# Patient Record
Sex: FEMALE | Race: ASIAN | NOT HISPANIC OR LATINO | URBAN - METROPOLITAN AREA
[De-identification: names, ages, dates, MRNs, and addresses within clinical notes are randomized per-mention and may not be internally consistent; named-entity substitution may affect disease eponyms.]

---

## 2019-07-15 ENCOUNTER — INPATIENT (INPATIENT)
Facility: HOSPITAL | Age: 69
LOS: 3 days | Discharge: HOME | DRG: 644 | End: 2019-07-19
Payer: MEDICARE

## 2019-07-15 VITALS
SYSTOLIC BLOOD PRESSURE: 156 MMHG | DIASTOLIC BLOOD PRESSURE: 88 MMHG | TEMPERATURE: 98 F | HEART RATE: 75 BPM | RESPIRATION RATE: 18 BRPM | OXYGEN SATURATION: 99 %

## 2019-07-15 DIAGNOSIS — Z98.890 OTHER SPECIFIED POSTPROCEDURAL STATES: Chronic | ICD-10-CM

## 2019-07-15 LAB
ALBUMIN SERPL ELPH-MCNC: 4.1 G/DL — SIGNIFICANT CHANGE UP (ref 3.4–5)
ALBUMIN SERPL ELPH-MCNC: 4.4 G/DL — SIGNIFICANT CHANGE UP (ref 3.3–5)
ALP SERPL-CCNC: 45 U/L — SIGNIFICANT CHANGE UP (ref 40–120)
ALP SERPL-CCNC: 45 U/L — SIGNIFICANT CHANGE UP (ref 40–120)
ALT FLD-CCNC: 26 U/L — SIGNIFICANT CHANGE UP (ref 10–45)
ALT FLD-CCNC: 36 U/L — SIGNIFICANT CHANGE UP (ref 12–42)
ANION GAP SERPL CALC-SCNC: 12 MMOL/L — SIGNIFICANT CHANGE UP (ref 5–17)
ANION GAP SERPL CALC-SCNC: 14 MMOL/L — SIGNIFICANT CHANGE UP (ref 5–17)
ANION GAP SERPL CALC-SCNC: 9 MMOL/L — SIGNIFICANT CHANGE UP (ref 9–16)
APPEARANCE UR: CLEAR — SIGNIFICANT CHANGE UP
APTT BLD: 30.3 SEC — SIGNIFICANT CHANGE UP (ref 27.5–36.3)
AST SERPL-CCNC: 37 U/L — SIGNIFICANT CHANGE UP (ref 15–37)
AST SERPL-CCNC: 41 U/L — HIGH (ref 10–40)
BASOPHILS # BLD AUTO: 0.11 K/UL — SIGNIFICANT CHANGE UP (ref 0–0.2)
BASOPHILS NFR BLD AUTO: 0.2 % — SIGNIFICANT CHANGE UP (ref 0–2)
BASOPHILS NFR BLD AUTO: 1.7 % — SIGNIFICANT CHANGE UP (ref 0–2)
BILIRUB SERPL-MCNC: 0.7 MG/DL — SIGNIFICANT CHANGE UP (ref 0.2–1.2)
BILIRUB SERPL-MCNC: 0.8 MG/DL — SIGNIFICANT CHANGE UP (ref 0.2–1.2)
BILIRUB UR-MCNC: NEGATIVE — SIGNIFICANT CHANGE UP
BLD GP AB SCN SERPL QL: NEGATIVE — SIGNIFICANT CHANGE UP
BUN SERPL-MCNC: 15 MG/DL — SIGNIFICANT CHANGE UP (ref 7–23)
BUN SERPL-MCNC: 16 MG/DL — SIGNIFICANT CHANGE UP (ref 7–23)
BUN SERPL-MCNC: 20 MG/DL — SIGNIFICANT CHANGE UP (ref 7–23)
CALCIUM SERPL-MCNC: 9.3 MG/DL — SIGNIFICANT CHANGE UP (ref 8.5–10.5)
CALCIUM SERPL-MCNC: 9.9 MG/DL — SIGNIFICANT CHANGE UP (ref 8.4–10.5)
CALCIUM SERPL-MCNC: 9.9 MG/DL — SIGNIFICANT CHANGE UP (ref 8.4–10.5)
CHLORIDE SERPL-SCNC: 69 MMOL/L — LOW (ref 96–108)
CHLORIDE SERPL-SCNC: 73 MMOL/L — LOW (ref 96–108)
CHLORIDE SERPL-SCNC: 74 MMOL/L — LOW (ref 96–108)
CO2 SERPL-SCNC: 27 MMOL/L — SIGNIFICANT CHANGE UP (ref 22–31)
CO2 SERPL-SCNC: 27 MMOL/L — SIGNIFICANT CHANGE UP (ref 22–31)
CO2 SERPL-SCNC: 28 MMOL/L — SIGNIFICANT CHANGE UP (ref 22–31)
COLOR SPEC: YELLOW — SIGNIFICANT CHANGE UP
CREAT ?TM UR-MCNC: 16 MG/DL — SIGNIFICANT CHANGE UP
CREAT SERPL-MCNC: 0.74 MG/DL — SIGNIFICANT CHANGE UP (ref 0.5–1.3)
CREAT SERPL-MCNC: 0.76 MG/DL — SIGNIFICANT CHANGE UP (ref 0.5–1.3)
CREAT SERPL-MCNC: 0.97 MG/DL — SIGNIFICANT CHANGE UP (ref 0.5–1.3)
DIFF PNL FLD: ABNORMAL
EOSINOPHIL # BLD AUTO: 0.11 K/UL — SIGNIFICANT CHANGE UP (ref 0–0.5)
EOSINOPHIL NFR BLD AUTO: 1.6 % — SIGNIFICANT CHANGE UP (ref 0–6)
EOSINOPHIL NFR BLD AUTO: 1.7 % — SIGNIFICANT CHANGE UP (ref 0–6)
ETHANOL SERPL-MCNC: <3 MG/DL — SIGNIFICANT CHANGE UP
GLUCOSE SERPL-MCNC: 103 MG/DL — HIGH (ref 70–99)
GLUCOSE SERPL-MCNC: 108 MG/DL — HIGH (ref 70–99)
GLUCOSE SERPL-MCNC: 114 MG/DL — HIGH (ref 70–99)
GLUCOSE UR QL: NEGATIVE — SIGNIFICANT CHANGE UP
HCT VFR BLD CALC: 29.8 % — LOW (ref 34.5–45)
HCT VFR BLD CALC: 32.8 % — LOW (ref 34.5–45)
HCT VFR BLD CALC: 33.9 % — LOW (ref 34.5–45)
HGB BLD-MCNC: 11.2 G/DL — LOW (ref 11.5–15.5)
HGB BLD-MCNC: 12.2 G/DL — SIGNIFICANT CHANGE UP (ref 11.5–15.5)
HGB BLD-MCNC: 12.5 G/DL — SIGNIFICANT CHANGE UP (ref 11.5–15.5)
IMM GRANULOCYTES NFR BLD AUTO: 1.9 % — HIGH (ref 0–1.5)
INR BLD: 1.02 — SIGNIFICANT CHANGE UP (ref 0.88–1.16)
KETONES UR-MCNC: NEGATIVE — SIGNIFICANT CHANGE UP
LEUKOCYTE ESTERASE UR-ACNC: NEGATIVE — SIGNIFICANT CHANGE UP
LYMPHOCYTES # BLD AUTO: 1.45 K/UL — SIGNIFICANT CHANGE UP (ref 1–3.3)
LYMPHOCYTES # BLD AUTO: 16.8 % — SIGNIFICANT CHANGE UP (ref 13–44)
LYMPHOCYTES # BLD AUTO: 21.7 % — SIGNIFICANT CHANGE UP (ref 13–44)
MAGNESIUM SERPL-MCNC: 1.3 MG/DL — LOW (ref 1.6–2.6)
MAGNESIUM SERPL-MCNC: 1.8 MG/DL — SIGNIFICANT CHANGE UP (ref 1.6–2.6)
MAGNESIUM SERPL-MCNC: 2 MG/DL — SIGNIFICANT CHANGE UP (ref 1.6–2.6)
MCHC RBC-ENTMCNC: 31.5 PG — SIGNIFICANT CHANGE UP (ref 27–34)
MCHC RBC-ENTMCNC: 31.6 PG — SIGNIFICANT CHANGE UP (ref 27–34)
MCHC RBC-ENTMCNC: 31.9 PG — SIGNIFICANT CHANGE UP (ref 27–34)
MCHC RBC-ENTMCNC: 36.9 GM/DL — HIGH (ref 32–36)
MCHC RBC-ENTMCNC: 37.2 GM/DL — HIGH (ref 32–36)
MCHC RBC-ENTMCNC: 37.6 G/DL — HIGH (ref 32–36)
MCV RBC AUTO: 83.7 FL — SIGNIFICANT CHANGE UP (ref 80–100)
MCV RBC AUTO: 85.6 FL — SIGNIFICANT CHANGE UP (ref 80–100)
MCV RBC AUTO: 85.6 FL — SIGNIFICANT CHANGE UP (ref 80–100)
MONOCYTES # BLD AUTO: 0.64 K/UL — SIGNIFICANT CHANGE UP (ref 0–0.9)
MONOCYTES NFR BLD AUTO: 11.1 % — SIGNIFICANT CHANGE UP (ref 2–14)
MONOCYTES NFR BLD AUTO: 9.6 % — SIGNIFICANT CHANGE UP (ref 2–14)
NEUTROPHILS # BLD AUTO: 4.38 K/UL — SIGNIFICANT CHANGE UP (ref 1.8–7.4)
NEUTROPHILS NFR BLD AUTO: 61.8 % — SIGNIFICANT CHANGE UP (ref 43–77)
NEUTROPHILS NFR BLD AUTO: 68.4 % — SIGNIFICANT CHANGE UP (ref 43–77)
NITRITE UR-MCNC: NEGATIVE — SIGNIFICANT CHANGE UP
OSMOLALITY SERPL: 229 MOSMOL/KG — LOW (ref 280–301)
OSMOLALITY UR: 252 MOSMOL/KG — SIGNIFICANT CHANGE UP (ref 100–650)
PH UR: 7.5 — SIGNIFICANT CHANGE UP (ref 5–8)
PHOSPHATE SERPL-MCNC: 3.8 MG/DL — SIGNIFICANT CHANGE UP (ref 2.5–4.5)
PHOSPHATE SERPL-MCNC: 4.4 MG/DL — SIGNIFICANT CHANGE UP (ref 2.5–4.5)
PLATELET # BLD AUTO: 89 K/UL — LOW (ref 150–400)
PLATELET # BLD AUTO: 94 K/UL — LOW (ref 150–400)
PLATELET # BLD AUTO: 95 K/UL — LOW (ref 150–400)
POTASSIUM SERPL-MCNC: 2.5 MMOL/L — CRITICAL LOW (ref 3.5–5.3)
POTASSIUM SERPL-MCNC: 2.6 MMOL/L — CRITICAL LOW (ref 3.5–5.3)
POTASSIUM SERPL-MCNC: 2.9 MMOL/L — CRITICAL LOW (ref 3.5–5.3)
POTASSIUM SERPL-SCNC: 2.5 MMOL/L — CRITICAL LOW (ref 3.5–5.3)
POTASSIUM SERPL-SCNC: 2.6 MMOL/L — CRITICAL LOW (ref 3.5–5.3)
POTASSIUM SERPL-SCNC: 2.9 MMOL/L — CRITICAL LOW (ref 3.5–5.3)
PROT SERPL-MCNC: 7.3 G/DL — SIGNIFICANT CHANGE UP (ref 6.4–8.2)
PROT SERPL-MCNC: 7.8 G/DL — SIGNIFICANT CHANGE UP (ref 6–8.3)
PROT UR-MCNC: NEGATIVE MG/DL — SIGNIFICANT CHANGE UP
PROTHROM AB SERPL-ACNC: 11.2 SEC — SIGNIFICANT CHANGE UP (ref 10–12.9)
RBC # BLD: 3.56 M/UL — LOW (ref 3.8–5.2)
RBC # BLD: 3.83 M/UL — SIGNIFICANT CHANGE UP (ref 3.8–5.2)
RBC # BLD: 3.96 M/UL — SIGNIFICANT CHANGE UP (ref 3.8–5.2)
RBC # FLD: 11.9 % — SIGNIFICANT CHANGE UP (ref 10.3–14.5)
RBC # FLD: 11.9 % — SIGNIFICANT CHANGE UP (ref 10.3–14.5)
RBC # FLD: 12.1 % — SIGNIFICANT CHANGE UP (ref 10.3–14.5)
RH IG SCN BLD-IMP: POSITIVE — SIGNIFICANT CHANGE UP
SODIUM SERPL-SCNC: 110 MMOL/L — CRITICAL LOW (ref 135–145)
SODIUM SERPL-SCNC: 111 MMOL/L — CRITICAL LOW (ref 132–145)
SODIUM SERPL-SCNC: 112 MMOL/L — CRITICAL LOW (ref 135–145)
SODIUM UR-SCNC: 63 MMOL/L — SIGNIFICANT CHANGE UP
SP GR SPEC: 1.01 — SIGNIFICANT CHANGE UP (ref 1–1.03)
TSH SERPL-MCNC: 1.72 UIU/ML — SIGNIFICANT CHANGE UP (ref 0.35–4.94)
UROBILINOGEN FLD QL: 0.2 E.U./DL — SIGNIFICANT CHANGE UP
WBC # BLD: 6.08 K/UL — SIGNIFICANT CHANGE UP (ref 3.8–10.5)
WBC # BLD: 6.2 K/UL — SIGNIFICANT CHANGE UP (ref 3.8–10.5)
WBC # BLD: 6.7 K/UL — SIGNIFICANT CHANGE UP (ref 3.8–10.5)
WBC # FLD AUTO: 6.08 K/UL — SIGNIFICANT CHANGE UP (ref 3.8–10.5)
WBC # FLD AUTO: 6.2 K/UL — SIGNIFICANT CHANGE UP (ref 3.8–10.5)
WBC # FLD AUTO: 6.7 K/UL — SIGNIFICANT CHANGE UP (ref 3.8–10.5)

## 2019-07-15 PROCEDURE — 93010 ELECTROCARDIOGRAM REPORT: CPT

## 2019-07-15 PROCEDURE — 99291 CRITICAL CARE FIRST HOUR: CPT

## 2019-07-15 PROCEDURE — 70450 CT HEAD/BRAIN W/O DYE: CPT | Mod: 26

## 2019-07-15 RX ORDER — POTASSIUM CHLORIDE 20 MEQ
10 PACKET (EA) ORAL
Refills: 0 | Status: COMPLETED | OUTPATIENT
Start: 2019-07-15 | End: 2019-07-15

## 2019-07-15 RX ORDER — CHLORHEXIDINE GLUCONATE 213 G/1000ML
1 SOLUTION TOPICAL
Refills: 0 | Status: DISCONTINUED | OUTPATIENT
Start: 2019-07-15 | End: 2019-07-17

## 2019-07-15 RX ORDER — SODIUM CHLORIDE 5 G/100ML
30 INJECTION, SOLUTION INTRAVENOUS
Refills: 0 | Status: DISCONTINUED | OUTPATIENT
Start: 2019-07-15 | End: 2019-07-15

## 2019-07-15 RX ORDER — HEPARIN SODIUM 5000 [USP'U]/ML
5000 INJECTION INTRAVENOUS; SUBCUTANEOUS EVERY 8 HOURS
Refills: 0 | Status: DISCONTINUED | OUTPATIENT
Start: 2019-07-15 | End: 2019-07-19

## 2019-07-15 RX ORDER — NIFEDIPINE 30 MG
30 TABLET, EXTENDED RELEASE 24 HR ORAL DAILY
Refills: 0 | Status: DISCONTINUED | OUTPATIENT
Start: 2019-07-15 | End: 2019-07-16

## 2019-07-15 RX ORDER — SODIUM CHLORIDE 5 G/100ML
500 INJECTION, SOLUTION INTRAVENOUS
Refills: 0 | Status: DISCONTINUED | OUTPATIENT
Start: 2019-07-15 | End: 2019-07-15

## 2019-07-15 RX ORDER — POTASSIUM CHLORIDE 20 MEQ
40 PACKET (EA) ORAL ONCE
Refills: 0 | Status: DISCONTINUED | OUTPATIENT
Start: 2019-07-15 | End: 2019-07-15

## 2019-07-15 RX ORDER — POTASSIUM CHLORIDE 20 MEQ
40 PACKET (EA) ORAL ONCE
Refills: 0 | Status: COMPLETED | OUTPATIENT
Start: 2019-07-15 | End: 2019-07-15

## 2019-07-15 RX ORDER — MAGNESIUM SULFATE 500 MG/ML
2 VIAL (ML) INJECTION ONCE
Refills: 0 | Status: COMPLETED | OUTPATIENT
Start: 2019-07-15 | End: 2019-07-15

## 2019-07-15 RX ADMIN — Medication 100 MILLIEQUIVALENT(S): at 15:43

## 2019-07-15 RX ADMIN — HEPARIN SODIUM 5000 UNIT(S): 5000 INJECTION INTRAVENOUS; SUBCUTANEOUS at 21:30

## 2019-07-15 RX ADMIN — Medication 50 GRAM(S): at 15:03

## 2019-07-15 RX ADMIN — SODIUM CHLORIDE 30 MILLILITER(S): 5 INJECTION, SOLUTION INTRAVENOUS at 15:43

## 2019-07-15 RX ADMIN — Medication 100 MILLIEQUIVALENT(S): at 19:25

## 2019-07-15 RX ADMIN — Medication 100 MILLIEQUIVALENT(S): at 18:15

## 2019-07-15 RX ADMIN — Medication 100 MILLIEQUIVALENT(S): at 22:49

## 2019-07-15 RX ADMIN — SODIUM CHLORIDE 30 MILLILITER(S): 5 INJECTION, SOLUTION INTRAVENOUS at 18:42

## 2019-07-15 RX ADMIN — Medication 40 MILLIEQUIVALENT(S): at 21:31

## 2019-07-15 RX ADMIN — Medication 100 MILLIEQUIVALENT(S): at 21:30

## 2019-07-15 RX ADMIN — Medication 100 MILLIEQUIVALENT(S): at 20:30

## 2019-07-15 NOTE — H&P ADULT - HISTORY OF PRESENT ILLNESS
Ms. Thompson is a 69 year old  female with a past medical history significant for depression, HTN, and thrombocytopenia who presented to [facility] ED after losing balance and falling while walking. Patient denies hitting her head. She denies past episodes of dizziness or weakness. Patient states that two days ago she had 3 episodes of non-bilious and non-bloody vomiting after an episode of nausea. Patient states that she re-hydrated with 4 cups of water and felt better. The patient normall drinks 8 cups of seltzer per day and makes sure she stays "very hydrated". Last night the patient complained of trouble sleeping that did not get better with melatonin and was only able to sleep 3 hours after taking one of her prescribed lorazepam. Today she had jury duty and was walking across the walk way when she lost balance and fell. Patient admits to recent nausea, vomiting, and loss of balance. Patient denies headache, fever, changes in vision, dizziness, acid reflux, chest pain, palpitations, shortness of breath, changes in bowel movement or changes in urination. Patient denies changes in recent medication, travel, changes in weight or drinking habits.    ED Vitals: T 98.2 P 75 /88 RR 18 O2 99% on RA  ED Course: In the ED, Hgb 11.2, Sodium 111, Potassium 2.6, Chloride 74, Magnesium 1.3. CT Head: No acute intracranial hemorrhage or transcortical infarct. She received 30 ml of 3%NaCl and 2g of MgSulfate IV. She was admitted to [facility] MICU for workup and treatment of hyponatremia. Ms. Thompson is a 69 year old  female with a past medical history significant for depression, HTN, and thrombocytopenia who presented to [facility] ED after losing balance and falling while walking. Patient denies hitting her head. She denies past episodes of dizziness or weakness. Patient states that two days ago she had 3 episodes of non-bilious and non-bloody vomiting after an episode of nausea. Patient states that she re-hydrated with 4 cups of water and felt better. The patient normall drinks 8 cups of seltzer per day and makes sure she stays "very hydrated". She says that when she gets dehydrated and tired, she makes sure to drink water. to  Last night the patient complained of trouble sleeping that did not get better with melatonin and was only able to sleep 3 hours after taking one of her prescribed lorazepam. Today she had jury duty and was walking across the walk way when she lost balance and fell. Patient admits to recent nausea, vomiting, and loss of balance. Patient denies headache, fever, changes in vision, dizziness, acid reflux, chest pain, palpitations, shortness of breath, changes in bowel movement or changes in urination. Patient denies changes in recent medication, travel, changes in weight or drinking habits.    ED Vitals: T 98.2 P 75 /88 RR 18 O2 99% on RA  ED Course: In the ED, Hgb 11.2, Sodium 111, Potassium 2.6, Chloride 74, Magnesium 1.3. CT Head: No acute intracranial hemorrhage or transcortical infarct. She received 30 ml of 3%NaCl at 30 ml/hr and 2g of MgSulfate IV. She was admitted to [facility] MICU for workup and treatment of hyponatremia. Ms. Thompson is a 69 year old  female with a past medical history significant for depression, HTN, and thrombocytopenia who presented to [facility] ED after losing balance and falling while walking. Patient denies hitting her head or LOC. She denies past episodes of dizziness or weakness. Patient states that two days ago she had 3 episodes of non-bilious and non-bloody vomiting after an episode of nausea. Patient states that she re-hydrated with 4 cups of water and felt better. The patient normall drinks 8 cups of seltzer per day and makes sure she stays "very hydrated". She says that when she gets dehydrated and tired, she makes sure to drink water. to  Last night the patient complained of trouble sleeping that did not get better with melatonin and was only able to sleep 3 hours after taking one of her prescribed lorazepam. Today she had jury duty and was walking across the walk way when she lost balance and fell. Patient admits to recent nausea, vomiting, and loss of balance. Patient denies headache, fever, changes in vision, dizziness, acid reflux, chest pain, palpitations, shortness of breath, changes in bowel movement or changes in urination. Patient denies changes in recent medication, travel, changes in weight or drinking habits.    ED Vitals: T 98.2 P 75 /88 RR 18 O2 99% on RA  ED Course: In the ED, Hgb 11.2, Sodium 111, Potassium 2.6, Chloride 74, Magnesium 1.3. CT Head: No acute intracranial hemorrhage or transcortical infarct. She received 30 ml of 3%NaCl at 30 ml/hr and 2g of MgSulfate IV. She was admitted to [facility] MICU for workup and treatment of hyponatremia. Ms. Thompson is a 69 year old  female with a past medical history significant for depression, HTN, thoracic aortic aneurysm and thrombocytopenia who presented to [facility] ED after losing balance and falling while walking. Patient denies hitting her head or LOC. She denies past episodes of dizziness or weakness. Patient states that two days ago she had 3 episodes of non-bilious and non-bloody vomiting after an episode of nausea. Patient states that she re-hydrated with 4 cups of water and felt better. The patient normall drinks 8 cups of seltzer per day and makes sure she stays "very hydrated". She says that when she gets dehydrated and tired, she makes sure to drink water. to  Last night the patient complained of trouble sleeping that did not get better with melatonin and was only able to sleep 3 hours after taking one of her prescribed lorazepam. Today she had jury duty and was walking across the walk way when she lost balance and fell. Patient admits to recent nausea, vomiting, and loss of balance. Patient denies headache, fever, changes in vision, dizziness, acid reflux, chest pain, palpitations, shortness of breath, changes in bowel movement or changes in urination. Patient denies changes in recent medication, travel, changes in weight or drinking habits.    ED Vitals: T 98.2 P 75 /88 RR 18 O2 99% on RA  ED Course: In the ED, Hgb 11.2, Sodium 111, Potassium 2.6, Chloride 74, Magnesium 1.3. CT Head: No acute intracranial hemorrhage or transcortical infarct. She received 30 ml of 3%NaCl at 30 ml/hr and 2g of MgSulfate IV per Dr. Elias at [facility] and she was admitted to [facility] MICU for workup and treatment of hyponatremia.

## 2019-07-15 NOTE — H&P ADULT - NSICDXPASTMEDICALHX_GEN_ALL_CORE_FT
PAST MEDICAL HISTORY:  Colon polyp     Depression     HTN (hypertension)     Thoracic aortic aneurysm     Thrombocytopenia

## 2019-07-15 NOTE — ED PROVIDER NOTE - OBJECTIVE STATEMENT
70yo female pmh atypical depresison, remote hx alcohol abuse, "platelet issue," HTN BIBEMS for weakness. Pt was walking up stairs on way to jury duty, felt as if she is too weak to walk and sat down on the stairs. She denies falling but states she falls often, unsure why. Denies fevers, chills, n/v/d, abdominal pain, chest pain, LOC, dyspnea, cough, rash. Took lorazepam, melatonin and MAOI last night to sleep. States "I may have taken 1 lorazepam." Denies alcohol use. 70yo female pmh atypical depression, remote hx alcohol abuse, "platelet issue," HTN BIBEMS for weakness. Pt was walking up stairs on way to jury duty, felt as if she is too weak to walk and sat down on the stairs. She denies falling but states she falls often, unsure why. Denies fevers, chills, n/v/d, abdominal pain, chest pain, LOC, dyspnea, cough, rash. Took lorazepam, melatonin and MAOI last night to sleep. States "I may have taken 1 lorazepam." Denies alcohol use.

## 2019-07-15 NOTE — ED ADULT TRIAGE NOTE - CHIEF COMPLAINT QUOTE
here for generalized weakness - states she took her ativan, melatonin and her MAOIs at 2am- denies drinking alcohol, or recreational drugs- FS in the field is 112- presents with slurred speech

## 2019-07-15 NOTE — ED PROVIDER NOTE - CLINICAL SUMMARY MEDICAL DECISION MAKING FREE TEXT BOX
69F with weakness, slurred speech, unknown last known normal, hx platelet disorder, alcoholism. Poor historian, odd affect, history changing regarding falls, HPI. Considering pt high risk for intracranial bleeding, will CT head. Labs with alcohol level. EKG unremarkable. UA.

## 2019-07-15 NOTE — H&P ADULT - NSHPPHYSICALEXAM_GEN_ALL_CORE
.  VITAL SIGNS:  T(C): 36.3 (07-15-19 @ 18:08), Max: 36.8 (07-15-19 @ 13:15)  T(F): 97.4 (07-15-19 @ 18:08), Max: 98.2 (07-15-19 @ 13:15)  HR: 66 (07-15-19 @ 18:00) (66 - 75)  BP: 152/88 (07-15-19 @ 16:19) (152/88 - 171/100)  BP(mean): --  RR: 14 (07-15-19 @ 18:00) (14 - 18)  SpO2: 99% (07-15-19 @ 16:19) (98% - 99%)  Wt(kg): --    PHYSICAL EXAM:    Constitutional: No acute distress, resting in bed comfortably, pleasantly conversant  Head: NC/AT  Eyes: PERRL, EOMI, anicteric sclera, poor accomodation on the left eye  ENT: no nasal discharge; uvula midline, no oropharyngeal erythema or exudates; MMM  Neck: supple; no JVD or thyromegaly  Respiratory: CTA B/L; no W/R/R, no retractions  Cardiac: +S1/S2; RRR; no M/R/G  Gastrointestinal: soft, NT/ND; no rebound or guarding; +BSx4  Back: spine midline, no bony tenderness or step-offs  Extremities: WWP, no clubbing or cyanosis; no peripheral edema  Musculoskeletal: NROM x4; no joint swelling, tenderness or erythema  Vascular: 2+ radial, femoral, DP/PT pulses B/L  Dermatologic: skin warm, dry and intact; multiple scattered ecchymosis on bilateral legs,   Lymphatic: no submandibular or cervical LAD  Neurologic: AAOx3; CNII-XII grossly intact; no focal deficits  Psychiatric: affect and characteristics of appearance, verbalizations, behaviors are appropriate .  VITAL SIGNS:  T(C): 36.3 (07-15-19 @ 18:08), Max: 36.8 (07-15-19 @ 13:15)  T(F): 97.4 (07-15-19 @ 18:08), Max: 98.2 (07-15-19 @ 13:15)  HR: 66 (07-15-19 @ 18:00) (66 - 75)  BP: 152/88 (07-15-19 @ 16:19) (152/88 - 171/100)  BP(mean): --  RR: 14 (07-15-19 @ 18:00) (14 - 18)  SpO2: 99% (07-15-19 @ 16:19) (98% - 99%)  Wt(kg): --    PHYSICAL EXAM:    Constitutional: No acute distress, resting in bed comfortably, pleasantly conversant  Head: NC/AT  Eyes: PERRL, EOMI, anicteric sclera, poor accomodation on the left eye  ENT: no nasal discharge; uvula midline, no oropharyngeal erythema or exudates; MMM  Neck: supple; no JVD or thyromegaly  Respiratory: CTA B/L; no W/R/R, no retractions  Cardiac: +S1/S2; RRR; no M/R/G  Gastrointestinal: soft, NT/ND; no rebound or guarding; +BSx4  Back: spine midline, no bony tenderness or step-offs  Extremities: WWP, no clubbing or cyanosis; no peripheral edema  Musculoskeletal: NROM x4; no joint swelling, tenderness or erythema  Vascular: 2+ radial, femoral, DP/PT pulses B/L  Dermatologic: skin warm, dry and intact; multiple scattered ecchymosis on bilateral legs, and ecchymosis on right elbow  Lymphatic: no submandibular or cervical LAD  Neurologic: AAOx3; CNII-XII grossly intact; no focal deficits

## 2019-07-15 NOTE — H&P ADULT - NSHPLABSRESULTS_GEN_ALL_CORE
.  LABS:                         11.2   6.2   )-----------( 89       ( 15 Jul 2019 13:39 )             29.8     07-15    110<LL>  |  69<L>  |  16  ----------------------------<  114<H>  2.5<LL>   |  27  |  0.74    Ca    9.9      15 Jul 2019 17:30  Phos  3.8     07-15  Mg     2.0     07-15    TPro  7.8  /  Alb  4.4  /  TBili  0.8  /  DBili  x   /  AST  41<H>  /  ALT  26  /  AlkPhos  45  07-15    PT/INR - ( 15 Jul 2019 13:39 )   PT: 11.2 sec;   INR: 1.02          PTT - ( 15 Jul 2019 13:39 )  PTT:30.3 sec  Urinalysis Basic - ( 15 Jul 2019 14:49 )    Color: Yellow / Appearance: Clear / S.010 / pH: x  Gluc: x / Ketone: NEGATIVE  / Bili: NEGATIVE / Urobili: 0.2 E.U./dL   Blood: x / Protein: NEGATIVE mg/dL / Nitrite: NEGATIVE   Leuk Esterase: NEGATIVE / RBC: < 5 /HPF / WBC < 5 /HPF   Sq Epi: x / Non Sq Epi: 0-5 /HPF / Bacteria: Present /HPF    RADIOLOGY, EKG & ADDITIONAL TESTS: Reviewed.

## 2019-07-15 NOTE — ED ADULT NURSE NOTE - NSIMPLEMENTINTERV_GEN_ALL_ED
Implemented All Fall with Harm Risk Interventions:  [place] to call system. Call bell, personal items and telephone within reach. Instruct patient to call for assistance. Room bathroom lighting operational. Non-slip footwear when patient is off stretcher. Physically safe environment: no spills, clutter or unnecessary equipment. Stretcher in lowest position, wheels locked, appropriate side rails in place. Provide visual cue, wrist band, yellow gown, etc. Monitor gait and stability. Monitor for mental status changes and reorient to person, place, and time. Review medications for side effects contributing to fall risk. Reinforce activity limits and safety measures with patient and family. Provide visual clues: red socks.

## 2019-07-15 NOTE — H&P ADULT - ASSESSMENT
Neuro    Renal  #Electrolyte imbalances  #severe hyponatremia, symptomatic  - 3% at 30, recheck at 9    #hypokalemia  - replete as necessary    #hypomagnesemia   Mg 1.3-> 2.0, repleted with 20  - replete as necessary    Cardiovascular  #HTN  - Hold home Spironolactone    Respiratory  # none    Heme Onc  # Hx of thrombocytopenia, unknown   -     Psychiatric  # Hx of Depression  - Hold home MAOi  - Lorazepam for anxiety    DVT PPX  GI PPX    F: 3% NaCl at 30%  E:   N: 69 year old  female with a past medical history significant for depression, HTN, and thrombocytopenia who presented to [facility] ED after losing balance and falling while walking. In the ED, Hgb 11.2, Sodium 111, Potassium 2.6, Chloride 74, Magnesium 1.3.  She received 30 ml of 3%NaCl at 30 ml/hr and 2g of MgSulfate IV. She was admitted to [facility] MICU for workup and treatment of hyponatremia      Neuro  # Fall likely 2/2 severe hyponatremia  CT Head: No acute intracranial hemorrhage or transcortical infarct.  - Currently, AAOx3  - See Renal problems    Renal  #Electrolyte imbalances  #severe hyponatremia, symptomatic  - 3% at 30, recheck at 9  - Correct Na 6-8 mEq within 24 hours, do not exceed 8 mEq  - f/u urine lytes  - f/u urine tox    #hypokalemia  - replete as necessary  - 10 mEq KCl IV    #hypomagnesemia   Mg 1.3-> 2.0, repleted with 20  - replete as necessary    Cardiovascular  #HTN  - Hold home Spironolactone  - Monitor vitals    Respiratory  # none    Heme Onc  # Hx of thrombocytopenia, unknown cause, saw a hematologist in the past    Psychiatric  # Hx of Depression  - Hold home MAOi  - Lorazepam for anxiety    DVT PPX  GI PPX    F: 3% NaCl at 30%  E:   N: 69 year old  female with a past medical history significant for depression, HTN, and thrombocytopenia who presented to [facility] ED after losing balance and falling while walking. In the ED, Hgb 11.2, Sodium 111, Potassium 2.6, Chloride 74, Magnesium 1.3.  She received 30 ml of 3%NaCl at 30 ml/hr and 2g of MgSulfate IV. She was admitted to [facility] MICU for workup and treatment of hyponatremia      Neuro  # Fall likely 2/2 severe hyponatremia  CT Head: No acute intracranial hemorrhage or transcortical infarct.  - Currently, AAOx3  - See Renal problems    Renal  #Electrolyte imbalances  #severe hyponatremia, symptomatic likely 2/2 SIADH  Urine Na 63, Urine Cr 16, Urine osm 252, serum osm 229  - Continue 3% NaCl at 30, q3 hr BMP  - Correct Na 6-8 mEq within 24 hours, do not exceed 8 mEq  - f/u urine tox    #hypokalemia  - replete as necessary  - 10 mEq KCl IV    #hypomagnesemia   Mg 1.3-> 2.0, repleted with 20  - replete as necessary    Cardiovascular  #HTN  - Hold home Spironolactone  - Monitor vitals    Respiratory  # none    Heme Onc  # Hx of thrombocytopenia, unknown cause, saw a hematologist in the past  - AM CBC    Psychiatric  # Hx of Depression  - Hold home MAOi  - Lorazepam for anxiety    DVT PPX:  GI PPX:    F: 3% NaCl at 30%  E: replete as needed  N: NPO    DISPO: MICU 69 year old  female with a past medical history significant for depression, HTN, and thrombocytopenia who presented to [facility] ED after losing balance and falling while walking. In the ED, Hgb 11.2, Sodium 111, Potassium 2.6, Chloride 74, Magnesium 1.3.  She received 30 ml of 3%NaCl at 30 ml/hr and 2g of MgSulfate IV. She was admitted to [facility] MICU for workup and treatment of hyponatremia likely 2/2 SIADH      Neuro  # Fall likely 2/2 severe hyponatremia  CT Head: No acute intracranial hemorrhage or transcortical infarct.  - Currently, AAOx3  - See Renal problems    Renal  #Electrolyte imbalances  #severe hyponatremia, symptomatic likely 2/2 SIADH  Urine Na 63, Urine Cr 16, Urine osm 252, serum osm 229  - Continue 3% NaCl at 30, q3 hr BMP  - Correct Na 6-8 mEq within 24 hours, do not exceed 8 mEq  - f/u urine tox    #hypokalemia  - replete as necessary  - 10 mEq KCl IV    #hypomagnesemia   Mg 1.3-> 2.0, repleted with 20  - replete as necessary    Cardiovascular  #HTN  - Hold home Spironolactone  - Monitor vitals    Respiratory  # none    Heme Onc  # Hx of thrombocytopenia, unknown cause, saw a hematologist in the past  - AM CBC    Psychiatric  # Hx of Depression  - Hold home MAOi  - Lorazepam for anxiety    DVT PPX:  GI PPX:    F: 3% NaCl at 30%  E: replete as needed  N: NPO    DISPO: MICU 69 year old  female with a past medical history significant for depression, HTN, and thrombocytopenia who presented to [facility] ED after losing balance and falling while walking. In the ED, Hgb 11.2, Sodium 111, Potassium 2.6, Chloride 74, Magnesium 1.3.  She received 30 ml of 3%NaCl at 30 ml/hr and 2g of MgSulfate IV. She was admitted to [facility] MICU for workup and treatment of hyponatremia likely 2/2 SIADH      Neuro  # Fall likely 2/2 severe hyponatremia likely 2/2 SIADH  CT Head: No acute intracranial hemorrhage or transcortical infarct. Age related parenchymal volume loss. Patchy periventricular white matter   hypodense focus in left frontal lobe consistent with chronic microangiopathic ischemia.  - Currently, AAOx3  - See Renal problems    Renal  #Electrolyte imbalances  #Severe hyponatremia, symptomatic likely 2/2 SIADH  Urine Na 63, Urine Cr 16, Urine osm 252, serum osm 229  - Continue 3% NaCl at 30, q3 hr BMP  - Correct Na 6-8 mEq within 24 hours, do not exceed 8 mEq  - f/u urine tox    #hypokalemia  - replete as necessary  - 10 mEq KCl IV    #hypomagnesemia   Mg 1.3-> 2.0, repleted with 20  - replete as necessary    Cardiovascular  #HTN  - Hold home Spironolactone  - Monitor vitals    Respiratory  # none    Heme Onc  # Hx of thrombocytopenia, unknown cause, saw a hematologist in the past  - AM CBC, peripheral smear    Psychiatric  # Hx of Depression  - Hold home MAOi  - follow up with psychiatry regarding home meds  - hold lorazepam      DVT PPX: none  GI PPX: none    F: 3% NaCl at 30% ml/hr  E: replete as needed  N: NPO    DISPO: MICU

## 2019-07-15 NOTE — ED PROVIDER NOTE - CARE PLAN
Principal Discharge DX:	Hyponatremia  Secondary Diagnosis:	Hypokalemia  Secondary Diagnosis:	Hypomagnesemia

## 2019-07-15 NOTE — ED PROVIDER NOTE - ATTENDING CONTRIBUTION TO CARE
69F with weakness, slurred speech, unknown last known normal, hx platelet disorder, alcoholism. Poor historian, odd affect.  Found to have hyponatremic with low mag/potassium/chloride.  Slurred speech, slightly lethargic but AAO x 3.  Mag/Potassium replacement ordered.  D/w Dr. Elias who recommends hypertonic saline.  CT brain with chronic microvascular ischemic disease.  Admit to the ICU at [facility].  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

## 2019-07-15 NOTE — ED PROVIDER NOTE - PROGRESS NOTE DETAILS
Na 111, hypokalemic, hypomagnesemic. Will replete mag followed by K. Urine labs are send out here, will defer to ICU as results will occur sooner. Discussed admission, pt agreed. Discussed with MICU attending at [facility]. Na 111, hypokalemic, hypomagnesemic. Will replete mag followed by K. Urine labs are send out here, will defer to ICU as results will occur sooner. Discussed admission, pt agreed. Discussed with MICU attending at [facility], Dr. Sharif, who requested 30cc hypertonic saline as pt is altered from baseline despite being AAOx3.

## 2019-07-15 NOTE — ED ADULT NURSE NOTE - OBJECTIVE STATEMENT
pt is a 68 y/o female presents to the ED for generalized weakness, lack of sleep (only had 3 hours of sleep), and slurred speech with unsteady gait. as per pt, fell this morning  and has ecchymosis to right elbow and lower extremities. denies head injury. as per pt, takes antidepressants, but did not take more than usual.

## 2019-07-15 NOTE — ED PROVIDER NOTE - PHYSICAL EXAMINATION
Neuro: Awake, alert and fully oriented x 4. Slow speech.   Cranial nerves: Visual fields are full. Extraocular movements full. Pupils equal, round, react to light. No nystagmus noted. Fifth nerve function is normal. There is no facial asymmetry noted. CN XII intact.   Motor exam: good tone and bulk throughout. No pronator drift. Muscle strength is 5/5 throughout. Sensory examination is intact.

## 2019-07-16 DIAGNOSIS — D69.6 THROMBOCYTOPENIA, UNSPECIFIED: ICD-10-CM

## 2019-07-16 DIAGNOSIS — F33.42 MAJOR DEPRESSIVE DISORDER, RECURRENT, IN FULL REMISSION: ICD-10-CM

## 2019-07-16 DIAGNOSIS — Z91.89 OTHER SPECIFIED PERSONAL RISK FACTORS, NOT ELSEWHERE CLASSIFIED: ICD-10-CM
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
## 2019-07-16 DIAGNOSIS — E22.2 SYNDROME OF INAPPROPRIATE SECRETION OF ANTIDIURETIC HORMONE: ICD-10-CM

## 2019-07-16 DIAGNOSIS — Z29.9 ENCOUNTER FOR PROPHYLACTIC MEASURES, UNSPECIFIED: ICD-10-CM

## 2019-07-16 DIAGNOSIS — E83.42 HYPOMAGNESEMIA: ICD-10-CM

## 2019-07-16 DIAGNOSIS — E87.1 HYPO-OSMOLALITY AND HYPONATREMIA: ICD-10-CM

## 2019-07-16 DIAGNOSIS — I10 ESSENTIAL (PRIMARY) HYPERTENSION: ICD-10-CM

## 2019-07-16 DIAGNOSIS — E87.6 HYPOKALEMIA: ICD-10-CM

## 2019-07-16 LAB
ANION GAP SERPL CALC-SCNC: 10 MMOL/L — SIGNIFICANT CHANGE UP (ref 5–17)
ANION GAP SERPL CALC-SCNC: 12 MMOL/L — SIGNIFICANT CHANGE UP (ref 5–17)
ANION GAP SERPL CALC-SCNC: 12 MMOL/L — SIGNIFICANT CHANGE UP (ref 5–17)
ANION GAP SERPL CALC-SCNC: 14 MMOL/L — SIGNIFICANT CHANGE UP (ref 5–17)
ANION GAP SERPL CALC-SCNC: 9 MMOL/L — SIGNIFICANT CHANGE UP (ref 5–17)
BUN SERPL-MCNC: 12 MG/DL — SIGNIFICANT CHANGE UP (ref 7–23)
BUN SERPL-MCNC: 13 MG/DL — SIGNIFICANT CHANGE UP (ref 7–23)
BUN SERPL-MCNC: 13 MG/DL — SIGNIFICANT CHANGE UP (ref 7–23)
BUN SERPL-MCNC: 14 MG/DL — SIGNIFICANT CHANGE UP (ref 7–23)
BUN SERPL-MCNC: 19 MG/DL — SIGNIFICANT CHANGE UP (ref 7–23)
CALCIUM SERPL-MCNC: 9.1 MG/DL — SIGNIFICANT CHANGE UP (ref 8.4–10.5)
CALCIUM SERPL-MCNC: 9.4 MG/DL — SIGNIFICANT CHANGE UP (ref 8.4–10.5)
CALCIUM SERPL-MCNC: 9.5 MG/DL — SIGNIFICANT CHANGE UP (ref 8.4–10.5)
CALCIUM SERPL-MCNC: 9.5 MG/DL — SIGNIFICANT CHANGE UP (ref 8.4–10.5)
CALCIUM SERPL-MCNC: 9.8 MG/DL — SIGNIFICANT CHANGE UP (ref 8.4–10.5)
CHLORIDE SERPL-SCNC: 73 MMOL/L — LOW (ref 96–108)
CHLORIDE SERPL-SCNC: 79 MMOL/L — LOW (ref 96–108)
CHLORIDE SERPL-SCNC: 79 MMOL/L — LOW (ref 96–108)
CHLORIDE SERPL-SCNC: 80 MMOL/L — LOW (ref 96–108)
CHLORIDE SERPL-SCNC: 83 MMOL/L — LOW (ref 96–108)
CHLORIDE UR-SCNC: 35 MMOL/L — SIGNIFICANT CHANGE UP
CO2 SERPL-SCNC: 25 MMOL/L — SIGNIFICANT CHANGE UP (ref 22–31)
CO2 SERPL-SCNC: 26 MMOL/L — SIGNIFICANT CHANGE UP (ref 22–31)
CO2 SERPL-SCNC: 26 MMOL/L — SIGNIFICANT CHANGE UP (ref 22–31)
CO2 SERPL-SCNC: 27 MMOL/L — SIGNIFICANT CHANGE UP (ref 22–31)
CO2 SERPL-SCNC: 29 MMOL/L — SIGNIFICANT CHANGE UP (ref 22–31)
CREAT SERPL-MCNC: 0.71 MG/DL — SIGNIFICANT CHANGE UP (ref 0.5–1.3)
CREAT SERPL-MCNC: 0.73 MG/DL — SIGNIFICANT CHANGE UP (ref 0.5–1.3)
CREAT SERPL-MCNC: 0.74 MG/DL — SIGNIFICANT CHANGE UP (ref 0.5–1.3)
CREAT SERPL-MCNC: 0.76 MG/DL — SIGNIFICANT CHANGE UP (ref 0.5–1.3)
CREAT SERPL-MCNC: 0.8 MG/DL — SIGNIFICANT CHANGE UP (ref 0.5–1.3)
GLUCOSE BLDC GLUCOMTR-MCNC: 102 MG/DL — HIGH (ref 70–99)
GLUCOSE BLDC GLUCOMTR-MCNC: 81 MG/DL — SIGNIFICANT CHANGE UP (ref 70–99)
GLUCOSE SERPL-MCNC: 151 MG/DL — HIGH (ref 70–99)
GLUCOSE SERPL-MCNC: 81 MG/DL — SIGNIFICANT CHANGE UP (ref 70–99)
GLUCOSE SERPL-MCNC: 83 MG/DL — SIGNIFICANT CHANGE UP (ref 70–99)
GLUCOSE SERPL-MCNC: 84 MG/DL — SIGNIFICANT CHANGE UP (ref 70–99)
GLUCOSE SERPL-MCNC: 98 MG/DL — SIGNIFICANT CHANGE UP (ref 70–99)
MAGNESIUM SERPL-MCNC: 1.5 MG/DL — LOW (ref 1.6–2.6)
OSMOLALITY UR: 259 MOSMOL/KG — SIGNIFICANT CHANGE UP (ref 100–650)
OSMOLALITY UR: 361 MOSMOL/KG — SIGNIFICANT CHANGE UP (ref 100–650)
POTASSIUM SERPL-MCNC: 3.3 MMOL/L — LOW (ref 3.5–5.3)
POTASSIUM SERPL-MCNC: 3.3 MMOL/L — LOW (ref 3.5–5.3)
POTASSIUM SERPL-MCNC: 3.5 MMOL/L — SIGNIFICANT CHANGE UP (ref 3.5–5.3)
POTASSIUM SERPL-MCNC: 3.7 MMOL/L — SIGNIFICANT CHANGE UP (ref 3.5–5.3)
POTASSIUM SERPL-MCNC: 3.9 MMOL/L — SIGNIFICANT CHANGE UP (ref 3.5–5.3)
POTASSIUM SERPL-SCNC: 3.3 MMOL/L — LOW (ref 3.5–5.3)
POTASSIUM SERPL-SCNC: 3.3 MMOL/L — LOW (ref 3.5–5.3)
POTASSIUM SERPL-SCNC: 3.5 MMOL/L — SIGNIFICANT CHANGE UP (ref 3.5–5.3)
POTASSIUM SERPL-SCNC: 3.7 MMOL/L — SIGNIFICANT CHANGE UP (ref 3.5–5.3)
POTASSIUM SERPL-SCNC: 3.9 MMOL/L — SIGNIFICANT CHANGE UP (ref 3.5–5.3)
POTASSIUM UR-SCNC: 51 MMOL/L — SIGNIFICANT CHANGE UP
SODIUM SERPL-SCNC: 112 MMOL/L — CRITICAL LOW (ref 135–145)
SODIUM SERPL-SCNC: 116 MMOL/L — CRITICAL LOW (ref 135–145)
SODIUM SERPL-SCNC: 117 MMOL/L — CRITICAL LOW (ref 135–145)
SODIUM SERPL-SCNC: 117 MMOL/L — CRITICAL LOW (ref 135–145)
SODIUM SERPL-SCNC: 122 MMOL/L — LOW (ref 135–145)
SODIUM UR-SCNC: 22 MMOL/L — SIGNIFICANT CHANGE UP
SODIUM UR-SCNC: 32 MMOL/L — SIGNIFICANT CHANGE UP

## 2019-07-16 PROCEDURE — 71045 X-RAY EXAM CHEST 1 VIEW: CPT | Mod: 26

## 2019-07-16 PROCEDURE — 99223 1ST HOSP IP/OBS HIGH 75: CPT

## 2019-07-16 PROCEDURE — 99291 CRITICAL CARE FIRST HOUR: CPT

## 2019-07-16 RX ORDER — SODIUM CHLORIDE 5 G/100ML
1000 INJECTION, SOLUTION INTRAVENOUS
Refills: 0 | Status: DISCONTINUED | OUTPATIENT
Start: 2019-07-16 | End: 2019-07-16

## 2019-07-16 RX ORDER — SODIUM CHLORIDE 9 MG/ML
1000 INJECTION, SOLUTION INTRAVENOUS
Refills: 0 | Status: DISCONTINUED | OUTPATIENT
Start: 2019-07-16 | End: 2019-07-17

## 2019-07-16 RX ORDER — POTASSIUM CHLORIDE 20 MEQ
40 PACKET (EA) ORAL ONCE
Refills: 0 | Status: COMPLETED | OUTPATIENT
Start: 2019-07-16 | End: 2019-07-16

## 2019-07-16 RX ORDER — NIFEDIPINE 30 MG
1 TABLET, EXTENDED RELEASE 24 HR ORAL
Qty: 0 | Refills: 0 | DISCHARGE

## 2019-07-16 RX ORDER — DEXTROSE 50 % IN WATER 50 %
25 SYRINGE (ML) INTRAVENOUS ONCE
Refills: 0 | Status: DISCONTINUED | OUTPATIENT
Start: 2019-07-16 | End: 2019-07-19

## 2019-07-16 RX ORDER — TRANYLCYPROMINE SULFATE 10 MG/1
60 TABLET, FILM COATED ORAL EVERY 24 HOURS
Refills: 0 | Status: DISCONTINUED | OUTPATIENT
Start: 2019-07-17 | End: 2019-07-19

## 2019-07-16 RX ORDER — INSULIN LISPRO 100/ML
VIAL (ML) SUBCUTANEOUS
Refills: 0 | Status: DISCONTINUED | OUTPATIENT
Start: 2019-07-16 | End: 2019-07-16

## 2019-07-16 RX ORDER — SODIUM CHLORIDE 5 G/100ML
500 INJECTION, SOLUTION INTRAVENOUS
Refills: 0 | Status: DISCONTINUED | OUTPATIENT
Start: 2019-07-16 | End: 2019-07-16

## 2019-07-16 RX ORDER — MAGNESIUM SULFATE 500 MG/ML
2 VIAL (ML) INJECTION ONCE
Refills: 0 | Status: COMPLETED | OUTPATIENT
Start: 2019-07-16 | End: 2019-07-16

## 2019-07-16 RX ORDER — POTASSIUM CHLORIDE 20 MEQ
40 PACKET (EA) ORAL ONCE
Refills: 0 | Status: DISCONTINUED | OUTPATIENT
Start: 2019-07-16 | End: 2019-07-16

## 2019-07-16 RX ORDER — DEXTROSE 50 % IN WATER 50 %
15 SYRINGE (ML) INTRAVENOUS ONCE
Refills: 0 | Status: DISCONTINUED | OUTPATIENT
Start: 2019-07-16 | End: 2019-07-19

## 2019-07-16 RX ORDER — NIFEDIPINE 30 MG
30 TABLET, EXTENDED RELEASE 24 HR ORAL EVERY 24 HOURS
Refills: 0 | Status: DISCONTINUED | OUTPATIENT
Start: 2019-07-16 | End: 2019-07-19

## 2019-07-16 RX ORDER — GLUCAGON INJECTION, SOLUTION 0.5 MG/.1ML
1 INJECTION, SOLUTION SUBCUTANEOUS ONCE
Refills: 0 | Status: DISCONTINUED | OUTPATIENT
Start: 2019-07-16 | End: 2019-07-19

## 2019-07-16 RX ORDER — DEXTROSE 50 % IN WATER 50 %
12.5 SYRINGE (ML) INTRAVENOUS ONCE
Refills: 0 | Status: DISCONTINUED | OUTPATIENT
Start: 2019-07-16 | End: 2019-07-19

## 2019-07-16 RX ADMIN — HEPARIN SODIUM 5000 UNIT(S): 5000 INJECTION INTRAVENOUS; SUBCUTANEOUS at 21:48

## 2019-07-16 RX ADMIN — Medication 40 MILLIEQUIVALENT(S): at 13:59

## 2019-07-16 RX ADMIN — HEPARIN SODIUM 5000 UNIT(S): 5000 INJECTION INTRAVENOUS; SUBCUTANEOUS at 06:25

## 2019-07-16 RX ADMIN — Medication 40 MILLIEQUIVALENT(S): at 01:32

## 2019-07-16 RX ADMIN — Medication 1 MILLIGRAM(S): at 21:47

## 2019-07-16 RX ADMIN — Medication 1 MILLIGRAM(S): at 17:39

## 2019-07-16 RX ADMIN — Medication 30 MILLIGRAM(S): at 00:08

## 2019-07-16 RX ADMIN — SODIUM CHLORIDE 20 MILLILITER(S): 5 INJECTION, SOLUTION INTRAVENOUS at 15:37

## 2019-07-16 RX ADMIN — HEPARIN SODIUM 5000 UNIT(S): 5000 INJECTION INTRAVENOUS; SUBCUTANEOUS at 13:59

## 2019-07-16 RX ADMIN — Medication 30 MILLIGRAM(S): at 23:12

## 2019-07-16 RX ADMIN — SODIUM CHLORIDE 50 MILLILITER(S): 5 INJECTION, SOLUTION INTRAVENOUS at 01:59

## 2019-07-16 RX ADMIN — Medication 50 GRAM(S): at 13:59

## 2019-07-16 NOTE — CONSULT NOTE ADULT - ASSESSMENT
69F PMH  depression, HTN, thoracic aortic aneurysm and thrombocytopenia who presented to [facility] ED after fall due to generalized weakness, found to have Sodium 111, Potassium 2.6, Chloride 74, Magnesium 1.3 admitted to MICU for symptomatic hyponatremia. Now s/p approprate correction with 3% NaCl to Na 117; however with concern that Pt is still symptomatic after weakness walking this AM. Renal consulted for further management     #Severe Chronic Euvolemic Hypotonic Hypernatremia  -Chronicity unclear, but Pt has been having symptoms since 7/12 with last known Na 130 in Oct 2018 at PCP   -At this time etio likely multifactorial suspect component of SIADH (due to MAOI, less likely CA with recent LD-CT chest WNL, UTD with CA screening, TSH WNL, denies pain) in the setting of thiazide use given low Cl, K, Mg  Plan   -Repeat stat BMP this afternoon  -Would not advise additional 3% NaCl at this time to avoid overcorrection   -Agree with plan for correct Na 6-8 mEq within 24 hours, do not exceed 8 mEq , c/w q3hr BMP      #Essential HTN   -On [place]-HCTZ for past two years, BP above goal this admission   -C/w Nifedipine 30mg 69F PMH  depression, HTN, thoracic aortic aneurysm and thrombocytopenia who presented to [facility] ED after fall due to generalized weakness, found to have Sodium 111, Potassium 2.6, Chloride 74, Magnesium 1.3 admitted to MICU for symptomatic hyponatremia. Now s/p approprate correction with 3% NaCl to Na 117; however with concern that Pt is still symptomatic after weakness walking this AM. Renal consulted for further management     #Severe Chronic Euvolemic Hypotonic Hypernatremia  -Chronicity unclear, but Pt has been having symptoms since 7/12 with last known Na 130 in Oct 2018 at PCP   -At this time etio likely multifactorial suspect component of SIADH (due to MAOI, less likely CA with recent LD-CT chest WNL, UTD with CA screening, TSH WNL, denies pain) in the setting of thiazide use given low Cl, K, Mg  Plan   -Repeat stat BMP this afternoon  -Would not advise additional 3% NaCl at this time to avoid overcorrection   -Agree with plan for correct Na 6-8 mEq within 24 hours, do not exceed 8 mEq , c/w q3hr BMP, strict i/o      #Essential HTN   -On [place]-HCTZ for past two years, BP above goal this admission   -C/w Nifedipine 30mg     #Hypokalemia, hypophosphatemia   -Replete to goal PRN

## 2019-07-16 NOTE — BEHAVIORAL HEALTH ASSESSMENT NOTE - NSBHCONSULTMEDNEW_PSY_A_CORE
Problem: Patient Care Overview (Adult)  Goal: Plan of Care Review  Outcome: Ongoing (interventions implemented as appropriate)    06/13/17 1037   Coping/Psychosocial Response Interventions   Plan Of Care Reviewed With patient   Patient Care Overview   Progress improving   Outcome Evaluation   Outcome Summary/Follow up Plan patient is tolerating antibiotic therapy;         Problem: Fall Risk (Adult)  Goal: Absence of Falls  Outcome: Ongoing (interventions implemented as appropriate)    Problem: Infection, Risk/Actual (Adult)  Goal: Infection Prevention/Resolution  Outcome: Ongoing (interventions implemented as appropriate)    Problem: Skin Integrity Impairment, Risk/Actual (Adult)  Goal: Skin Integrity/Wound Healing  Outcome: Ongoing (interventions implemented as appropriate)       no

## 2019-07-16 NOTE — PROGRESS NOTE ADULT - PROBLEM SELECTOR PLAN 1
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   - Currently AAOx3  - Current Na at goal of 117, pt currently 122  - hypertonic NaCl held   - Goal 24 hr Na 123  - BMP q3  - UO increased to 100-370/hr from 8am-1pm suggesting ADH effect waning  - low Ur Na   - f/u renal reccs

## 2019-07-16 NOTE — PROGRESS NOTE ADULT - ASSESSMENT
69 year old  female with a past medical history significant for depression, HTN, and thrombocytopenia who presented to [facility] ED after losing balance and falling while walking. She was admitted to [facility] MICU for workup and treatment of hyponatremia likely 2/2 SIADH; hyponatremia currently stabilized at 122 from 110 on admission. Patient transferred to [facility] for further management.

## 2019-07-16 NOTE — BEHAVIORAL HEALTH ASSESSMENT NOTE - NSBHADMITCOUNSEL_PSY_A_CORE
instructions for management, treatment and follow up/risk factor reduction/client/family/caregiver education/risks and benefits of treatment options/diagnostic results/impressions and/or recommended studies/importance of adherence to chosen treatment/prognosis

## 2019-07-16 NOTE — PROGRESS NOTE ADULT - PROBLEM SELECTOR PLAN 9
Fluids:  Electrolytes;  Nutrition: regular diet   DVT: none  Dispo: [facility]  Code: Full 1) PCP Contacted on Admission: (Y/N) --> Name & Phone #:  2) Date of Contact with PCP:  3) PCP Contacted at Discharge: (Y/N)  4) Summary of Handoff Given to PCP:   5) Post-Discharge Appointment Date and Location:

## 2019-07-16 NOTE — PROGRESS NOTE ADULT - PROBLEM SELECTOR PLAN 2
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   - Current Na at goal of 117, pt currently 122

## 2019-07-16 NOTE — PROGRESS NOTE ADULT - PROBLEM SELECTOR PLAN 6
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   - Currently AAOx3  - Current Na at goal of 117, pt currently 122  - hypertonic NaCl held   - Goal 24 hr Na 123  - BMP q3  - UO increased to 100-370/hr from 8am-1pm suggesting ADH effect waning  - low Ur Na   - f/u renal reccs History of depression   - follow up with psychiatry regarding home meds  - hold lorazepam

## 2019-07-16 NOTE — CONSULT NOTE ADULT - ATTENDING COMMENTS
Symptomatic presumed chronic hypoosmolar euvolemic hyponatremia suspect 2/2 thiazide diuretic rather than denovo SIADH given only modest elevation in Uosm and relatively low Ur Na, as well as concurrent hypokalemia, hypomagnesemia and hypochloremia. UO increased to 100-370/hr from 8am-1pm suggesting ADH effect waning, 24hrs after Na 111 on admission Na results at 117 which is exactly at goal, concern may overcorrect if UO remains elevated and hypertonic given, recommend stat repeat BMP, hold hypertonic. Goal for next 24hrs is 123 by early afternoon tomorrow.

## 2019-07-16 NOTE — BEHAVIORAL HEALTH ASSESSMENT NOTE - PATIENT'S CHIEF COMPLAINT
"Going off my parnate is a big deal. I've been on it for 10 years. Nothing else has worked. I've even needed ECT in the past."

## 2019-07-16 NOTE — BEHAVIORAL HEALTH ASSESSMENT NOTE - NSBHCONSULTRECOMMENDOTHER_PSY_A_CORE FT
1)Pt has been on parnate for 10 years and has severe recurrent depression with suicide attempts and hospitalizations and has failed multiple trials of antidepressants and has required ECT. Her outpatient psychiatrist and I recommend continuation of parnate which patient is very much in agreement with. I understand no other source of SIADH found. Recommend decreasing dose of parnate from 90 mg to 60 mg and continued treatment to manage electrolyte abnormalities that occur/continue.     2)If discontinuation of parnate is absolutely necessary would taper off rather than abruptly discontinue. Literature search suggested remeron is antidepressant with lowest risk of SIADH. Pt seems never to have tried remeron. There has to be a two week washout period after discontinuation of parnate before remeron or any other antidepressant could be started.     3)I will continue to follow.

## 2019-07-16 NOTE — PROVIDER CONTACT NOTE (CRITICAL VALUE NOTIFICATION) - DATE AND TIME:
Ongoing SW/CM Assessment/Plan of Care Note     See SW/CM flowsheets for goals and other objective data.    Patient/Family discharge goal (s):  Goal #1: Psychosocial needs assessed  Goal #2: Establish present or future health care decison-maker       PT Recommendation:  Recommendation for Discharge: PT: Post acute therapy    OT Recommendation:  Recommendations for Discharge: OT: Sub-acute nursing home    SLP Recommendation:       Disposition:  Planned Discharge Destination: Rehabilitation/Skilled Care    Progress note:   Chart reviewed, pt discussed in OFT rounds held earlier today and reassessment completed.  Met briefly with pt this pm and also spoke to his RN.  VAC currently off due to leaking last night.  Uncertain when VAC will be placed again.  Pt remains on IV antibiotics and continues to receive HBO treatments.  Today was treatment number 19 out of an anticipated 22 treatments.  Uncertain if wound care will want to continue with the HBO treatments after the 22nd treatment.  Did meet briefly with pt this pm.  Introduced self and social work role.  Brought up possible LTAC placement but do need to know when HBO treatments end.  Pt has had a 3 night stay in the ICU unit.  Therefore, pt would qualify for referral to [facility].  However, pt lives in [facility] [place] might be more convenient and closer for family.  This discipline will follow up with pt in the next day or two to see if LTAC referral is appropriate at that time and if wound care is looking at discontinuing treatments after the 22nd treatment or if they will be doing more.  If appropriate for LTAC referral, will provide pt and his wife with the options available in the area.  Follow up planned.         16-Jul-2019 04:48

## 2019-07-16 NOTE — PROGRESS NOTE ADULT - ASSESSMENT
69 year old  female with a past medical history significant for depression, HTN, and thrombocytopenia who presented to [facility] ED after losing balance and falling while walking. In the ED, Hgb 11.2, Sodium 111, Potassium 2.6, Chloride 74, Magnesium 1.3.  She received 30 ml of 3%NaCl at 30 ml/hr and 2g of MgSulfate IV. She was admitted to [facility] MICU for workup and treatment of hyponatremia likely 2/2 SIADH      Neuro  # Fall likely 2/2 severe hyponatremia likely 2/2 SIADH  CT Head: No acute intracranial hemorrhage or transcortical infarct. Age related parenchymal volume loss. Patchy periventricular white matter   hypodense focus in left frontal lobe consistent with chronic microangiopathic ischemia.  - Currently, AAOx3  - See Renal problems    Renal  #Electrolyte imbalances  #Severe hyponatremia, symptomatic likely 2/2 SIADH  Urine Na 63, Urine Cr 16, Urine osm 252, serum osm 229  - Continue 3% NaCl at 30, q3 hr BMP  - Correct Na 6-8 mEq within 24 hours, do not exceed 8 mEq  - f/u urine tox    #hypokalemia  - replete as necessary  - 10 mEq KCl IV    #hypomagnesemia   Mg 1.3-> 2.0, repleted with 20  - replete as necessary    Cardiovascular  #HTN  - Hold home Spironolactone  - Monitor vitals    Respiratory  # none    Heme Onc  # Hx of thrombocytopenia, unknown cause, saw a hematologist in the past  - AM CBC, peripheral smear    Psychiatric  # Hx of Depression  - Hold home MAOi  - follow up with psychiatry regarding home meds  - hold lorazepam      DVT PPX: none  GI PPX: none    F: 3% NaCl at 30% ml/hr  E: replete as needed  N: NPO    DISPO: MICU 69 year old  female with a past medical history significant for depression, HTN, and thrombocytopenia who presented to [facility] ED after losing balance and falling while walking. In the ED, Hgb 11.2, Sodium 111, Potassium 2.6, Chloride 74, Magnesium 1.3.  She received 30 ml of 3%NaCl at 30 ml/hr and 2g of MgSulfate IV. Sodium corrected from 111->117 in 24 hours. She was admitted to [facility] MICU for workup and treatment of hyponatremia likely 2/2 SIADH. Her sodium has corrected from 111 to 117 in 23 hours. She is hemodynamically stable without change to neurological status, improving sodium, and can be stepped down to [facility] for further management.    Neuro  # Fall likely 2/2 severe symptomatic hyponatremia, euvolemic likely 2/2 SIADH  CT Head: No acute intracranial hemorrhage or transcortical infarct. Age related parenchymal volume loss. Patchy periventricular white matter   hypodense focus in left frontal lobe consistent with chronic microangiopathic ischemia.   - Currently, AAOx3  - See Renal problems    Renal  #Electrolyte imbalances  #Severe symptomatic hyponatremia, euvolemic likely 2/2 SIADH  Urine Na 63, Urine Cr 16, Urine osm 252, serum osm 229  Correct Na 6-8 mEq within 24 hours, do not exceed 8 mEq  - Na trended overnight from admission. 111-> 110-> 112-> 112-> 116-> 117-> 117  - Hold 2% NaCl at 30 cc/hr  - renal consulted, f/u recommendations  - q4 hr BMP, next at 4pm  - f/u repeat urine lytes on 7/16  - f/u urine tox    #hypokalemia  - replete as necessary    #hypomagnesemia   Mg 1.3-> 2.0, repleted with 20    Cardiovascular  #HTN, episode of -180's overnight  - Hold home Spironolactone  - Procardia XL 30mg qd  - Monitor vitals      #Urinary retention likely from MAOi use  - Received ocampo overnight on 7/14 for urinary rentention of 450, dc'ed on 7/16  -q6 bladder scans and ocampo/straight cath protocol    Respiratory  # none    Heme Onc  # Hx of thrombocytopenia, unknown cause, saw a hematologist in the past  - Heme/Onc consulted, likely can follow up outpatient    Psychiatric  # Hx of Depression  - Hold home MAOi (Parnate) in setting of SIADH  - Per her psychiatrist Dr. Najera, SIADH likely not from parnate, can consider restarting parnate and ativan 1mg for anxiety  - follow up with [facility] psychiatry regarding home meds in the hospital    DVT PPX: SQH 5000 U q8h  GI PPX: none    F: 3% NaCl at 30% ml/hr  E: replete as needed  N: Regular    DISPO: Stepdown to [facility]

## 2019-07-16 NOTE — CONSULT NOTE ADULT - SUBJECTIVE AND OBJECTIVE BOX
[facility] DIVISION OF KIDNEY DISEASES AND HYPERTENSION -- INITIAL CONSULT NOTE  --------------------------------------------------------------------------------  HPI: 69 year old  female with a past medical history significant for depression, HTN, thoracic aortic aneurysm and thrombocytopenia who presented to [facility] ED after losing balance and falling while walking. Pt states she was stumbled up the stairs and slid against the side, landing on her bottom without LOC/head trauma. EMS called to the scene. Attributed the episode due to generalized weakness and the hot weather, no neurological or syncopal reasons elicited. Of note, Pt         PAST HISTORY  --------------------------------------------------------------------------------  PAST MEDICAL & SURGICAL HISTORY:  Thoracic aortic aneurysm  Colon polyp  Thrombocytopenia  Depression  HTN (hypertension)  History of eye surgery    FAMILY HISTORY:  Family history of colon cancer in mother    PAST SOCIAL HISTORY:    ALLERGIES & MEDICATIONS  --------------------------------------------------------------------------------  Allergies    No Known Allergies    Intolerances      Standing Inpatient Medications  chlorhexidine 2% Cloths 1 Application(s) Topical <User Schedule>  dextrose 5%. 1000 milliLiter(s) IV Continuous <Continuous>  dextrose 50% Injectable 12.5 Gram(s) IV Push once  dextrose 50% Injectable 25 Gram(s) IV Push once  dextrose 50% Injectable 25 Gram(s) IV Push once  heparin  Injectable 5000 Unit(s) SubCutaneous every 8 hours  NIFEdipine XL 30 milliGRAM(s) Oral every 24 hours  sodium chloride 3%. 500 milliLiter(s) IV Continuous <Continuous>    PRN Inpatient Medications  dextrose 40% Gel 15 Gram(s) Oral once PRN  glucagon  Injectable 1 milliGRAM(s) IntraMuscular once PRN      REVIEW OF SYSTEMS  --------------------------------------------------------------------------------  Gen: No weight changes, fatigue, fevers/chills, weakness  Skin: No rashes  Head/Eyes/Ears/Mouth: No headache; Normal hearing; Normal vision w/o blurriness; No sinus pain/discomfort, sore throat  Respiratory: No dyspnea, cough, wheezing, hemoptysis  CV: No chest pain, PND, orthopnea  GI: No abdominal pain, diarrhea, constipation, nausea, vomiting, melena, hematochezia  : No increased frequency, dysuria, hematuria, nocturia  MSK: No joint pain/swelling; no back pain; no edema  Neuro: No dizziness/lightheadedness, weakness, seizures, numbness, tingling  Heme: No easy bruising or bleeding  Endo: No heat/cold intolerance  Psych: No significant nervousness, anxiety, stress, depression    All other systems were reviewed and are negative, except as noted.    VITALS/PHYSICAL EXAM  --------------------------------------------------------------------------------  T(C): 36.4 (07-16-19 @ 14:01), Max: 36.7 (07-16-19 @ 06:03)  HR: 68 (07-16-19 @ 13:00) (60 - 68)  BP: 111/67 (07-16-19 @ 13:00) (104/57 - 186/97)  RR: 48 (07-16-19 @ 13:00) (10 - 48)  SpO2: 99% (07-16-19 @ 13:00) (97% - 100%)  Wt(kg): --  Height (cm): 162.56 (07-15-19 @ 13:34)  Weight (kg): 56.4 (07-15-19 @ 13:34)  BMI (kg/m2): 21.3 (07-15-19 @ 13:34)  BSA (m2): 1.6 (07-15-19 @ 13:34)      07-15-19 @ 07:01  -  07-16-19 @ 07:00  --------------------------------------------------------  IN: 540 mL / OUT: 2780 mL / NET: -2240 mL    07-16-19 @ 07:01  -  07-16-19 @ 15:23  --------------------------------------------------------  IN: 0 mL / OUT: 945 mL / NET: -945 mL      Physical Exam:  	Gen: NAD, well-appearing  	HEENT: PERRL, supple neck, clear oropharynx  	Pulm: CTA B/L  	CV: RRR, S1S2; no rub  	Back: No spinal or CVA tenderness; no sacral edema  	Abd: +BS, soft, nontender/nondistended  	: No suprapubic tenderness  	UE: Warm, FROM, no clubbing, intact strength; no edema; no asterixis  	LE: Warm, FROM, no clubbing, intact strength; no edema  	Neuro: No focal deficits, intact gait  	Psych: Normal affect and mood  	Skin: Warm, without rashes  	Vascular access:    LABS/STUDIES  --------------------------------------------------------------------------------              12.2   6.70  >-----------<  95       [07-15-19 @ 20:58]              32.8     117  |  79  |  12  ----------------------------<  81      [07-16-19 @ 12:47]  3.3   |  26  |  0.76        Ca     9.5     [07-16-19 @ 12:47]      Mg     1.5     [07-16-19 @ 07:32]      Phos  3.8     [07-15-19 @ 17:30]    TPro  7.8  /  Alb  4.4  /  TBili  0.8  /  DBili  x   /  AST  41  /  ALT  26  /  AlkPhos  45  [07-15-19 @ 17:30]    PT/INR: PT 11.2 , INR 1.02       [07-15-19 @ 13:39]  PTT: 30.3       [07-15-19 @ 13:39]    Serum Osmolality 229      [07-15-19 @ 17:30]    Creatinine Trend:  SCr 0.76 [07-16 @ 12:47]  SCr 0.71 [07-16 @ 07:32]  SCr 0.74 [07-16 @ 04:09]  SCr 0.73 [07-16 @ 00:23]  SCr 0.76 [07-15 @ 20:58]    Urinalysis - [07-15-19 @ 14:49]      Color Yellow / Appearance Clear / SG 1.010 / pH 7.5      Gluc NEGATIVE / Ketone NEGATIVE  / Bili NEGATIVE / Urobili 0.2       Blood Trace / Protein NEGATIVE / Leuk Est NEGATIVE / Nitrite NEGATIVE      RBC < 5 / WBC < 5 / Hyaline  / Gran  / Sq Epi  / Non Sq Epi 0-5 / Bacteria Present    Urine Creatinine 16      [07-15-19 @ 18:22]  Urine Sodium 32      [07-16-19 @ 12:47]  Urine Osmolality 259      [07-16-19 @ 12:47]    TSH 1.716      [07-15-19 @ 20:58] [facility] DIVISION OF KIDNEY DISEASES AND HYPERTENSION -- INITIAL CONSULT NOTE  --------------------------------------------------------------------------------  HPI: 69 year old  female with a past medical history significant for depression, HTN, thoracic aortic aneurysm and thrombocytopenia who presented to [facility] ED after losing balance and falling while walking. Pt states she was stumbled up the stairs and slid against the side, landing on her bottom without LOC/head trauma. EMS called to the scene. Attributed the episode due to generalized weakness and the hot weather, no neurological or syncopal reasons elicited. Of note, Pt endorses feeling unusually weak after her massage three days before admission then slept most of the day, then NBNB vomiting the day after.     Renal specific hx: has been told of "urine tubes being enlarged" (?poss hydronephrosis??) on routine gynecological sonograms (for r/o CA) without symptoms or interventions. No CKD or electrolyte abnormality history. Takes Grayson-HZTC for HTN for past two years and was told to drink a cup of pedialyte daily to combat electrolyte abnormalities. Also drinks 8 cups of seltzer water daily. Does not eat much but tries to eat healthy diet, no weight changes.     Last labs oct 2018 (per convo with Dr. Coto's office): Na 130, K 3.5, Cl 94, CO2 28, BUN 22 Cre 1.0      PAST HISTORY  --------------------------------------------------------------------------------  PAST MEDICAL & SURGICAL HISTORY:  Thoracic aortic aneurysm  Colon polyp  Thrombocytopenia  Depression  HTN (hypertension)  History of eye surgery    FAMILY HISTORY:  Family history of colon cancer in mother    PAST SOCIAL HISTORY:    ALLERGIES & MEDICATIONS  --------------------------------------------------------------------------------  Allergies    No Known Allergies    Intolerances      Standing Inpatient Medications  chlorhexidine 2% Cloths 1 Application(s) Topical <User Schedule>  dextrose 5%. 1000 milliLiter(s) IV Continuous <Continuous>  dextrose 50% Injectable 12.5 Gram(s) IV Push once  dextrose 50% Injectable 25 Gram(s) IV Push once  dextrose 50% Injectable 25 Gram(s) IV Push once  heparin  Injectable 5000 Unit(s) SubCutaneous every 8 hours  NIFEdipine XL 30 milliGRAM(s) Oral every 24 hours  sodium chloride 3%. 500 milliLiter(s) IV Continuous <Continuous>    PRN Inpatient Medications  dextrose 40% Gel 15 Gram(s) Oral once PRN  glucagon  Injectable 1 milliGRAM(s) IntraMuscular once PRN      REVIEW OF SYSTEMS  --------------------------------------------------------------------------------  Gen: No weight changes, fatigue, fevers/chills, weakness  Skin: No rashes  Head/Eyes/Ears/Mouth: No headache; Normal hearing; Normal vision w/o blurriness; No sinus pain/discomfort, sore throat  Respiratory: No dyspnea, cough, wheezing, hemoptysis  CV: No chest pain, PND, orthopnea  GI: No abdominal pain, diarrhea, constipation, nausea, vomiting, melena, hematochezia  : No increased frequency, dysuria, hematuria, nocturia  MSK: No joint pain/swelling; no back pain; no edema  Neuro: No dizziness/lightheadedness, weakness, seizures, numbness, tingling  Heme: No easy bruising or bleeding  Endo: No heat/cold intolerance  Psych: No significant nervousness, anxiety, stress, depression    All other systems were reviewed and are negative, except as noted.    VITALS/PHYSICAL EXAM  --------------------------------------------------------------------------------  T(C): 36.4 (07-16-19 @ 14:01), Max: 36.7 (07-16-19 @ 06:03)  HR: 68 (07-16-19 @ 13:00) (60 - 68)  BP: 111/67 (07-16-19 @ 13:00) (104/57 - 186/97)  RR: 48 (07-16-19 @ 13:00) (10 - 48)  SpO2: 99% (07-16-19 @ 13:00) (97% - 100%)  Wt(kg): --  Height (cm): 162.56 (07-15-19 @ 13:34)  Weight (kg): 56.4 (07-15-19 @ 13:34)  BMI (kg/m2): 21.3 (07-15-19 @ 13:34)  BSA (m2): 1.6 (07-15-19 @ 13:34)      07-15-19 @ 07:01  -  07-16-19 @ 07:00  --------------------------------------------------------  IN: 540 mL / OUT: 2780 mL / NET: -2240 mL    07-16-19 @ 07:01  -  07-16-19 @ 15:23  --------------------------------------------------------  IN: 0 mL / OUT: 945 mL / NET: -945 mL      Physical Exam:  	Gen: NAD, well-appearing, tired appearing   	HEENT: PERRL, supple neck, clear oropharynx, dry   	Pulm: CTA B/L  	CV: RRR, S1S2; no rub  	Back: No spinal or CVA tenderness; no sacral edema  	Abd: +BS, soft, nontender/nondistended  	: No suprapubic tenderness  	UE: Warm, FROM, no clubbing, intact strength; no edema; no asterixis  	LE: Warm, FROM, no clubbing, intact strength; no edema  	Neuro: No focal deficits, intact gait; strength 5/5 in UE, reflexes 2+ UE/LE, strength 4/5 in LE   	Psych: Normal affect and mood  	Skin: Warm, without rashes  	Vascular access:    LABS/STUDIES  --------------------------------------------------------------------------------              12.2   6.70  >-----------<  95       [07-15-19 @ 20:58]              32.8     117  |  79  |  12  ----------------------------<  81      [07-16-19 @ 12:47]  3.3   |  26  |  0.76        Ca     9.5     [07-16-19 @ 12:47]      Mg     1.5     [07-16-19 @ 07:32]      Phos  3.8     [07-15-19 @ 17:30]    TPro  7.8  /  Alb  4.4  /  TBili  0.8  /  DBili  x   /  AST  41  /  ALT  26  /  AlkPhos  45  [07-15-19 @ 17:30]    PT/INR: PT 11.2 , INR 1.02       [07-15-19 @ 13:39]  PTT: 30.3       [07-15-19 @ 13:39]    Serum Osmolality 229      [07-15-19 @ 17:30]    Creatinine Trend:  SCr 0.76 [07-16 @ 12:47]  SCr 0.71 [07-16 @ 07:32]  SCr 0.74 [07-16 @ 04:09]  SCr 0.73 [07-16 @ 00:23]  SCr 0.76 [07-15 @ 20:58]    Urinalysis - [07-15-19 @ 14:49]      Color Yellow / Appearance Clear / SG 1.010 / pH 7.5      Gluc NEGATIVE / Ketone NEGATIVE  / Bili NEGATIVE / Urobili 0.2       Blood Trace / Protein NEGATIVE / Leuk Est NEGATIVE / Nitrite NEGATIVE      RBC < 5 / WBC < 5 / Hyaline  / Gran  / Sq Epi  / Non Sq Epi 0-5 / Bacteria Present    Urine Creatinine 16      [07-15-19 @ 18:22]  Urine Sodium 32      [07-16-19 @ 12:47]  Urine Osmolality 259      [07-16-19 @ 12:47]    TSH 1.716      [07-15-19 @ 20:58]

## 2019-07-16 NOTE — PROGRESS NOTE ADULT - PROBLEM SELECTOR PLAN 8
# Hx of thrombocytopenia, unknown cause, saw a hematologist in the past  - AM CBC, peripheral smear Fluids:  Electrolytes;  Nutrition: regular diet   DVT: none  Dispo: [facility]  Code: Full

## 2019-07-16 NOTE — BEHAVIORAL HEALTH ASSESSMENT NOTE - DETAILS
2 suicide attempts, 1 severe overdose and 1 hanging attempt- Led to hospitalizations had hypertensive crisis on MAOI after inadvertent parmesan cheese ingestion, has also had episodes of hypomania on a couple of medications fatigue

## 2019-07-16 NOTE — PROGRESS NOTE ADULT - PROBLEM SELECTOR PLAN 7
History of depression   - follow up with psychiatry regarding home meds  - hold lorazepam # Hx of thrombocytopenia, unknown cause, saw a hematologist in the past  - AM CBC, peripheral smear

## 2019-07-16 NOTE — PROGRESS NOTE ADULT - SUBJECTIVE AND OBJECTIVE BOX
**Incomplete Note**    TRANSFER NOTE: FROM MICU TO [facility]     OVERNIGHT EVENTS:    SUBJECTIVE / INTERVAL HPI: Patient seen and examined at bedside.     VITAL SIGNS:  Vital Signs Last 24 Hrs  T(C): 36.4 (2019 14:01), Max: 36.7 (2019 06:03)  T(F): 97.5 (2019 14:01), Max: 98 (2019 06:03)  HR: 80 (2019 19:52) (60 - 80)  BP: 148/78 (2019 19:52) (104/57 - 186/97)  BP(mean): 88 (2019 18:40) (79 - 130)  RR: 16 (2019 19:52) (10 - 38)  SpO2: 99% (2019 19:52) (97% - 100%)    PHYSICAL EXAM:    General: WDWN  HEENT: NC/AT; PERRL, anicteric sclera; MMM  Neck: supple  Cardiovascular: +S1/S2; RRR  Respiratory: CTA B/L; no W/R/R  Gastrointestinal: soft, NT/ND; +BSx4  Extremities: WWP; no edema, clubbing or cyanosis  Vascular: 2+ radial, DP/PT pulses B/L  Neurological: AAOx3; no focal deficits    MEDICATIONS:  MEDICATIONS  (STANDING):  chlorhexidine 2% Cloths 1 Application(s) Topical <User Schedule>  dextrose 5%. 1000 milliLiter(s) (80 mL/Hr) IV Continuous <Continuous>  dextrose 5%. 1000 milliLiter(s) (50 mL/Hr) IV Continuous <Continuous>  dextrose 50% Injectable 12.5 Gram(s) IV Push once  dextrose 50% Injectable 25 Gram(s) IV Push once  dextrose 50% Injectable 25 Gram(s) IV Push once  heparin  Injectable 5000 Unit(s) SubCutaneous every 8 hours  LORazepam     Tablet 1 milliGRAM(s) Oral once  NIFEdipine XL 30 milliGRAM(s) Oral every 24 hours    MEDICATIONS  (PRN):  dextrose 40% Gel 15 Gram(s) Oral once PRN Blood Glucose LESS THAN 70 milliGRAM(s)/deciliter  glucagon  Injectable 1 milliGRAM(s) IntraMuscular once PRN Glucose LESS THAN 70 milligrams/deciliter  LORazepam     Tablet 1 milliGRAM(s) Oral every 6 hours PRN Anxiety      ALLERGIES:  Allergies    No Known Allergies    Intolerances        LABS:                        12.2   6.70  )-----------( 95       ( 15 Jul 2019 20:58 )             32.8     07-16    122<L>  |  83<L>  |  19  ----------------------------<  151<H>  3.9   |  25  |  0.80    Ca    9.5      2019 17:58  Phos  3.8     07-15  Mg     1.5     -16    TPro  7.8  /  Alb  4.4  /  TBili  0.8  /  DBili  x   /  AST  41<H>  /  ALT  26  /  AlkPhos  45  07-15    PT/INR - ( 15 Jul 2019 13:39 )   PT: 11.2 sec;   INR: 1.02          PTT - ( 15 Jul 2019 13:39 )  PTT:30.3 sec  Urinalysis Basic - ( 15 Jul 2019 14:49 )    Color: Yellow / Appearance: Clear / S.010 / pH: x  Gluc: x / Ketone: NEGATIVE  / Bili: NEGATIVE / Urobili: 0.2 E.U./dL   Blood: x / Protein: NEGATIVE mg/dL / Nitrite: NEGATIVE   Leuk Esterase: NEGATIVE / RBC: < 5 /HPF / WBC < 5 /HPF   Sq Epi: x / Non Sq Epi: 0-5 /HPF / Bacteria: Present /HPF      CAPILLARY BLOOD GLUCOSE      POCT Blood Glucose.: 81 mg/dL (2019 05:33)      RADIOLOGY & ADDITIONAL TESTS: Reviewed.    ASSESSMENT:    PLAN: **Incomplete Note**    TRANSFER NOTE: FROM MICU TO [address] [place] [address] Course:     OVERNIGHT EVENTS:    SUBJECTIVE / INTERVAL HPI: Patient seen and examined at bedside.     VITAL SIGNS:  Vital Signs Last 24 Hrs  T(C): 36.4 (2019 14:01), Max: 36.7 (2019 06:03)  T(F): 97.5 (2019 14:01), Max: 98 (2019 06:03)  HR: 80 (2019 19:52) (60 - 80)  BP: 148/78 (2019 19:52) (104/57 - 186/97)  BP(mean): 88 (2019 18:40) (79 - 130)  RR: 16 (2019 19:52) (10 - 38)  SpO2: 99% (2019 19:52) (97% - 100%)    PHYSICAL EXAM:    General: WDWN  HEENT: NC/AT; PERRL, anicteric sclera; MMM  Neck: supple  Cardiovascular: +S1/S2; RRR  Respiratory: CTA B/L; no W/R/R  Gastrointestinal: soft, NT/ND; +BSx4  Extremities: WWP; no edema, clubbing or cyanosis  Vascular: 2+ radial, DP/PT pulses B/L  Neurological: AAOx3; no focal deficits    MEDICATIONS:  MEDICATIONS  (STANDING):  chlorhexidine 2% Cloths 1 Application(s) Topical <User Schedule>  dextrose 5%. 1000 milliLiter(s) (80 mL/Hr) IV Continuous <Continuous>  dextrose 5%. 1000 milliLiter(s) (50 mL/Hr) IV Continuous <Continuous>  dextrose 50% Injectable 12.5 Gram(s) IV Push once  dextrose 50% Injectable 25 Gram(s) IV Push once  dextrose 50% Injectable 25 Gram(s) IV Push once  heparin  Injectable 5000 Unit(s) SubCutaneous every 8 hours  LORazepam     Tablet 1 milliGRAM(s) Oral once  NIFEdipine XL 30 milliGRAM(s) Oral every 24 hours    MEDICATIONS  (PRN):  dextrose 40% Gel 15 Gram(s) Oral once PRN Blood Glucose LESS THAN 70 milliGRAM(s)/deciliter  glucagon  Injectable 1 milliGRAM(s) IntraMuscular once PRN Glucose LESS THAN 70 milligrams/deciliter  LORazepam     Tablet 1 milliGRAM(s) Oral every 6 hours PRN Anxiety      ALLERGIES:  Allergies    No Known Allergies    Intolerances        LABS:                        12.2   6.70  )-----------( 95       ( 15 Jul 2019 20:58 )             32.8     07-16    122<L>  |  83<L>  |  19  ----------------------------<  151<H>  3.9   |  25  |  0.80    Ca    9.5      2019 17:58  Phos  3.8     07-15  Mg     1.5     -16    TPro  7.8  /  Alb  4.4  /  TBili  0.8  /  DBili  x   /  AST  41<H>  /  ALT  26  /  AlkPhos  45  07-15    PT/INR - ( 15 Jul 2019 13:39 )   PT: 11.2 sec;   INR: 1.02          PTT - ( 15 Jul 2019 13:39 )  PTT:30.3 sec  Urinalysis Basic - ( 15 Jul 2019 14:49 )    Color: Yellow / Appearance: Clear / S.010 / pH: x  Gluc: x / Ketone: NEGATIVE  / Bili: NEGATIVE / Urobili: 0.2 E.U./dL   Blood: x / Protein: NEGATIVE mg/dL / Nitrite: NEGATIVE   Leuk Esterase: NEGATIVE / RBC: < 5 /HPF / WBC < 5 /HPF   Sq Epi: x / Non Sq Epi: 0-5 /HPF / Bacteria: Present /HPF      CAPILLARY BLOOD GLUCOSE      POCT Blood Glucose.: 81 mg/dL (2019 05:33)      RADIOLOGY & ADDITIONAL TESTS: Reviewed.    ASSESSMENT:    PLAN: **Incomplete Note**    TRANSFER NOTE: FROM MICU TO [address] [place] [address] Course:     68 y/o  female with a medical history of depression, HTN, thoracic aortic aneurysm and thrombocytopenia admitted for LOC secondary to hyponatremia. On admission, patient was hemodynamically stable; Na: 111, K: 2.6, Cl: 74, M.3. She was repleted with 30 ml of 3%NaCl at 30 ml/hr and 2g of MgSulfate IV. CT head had no evidence of acute intracranial hemorrhage or transcortical infarct. CXR unremarkable. EKG NSR. Patient was admitted to [facility] MICU for workup and treatment of hyponatremia. In MICU, Na corrected to 112 (7/15), while on 3%NaC1; NA remained stable and fluids were changed to 2%NaCl, Na continued to improve to 117. TSH WNL; urine Na, mildly improving urine osmality. Nephrology consulted due to hyponatremia and other electrolyte abnormalities (hypokalemia, hypomagnesemia and hypochloremia). Hyponatremia considered chronic hypoosmolar euvolemic hyponatremia and attributed to thiazide diuretic use. Renal plan and MICU team plan to correct Na 6-8 mEq within 24 hours. Of note, patient was hypertensive 160s-180s SBP, while in improved with nifedipine 30mg, which was required during previous hospitalization, as per pharmacy. Hypertension was attributed to MAO-I (Parnate). Psych consulted regarding inpatient medication management, Parnate to be restarted.     Patient stable, Na improving, no neurological deficits and stable neurological status. Patient stepped down from MICU to [facility].       SUBJECTIVE HPI: Patient seen and examined at bedside. No complaints.  Indicates she is agitated without her MAO-I, but is awaiting the ativan for relief. Denies weakness, headache, nausea, vomiting. chest pain. SOB. ROS otherwise negative.     VITAL SIGNS:  Vital Signs Last 24 Hrs  T(C): 36.4 (2019 14:01), Max: 36.7 (2019 06:03)  T(F): 97.5 (2019 14:01), Max: 98 (2019 06:03)  HR: 80 (2019 19:52) (60 - 80)  BP: 148/78 (2019 19:52) (104/57 - 186/97)  BP(mean): 88 (2019 18:40) (79 - 130)  RR: 16 (2019 19:52) (10 - 38)  SpO2: 99% (2019 19:52) (97% - 100%)    PHYSICAL EXAM:    Constitutional: well appearing female resting comfortably in bed; NAD; AAOx3  HEENT: NC/AT; PERRL, EOMI, clear conjunctiva; MMM; no cervical or submandibular LAD; neck supple  Cardiac: +S1/S2; RRR; no M/R/G  Respiratory: CTA B/L; no W/R/R, no retractions  Gastrointestinal: soft, NT/ND; +BSx4  Extremities: WWP, no clubbing or cyanosis; no peripheral edema  Neurologic: AAOx3; CNII-XII grossly intact; no focal deficits  - Motor:  strength is 5/5 throughout; normal muscle bulk and tone throughout  - Sensory:  intact to light touch, pin prick, vibration, and joint-position sense throughout    MEDICATIONS:  MEDICATIONS  (STANDING):  chlorhexidine 2% Cloths 1 Application(s) Topical <User Schedule>  dextrose 5%. 1000 milliLiter(s) (80 mL/Hr) IV Continuous <Continuous>  dextrose 5%. 1000 milliLiter(s) (50 mL/Hr) IV Continuous <Continuous>  dextrose 50% Injectable 12.5 Gram(s) IV Push once  dextrose 50% Injectable 25 Gram(s) IV Push once  dextrose 50% Injectable 25 Gram(s) IV Push once  heparin  Injectable 5000 Unit(s) SubCutaneous every 8 hours  LORazepam     Tablet 1 milliGRAM(s) Oral once  NIFEdipine XL 30 milliGRAM(s) Oral every 24 hours    MEDICATIONS  (PRN):  dextrose 40% Gel 15 Gram(s) Oral once PRN Blood Glucose LESS THAN 70 milliGRAM(s)/deciliter  glucagon  Injectable 1 milliGRAM(s) IntraMuscular once PRN Glucose LESS THAN 70 milligrams/deciliter  LORazepam     Tablet 1 milliGRAM(s) Oral every 6 hours PRN Anxiety      ALLERGIES:  Allergies    No Known Allergies    Intolerances        LABS:                        12.2   6.70  )-----------( 95       ( 15 Jul 2019 20:58 )             32.8     07-16    122<L>  |  83<L>  |  19  ----------------------------<  151<H>  3.9   |  25  |  0.80    Ca    9.5      2019 17:58  Phos  3.8     07-15  Mg     1.5     07-16    TPro  7.8  /  Alb  4.4  /  TBili  0.8  /  DBili  x   /  AST  41<H>  /  ALT  26  /  AlkPhos  45  07-15    PT/INR - ( 15 Jul 2019 13:39 )   PT: 11.2 sec;   INR: 1.02          PTT - ( 15 Jul 2019 13:39 )  PTT:30.3 sec  Urinalysis Basic - ( 15 Jul 2019 14:49 )    Color: Yellow / Appearance: Clear / S.010 / pH: x  Gluc: x / Ketone: NEGATIVE  / Bili: NEGATIVE / Urobili: 0.2 E.U./dL   Blood: x / Protein: NEGATIVE mg/dL / Nitrite: NEGATIVE   Leuk Esterase: NEGATIVE / RBC: < 5 /HPF / WBC < 5 /HPF   Sq Epi: x / Non Sq Epi: 0-5 /HPF / Bacteria: Present /HPF      CAPILLARY BLOOD GLUCOSE      POCT Blood Glucose.: 81 mg/dL (2019 05:33)      RADIOLOGY & ADDITIONAL TESTS: Reviewed.    ASSESSMENT:    PLAN: **Incomplete Note**    TRANSFER NOTE: FROM MICU TO [address] [place] [address] Course:     70 y/o  female with a medical history of depression, HTN, thoracic aortic aneurysm and thrombocytopenia admitted for LOC secondary to hyponatremia. On admission, patient was hemodynamically stable; Na: 111, K: 2.6, Cl: 74, M.3. She was repleted with 30 ml of 3%NaCl at 30 ml/hr and 2g of MgSulfate IV. CT head had no evidence of acute intracranial hemorrhage or transcortical infarct. CXR unremarkable. EKG NSR. Patient was admitted to [facility] MICU for workup and treatment of hyponatremia. In MICU, Na corrected to 112 (7/15), while on 3%NaC1; NA remained stable and fluids were changed to 2%NaCl, Na continued to improve to 117. TSH WNL; urine Na, mildly improving urine osmality. Nephrology consulted due to hyponatremia and other electrolyte abnormalities (hypokalemia, hypomagnesemia and hypochloremia). Hyponatremia considered chronic hypoosmolar euvolemic hyponatremia and attributed to thiazide diuretic use. Renal plan and MICU team plan to correct Na 6-8 mEq within 24 hours. Of note, patient was hypertensive 160s-180s SBP, while in improved with nifedipine 30mg, which was required during previous hospitalization, as per pharmacy. Hypertension was attributed to MAO-I (Parnate). Psych consulted regarding inpatient medication management, parnate (MAO-I) to be restarted.     Patient stable, Na improving, no neurological deficits and stable neurological status. Patient stepped down from MICU to [facility].     SUBJECTIVE HPI: Patient seen and examined at bedside. No complaints.  Indicates she is agitated without her MAO-I, but is awaiting the ativan for relief. Denies weakness, headache, nausea, vomiting. chest pain. SOB. ROS otherwise negative.     VITAL SIGNS:  Vital Signs Last 24 Hrs  T(C): 36.4 (2019 14:01), Max: 36.7 (2019 06:03)  T(F): 97.5 (2019 14:01), Max: 98 (2019 06:03)  HR: 80 (2019 19:52) (60 - 80)  BP: 148/78 (2019 19:52) (104/57 - 186/97)  BP(mean): 88 (2019 18:40) (79 - 130)  RR: 16 (2019 19:52) (10 - 38)  SpO2: 99% (2019 19:52) (97% - 100%)    PHYSICAL EXAM:    Constitutional: well appearing female resting comfortably in bed; NAD; AAOx3  HEENT: NC/AT; PERRL, EOMI, clear conjunctiva; MMM; no cervical or submandibular LAD; neck supple  Cardiac: +S1/S2; RRR; no M/R/G  Respiratory: CTA B/L; no W/R/R, no retractions  Gastrointestinal: soft, NT/ND; +BSx4  Extremities: WWP, no clubbing or cyanosis; no peripheral edema  Neurologic: AAOx3; CNII-XII grossly intact; no focal deficits  - Motor:  strength is 5/5 throughout; normal muscle bulk and tone throughout  - Sensory:  intact to light touch, pin prick, vibration, and joint-position sense throughout    MEDICATIONS:  MEDICATIONS  (STANDING):  chlorhexidine 2% Cloths 1 Application(s) Topical <User Schedule>  dextrose 5%. 1000 milliLiter(s) (80 mL/Hr) IV Continuous <Continuous>  dextrose 5%. 1000 milliLiter(s) (50 mL/Hr) IV Continuous <Continuous>  dextrose 50% Injectable 12.5 Gram(s) IV Push once  dextrose 50% Injectable 25 Gram(s) IV Push once  dextrose 50% Injectable 25 Gram(s) IV Push once  heparin  Injectable 5000 Unit(s) SubCutaneous every 8 hours  LORazepam     Tablet 1 milliGRAM(s) Oral once  NIFEdipine XL 30 milliGRAM(s) Oral every 24 hours    MEDICATIONS  (PRN):  dextrose 40% Gel 15 Gram(s) Oral once PRN Blood Glucose LESS THAN 70 milliGRAM(s)/deciliter  glucagon  Injectable 1 milliGRAM(s) IntraMuscular once PRN Glucose LESS THAN 70 milligrams/deciliter  LORazepam     Tablet 1 milliGRAM(s) Oral every 6 hours PRN Anxiety      ALLERGIES:  Allergies    No Known Allergies    Intolerances        LABS:                        12.2   6.70  )-----------( 95       ( 15 Jul 2019 20:58 )             32.8     07-16    122<L>  |  83<L>  |  19  ----------------------------<  151<H>  3.9   |  25  |  0.80    Ca    9.5      2019 17:58  Phos  3.8     07-15  Mg     1.5     07-16    TPro  7.8  /  Alb  4.4  /  TBili  0.8  /  DBili  x   /  AST  41<H>  /  ALT  26  /  AlkPhos  45  07-15    PT/INR - ( 15 Jul 2019 13:39 )   PT: 11.2 sec;   INR: 1.02          PTT - ( 15 Jul 2019 13:39 )  PTT:30.3 sec  Urinalysis Basic - ( 15 Jul 2019 14:49 )    Color: Yellow / Appearance: Clear / S.010 / pH: x  Gluc: x / Ketone: NEGATIVE  / Bili: NEGATIVE / Urobili: 0.2 E.U./dL   Blood: x / Protein: NEGATIVE mg/dL / Nitrite: NEGATIVE   Leuk Esterase: NEGATIVE / RBC: < 5 /HPF / WBC < 5 /HPF   Sq Epi: x / Non Sq Epi: 0-5 /HPF / Bacteria: Present /HPF      CAPILLARY BLOOD GLUCOSE      POCT Blood Glucose.: 81 mg/dL (2019 05:33)      RADIOLOGY & ADDITIONAL TESTS: Reviewed.    ASSESSMENT:    PLAN: TRANSFER NOTE: FROM MICU TO [address] [place] [address] Course:     68 y/o  female with a medical history of depression, HTN, thoracic aortic aneurysm and thrombocytopenia admitted for LOC secondary to hyponatremia. On admission, patient was hemodynamically stable; Na: 111, K: 2.6, Cl: 74, M.3. She was repleted with 30 ml of 3%NaCl at 30 ml/hr and 2g of MgSulfate IV. CT head had no evidence of acute intracranial hemorrhage or transcortical infarct. CXR unremarkable. EKG NSR. Patient was admitted to [facility] MICU for workup and treatment of hyponatremia. In MICU, Na corrected to 112 (7/15), while on 3%NaC1; NA remained stable and fluids were changed to 2%NaCl, Na continued to improve to 117. TSH WNL; urine Na, mildly improving urine osmality. Nephrology consulted due to hyponatremia and other electrolyte abnormalities (hypokalemia, hypomagnesemia and hypochloremia). Hyponatremia considered chronic hypoosmolar euvolemic hyponatremia and attributed to thiazide diuretic use. Renal plan and MICU team plan to correct Na 6-8 mEq within 24 hours. Of note, patient was hypertensive 160s-180s SBP, while in improved with nifedipine 30mg, which was required during previous hospitalization, as per pharmacy. Hypertension was attributed to MAO-I (Parnate). Psych consulted regarding inpatient medication management, parnate (MAO-I) to be restarted.     Patient stable, Na improving, no neurological deficits and stable neurological status. Patient stepped down from MICU to [facility].     SUBJECTIVE HPI: Patient seen and examined at bedside. No complaints.  Indicates she is agitated without her MAO-I, but is awaiting the ativan for relief. Denies weakness, headache, nausea, vomiting. chest pain. SOB. ROS otherwise negative.     VITAL SIGNS:  Vital Signs Last 24 Hrs  T(C): 36.4 (2019 14:01), Max: 36.7 (2019 06:03)  T(F): 97.5 (2019 14:01), Max: 98 (2019 06:03)  HR: 80 (2019 19:52) (60 - 80)  BP: 148/78 (2019 19:52) (104/57 - 186/97)  BP(mean): 88 (2019 18:40) (79 - 130)  RR: 16 (2019 19:52) (10 - 38)  SpO2: 99% (2019 19:52) (97% - 100%)    PHYSICAL EXAM:    Constitutional: well appearing female resting comfortably in bed; NAD; AAOx3  HEENT: NC/AT; PERRL, EOMI, clear conjunctiva; MMM; no cervical or submandibular LAD; neck supple  Cardiac: +S1/S2; RRR; no M/R/G  Respiratory: CTA B/L; no W/R/R, no retractions  Gastrointestinal: soft, NT/ND; +BSx4  Extremities: WWP, no clubbing or cyanosis; no peripheral edema  Neurologic: AAOx3; CNII-XII grossly intact; no focal deficits  - Motor:  strength is 5/5 throughout; normal muscle bulk and tone throughout  - Sensory:  intact to light touch, pin prick, vibration, and joint-position sense throughout    MEDICATIONS:  MEDICATIONS  (STANDING):  chlorhexidine 2% Cloths 1 Application(s) Topical <User Schedule>  dextrose 5%. 1000 milliLiter(s) (80 mL/Hr) IV Continuous <Continuous>  dextrose 5%. 1000 milliLiter(s) (50 mL/Hr) IV Continuous <Continuous>  dextrose 50% Injectable 12.5 Gram(s) IV Push once  dextrose 50% Injectable 25 Gram(s) IV Push once  dextrose 50% Injectable 25 Gram(s) IV Push once  heparin  Injectable 5000 Unit(s) SubCutaneous every 8 hours  LORazepam     Tablet 1 milliGRAM(s) Oral once  NIFEdipine XL 30 milliGRAM(s) Oral every 24 hours    MEDICATIONS  (PRN):  dextrose 40% Gel 15 Gram(s) Oral once PRN Blood Glucose LESS THAN 70 milliGRAM(s)/deciliter  glucagon  Injectable 1 milliGRAM(s) IntraMuscular once PRN Glucose LESS THAN 70 milligrams/deciliter  LORazepam     Tablet 1 milliGRAM(s) Oral every 6 hours PRN Anxiety      ALLERGIES:  Allergies    No Known Allergies    Intolerances        LABS:                        12.2   6.70  )-----------( 95       ( 15 Jul 2019 20:58 )             32.8     07-16    122<L>  |  83<L>  |  19  ----------------------------<  151<H>  3.9   |  25  |  0.80    Ca    9.5      2019 17:58  Phos  3.8     07-15  Mg     1.5     07-16    TPro  7.8  /  Alb  4.4  /  TBili  0.8  /  DBili  x   /  AST  41<H>  /  ALT  26  /  AlkPhos  45  07-15    PT/INR - ( 15 Jul 2019 13:39 )   PT: 11.2 sec;   INR: 1.02          PTT - ( 15 Jul 2019 13:39 )  PTT:30.3 sec  Urinalysis Basic - ( 15 Jul 2019 14:49 )    Color: Yellow / Appearance: Clear / S.010 / pH: x  Gluc: x / Ketone: NEGATIVE  / Bili: NEGATIVE / Urobili: 0.2 E.U./dL   Blood: x / Protein: NEGATIVE mg/dL / Nitrite: NEGATIVE   Leuk Esterase: NEGATIVE / RBC: < 5 /HPF / WBC < 5 /HPF   Sq Epi: x / Non Sq Epi: 0-5 /HPF / Bacteria: Present /HPF      CAPILLARY BLOOD GLUCOSE      POCT Blood Glucose.: 81 mg/dL (2019 05:33)      RADIOLOGY & ADDITIONAL TESTS: Reviewed.    ASSESSMENT:    PLAN:

## 2019-07-16 NOTE — BEHAVIORAL HEALTH ASSESSMENT NOTE - SUMMARY
69 year old  female with a past medical history significant for depression, HTN, thoracic aortic aneurysm and thrombocytopenia who presented to [facility] ED on 7/15 after losing balance and falling while walking. Patient denies hitting her head or LOC. She denies past episodes of dizziness or weakness. Patient states that two days ago she had 3 episodes of non-bilious and non-bloody vomiting after an episode of nausea. Patient states that she re-hydrated with 4 cups of water and felt better. The patient normal drinks 8 cups of seltzer per day and makes sure she stays "very hydrated". HD stable in the ED, found a Sodium 111, Potassium 2.6, Chloride 74, Magnesium 1.3. CT Head: No acute intracranial hemorrhage or transcortical infarct. She received 30 ml of 3%NaCl at 30 ml/hr and 2g of MgSulfate IV. She was admitted to [facility] MICU for workup and treatment of hyponatremia. She has been depressed since childhood and has failed multiple medications in the past including tofranil, all SSRIs, nardil (not as good as parnate), and ECT has been effective in the past. Pt has been asymptomatic of late. Has had two suicide attempts in the past, 1 severe overdose and 1 hanging attempt (used her bathrobe and shower curtain broke). Spoke at length with her outpatient psychiatrist, Dr. Chica Najera, who has followed her for many years. Pt has been on parnate for 10 years and is currently on 90 mg. She took 120 mg in the past. Her sodium has always been stable on parnate in the past. She denies current substance abuse but has abused alcohol (3/4 bottle of champagne nightly) and cocaine in the past.    1)Pt has been on parnate for 10 years and has severe recurrent depression with suicide attempts and hospitalizations and has failed multiple trials of antidepressants and has required ECT. Her outpatient psychiatrist and I recommend continuation of parnate which patient is very much in agreement with. I understand no other source of SIADH found. Recommend decreasing dose of parnate from 90 mg to 60 mg and continued treatment to manage electrolyte abnormalities that occur/continue.     2)If discontinuation of parnate is absolutely necessary would taper off rather than abruptly discontinue. Literature search suggested remeron is antidepressant with lowest risk of SIADH. Pt seems never to have tried remeron. There has to be a two week washout period after discontinuation of parnate before remeron or any other antidepressant could be started.     3)I will continue to follow.

## 2019-07-16 NOTE — BEHAVIORAL HEALTH ASSESSMENT NOTE - [id number]_PSY_[email or web address]_CORE
Residential stability/Good treatment reponse/ compliance/Engagement in treatment/Employment stability/Affective stability

## 2019-07-16 NOTE — PROGRESS NOTE ADULT - SUBJECTIVE AND OBJECTIVE BOX
*** NOTE IN PROGRESS ***    INTERVAL HPI/OVERNIGHT EVENTS:    SUBJECTIVE: Patient seen and examined at bedside.    OBJECTIVE:    VITAL SIGNS:  ICU Vital Signs Last 24 Hrs  T(C): 36.4 (2019 02:02), Max: 36.8 (15 Jul 2019 13:15)  T(F): 97.5 (2019 02:02), Max: 98.2 (15 Jul 2019 13:15)  HR: 66 (2019 05:00) (62 - 75)  BP: 152/86 (2019 05:00) (118/74 - 186/97)  BP(mean): 108 (2019 05:00) (88 - 130)  ABP: --  ABP(mean): --  RR: 18 (2019 05:00) (10 - 36)  SpO2: 97% (2019 05:00) (97% - 100%)        07-15 @ 07:01  -  07-16 @ 06:06  --------------------------------------------------------  IN: 540 mL / OUT: 2600 mL / NET: -2060 mL      CAPILLARY BLOOD GLUCOSE      POCT Blood Glucose.: 81 mg/dL (2019 05:33)      PHYSICAL EXAM:    General: NAD  HEENT: NC/AT; PERRL, clear conjunctiva  Neck: supple  Respiratory: CTA b/l  Cardiovascular: +S1/S2; RRR  Abdomen: soft, NT/ND; +BS x4  Extremities: WWP, 2+ peripheral pulses b/l; no LE edema  Skin: normal color and turgor; no rash  Neurological:     MEDICATIONS:  MEDICATIONS  (STANDING):  chlorhexidine 2% Cloths 1 Application(s) Topical <User Schedule>  dextrose 5%. 1000 milliLiter(s) (50 mL/Hr) IV Continuous <Continuous>  dextrose 50% Injectable 12.5 Gram(s) IV Push once  dextrose 50% Injectable 25 Gram(s) IV Push once  dextrose 50% Injectable 25 Gram(s) IV Push once  heparin  Injectable 5000 Unit(s) SubCutaneous every 8 hours  insulin lispro (HumaLOG) corrective regimen sliding scale   SubCutaneous three times a day before meals  NIFEdipine XL 30 milliGRAM(s) Oral daily    MEDICATIONS  (PRN):  dextrose 40% Gel 15 Gram(s) Oral once PRN Blood Glucose LESS THAN 70 milliGRAM(s)/deciliter  glucagon  Injectable 1 milliGRAM(s) IntraMuscular once PRN Glucose LESS THAN 70 milligrams/deciliter      ALLERGIES:  Allergies    No Known Allergies    Intolerances        LABS:                        12.2   6.70  )-----------( 95       ( 15 Jul 2019 20:58 )             32.8     07-16    116<LL>  |  80<L>  |  13  ----------------------------<  84  3.7   |  27  |  0.74    Ca    9.1      2019 04:09  Phos  3.8     07-15  Mg     1.8     07-15    TPro  7.8  /  Alb  4.4  /  TBili  0.8  /  DBili  x   /  AST  41<H>  /  ALT  26  /  AlkPhos  45  07-15    PT/INR - ( 15 Jul 2019 13:39 )   PT: 11.2 sec;   INR: 1.02          PTT - ( 15 Jul 2019 13:39 )  PTT:30.3 sec  Urinalysis Basic - ( 15 Jul 2019 14:49 )    Color: Yellow / Appearance: Clear / S.010 / pH: x  Gluc: x / Ketone: NEGATIVE  / Bili: NEGATIVE / Urobili: 0.2 E.U./dL   Blood: x / Protein: NEGATIVE mg/dL / Nitrite: NEGATIVE   Leuk Esterase: NEGATIVE / RBC: < 5 /HPF / WBC < 5 /HPF   Sq Epi: x / Non Sq Epi: 0-5 /HPF / Bacteria: Present /HPF        RADIOLOGY & ADDITIONAL TESTS: Reviewed. *** NOTE IN PROGRESS ***    INTERVAL HPI/OVERNIGHT EVENTS:    SUBJECTIVE: Patient seen and examined at bedside.    OBJECTIVE:    VITAL SIGNS:  ICU Vital Signs Last 24 Hrs  T(C): 36.6 (2019 09:19), Max: 36.8 (15 Jul 2019 13:15)  T(F): 97.8 (2019 09:19), Max: 98.2 (15 Jul 2019 13:15)  HR: 62 (2019 11:00) (60 - 75)  BP: 126/71 (2019 11:00) (104/57 - 186/97)  BP(mean): 90 (2019 11:00) (79 - 130)  ABP: --  ABP(mean): --  RR: 38 (2019 11:00) (10 - 38)  SpO2: 98% (2019 11:00) (97% - 100%)        07-15 @ 07:01  -  16 @ 07:00  --------------------------------------------------------  IN: 540 mL / OUT: 2780 mL / NET: -2240 mL     @ 07:01  -  16 @ 13:03  --------------------------------------------------------  IN: 0 mL / OUT: 945 mL / NET: -945 mL      CAPILLARY BLOOD GLUCOSE      POCT Blood Glucose.: 81 mg/dL (2019 05:33)      PHYSICAL EXAM:    General: NAD  HEENT: NC/AT; PERRL, clear conjunctiva  Neck: supple  Respiratory: CTA b/l  Cardiovascular: +S1/S2; RRR  Abdomen: soft, NT/ND; +BS x4  Extremities: WWP, 2+ peripheral pulses b/l; no LE edema  Skin: normal color and turgor; no rash  Neurological:     MEDICATIONS:  MEDICATIONS  (STANDING):  chlorhexidine 2% Cloths 1 Application(s) Topical <User Schedule>  dextrose 5%. 1000 milliLiter(s) (50 mL/Hr) IV Continuous <Continuous>  dextrose 50% Injectable 12.5 Gram(s) IV Push once  dextrose 50% Injectable 25 Gram(s) IV Push once  dextrose 50% Injectable 25 Gram(s) IV Push once  heparin  Injectable 5000 Unit(s) SubCutaneous every 8 hours  NIFEdipine XL 30 milliGRAM(s) Oral every 24 hours    MEDICATIONS  (PRN):  dextrose 40% Gel 15 Gram(s) Oral once PRN Blood Glucose LESS THAN 70 milliGRAM(s)/deciliter  glucagon  Injectable 1 milliGRAM(s) IntraMuscular once PRN Glucose LESS THAN 70 milligrams/deciliter      ALLERGIES:  Allergies    No Known Allergies    Intolerances        LABS:                        12.2   6.70  )-----------( 95       ( 15 Jul 2019 20:58 )             32.8     07-16    117<LL>  |  79<L>  |  13  ----------------------------<  83  3.5   |  26  |  0.71    Ca    9.4      2019 07:32  Phos  3.8     07-15  Mg     1.5     07-16    TPro  7.8  /  Alb  4.4  /  TBili  0.8  /  DBili  x   /  AST  41<H>  /  ALT  26  /  AlkPhos  45  07-15    PT/INR - ( 15 Jul 2019 13:39 )   PT: 11.2 sec;   INR: 1.02          PTT - ( 15 Jul 2019 13:39 )  PTT:30.3 sec  Urinalysis Basic - ( 15 Jul 2019 14:49 )    Color: Yellow / Appearance: Clear / S.010 / pH: x  Gluc: x / Ketone: NEGATIVE  / Bili: NEGATIVE / Urobili: 0.2 E.U./dL   Blood: x / Protein: NEGATIVE mg/dL / Nitrite: NEGATIVE   Leuk Esterase: NEGATIVE / RBC: < 5 /HPF / WBC < 5 /HPF   Sq Epi: x / Non Sq Epi: 0-5 /HPF / Bacteria: Present /HPF        RADIOLOGY & ADDITIONAL TESTS: Reviewed. INTERVAL HPI/OVERNIGHT EVENTS:     SUBJECTIVE: Patient seen and examined at bedside.    OBJECTIVE:    VITAL SIGNS:  ICU Vital Signs Last 24 Hrs  T(C): 36.6 (2019 09:19), Max: 36.8 (15 Jul 2019 13:15)  T(F): 97.8 (2019 09:19), Max: 98.2 (15 Jul 2019 13:15)  HR: 62 (2019 11:00) (60 - 75)  BP: 126/71 (2019 11:00) (104/57 - 186/97)  BP(mean): 90 (2019 11:00) (79 - 130)  ABP: --  ABP(mean): --  RR: 38 (2019 11:00) (10 - 38)  SpO2: 98% (2019 11:00) (97% - 100%)        15 @ 07:01  -  16 @ 07:00  --------------------------------------------------------  IN: 540 mL / OUT: 2780 mL / NET: -2240 mL     @ 07:01  16 @ 13:03  --------------------------------------------------------  IN: 0 mL / OUT: 945 mL / NET: -945 mL      CAPILLARY BLOOD GLUCOSE      POCT Blood Glucose.: 81 mg/dL (2019 05:33)      PHYSICAL EXAM:    General: NAD  HEENT: NC/AT; PERRL, clear conjunctiva  Neck: supple  Respiratory: CTA b/l  Cardiovascular: +S1/S2; RRR  Abdomen: soft, NT/ND; +BS x4  Extremities: WWP, 2+ peripheral pulses b/l; no LE edema  Skin: normal color and turgor; no rash  Neurological:     MEDICATIONS:  MEDICATIONS  (STANDING):  chlorhexidine 2% Cloths 1 Application(s) Topical <User Schedule>  dextrose 5%. 1000 milliLiter(s) (50 mL/Hr) IV Continuous <Continuous>  dextrose 50% Injectable 12.5 Gram(s) IV Push once  dextrose 50% Injectable 25 Gram(s) IV Push once  dextrose 50% Injectable 25 Gram(s) IV Push once  heparin  Injectable 5000 Unit(s) SubCutaneous every 8 hours  NIFEdipine XL 30 milliGRAM(s) Oral every 24 hours    MEDICATIONS  (PRN):  dextrose 40% Gel 15 Gram(s) Oral once PRN Blood Glucose LESS THAN 70 milliGRAM(s)/deciliter  glucagon  Injectable 1 milliGRAM(s) IntraMuscular once PRN Glucose LESS THAN 70 milligrams/deciliter      ALLERGIES:  Allergies    No Known Allergies    Intolerances        LABS:                        12.2   6.70  )-----------( 95       ( 15 Jul 2019 20:58 )             32.8     07-16    117<LL>  |  79<L>  |  13  ----------------------------<  83  3.5   |  26  |  0.71    Ca    9.4      2019 07:32  Phos  3.8     07-15  Mg     1.5     07-16    TPro  7.8  /  Alb  4.4  /  TBili  0.8  /  DBili  x   /  AST  41<H>  /  ALT  26  /  AlkPhos  45  07-15    PT/INR - ( 15 Jul 2019 13:39 )   PT: 11.2 sec;   INR: 1.02          PTT - ( 15 Jul 2019 13:39 )  PTT:30.3 sec  Urinalysis Basic - ( 15 Jul 2019 14:49 )    Color: Yellow / Appearance: Clear / S.010 / pH: x  Gluc: x / Ketone: NEGATIVE  / Bili: NEGATIVE / Urobili: 0.2 E.U./dL   Blood: x / Protein: NEGATIVE mg/dL / Nitrite: NEGATIVE   Leuk Esterase: NEGATIVE / RBC: < 5 /HPF / WBC < 5 /HPF   Sq Epi: x / Non Sq Epi: 0-5 /HPF / Bacteria: Present /HPF        RADIOLOGY & ADDITIONAL TESTS: Reviewed. INTERVAL HPI/OVERNIGHT EVENTS: 3% NS was changed to 2% around 2 am.    SUBJECTIVE: Patient seen and examined at bedside. No acute complaints overnight.    ROS:  General: Denies fever, nausea, vomiting  Cardiac: Denies chest pain or palpitations  Respiratory: No SOB or wheezing  GI: No abdominal pain, diarrhea or constipation\    OBJECTIVE:    VITAL SIGNS:  ICU Vital Signs Last 24 Hrs  T(C): 36.6 (2019 09:19), Max: 36.7 (15 Jul 2019 13:42)  T(F): 97.8 (2019 09:19), Max: 98.1 (15 Jul 2019 13:42)  HR: 62 (2019 11:00) (60 - 70)  BP: 126/71 (2019 11:00) (104/57 - 186/97)  BP(mean): 90 (2019 11:00) (79 - 130)  ABP: --  ABP(mean): --  RR: 38 (2019 11:00) (10 - 38)  SpO2: 98% (2019 11:00) (97% - 100%)        15 @ 07:  -  16 @ 07:00  --------------------------------------------------------  IN: 540 mL / OUT: 2780 mL / NET: -2240 mL     @ 07:16 @ 13:29  --------------------------------------------------------  IN: 0 mL / OUT: 945 mL / NET: -945 mL      CAPILLARY BLOOD GLUCOSE      POCT Blood Glucose.: 81 mg/dL (2019 05:33)      PHYSICAL EXAM:    General: No acute distress, pleasantly conversant  HEENT: NC/AT; PERRL, clear conjunctiva, moist mucus membranes  Neck: supple, no JVD  Respiratory: CTA b/l, no W/R/R  Cardiovascular: +S1/S2; RRR, no murmurs, rubs, or gallops  Abdomen: soft, NT/ND; +BS x4  Extremities: WWP, 2+ peripheral pulses b/l; no LE edema  Skin: normal color and turgor; multiple scattered ecchymosis on bilateral legs, and ecchymosis on right elbow  Neurologic: AAOx3; CNII-XII grossly intact; no focal deficits  Neurological:     MEDICATIONS:  MEDICATIONS  (STANDING):  chlorhexidine 2% Cloths 1 Application(s) Topical <User Schedule>  dextrose 5%. 1000 milliLiter(s) (50 mL/Hr) IV Continuous <Continuous>  dextrose 50% Injectable 12.5 Gram(s) IV Push once  dextrose 50% Injectable 25 Gram(s) IV Push once  dextrose 50% Injectable 25 Gram(s) IV Push once  heparin  Injectable 5000 Unit(s) SubCutaneous every 8 hours  NIFEdipine XL 30 milliGRAM(s) Oral every 24 hours    MEDICATIONS  (PRN):  dextrose 40% Gel 15 Gram(s) Oral once PRN Blood Glucose LESS THAN 70 milliGRAM(s)/deciliter  glucagon  Injectable 1 milliGRAM(s) IntraMuscular once PRN Glucose LESS THAN 70 milligrams/deciliter      ALLERGIES:  Allergies    No Known Allergies    Intolerances        LABS:                        12.2   6.70  )-----------( 95       ( 15 Jul 2019 20:58 )             32.8     07-16    117<LL>  |  79<L>  |  12  ----------------------------<  81  3.3<L>   |  26  |  0.76    Ca    9.5      2019 12:47  Phos  3.8     07-15  Mg     1.5     07-16    TPro  7.8  /  Alb  4.4  /  TBili  0.8  /  DBili  x   /  AST  41<H>  /  ALT  26  /  AlkPhos  45  07-15    PT/INR - ( 15 Jul 2019 13:39 )   PT: 11.2 sec;   INR: 1.02          PTT - ( 15 Jul 2019 13:39 )  PTT:30.3 sec  Urinalysis Basic - ( 15 Jul 2019 14:49 )    Color: Yellow / Appearance: Clear / S.010 / pH: x  Gluc: x / Ketone: NEGATIVE  / Bili: NEGATIVE / Urobili: 0.2 E.U./dL   Blood: x / Protein: NEGATIVE mg/dL / Nitrite: NEGATIVE   Leuk Esterase: NEGATIVE / RBC: < 5 /HPF / WBC < 5 /HPF   Sq Epi: x / Non Sq Epi: 0-5 /HPF / Bacteria: Present /HPF        RADIOLOGY & ADDITIONAL TESTS: Reviewed. Hospital Course:    69 year old  female with a past medical history significant for depression, HTN, thoracic aortic aneurysm and thrombocytopenia who presented to [facility] ED on 7/15 after losing balance and falling while walking. Patient denies hitting her head or LOC. She denies past episodes of dizziness or weakness. Patient states that two days ago she had 3 episodes of non-bilious and non-bloody vomiting after an episode of nausea. Patient states that she re-hydrated with 4 cups of water and felt better. The patient normal drinks 8 cups of seltzer per day and makes sure she stays "very hydrated". She says that when she gets dehydrated and tired, she makes sure to drink water. to  Last night the patient complained of trouble sleeping that did not get better with melatonin and was only able to sleep 3 hours after taking one of her prescribed lorazepam. Today she had jury duty and was walking across the walk way when she lost balance and fell. Patient admits to recent nausea, vomiting, and loss of balance. Patient denies headache, fever, changes in vision, dizziness, acid reflux, chest pain, palpitations, shortness of breath, changes in bowel movement or changes in urination. Patient denies changes in recent medication, travel, changes in weight or drinking habits.    ED Vitals: T 98.2 P 75 /88 RR 18 O2 99% on RA  ED Course: In the ED, Hgb 11.2, Sodium 111, Potassium 2.6, Chloride 74, Magnesium 1.3. CT Head: No acute intracranial hemorrhage or transcortical infarct. She received 30 ml of 3%NaCl at 30 ml/hr and 2g of MgSulfate IV. She was admitted to [facility] MICU for workup and treatment of hyponatremia.      7/15: Patient found to have retained urine, straight cathed with 1.2 L out   ON: Patient was -180s so she was given nifedipine 30mg (per pharmacy she was on in ). Contacted pharmacy (see notes above). Patient 3% was held at 8:30p w/ Na 112 to see what her Na would do. It remained unchanged so 2% was started 50cc at 1a. 4a Gb=481, so 2% saline was dc'ed because patient had increased 6 during the shift. K repleted throughout the evening. A&Ox3. Bladder scan showed 450mL so ocampo placed.      INTERVAL HPI/OVERNIGHT EVENTS: 3% NS was changed to 2% around 2 am.    SUBJECTIVE: Patient seen and examined at bedside. No acute complaints overnight.    ROS:  General: Denies fever, nausea, vomiting  Cardiac: Denies chest pain or palpitations  Respiratory: No SOB or wheezing  GI: No abdominal pain, diarrhea or constipation    OBJECTIVE:    VITAL SIGNS:  ICU Vital Signs Last 24 Hrs  T(C): 36.6 (2019 09:19), Max: 36.7 (15 Jul 2019 13:42)  T(F): 97.8 (2019 09:19), Max: 98.1 (15 Jul 2019 13:42)  HR: 62 (2019 11:00) (60 - 70)  BP: 126/71 (2019 11:00) (104/57 - 186/97)  BP(mean): 90 (2019 11:00) (79 - 130)  ABP: --  ABP(mean): --  RR: 38 (2019 11:00) (10 - 38)  SpO2: 98% (2019 11:00) (97% - 100%)        07-15 @ 07:  -   @ 07:00  --------------------------------------------------------  IN: 540 mL / OUT: 2780 mL / NET: -2240 mL     @ 07: @ 13:29  --------------------------------------------------------  IN: 0 mL / OUT: 945 mL / NET: -945 mL      CAPILLARY BLOOD GLUCOSE      POCT Blood Glucose.: 81 mg/dL (2019 05:33)      PHYSICAL EXAM:    General: No acute distress, pleasantly conversant  HEENT: NC/AT; PERRL, clear conjunctiva, moist mucus membranes  Neck: supple, no JVD  Respiratory: CTA b/l, no W/R/R  Cardiovascular: +S1/S2; RRR, no murmurs, rubs, or gallops  Abdomen: soft, NT/ND; +BS x4  Extremities: WWP, 2+ peripheral pulses b/l; no LE edema  Skin: normal color and turgor; multiple scattered ecchymosis on bilateral legs, and ecchymosis on right elbow  Neurologic: AAOx3; CNII-XII grossly intact; no focal deficits  Neurological:     MEDICATIONS:  MEDICATIONS  (STANDING):  chlorhexidine 2% Cloths 1 Application(s) Topical <User Schedule>  dextrose 5%. 1000 milliLiter(s) (50 mL/Hr) IV Continuous <Continuous>  dextrose 50% Injectable 12.5 Gram(s) IV Push once  dextrose 50% Injectable 25 Gram(s) IV Push once  dextrose 50% Injectable 25 Gram(s) IV Push once  heparin  Injectable 5000 Unit(s) SubCutaneous every 8 hours  NIFEdipine XL 30 milliGRAM(s) Oral every 24 hours    MEDICATIONS  (PRN):  dextrose 40% Gel 15 Gram(s) Oral once PRN Blood Glucose LESS THAN 70 milliGRAM(s)/deciliter  glucagon  Injectable 1 milliGRAM(s) IntraMuscular once PRN Glucose LESS THAN 70 milligrams/deciliter      ALLERGIES:  Allergies    No Known Allergies    Intolerances        LABS:                        12.2   6.70  )-----------( 95       ( 15 Jul 2019 20:58 )             32.8     07-16    117<LL>  |  79<L>  |  12  ----------------------------<  81  3.3<L>   |  26  |  0.76    Ca    9.5      2019 12:47  Phos  3.8     07-15  Mg     1.5     07-16    TPro  7.8  /  Alb  4.4  /  TBili  0.8  /  DBili  x   /  AST  41<H>  /  ALT  26  /  AlkPhos  45  07-15    PT/INR - ( 15 Jul 2019 13:39 )   PT: 11.2 sec;   INR: 1.02          PTT - ( 15 Jul 2019 13:39 )  PTT:30.3 sec  Urinalysis Basic - ( 15 Jul 2019 14:49 )    Color: Yellow / Appearance: Clear / S.010 / pH: x  Gluc: x / Ketone: NEGATIVE  / Bili: NEGATIVE / Urobili: 0.2 E.U./dL   Blood: x / Protein: NEGATIVE mg/dL / Nitrite: NEGATIVE   Leuk Esterase: NEGATIVE / RBC: < 5 /HPF / WBC < 5 /HPF   Sq Epi: x / Non Sq Epi: 0-5 /HPF / Bacteria: Present /HPF        RADIOLOGY & ADDITIONAL TESTS: Reviewed. Hospital Course:    69 year old  female with a past medical history significant for depression, HTN, thoracic aortic aneurysm and thrombocytopenia who presented to [facility] ED on 7/15 after losing balance and falling while walking. Patient denies hitting her head or LOC. She denies past episodes of dizziness or weakness. Patient states that two days ago she had 3 episodes of non-bilious and non-bloody vomiting after an episode of nausea. Patient states that she re-hydrated with 4 cups of water and felt better. The patient normal drinks 8 cups of seltzer per day and makes sure she stays "very hydrated". HD stable in the ED, found a Sodium 111, Potassium 2.6, Chloride 74, Magnesium 1.3. CT Head: No acute intracranial hemorrhage or transcortical infarct. She received 30 ml of 3%NaCl at 30 ml/hr and 2g of MgSulfate IV. She was admitted to [facility] MICU for workup and treatment of hyponatremia.     Patient was started on 3%NaCl and her sodium corrected to 112 on 7/15. Patient 3% was held at 8:30p w/ Na 112 to see what her Na would do. It remained unchanged so 2% was started 50cc at 1a, her sodium corrected 116->117 in the morning, 2% NaCl was dc'ed because patient had increased 6 during the shift. Overnight experienced had -180s, improved with nifedipine 30mg (per pharmacy she was on in ).  There was concern that her MAOi (Parnate) might have been responsible, her psychiatrist was contacted and she thought it was unlikely, [facility] psychiatry was also contacted regarding continuing her medications inpatient. She is hemodynamically stable without change to neurological status, improving sodium, and can be stepped down to [id number] for further management.      INTERVAL HPI/OVERNIGHT EVENTS:     SUBJECTIVE: Patient seen and examined at bedside. No acute complaints overnight.    ROS:  General: Denies fever, nausea, vomiting  Cardiac: Denies chest pain or palpitations  Respiratory: No SOB or wheezing  GI: No abdominal pain, diarrhea or constipation    OBJECTIVE:    VITAL SIGNS:  ICU Vital Signs Last 24 Hrs  T(C): 36.6 (2019 09:19), Max: 36.7 (15 Jul 2019 13:42)  T(F): 97.8 (2019 09:19), Max: 98.1 (15 Jul 2019 13:42)  HR: 62 (2019 11:00) (60 - 70)  BP: 126/71 (2019 11:00) (104/57 - 186/97)  BP(mean): 90 (2019 11:00) (79 - 130)  ABP: --  ABP(mean): --  RR: 38 (2019 11:00) (10 - 38)  SpO2: 98% (2019 11:00) (97% - 100%)        07-15 @ 07:  -   @ 07:00  --------------------------------------------------------  IN: 540 mL / OUT: 2780 mL / NET: -2240 mL     @ 07:01   @ 13:29  --------------------------------------------------------  IN: 0 mL / OUT: 945 mL / NET: -945 mL      CAPILLARY BLOOD GLUCOSE      POCT Blood Glucose.: 81 mg/dL (2019 05:33)      PHYSICAL EXAM:    General: No acute distress, pleasantly conversant  HEENT: NC/AT; PERRL, clear conjunctiva, moist mucus membranes  Neck: supple, no JVD  Respiratory: CTA b/l, no W/R/R  Cardiovascular: +S1/S2; RRR, no murmurs, rubs, or gallops  Abdomen: soft, NT/ND; +BS x4  Extremities: WWP, 2+ peripheral pulses b/l; no LE edema  Skin: normal color and turgor; multiple scattered ecchymosis on bilateral legs, and ecchymosis on right elbow  Neurologic: AAOx3; CNII-XII grossly intact; no focal deficits      MEDICATIONS:  MEDICATIONS  (STANDING):  chlorhexidine 2% Cloths 1 Application(s) Topical <User Schedule>  dextrose 5%. 1000 milliLiter(s) (50 mL/Hr) IV Continuous <Continuous>  dextrose 50% Injectable 12.5 Gram(s) IV Push once  dextrose 50% Injectable 25 Gram(s) IV Push once  dextrose 50% Injectable 25 Gram(s) IV Push once  heparin  Injectable 5000 Unit(s) SubCutaneous every 8 hours  NIFEdipine XL 30 milliGRAM(s) Oral every 24 hours    MEDICATIONS  (PRN):  dextrose 40% Gel 15 Gram(s) Oral once PRN Blood Glucose LESS THAN 70 milliGRAM(s)/deciliter  glucagon  Injectable 1 milliGRAM(s) IntraMuscular once PRN Glucose LESS THAN 70 milligrams/deciliter      ALLERGIES:  Allergies    No Known Allergies    Intolerances        LABS:                        12.2   6.70  )-----------( 95       ( 15 Jul 2019 20:58 )             32.8     07-16    117<LL>  |  79<L>  |  12  ----------------------------<  81  3.3<L>   |  26  |  0.76    Ca    9.5      2019 12:47  Phos  3.8     07-15  Mg     1.5     07-16    TPro  7.8  /  Alb  4.4  /  TBili  0.8  /  DBili  x   /  AST  41<H>  /  ALT  26  /  AlkPhos  45  07-15    PT/INR - ( 15 Jul 2019 13:39 )   PT: 11.2 sec;   INR: 1.02          PTT - ( 15 Jul 2019 13:39 )  PTT:30.3 sec  Urinalysis Basic - ( 15 Jul 2019 14:49 )    Color: Yellow / Appearance: Clear / S.010 / pH: x  Gluc: x / Ketone: NEGATIVE  / Bili: NEGATIVE / Urobili: 0.2 E.U./dL   Blood: x / Protein: NEGATIVE mg/dL / Nitrite: NEGATIVE   Leuk Esterase: NEGATIVE / RBC: < 5 /HPF / WBC < 5 /HPF   Sq Epi: x / Non Sq Epi: 0-5 /HPF / Bacteria: Present /HPF        RADIOLOGY & ADDITIONAL TESTS: Reviewed.

## 2019-07-16 NOTE — BEHAVIORAL HEALTH ASSESSMENT NOTE - HPI (INCLUDE ILLNESS QUALITY, SEVERITY, DURATION, TIMING, CONTEXT, MODIFYING FACTORS, ASSOCIATED SIGNS AND SYMPTOMS)
69 year old  female with a past medical history significant for depression, HTN, thoracic aortic aneurysm and thrombocytopenia who presented to [facility] ED on 7/15 after losing balance and falling while walking. Patient denies hitting her head or LOC. She denies past episodes of dizziness or weakness. Patient states that two days ago she had 3 episodes of non-bilious and non-bloody vomiting after an episode of nausea. Patient states that she re-hydrated with 4 cups of water and felt better. The patient normal drinks 8 cups of seltzer per day and makes sure she stays "very hydrated". HD stable in the ED, found a Sodium 111, Potassium 2.6, Chloride 74, Magnesium 1.3. CT Head: No acute intracranial hemorrhage or transcortical infarct. She received 30 ml of 3%NaCl at 30 ml/hr and 2g of MgSulfate IV. She was admitted to [facility] MICU for workup and treatment of hyponatremia. She has been depressed since childhood and has failed multiple medications in the past including tofranil, all SSRIs, nardil (not as good as parnate), and ECT has been effective in the past. Pt has been asymptomatic of late. Has had two suicide attempts in the past, 1 severe overdose and 1 hanging attempt (used her bathrobe and shower curtain broke). Spoke at length with her outpatient psychiatrist, Dr. Chica Najera, who has followed her for many years. Pt has been on parnate for 10 years and is currently on 90 mg. She took 120 mg in the past. Her sodium has always been stable on parnate in the past. She denies current substance abuse but has abused alcohol (3/4 bottle of champagne nightly) and cocaine in the past. Pt in individual and group therapy with Milagros Fernandez as well. Also of note in this whole situation is that Dr. Najera is soon to go on a monthlong vacation.

## 2019-07-16 NOTE — BEHAVIORAL HEALTH ASSESSMENT NOTE - OTHER PAST PSYCHIATRIC HISTORY (INCLUDE DETAILS REGARDING ONSET, COURSE OF ILLNESS, INPATIENT/OUTPATIENT TREATMENT)
Several past hospitalizations at [place], ECT has been helpful, multiple failed trials of antidepressants with only MAOIs, and especially parnate, being effective. 2 suicide attempts. Depressed since childhood.

## 2019-07-16 NOTE — BEHAVIORAL HEALTH ASSESSMENT NOTE - NSBHCONSULTFOLLOWAFTERCARE_PSY_A_CORE FT
She should follow up with outpatient psychiatrist, Dr. Chica Najera, and with outpatient therapist, Milagros Fernandez.

## 2019-07-17 LAB
ANION GAP SERPL CALC-SCNC: 10 MMOL/L — SIGNIFICANT CHANGE UP (ref 5–17)
ANION GAP SERPL CALC-SCNC: 10 MMOL/L — SIGNIFICANT CHANGE UP (ref 5–17)
ANION GAP SERPL CALC-SCNC: 11 MMOL/L — SIGNIFICANT CHANGE UP (ref 5–17)
ANION GAP SERPL CALC-SCNC: 11 MMOL/L — SIGNIFICANT CHANGE UP (ref 5–17)
ANION GAP SERPL CALC-SCNC: 12 MMOL/L — SIGNIFICANT CHANGE UP (ref 5–17)
ANION GAP SERPL CALC-SCNC: 12 MMOL/L — SIGNIFICANT CHANGE UP (ref 5–17)
BUN SERPL-MCNC: 16 MG/DL — SIGNIFICANT CHANGE UP (ref 7–23)
BUN SERPL-MCNC: 18 MG/DL — SIGNIFICANT CHANGE UP (ref 7–23)
BUN SERPL-MCNC: 18 MG/DL — SIGNIFICANT CHANGE UP (ref 7–23)
BUN SERPL-MCNC: 19 MG/DL — SIGNIFICANT CHANGE UP (ref 7–23)
BUN SERPL-MCNC: 20 MG/DL — SIGNIFICANT CHANGE UP (ref 7–23)
BUN SERPL-MCNC: 25 MG/DL — HIGH (ref 7–23)
CALCIUM SERPL-MCNC: 8.8 MG/DL — SIGNIFICANT CHANGE UP (ref 8.4–10.5)
CALCIUM SERPL-MCNC: 9.1 MG/DL — SIGNIFICANT CHANGE UP (ref 8.4–10.5)
CALCIUM SERPL-MCNC: 9.1 MG/DL — SIGNIFICANT CHANGE UP (ref 8.4–10.5)
CALCIUM SERPL-MCNC: 9.4 MG/DL — SIGNIFICANT CHANGE UP (ref 8.4–10.5)
CALCIUM SERPL-MCNC: 9.5 MG/DL — SIGNIFICANT CHANGE UP (ref 8.4–10.5)
CALCIUM SERPL-MCNC: 9.5 MG/DL — SIGNIFICANT CHANGE UP (ref 8.4–10.5)
CHLORIDE SERPL-SCNC: 83 MMOL/L — LOW (ref 96–108)
CHLORIDE SERPL-SCNC: 84 MMOL/L — LOW (ref 96–108)
CHLORIDE SERPL-SCNC: 85 MMOL/L — LOW (ref 96–108)
CHLORIDE SERPL-SCNC: 85 MMOL/L — LOW (ref 96–108)
CHLORIDE UR-SCNC: 43 MMOL/L — SIGNIFICANT CHANGE UP
CO2 SERPL-SCNC: 23 MMOL/L — SIGNIFICANT CHANGE UP (ref 22–31)
CO2 SERPL-SCNC: 25 MMOL/L — SIGNIFICANT CHANGE UP (ref 22–31)
CO2 SERPL-SCNC: 25 MMOL/L — SIGNIFICANT CHANGE UP (ref 22–31)
CO2 SERPL-SCNC: 26 MMOL/L — SIGNIFICANT CHANGE UP (ref 22–31)
CREAT SERPL-MCNC: 0.69 MG/DL — SIGNIFICANT CHANGE UP (ref 0.5–1.3)
CREAT SERPL-MCNC: 0.72 MG/DL — SIGNIFICANT CHANGE UP (ref 0.5–1.3)
CREAT SERPL-MCNC: 0.82 MG/DL — SIGNIFICANT CHANGE UP (ref 0.5–1.3)
CREAT SERPL-MCNC: 0.86 MG/DL — SIGNIFICANT CHANGE UP (ref 0.5–1.3)
CREAT SERPL-MCNC: 0.86 MG/DL — SIGNIFICANT CHANGE UP (ref 0.5–1.3)
CREAT SERPL-MCNC: 0.93 MG/DL — SIGNIFICANT CHANGE UP (ref 0.5–1.3)
GLUCOSE SERPL-MCNC: 113 MG/DL — HIGH (ref 70–99)
GLUCOSE SERPL-MCNC: 114 MG/DL — HIGH (ref 70–99)
GLUCOSE SERPL-MCNC: 114 MG/DL — HIGH (ref 70–99)
GLUCOSE SERPL-MCNC: 115 MG/DL — HIGH (ref 70–99)
GLUCOSE SERPL-MCNC: 124 MG/DL — HIGH (ref 70–99)
GLUCOSE SERPL-MCNC: 83 MG/DL — SIGNIFICANT CHANGE UP (ref 70–99)
HCT VFR BLD CALC: 32.6 % — LOW (ref 34.5–45)
HGB BLD-MCNC: 11.7 G/DL — SIGNIFICANT CHANGE UP (ref 11.5–15.5)
MAGNESIUM SERPL-MCNC: 1.5 MG/DL — LOW (ref 1.6–2.6)
MCHC RBC-ENTMCNC: 31.2 PG — SIGNIFICANT CHANGE UP (ref 27–34)
MCHC RBC-ENTMCNC: 35.9 GM/DL — SIGNIFICANT CHANGE UP (ref 32–36)
MCV RBC AUTO: 86.9 FL — SIGNIFICANT CHANGE UP (ref 80–100)
NRBC # BLD: 0 /100 WBCS — SIGNIFICANT CHANGE UP (ref 0–0)
OSMOLALITY UR: 304 MOSMOL/KG — SIGNIFICANT CHANGE UP (ref 100–650)
PHOSPHATE SERPL-MCNC: 2.1 MG/DL — LOW (ref 2.5–4.5)
PLATELET # BLD AUTO: 103 K/UL — LOW (ref 150–400)
POTASSIUM SERPL-MCNC: 3.6 MMOL/L — SIGNIFICANT CHANGE UP (ref 3.5–5.3)
POTASSIUM SERPL-MCNC: 3.8 MMOL/L — SIGNIFICANT CHANGE UP (ref 3.5–5.3)
POTASSIUM SERPL-MCNC: 3.9 MMOL/L — SIGNIFICANT CHANGE UP (ref 3.5–5.3)
POTASSIUM SERPL-MCNC: 4 MMOL/L — SIGNIFICANT CHANGE UP (ref 3.5–5.3)
POTASSIUM SERPL-MCNC: 4.1 MMOL/L — SIGNIFICANT CHANGE UP (ref 3.5–5.3)
POTASSIUM SERPL-MCNC: 4.5 MMOL/L — SIGNIFICANT CHANGE UP (ref 3.5–5.3)
POTASSIUM SERPL-SCNC: 3.6 MMOL/L — SIGNIFICANT CHANGE UP (ref 3.5–5.3)
POTASSIUM SERPL-SCNC: 3.8 MMOL/L — SIGNIFICANT CHANGE UP (ref 3.5–5.3)
POTASSIUM SERPL-SCNC: 3.9 MMOL/L — SIGNIFICANT CHANGE UP (ref 3.5–5.3)
POTASSIUM SERPL-SCNC: 4 MMOL/L — SIGNIFICANT CHANGE UP (ref 3.5–5.3)
POTASSIUM SERPL-SCNC: 4.1 MMOL/L — SIGNIFICANT CHANGE UP (ref 3.5–5.3)
POTASSIUM SERPL-SCNC: 4.5 MMOL/L — SIGNIFICANT CHANGE UP (ref 3.5–5.3)
POTASSIUM UR-SCNC: 24 MMOL/L — SIGNIFICANT CHANGE UP
RBC # BLD: 3.75 M/UL — LOW (ref 3.8–5.2)
RBC # FLD: 12.5 % — SIGNIFICANT CHANGE UP (ref 10.3–14.5)
SODIUM SERPL-SCNC: 119 MMOL/L — CRITICAL LOW (ref 135–145)
SODIUM SERPL-SCNC: 120 MMOL/L — CRITICAL LOW (ref 135–145)
SODIUM SERPL-SCNC: 121 MMOL/L — LOW (ref 135–145)
SODIUM SERPL-SCNC: 122 MMOL/L — LOW (ref 135–145)
SODIUM UR-SCNC: 50 MMOL/L — SIGNIFICANT CHANGE UP
WBC # BLD: 8.02 K/UL — SIGNIFICANT CHANGE UP (ref 3.8–10.5)
WBC # FLD AUTO: 8.02 K/UL — SIGNIFICANT CHANGE UP (ref 3.8–10.5)

## 2019-07-17 PROCEDURE — 99233 SBSQ HOSP IP/OBS HIGH 50: CPT

## 2019-07-17 PROCEDURE — 99233 SBSQ HOSP IP/OBS HIGH 50: CPT | Mod: GC

## 2019-07-17 RX ORDER — POTASSIUM PHOSPHATE, MONOBASIC POTASSIUM PHOSPHATE, DIBASIC 236; 224 MG/ML; MG/ML
30 INJECTION, SOLUTION INTRAVENOUS ONCE
Refills: 0 | Status: DISCONTINUED | OUTPATIENT
Start: 2019-07-17 | End: 2019-07-17

## 2019-07-17 RX ORDER — SODIUM,POTASSIUM PHOSPHATES 278-250MG
1 POWDER IN PACKET (EA) ORAL
Refills: 0 | Status: COMPLETED | OUTPATIENT
Start: 2019-07-17 | End: 2019-07-17

## 2019-07-17 RX ORDER — SODIUM CHLORIDE 9 MG/ML
1 INJECTION INTRAMUSCULAR; INTRAVENOUS; SUBCUTANEOUS THREE TIMES A DAY
Refills: 0 | Status: DISCONTINUED | OUTPATIENT
Start: 2019-07-17 | End: 2019-07-19

## 2019-07-17 RX ORDER — MAGNESIUM OXIDE 400 MG ORAL TABLET 241.3 MG
400 TABLET ORAL ONCE
Refills: 0 | Status: COMPLETED | OUTPATIENT
Start: 2019-07-17 | End: 2019-07-17

## 2019-07-17 RX ORDER — LANOLIN ALCOHOL/MO/W.PET/CERES
5 CREAM (GRAM) TOPICAL AT BEDTIME
Refills: 0 | Status: DISCONTINUED | OUTPATIENT
Start: 2019-07-17 | End: 2019-07-19

## 2019-07-17 RX ORDER — MAGNESIUM SULFATE 500 MG/ML
2 VIAL (ML) INJECTION ONCE
Refills: 0 | Status: COMPLETED | OUTPATIENT
Start: 2019-07-17 | End: 2019-07-17

## 2019-07-17 RX ORDER — SODIUM CHLORIDE 9 MG/ML
1000 INJECTION INTRAMUSCULAR; INTRAVENOUS; SUBCUTANEOUS
Refills: 0 | Status: DISCONTINUED | OUTPATIENT
Start: 2019-07-17 | End: 2019-07-17

## 2019-07-17 RX ORDER — MAGNESIUM SULFATE 500 MG/ML
4 VIAL (ML) INJECTION ONCE
Refills: 0 | Status: DISCONTINUED | OUTPATIENT
Start: 2019-07-17 | End: 2019-07-17

## 2019-07-17 RX ADMIN — Medication 1 TABLET(S): at 17:42

## 2019-07-17 RX ADMIN — TRANYLCYPROMINE SULFATE 60 MILLIGRAM(S): 10 TABLET, FILM COATED ORAL at 09:09

## 2019-07-17 RX ADMIN — Medication 50 GRAM(S): at 06:29

## 2019-07-17 RX ADMIN — HEPARIN SODIUM 5000 UNIT(S): 5000 INJECTION INTRAVENOUS; SUBCUTANEOUS at 13:09

## 2019-07-17 RX ADMIN — Medication 1 TABLET(S): at 13:09

## 2019-07-17 RX ADMIN — HEPARIN SODIUM 5000 UNIT(S): 5000 INJECTION INTRAVENOUS; SUBCUTANEOUS at 21:03

## 2019-07-17 RX ADMIN — MAGNESIUM OXIDE 400 MG ORAL TABLET 400 MILLIGRAM(S): 241.3 TABLET ORAL at 07:58

## 2019-07-17 RX ADMIN — SODIUM CHLORIDE 80 MILLILITER(S): 9 INJECTION, SOLUTION INTRAVENOUS at 00:14

## 2019-07-17 RX ADMIN — SODIUM CHLORIDE 1 GRAM(S): 9 INJECTION INTRAMUSCULAR; INTRAVENOUS; SUBCUTANEOUS at 21:03

## 2019-07-17 RX ADMIN — Medication 30 MILLIGRAM(S): at 21:03

## 2019-07-17 RX ADMIN — Medication 5 MILLIGRAM(S): at 02:10

## 2019-07-17 RX ADMIN — Medication 1 TABLET(S): at 07:58

## 2019-07-17 RX ADMIN — HEPARIN SODIUM 5000 UNIT(S): 5000 INJECTION INTRAVENOUS; SUBCUTANEOUS at 06:30

## 2019-07-17 RX ADMIN — Medication 1 MILLIGRAM(S): at 00:28

## 2019-07-17 NOTE — PROVIDER CONTACT NOTE (CRITICAL VALUE NOTIFICATION) - SITUATION
patient admitted with hyponatremia, previous sodium 120.
Pt receiving IV 2% sodium chloride at 50mL/hr for hyponatremia
patient admitted with hyponatremia.

## 2019-07-17 NOTE — PROGRESS NOTE ADULT - SUBJECTIVE AND OBJECTIVE BOX
Hospital Course:    68 y/o  female with a medical history of depression, HTN, thoracic aortic aneurysm and thrombocytopenia admitted for LOC secondary to hyponatremia. On admission, patient was hemodynamically stable; Na: 111, K: 2.6, Cl: 74, M.3. She was repleted with 30 ml of 3%NaCl at 30 ml/hr and 2g of MgSulfate IV. CT head had no evidence of acute intracranial hemorrhage or transcortical infarct. CXR unremarkable. EKG NSR. Patient was admitted to [facility] MICU for workup and treatment of hyponatremia. In MICU, Na corrected to 112 (7/15), while on 3%NaC1; NA remained stable and fluids were changed to 2%NaCl, Na continued to improve to 117. TSH WNL; urine Na, mildly improving urine osmality. Nephrology consulted due to hyponatremia and other electrolyte abnormalities (hypokalemia, hypomagnesemia and hypochloremia). Hyponatremia considered chronic hypoosmolar euvolemic hyponatremia and attributed to thiazide diuretic use. Renal plan and MICU team plan to correct Na 6-8 mEq within 24 hours. Of note, patient was hypertensive 160s-180s SBP, while in improved with nifedipine 30mg, which was required during previous hospitalization, as per pharmacy. Hypertension was attributed to MAO-I ([place]ate). Psych consulted regarding inpatient medication management, parnate (MAO-I) to be restarted. Patient was stepped down to 7LA on  @ 9:30Pm. No significant interventions taken on 7LA. Renal still following recommend with continued fluid restriction. Patient deemed fit to be transferred to [facility].     Interval / Overnight:    Overnight Na from 121 -> 119. D5 given for overcorrection. Ativan 2mg given and melatonin PRN for sleep. No complaints overnight. Patient denies HA, CP, SOB, N/V. Moving bowls ok.     VITAL SIGNS:  Vital Signs Last 24 Hrs  T(C): 36.1 (2019 13:45), Max: 36.9 (2019 22:09)  T(F): 97 (2019 13:45), Max: 98.5 (2019 22:09)  HR: 70 (2019 16:32) (66 - 80)  BP: 117/68 (2019 16:32) (102/57 - 151/77)  BP(mean): 86 (2019 16:32) (74 - 107)  RR: 16 (2019 16:32) (16 - 20)  SpO2: 99% (2019 16:32) (98% - 100%)    PHYSICAL EXAM:    General: in NAD, lying comfortably in bed. Slurred speech.   HEENT: normocephalic, atraumatic; PERRL, anicteric sclera; MMM  Neck: supple, no thyromegaly, no lymphadenopathy  Cardiovascular: +S1/S2, RRR, no M/G/R  Respiratory: clear to auscultation B/L; no wheezing, no rales, no rhonchi  Extremities: WWP; no edema, clubbing or cyanosis  Vascular: 2+ radial, DP/PT pulses B/L  Neurological: Alert to person place and time. Extraoccular eye muscles intact. Finger to nose accurate and without tremor. Speech is sensible but slurred.     MEDICATIONS:  MEDICATIONS  (STANDING):  dextrose 50% Injectable 12.5 Gram(s) IV Push once  dextrose 50% Injectable 25 Gram(s) IV Push once  dextrose 50% Injectable 25 Gram(s) IV Push once  heparin  Injectable 5000 Unit(s) SubCutaneous every 8 hours  NIFEdipine XL 30 milliGRAM(s) Oral every 24 hours  potassium acid phosphate/sodium acid phosphate tablet (K-PHOS No. 2) 1 Tablet(s) Oral three times a day with meals  tranylcypromine 60 milliGRAM(s) Oral every 24 hours    MEDICATIONS  (PRN):  dextrose 40% Gel 15 Gram(s) Oral once PRN Blood Glucose LESS THAN 70 milliGRAM(s)/deciliter  glucagon  Injectable 1 milliGRAM(s) IntraMuscular once PRN Glucose LESS THAN 70 milligrams/deciliter  LORazepam     Tablet 1 milliGRAM(s) Oral every 6 hours PRN Anxiety  melatonin 5 milliGRAM(s) Oral at bedtime PRN Insomnia      ALLERGIES:  Allergies    No Known Allergies    Intolerances        LABS:                        11.7   8.02  )-----------( 103      ( 2019 04:15 )             32.6     07-17    120<LL>  |  85<L>  |  18  ----------------------------<  115<H>  4.1   |  25  |  0.86    Ca    9.5      2019 15:07  Phos  2.1     -  Mg     1.5     -    TPro  7.8  /  Alb  4.4  /  TBili  0.8  /  DBili  x   /  AST  41<H>  /  ALT  26  /  AlkPhos  45  07-15        CAPILLARY BLOOD GLUCOSE      POCT Blood Glucose.: 102 mg/dL (2019 21:48)      RADIOLOGY & ADDITIONAL TESTS: Reviewed.

## 2019-07-17 NOTE — PROGRESS NOTE ADULT - PROBLEM SELECTOR PLAN 6
History of depression   - follow up with psychiatry regarding home meds  Ativan 1mg PRN q6h for anxiety

## 2019-07-17 NOTE — PROGRESS NOTE ADULT - SUBJECTIVE AND OBJECTIVE BOX
[facility] DIVISION OF KIDNEY DISEASES AND HYPERTENSION -- FOLLOW UP NOTE  --------------------------------------------------------------------------------      24 hour events/subjective: WILLIAMS. Na corrected to 122 yesterday evening so received D5W o/n with appropriate downtrending to 119 this AM. Currently Pt in bed. Feeling very irritated and stressed out that she has not received her MAOi or AM coffee. C/o feeling weakness, which she attributes to debilitation lying in bed this admission. Able to walk to bathroom without difficulty. No HA, SoB, CP/pressure, n/v/d/c.         PAST HISTORY  --------------------------------------------------------------------------------  No significant changes to PMH, PSH, FHx, SHx, unless otherwise noted    ALLERGIES & MEDICATIONS  --------------------------------------------------------------------------------  Allergies    No Known Allergies    Intolerances      Standing Inpatient Medications  dextrose 50% Injectable 12.5 Gram(s) IV Push once  dextrose 50% Injectable 25 Gram(s) IV Push once  dextrose 50% Injectable 25 Gram(s) IV Push once  heparin  Injectable 5000 Unit(s) SubCutaneous every 8 hours  NIFEdipine XL 30 milliGRAM(s) Oral every 24 hours  potassium acid phosphate/sodium acid phosphate tablet (K-PHOS No. 2) 1 Tablet(s) Oral three times a day with meals  tranylcypromine 60 milliGRAM(s) Oral every 24 hours    PRN Inpatient Medications  dextrose 40% Gel 15 Gram(s) Oral once PRN  glucagon  Injectable 1 milliGRAM(s) IntraMuscular once PRN  LORazepam     Tablet 1 milliGRAM(s) Oral every 6 hours PRN  melatonin 5 milliGRAM(s) Oral at bedtime PRN      REVIEW OF SYSTEMS  --------------------------------------------------------------------------------  Gen: No weight changes, fatigue, fevers/chills, weakness  Skin: No rashes  Head/Eyes/Ears/Mouth: No headache; Normal hearing; Normal vision w/o blurriness; No sinus pain/discomfort, sore throat  Respiratory: No dyspnea, cough, wheezing, hemoptysis  CV: No chest pain, PND, orthopnea  GI: No abdominal pain, diarrhea, constipation, nausea, vomiting, melena, hematochezia  : No increased frequency, dysuria, hematuria, nocturia  MSK: No joint pain/swelling; no back pain; no edema  Neuro: No dizziness/lightheadedness, weakness, seizures, numbness, tingling  Heme: No easy bruising or bleeding  Endo: No heat/cold intolerance  Psych: No significant nervousness, anxiety, stress, depression    All other systems were reviewed and are negative, except as noted.    VITALS/PHYSICAL EXAM  --------------------------------------------------------------------------------  T(C): 36.3 (07-17-19 @ 09:42), Max: 36.9 (07-16-19 @ 22:09)  HR: 70 (07-17-19 @ 08:56) (66 - 80)  BP: 131/74 (07-17-19 @ 08:56) (111/67 - 151/77)  RR: 20 (07-17-19 @ 08:56) (16 - 21)  SpO2: 98% (07-17-19 @ 08:56) (98% - 99%)  Wt(kg): --  Height (cm): 162.56 (07-15-19 @ 13:34)  Weight (kg): 56.4 (07-15-19 @ 13:34)  BMI (kg/m2): 21.3 (07-15-19 @ 13:34)  BSA (m2): 1.6 (07-15-19 @ 13:34)      07-16-19 @ 07:01  -  07-17-19 @ 07:00  --------------------------------------------------------  IN: 660 mL / OUT: 2765 mL / NET: -2105 mL    07-17-19 @ 07:01  -  07-17-19 @ 12:37  --------------------------------------------------------  IN: 0 mL / OUT: 400 mL / NET: -400 mL      Physical Exam:  	Gen: NAD, well-appearing, thin   	HEENT: PERRL, supple neck, clear oropharynx  	Pulm: CTA B/L  	CV: RRR, S1S2; no rub  	Back: No spinal or CVA tenderness; no sacral edema  	Abd: +BS, soft, nontender/nondistended  	: No suprapubic tenderness  	UE: Warm, FROM, no clubbing, intact strength; no edema; no asterixis  	LE: Warm, FROM, no clubbing, intact strength; no edema  	Neuro: No focal deficits, intact gait  	Psych: Normal affect and mood 5/5 in UE, 4/5 in LE   	Skin: Warm, without rashes  	Vascular access:    LABS/STUDIES  --------------------------------------------------------------------------------              11.7   8.02  >-----------<  103      [07-17-19 @ 04:15]              32.6     122  |  84  |  16  ----------------------------<  83      [07-17-19 @ 11:23]  3.9   |  26  |  0.69        Ca     9.4     [07-17-19 @ 11:23]      Mg     1.5     [07-17-19 @ 04:15]      Phos  2.1     [07-17-19 @ 04:15]    TPro  7.8  /  Alb  4.4  /  TBili  0.8  /  DBili  x   /  AST  41  /  ALT  26  /  AlkPhos  45  [07-15-19 @ 17:30]    PT/INR: PT 11.2 , INR 1.02       [07-15-19 @ 13:39]  PTT: 30.3       [07-15-19 @ 13:39]    Serum Osmolality 229      [07-15-19 @ 17:30]    Creatinine Trend:  SCr 0.69 [07-17 @ 11:23]  SCr 0.72 [07-17 @ 04:15]  SCr 0.93 [07-16 @ 23:13]  SCr 0.80 [07-16 @ 17:58]  SCr 0.76 [07-16 @ 12:47]    Urinalysis - [07-15-19 @ 14:49]      Color Yellow / Appearance Clear / SG 1.010 / pH 7.5      Gluc NEGATIVE / Ketone NEGATIVE  / Bili NEGATIVE / Urobili 0.2       Blood Trace / Protein NEGATIVE / Leuk Est NEGATIVE / Nitrite NEGATIVE      RBC < 5 / WBC < 5 / Hyaline  / Gran  / Sq Epi  / Non Sq Epi 0-5 / Bacteria Present    Urine Creatinine 16      [07-15-19 @ 18:22]  Urine Sodium 50      [07-17-19 @ 11:09]  Urine Potassium 24      [07-17-19 @ 11:09]  Urine Chloride 43      [07-17-19 @ 11:09]  Urine Osmolality 304      [07-17-19 @ 11:09]    TSH 1.716      [07-15-19 @ 20:58]

## 2019-07-17 NOTE — SBIRT NOTE ADULT - NSSBIRTDRGBRIEFINTDET_GEN_A_CORE
Pt. does not currently use drugs. She used cocaine but stopped many years ago. Pt. stated she was "self medicating" depression and stopped using drugs when she started obtaining regular mental health treatment.

## 2019-07-17 NOTE — SBIRT NOTE ADULT - NSSBIRTALCPOSREINDET_GEN_A_CORE
Pt. stopped drinking heavily about 3 years ago after an accident/hospitalization. She has good mental health follow up and only drinks when she goes out to eat. Support and positive reinforcement provided.

## 2019-07-17 NOTE — PROGRESS NOTE ADULT - PROBLEM SELECTOR PLAN 1
As per renal, likely multifactorial with thiazide diuretic most concerning (given K, Cl, and Mg levels).   - Currently AAOx3  - BMP q3  - Strict Is and Os   - As per renal note @ 1123 (7/17) patient with 122. New goal 128-130 by tomorrow afternoon.   - continue with water restriction.

## 2019-07-17 NOTE — PROGRESS NOTE ADULT - ASSESSMENT
69F PMH  depression, HTN, thoracic aortic aneurysm and thrombocytopenia who presented to [facility] ED after fall due to generalized weakness, found to have Sodium 111, Potassium 2.6, Chloride 74, Magnesium 1.3 admitted to MICU for symptomatic hyponatremia. Now s/p approprate correction with 3% NaCl to Na 117; however with concern that Pt is still symptomatic after weakness walking this AM. Renal consulted for further management     #Severe Chronic Euvolemic Hypotonic Hypernatremia: improving   -Chronicity unclear, but Pt has been having symptoms since 7/12 with last known Na 130 in Oct 2018 at PCP   -At this time etio likely multifactorial suspect 2/2 thiazide use given comcomittant low Cl, K, Mg, less likely SIADH     Plan   -Agree with plan for correct Na 6-8 mEq within 24 hours, do not exceed 8 mEq , c/w q3hr BMP, strict i/o  -Currently Na 122, goal 128-13o by tomorrow afternoon   -C/w Water restriction      #Essential HTN   -On [place]-HCTZ for past two years, BP above goal this admission   -C/w Nifedipine 30mg     #Hypokalemia, hypophosphatemia   -Replete to goal PRN

## 2019-07-17 NOTE — PROGRESS NOTE ADULT - ASSESSMENT
69 year old  female with a past medical history significant for depression, HTN, and thrombocytopenia who presented to [facility] ED after losing balance and falling while walking. She was admitted to [facility] MICU for workup and treatment of hyponatremia. Now on 7LA with sodium uptrending at an appropriate rate. Last level 120.

## 2019-07-17 NOTE — PROVIDER CONTACT NOTE (CRITICAL VALUE NOTIFICATION) - ACTION/TREATMENT ORDERED:
no immediate intervention, will continue to monitor.
no immediate interventions, will continue with 1L fluid restriction.
Hold 2% sodium chloride drip

## 2019-07-17 NOTE — PROGRESS NOTE ADULT - PROBLEM SELECTOR PLAN 2
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

## 2019-07-17 NOTE — PROVIDER CONTACT NOTE (CRITICAL VALUE NOTIFICATION) - ASSESSMENT
awake and alert in no distress, no neuro defecits noted,  patient with continues slight slurred speech.
Pt asleep, but easily arousable. BP stable.
patient awake and alert in bed, slurred speech, no new neuro deficits, hemodynamically stable.

## 2019-07-18 DIAGNOSIS — N17.9 ACUTE KIDNEY FAILURE, UNSPECIFIED: ICD-10-CM

## 2019-07-18 LAB
ANION GAP SERPL CALC-SCNC: 10 MMOL/L — SIGNIFICANT CHANGE UP (ref 5–17)
ANION GAP SERPL CALC-SCNC: 11 MMOL/L — SIGNIFICANT CHANGE UP (ref 5–17)
ANION GAP SERPL CALC-SCNC: 11 MMOL/L — SIGNIFICANT CHANGE UP (ref 5–17)
BUN SERPL-MCNC: 16 MG/DL — SIGNIFICANT CHANGE UP (ref 7–23)
BUN SERPL-MCNC: 17 MG/DL — SIGNIFICANT CHANGE UP (ref 7–23)
BUN SERPL-MCNC: 18 MG/DL — SIGNIFICANT CHANGE UP (ref 7–23)
BUN SERPL-MCNC: 20 MG/DL — SIGNIFICANT CHANGE UP (ref 7–23)
BUN SERPL-MCNC: 26 MG/DL — HIGH (ref 7–23)
BUN SERPL-MCNC: 33 MG/DL — HIGH (ref 7–23)
CALCIUM SERPL-MCNC: 9.1 MG/DL — SIGNIFICANT CHANGE UP (ref 8.4–10.5)
CALCIUM SERPL-MCNC: 9.2 MG/DL — SIGNIFICANT CHANGE UP (ref 8.4–10.5)
CALCIUM SERPL-MCNC: 9.2 MG/DL — SIGNIFICANT CHANGE UP (ref 8.4–10.5)
CALCIUM SERPL-MCNC: 9.3 MG/DL — SIGNIFICANT CHANGE UP (ref 8.4–10.5)
CALCIUM SERPL-MCNC: 9.4 MG/DL — SIGNIFICANT CHANGE UP (ref 8.4–10.5)
CALCIUM SERPL-MCNC: 9.6 MG/DL — SIGNIFICANT CHANGE UP (ref 8.4–10.5)
CHLORIDE SERPL-SCNC: 85 MMOL/L — LOW (ref 96–108)
CHLORIDE SERPL-SCNC: 87 MMOL/L — LOW (ref 96–108)
CHLORIDE SERPL-SCNC: 89 MMOL/L — LOW (ref 96–108)
CHLORIDE SERPL-SCNC: 89 MMOL/L — LOW (ref 96–108)
CHLORIDE SERPL-SCNC: 90 MMOL/L — LOW (ref 96–108)
CHLORIDE SERPL-SCNC: 91 MMOL/L — LOW (ref 96–108)
CHLORIDE UR-SCNC: <20 MMOL/L — SIGNIFICANT CHANGE UP
CO2 SERPL-SCNC: 23 MMOL/L — SIGNIFICANT CHANGE UP (ref 22–31)
CO2 SERPL-SCNC: 24 MMOL/L — SIGNIFICANT CHANGE UP (ref 22–31)
CO2 SERPL-SCNC: 25 MMOL/L — SIGNIFICANT CHANGE UP (ref 22–31)
CO2 SERPL-SCNC: 26 MMOL/L — SIGNIFICANT CHANGE UP (ref 22–31)
CO2 SERPL-SCNC: 26 MMOL/L — SIGNIFICANT CHANGE UP (ref 22–31)
CO2 SERPL-SCNC: 27 MMOL/L — SIGNIFICANT CHANGE UP (ref 22–31)
CREAT ?TM UR-MCNC: 118 MG/DL — SIGNIFICANT CHANGE UP
CREAT SERPL-MCNC: 0.72 MG/DL — SIGNIFICANT CHANGE UP (ref 0.5–1.3)
CREAT SERPL-MCNC: 0.72 MG/DL — SIGNIFICANT CHANGE UP (ref 0.5–1.3)
CREAT SERPL-MCNC: 0.77 MG/DL — SIGNIFICANT CHANGE UP (ref 0.5–1.3)
CREAT SERPL-MCNC: 0.95 MG/DL — SIGNIFICANT CHANGE UP (ref 0.5–1.3)
CREAT SERPL-MCNC: 1.24 MG/DL — SIGNIFICANT CHANGE UP (ref 0.5–1.3)
CREAT SERPL-MCNC: 1.51 MG/DL — HIGH (ref 0.5–1.3)
GLUCOSE SERPL-MCNC: 105 MG/DL — HIGH (ref 70–99)
GLUCOSE SERPL-MCNC: 113 MG/DL — HIGH (ref 70–99)
GLUCOSE SERPL-MCNC: 115 MG/DL — HIGH (ref 70–99)
GLUCOSE SERPL-MCNC: 118 MG/DL — HIGH (ref 70–99)
GLUCOSE SERPL-MCNC: 135 MG/DL — HIGH (ref 70–99)
GLUCOSE SERPL-MCNC: 155 MG/DL — HIGH (ref 70–99)
HCT VFR BLD CALC: 34.1 % — LOW (ref 34.5–45)
HGB BLD-MCNC: 12 G/DL — SIGNIFICANT CHANGE UP (ref 11.5–15.5)
LEAD BLD-MCNC: <1 UG/DL — SIGNIFICANT CHANGE UP (ref 0–4)
MAGNESIUM SERPL-MCNC: 1.6 MG/DL — SIGNIFICANT CHANGE UP (ref 1.6–2.6)
MCHC RBC-ENTMCNC: 32 PG — SIGNIFICANT CHANGE UP (ref 27–34)
MCHC RBC-ENTMCNC: 35.2 GM/DL — SIGNIFICANT CHANGE UP (ref 32–36)
MCV RBC AUTO: 90.9 FL — SIGNIFICANT CHANGE UP (ref 80–100)
NRBC # BLD: 0 /100 WBCS — SIGNIFICANT CHANGE UP (ref 0–0)
PLATELET # BLD AUTO: 113 K/UL — LOW (ref 150–400)
POTASSIUM SERPL-MCNC: 3.5 MMOL/L — SIGNIFICANT CHANGE UP (ref 3.5–5.3)
POTASSIUM SERPL-MCNC: 3.8 MMOL/L — SIGNIFICANT CHANGE UP (ref 3.5–5.3)
POTASSIUM SERPL-MCNC: 3.9 MMOL/L — SIGNIFICANT CHANGE UP (ref 3.5–5.3)
POTASSIUM SERPL-MCNC: 5.2 MMOL/L — SIGNIFICANT CHANGE UP (ref 3.5–5.3)
POTASSIUM SERPL-SCNC: 3.5 MMOL/L — SIGNIFICANT CHANGE UP (ref 3.5–5.3)
POTASSIUM SERPL-SCNC: 3.8 MMOL/L — SIGNIFICANT CHANGE UP (ref 3.5–5.3)
POTASSIUM SERPL-SCNC: 3.9 MMOL/L — SIGNIFICANT CHANGE UP (ref 3.5–5.3)
POTASSIUM SERPL-SCNC: 5.2 MMOL/L — SIGNIFICANT CHANGE UP (ref 3.5–5.3)
RBC # BLD: 3.75 M/UL — LOW (ref 3.8–5.2)
RBC # FLD: 12.9 % — SIGNIFICANT CHANGE UP (ref 10.3–14.5)
SODIUM SERPL-SCNC: 122 MMOL/L — LOW (ref 135–145)
SODIUM SERPL-SCNC: 122 MMOL/L — LOW (ref 135–145)
SODIUM SERPL-SCNC: 124 MMOL/L — LOW (ref 135–145)
SODIUM SERPL-SCNC: 124 MMOL/L — LOW (ref 135–145)
SODIUM SERPL-SCNC: 125 MMOL/L — LOW (ref 135–145)
SODIUM SERPL-SCNC: 127 MMOL/L — LOW (ref 135–145)
SODIUM UR-SCNC: <20 MMOL/L — SIGNIFICANT CHANGE UP
WBC # BLD: 9.3 K/UL — SIGNIFICANT CHANGE UP (ref 3.8–10.5)
WBC # FLD AUTO: 9.3 K/UL — SIGNIFICANT CHANGE UP (ref 3.8–10.5)

## 2019-07-18 PROCEDURE — 99233 SBSQ HOSP IP/OBS HIGH 50: CPT | Mod: GC

## 2019-07-18 PROCEDURE — 99232 SBSQ HOSP IP/OBS MODERATE 35: CPT

## 2019-07-18 PROCEDURE — 99233 SBSQ HOSP IP/OBS HIGH 50: CPT

## 2019-07-18 RX ORDER — POTASSIUM CHLORIDE 20 MEQ
40 PACKET (EA) ORAL ONCE
Refills: 0 | Status: COMPLETED | OUTPATIENT
Start: 2019-07-18 | End: 2019-07-18

## 2019-07-18 RX ORDER — SODIUM CHLORIDE 9 MG/ML
1000 INJECTION INTRAMUSCULAR; INTRAVENOUS; SUBCUTANEOUS
Refills: 0 | Status: DISCONTINUED | OUTPATIENT
Start: 2019-07-18 | End: 2019-07-19

## 2019-07-18 RX ORDER — MAGNESIUM SULFATE 500 MG/ML
4 VIAL (ML) INJECTION ONCE
Refills: 0 | Status: COMPLETED | OUTPATIENT
Start: 2019-07-18 | End: 2019-07-18

## 2019-07-18 RX ORDER — POTASSIUM CHLORIDE 20 MEQ
20 PACKET (EA) ORAL ONCE
Refills: 0 | Status: DISCONTINUED | OUTPATIENT
Start: 2019-07-18 | End: 2019-07-18

## 2019-07-18 RX ADMIN — SODIUM CHLORIDE 1 GRAM(S): 9 INJECTION INTRAMUSCULAR; INTRAVENOUS; SUBCUTANEOUS at 05:41

## 2019-07-18 RX ADMIN — SODIUM CHLORIDE 80 MILLILITER(S): 9 INJECTION INTRAMUSCULAR; INTRAVENOUS; SUBCUTANEOUS at 21:41

## 2019-07-18 RX ADMIN — HEPARIN SODIUM 5000 UNIT(S): 5000 INJECTION INTRAVENOUS; SUBCUTANEOUS at 05:41

## 2019-07-18 RX ADMIN — Medication 30 MILLIGRAM(S): at 22:20

## 2019-07-18 RX ADMIN — TRANYLCYPROMINE SULFATE 60 MILLIGRAM(S): 10 TABLET, FILM COATED ORAL at 11:26

## 2019-07-18 RX ADMIN — HEPARIN SODIUM 5000 UNIT(S): 5000 INJECTION INTRAVENOUS; SUBCUTANEOUS at 21:41

## 2019-07-18 RX ADMIN — Medication 40 MILLIEQUIVALENT(S): at 11:12

## 2019-07-18 RX ADMIN — HEPARIN SODIUM 5000 UNIT(S): 5000 INJECTION INTRAVENOUS; SUBCUTANEOUS at 17:20

## 2019-07-18 RX ADMIN — Medication 100 GRAM(S): at 11:04

## 2019-07-18 RX ADMIN — Medication 5 MILLIGRAM(S): at 00:10

## 2019-07-18 RX ADMIN — SODIUM CHLORIDE 1 GRAM(S): 9 INJECTION INTRAMUSCULAR; INTRAVENOUS; SUBCUTANEOUS at 17:19

## 2019-07-18 NOTE — CONSULT NOTE ADULT - SUBJECTIVE AND OBJECTIVE BOX
Patient is a 69y old  Female who presents with a chief complaint of fall (17 Jul 2019 16:55)       HPI:  Ms. Thompson is a 69 year old  female with a past medical history significant for depression, HTN, thoracic aortic aneurysm and thrombocytopenia who presented to [facility] ED after losing balance and falling while walking. Patient denies hitting her head or LOC. She denies past episodes of dizziness or weakness. Patient states that two days ago she had 3 episodes of non-bilious and non-bloody vomiting after an episode of nausea. Patient states that she re-hydrated with 4 cups of water and felt better. The patient normall drinks 8 cups of seltzer per day and makes sure she stays "very hydrated". She says that when she gets dehydrated and tired, she makes sure to drink water. to  Last night the patient complained of trouble sleeping that did not get better with melatonin and was only able to sleep 3 hours after taking one of her prescribed lorazepam. Today she had jury duty and was walking across the walk way when she lost balance and fell. Patient admits to recent nausea, vomiting, and loss of balance. Patient denies headache, fever, changes in vision, dizziness, acid reflux, chest pain, palpitations, shortness of breath, changes in bowel movement or changes in urination. Patient denies changes in recent medication, travel, changes in weight or drinking habits.    ED Vitals: T 98.2 P 75 /88 RR 18 O2 99% on RA  ED Course: In the ED, Hgb 11.2, Sodium 111, Potassium 2.6, Chloride 74, Magnesium 1.3. CT Head: No acute intracranial hemorrhage or transcortical infarct. She received 30 ml of 3%NaCl at 30 ml/hr and 2g of MgSulfate IV per Dr. Elias at [facility] and she was admitted to [facility] MICU for workup and treatment of hyponatremia. (15 Jul 2019 17:54)      PAST MEDICAL & SURGICAL HISTORY:  Thoracic aortic aneurysm  Colon polyp  Thrombocytopenia  Depression  HTN (hypertension)  History of eye surgery      MEDICATIONS  (STANDING):  dextrose 50% Injectable 12.5 Gram(s) IV Push once  dextrose 50% Injectable 25 Gram(s) IV Push once  dextrose 50% Injectable 25 Gram(s) IV Push once  heparin  Injectable 5000 Unit(s) SubCutaneous every 8 hours  magnesium sulfate  IVPB 4 Gram(s) IV Intermittent once  NIFEdipine XL 30 milliGRAM(s) Oral every 24 hours  potassium chloride    Tablet ER 20 milliEquivalent(s) Oral once  potassium chloride    Tablet ER 40 milliEquivalent(s) Oral once  sodium chloride 1 Gram(s) Oral three times a day  tranylcypromine 60 milliGRAM(s) Oral every 24 hours    MEDICATIONS  (PRN):  dextrose 40% Gel 15 Gram(s) Oral once PRN Blood Glucose LESS THAN 70 milliGRAM(s)/deciliter  glucagon  Injectable 1 milliGRAM(s) IntraMuscular once PRN Glucose LESS THAN 70 milligrams/deciliter  LORazepam     Tablet 1 milliGRAM(s) Oral every 6 hours PRN Anxiety  melatonin 5 milliGRAM(s) Oral at bedtime PRN Insomnia      Social History per patient:             -  (- )  tobacco,  (- )   IVDA,  (+ for cocaine use )  iliicit drug use,  (+ )  alcohol- 1/2 to 1 bottle of champagne qd           - 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[employer], retired " [email or web address]           - Home Living Status: lives alone in an elevatot accessible apartment building           - Home Care Services: none    Functional Level Prior to Admission per patient:                     - ADL's: independent           - ambulates without assistive devices    FAMILY HISTORY:  Family history of colon cancer in mother      CBC Full  -  ( 18 Jul 2019 07:30 )  WBC Count : 9.30 K/uL  RBC Count : 3.75 M/uL  Hemoglobin : 12.0 g/dL  Hematocrit : 34.1 %  Platelet Count - Automated : 113 K/uL  Mean Cell Volume : 90.9 fl  Mean Cell Hemoglobin : 32.0 pg  Mean Cell Hemoglobin Concentration : 35.2 gm/dL  Auto Neutrophil # : x  Auto Lymphocyte # : x  Auto Monocyte # : x  Auto Eosinophil # : x  Auto Basophil # : x  Auto Neutrophil % : x  Auto Lymphocyte % : x  Auto Monocyte % : x  Auto Eosinophil % : x  Auto Basophil % : x      07-18    124<L>  |  90<L>  |  17  ----------------------------<  113<H>  3.5   |  24  |  0.72    Ca    9.2      18 Jul 2019 07:30  Phos  2.1     07-17  Mg     1.6     07-18              Radiology:    < from: CT Head No Cont (07.15.19 @ 13:59) >    EXAM:  CT BRAIN                           PROCEDURE DATE:  07/15/2019          INTERPRETATION:  I, Mercedes Bojorquez MD, have reviewed the images and the   report and agree with the findings, with the following modification:     There is patchy areasof hypodensity within the periventricular white   matter bilaterally (left greater than right) which may represent the   sequela of small vessel ischemic disease in this patient. The lenses are   absent bilaterally which may secondary to prior cataract surgery. There   is a small bony protuberances along the paramedian left frontal lobe   which may represent a osteoma (series 3 image 12).    ******PRELIMINARY REPORT******     INDICATIONS: Presenting with generalized weakness, history of platelet   disorder. Evaluate for intracranial bleed.    TECHNIQUE: Serial axial images were obtained from the skull base to the   vertex without the use of intravenous contrast. Coronal and sagittal   reformations were obtained.    COMPARISON EXAMINATION: None.    FINDINGS:    VENTRICLES AND SULCI: Age-related parenchymal volume loss with dilatation   of the ventricles and cortical sulci.   INTRA-AXIAL: There is a bony protuberance along the paramedian left   frontal No intracranial mass, acute hemorrhage, midline shift or acute   transcortical infarct is seen. Patchy hypodense focus in the left frontal   lobe periventricular space consistent with chronic microangiopathic   ischemia.  EXTRA-AXIAL: No extra-axial fluid collection is present.   VISUALIZED SINUSES: No air-fluid levels are identified.   VISUALIZED MASTOIDS: Clear.  CALVARIUM: Mild degenerative disease of the right TMJ.  MISCELLANEOUS: None.    IMPRESSION: No acute intracranial hemorrhage or transcortical infarct.   Age related parenchymal volume loss. Patchy periventricular white matter   hypodense focus in left frontal lobe consistent with chronic   microangiopathic ischemia.          < from: Xray Chest 1 View- PORTABLE-Urgent (07.16.19 @ 11:05) >  EXAM:  XR CHEST PORTABLE URGENT 1V                          PROCEDURE DATE:  07/16/2019          INTERPRETATION:  Clinical history/reason for exam: Lung lesion.    Single frontal view.    Findings/  impression: Clear lungs. Heart size within normallimits, thoracic aortic   tortuosity. Mediastinum, unremarkable. Left rib old fractures.              Vital Signs Last 24 Hrs  T(C): 36.6 (18 Jul 2019 05:37), Max: 36.6 (18 Jul 2019 02:02)  T(F): 97.8 (18 Jul 2019 05:37), Max: 97.9 (18 Jul 2019 02:02)  HR: 70 (18 Jul 2019 03:59) (68 - 80)  BP: 141/74 (18 Jul 2019 03:59) (102/57 - 162/87)  BP(mean): 101 (18 Jul 2019 03:59) (74 - 116)  RR: 18 (18 Jul 2019 03:59) (16 - 18)  SpO2: 100% (18 Jul 2019 03:59) (98% - 100%)    REVIEW OF SYSTEMS:    CONSTITUTIONAL:  fatigue  EYES: No eye pain, visual disturbances, or discharge  ENMT:  No difficulty hearing, tinnitus, vertigo; No sinus or throat pain  NECK: No pain or stiffness  BREASTS: No pain, masses, or nipple discharge  RESPIRATORY: No cough, wheezing, chills or hemoptysis; No shortness of breath  CARDIOVASCULAR: No chest pain, palpitations, dizziness, or leg swelling  GASTROINTESTINAL: No abdominal or epigastric pain. No nausea, vomiting, or hematemesis; No diarrhea or constipation. No melena or hematochezia.  GENITOURINARY: No dysuria, frequency, hematuria, or incontinence  NEUROLOGICAL: No headaches, memory loss, loss of strength, numbness, or tremors  SKIN: No itching, burning, rashes, or lesions   LYMPH NODES: No enlarged glands  ENDOCRINE: No heat or cold intolerance; No hair loss  MUSCULOSKELETAL: No joint pain or swelling; No muscle, back, or extremity pain  PSYCHIATRIC: No depression, anxiety, mood swings, or difficulty sleeping  HEME/LYMPH: No easy bruising, or bleeding gums  ALLERGY AND IMMUNOLOGIC: No hives or eczema  VASCULAR: no swelling, erythema      Physical Exam: thin appearing 70 yo  woman lying in semi Limon's position, c/o not getting her coffee, slightly agitated    Head: normocephalic, atraumatic    Eyes: PERRLA, EOMI, no nystagmus, sclera anicteric    ENT: nasal discharge, uvula midline, no oropharyngeal erythema/exudate    Neck: supple, negative JVD, negative carotid bruits, no thyromegaly    Chest: CTA bilaterally, neg wheeze/ rhonchi/ rales/ crackles/ egophany    Cardiovascular: regular rate and rhythm, neg murmurs/rubs/gallops    Abdomen: soft, non distended, non tender, negative rebound/guarding, normal bowel sounds, neg hepatosplenomegaly    Extremities: bernabe leg and right elbow bruisses, WWP, neg cyanosis/clubbing/edema, negative calf tenderness to palpation, negative Jensen's sign    :     Neurologic Exam:    Alert and oriented to person, place, date/year, speech fluent w/o dysarthria, recent and remote memory intact, repetition intact, comprehension intact    Cranial Nerves:     II:                       pupils equal, round and reactive to light, visual fields intact   III/ IV/VI:             extraocular movements intact, neg nystagmus, neg ptosis  V:                       facial sensation intact, V1-3 normal  VII:                     face symmetric, no droop, normal eye closure and smile  VIII:                    hearing intact to finger rub bilaterally  IX/ X:                 soft palate rise symmetrical  XI:                      head turning, shoulder shrug normal  XII:                     tongue midline    Motor Exam:    Upper Extremities:     RIght:     5/5   /intrinsics               5/5  wrist flexors/extensors               5/5  biceps/triceps               5/5  deltoid               negative drift    Left :     5/5  /intrinsics               5/5  wrist flexors/extensors               5/5 biceps/triceps               5/5 deltoid               negative drift    Lower Extremities:                 Right:      5/5  DF/PF/ evertors/ invertors                5/5  Quad/ hamstrings                5/5  hip flexors/adductors/abductors                 Left:       5/5  DF/PF/ evertors/ invertors                5/5  Quad/ hamstrings                5/5  hip flexors/adductors/abductors                       Sensory:    intact to LT/PP in all UE/LE dermatomes    DTR:            = biceps/     triceps/     brachioradialis                      = patella/   medial hamstring/ankle                      neg clonus                      neg Babinski                        Finger to Nose:  wnl    Heel to Shin:  wnl    Rapid Alternating movements:  wnl    Joint Position Sense:  intact    Romberg:  not tested    Tandem Walking:  not tested    Gait:  not tested        PM&R Impression:    1) s/p fall  2) no focal weakness  3) hyponatremia        Recommendations:    1) Physical therapy focusing on therapeutic exercises, bed mobility/transfer out of bed evaluation, progressive ambulation with assistive devices prn.    2) Anticipated Disposition Plan/Recs: d/c home

## 2019-07-18 NOTE — PROGRESS NOTE ADULT - PROBLEM SELECTOR PLAN 1
As per renal, likely multifactorial with thiazide diuretic most concerning (given K, Cl, and Mg levels).   - Currently AAOx3  - BMP q3  - Strict Is and Os   - As per renal note @ 1123 (7/17) patient with 122. New goal 128-130 by tomorrow afternoon.   - continue with water restriction. As per renal, likely multifactorial with thiazide diuretic most concerning (given K, Cl, and Mg levels).   - Currently AAOx3  - Strict Is and Os   - As per renal note 7/18, Na goal 128-130 by tomorrow afternoon.  - Continue with water restriction  - BMP Q6H  - AM urine electrolytes, urea As per renal, likely multifactorial with thiazide diuretic most concerning (given K, Cl, and Mg levels).   - Currently AAOx3  - Strict Is and Os   - As per renal note 7/18, Na goal 128-130 by tomorrow afternoon.  - Continue with water restriction  - Continue NaCl tabs  - BMP Q6H  - AM urine electrolytes, urea

## 2019-07-18 NOTE — PROGRESS NOTE ADULT - ASSESSMENT
69 year old  female with a past medical history significant for depression, HTN, and thrombocytopenia who presented to [facility] ED after losing balance and falling while walking. She was admitted to [facility] MICU for workup and treatment of hyponatremia. Now on 7LA with sodium uptrending at an appropriate rate. Last level 124.

## 2019-07-18 NOTE — PHYSICAL THERAPY INITIAL EVALUATION ADULT - GENERAL OBSERVATIONS, REHAB EVAL
Pt found in upright sitting, (+) IV which was disconnected by ETTA Damon prior to mobilizing, NAD, denied pain.

## 2019-07-18 NOTE — CONSULT NOTE ADULT - ASSESSMENT
69 year old  female with a past medical history significant for depression, HTN, and thrombocytopenia who presented to [facility] ED after losing balance and falling while walking. She was admitted to [facility] MICU for workup and treatment of hyponatremia. Now on 7LA with sodium uptrending at an appropriate rate. Last level 120.        Problem/Plan - 1:  ·  Problem: Hyponatremia.  Plan: As per renal, likely multifactorial with thiazide diuretic most concerning (given K, Cl, and Mg levels).   - Currently AAOx3  - BMP q3  - Strict Is and Os   - As per renal note @ 1123 (7/17) patient with 122. New goal 128-130 by tomorrow afternoon.   - continue with water restriction.     Problem/Plan - 2:  ·  Problem: Fall.  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     Problem/Plan - 3:  ·  Problem: Hypertension, unspecified type.  Plan: #HTN  - Hold home Spironolactone  - Monitor vitals.     Problem/Plan - 4:  ·  Problem: Hypokalemia.  Plan: - Currently 4.1  - replete as necessary.     Problem/Plan - 5:  ·  Problem: Hypomagnesemia.  Plan: - Currently 1.5   - replete as necessary  - No level today (7/17) will get AM level.     Problem/Plan - 6:  Problem: Recurrent major depressive disorder, in full remission. Plan: History of depression   - follow up with psychiatry regarding home meds  Ativan 1mg PRN q6h for anxiety.    Problem/Plan - 7:  ·  Problem: Thrombocytopenia.  Plan: # Hx of thrombocytopenia, unknown cause, saw a hematologist in the past  - AM CBC, peripheral smear.     Problem/Plan - 8:  ·  Problem: Prophylactic measure.  Plan: Fluids:  Electrolytes;  Nutrition: regular diet   DVT: none  Dispo: 7Lach  Code: Full.

## 2019-07-18 NOTE — PROGRESS NOTE ADULT - PROBLEM SELECTOR PLAN 1
As per renal, likely multifactorial with thiazide diuretic most concerning (given K, Cl, and Mg levels).   - Currently AAOx3  - BMP q3  - Strict Is and Os   - As per renal note will repeat urine lytes in AM. Goal 128 before D/C.   - continue with water restriction 1- 1.5L and continue to monitor for duration of Parnate use.

## 2019-07-18 NOTE — PROGRESS NOTE ADULT - PROBLEM SELECTOR PLAN 2
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 unclear etiology  NA<20 likely prerenal  IVF NS 80cc/hr  strict i&o  renal consult

## 2019-07-18 NOTE — PROGRESS NOTE ADULT - SUBJECTIVE AND OBJECTIVE BOX
***note in progress***    OVERNIGHT EVENTS:    SUBJECTIVE:    Vital Signs Last 12 Hrs  T(F): 97.7 (07-18-19 @ 15:00), Max: 97.8 (07-18-19 @ 05:37)  HR: 72 (07-18-19 @ 09:24) (72 - 72)  BP: 130/78 (07-18-19 @ 12:05) (123/80 - 130/78)  BP(mean): --  RR: 16 (07-18-19 @ 09:24) (16 - 16)  SpO2: 99% (07-18-19 @ 09:24) (99% - 99%)  I&O's Summary    17 Jul 2019 07:01  -  18 Jul 2019 07:00  --------------------------------------------------------  IN: 0 mL / OUT: 1750 mL / NET: -1750 mL    18 Jul 2019 07:01  -  18 Jul 2019 17:19  --------------------------------------------------------  IN: 340 mL / OUT: 380 mL / NET: -40 mL        PHYSICAL EXAM:  Constitutional: NAD, comfortable in bed.  HEENT: NC/AT, PERRLA, EOMI, no conjunctival pallor or scleral icterus, MMM  Neck: Supple, no JVD  Respiratory: Normal rate, rhythm, depth, effort. CTAB. No w/r/r.   Cardiovascular: RRR, normal S1 and S2, no m/r/g.   Gastrointestinal: +BS, soft NTND, no guarding or rebound tenderness, no palpable masses   Extremities: wwp; no cyanosis, clubbing or edema.   Vascular: Pulses equal and strong throughout.   Neurological: AAOx3, no CN deficits, strength and sensation intact throughout.   Skin: No gross skin abnormalities or rashes        LABS:                        12.0   9.30  )-----------( 113      ( 18 Jul 2019 07:30 )             34.1     07-18    124<L>  |  87<L>  |  20  ----------------------------<  115<H>  5.2   |  26  |  0.95    Ca    9.3      18 Jul 2019 15:10  Phos  2.1     07-17  Mg     1.6     07-18            RADIOLOGY & ADDITIONAL TESTS:    MEDICATIONS  (STANDING):  dextrose 50% Injectable 12.5 Gram(s) IV Push once  dextrose 50% Injectable 25 Gram(s) IV Push once  dextrose 50% Injectable 25 Gram(s) IV Push once  heparin  Injectable 5000 Unit(s) SubCutaneous every 8 hours  NIFEdipine XL 30 milliGRAM(s) Oral every 24 hours  sodium chloride 1 Gram(s) Oral three times a day  tranylcypromine 60 milliGRAM(s) Oral every 24 hours    MEDICATIONS  (PRN):  dextrose 40% Gel 15 Gram(s) Oral once PRN Blood Glucose LESS THAN 70 milliGRAM(s)/deciliter  glucagon  Injectable 1 milliGRAM(s) IntraMuscular once PRN Glucose LESS THAN 70 milligrams/deciliter  LORazepam     Tablet 1 milliGRAM(s) Oral every 6 hours PRN Anxiety  melatonin 5 milliGRAM(s) Oral at bedtime PRN Insomnia OVERNIGHT EVENTS: No acute events overnight.    SUBJECTIVE: Patient says she feels well but is hoping to go home by Sunday to make her scheduled massage.    Vital Signs Last 12 Hrs  T(F): 97.7 (07-18-19 @ 15:00), Max: 97.8 (07-18-19 @ 05:37)  HR: 72 (07-18-19 @ 09:24) (72 - 72)  BP: 130/78 (07-18-19 @ 12:05) (123/80 - 130/78)  BP(mean): --  RR: 16 (07-18-19 @ 09:24) (16 - 16)  SpO2: 99% (07-18-19 @ 09:24) (99% - 99%)  I&O's Summary    17 Jul 2019 07:01  -  18 Jul 2019 07:00  --------------------------------------------------------  IN: 0 mL / OUT: 1750 mL / NET: -1750 mL    18 Jul 2019 07:01  -  18 Jul 2019 17:19  --------------------------------------------------------  IN: 340 mL / OUT: 380 mL / NET: -40 mL        PHYSICAL EXAM:  Constitutional: NAD, sitting on the side of her bed.  HEENT: NC/AT, PERRLA, EOMI, no conjunctival pallor or scleral icterus, MMM  Neck: Supple, no JVD  Respiratory: Normal rate, rhythm, depth, effort. CTAB. No w/r/r.   Cardiovascular: RRR, normal S1 and S2, no m/r/g.   Gastrointestinal: +BS, soft NTND, no guarding or rebound tenderness, no palpable masses   Extremities: wwp; no cyanosis, clubbing or edema.   Vascular: Pulses equal and strong throughout.   Neurological: AAOx3, no CN deficits, strength and sensation intact throughout.   Skin: Several bruises from lab draws present, other other rashes or skin abrasions noted.      LABS:                        12.0   9.30  )-----------( 113      ( 18 Jul 2019 07:30 )             34.1     07-18    124<L>  |  87<L>  |  20  ----------------------------<  115<H>  5.2   |  26  |  0.95    Ca    9.3      18 Jul 2019 15:10  Phos  2.1     07-17  Mg     1.6     07-18            RADIOLOGY & ADDITIONAL TESTS:    MEDICATIONS  (STANDING):  dextrose 50% Injectable 12.5 Gram(s) IV Push once  dextrose 50% Injectable 25 Gram(s) IV Push once  dextrose 50% Injectable 25 Gram(s) IV Push once  heparin  Injectable 5000 Unit(s) SubCutaneous every 8 hours  NIFEdipine XL 30 milliGRAM(s) Oral every 24 hours  sodium chloride 1 Gram(s) Oral three times a day  tranylcypromine 60 milliGRAM(s) Oral every 24 hours    MEDICATIONS  (PRN):  dextrose 40% Gel 15 Gram(s) Oral once PRN Blood Glucose LESS THAN 70 milliGRAM(s)/deciliter  glucagon  Injectable 1 milliGRAM(s) IntraMuscular once PRN Glucose LESS THAN 70 milligrams/deciliter  LORazepam     Tablet 1 milliGRAM(s) Oral every 6 hours PRN Anxiety  melatonin 5 milliGRAM(s) Oral at bedtime PRN Insomnia TRANSFER FROM [facility] TO [facility]  HOSPITAL COURSE  Ms. Thompson is a 69-year-old female with a past medical history of depression, HTN, thoracic aortic aneurysm, and thrombocytopenia who was admitted 7/15 for loss of consciousness secondary to hyponatremia. On admission she had a sodium of 111, potassium of 2.6, Cl of 74, and Mg of 1.3. She was started on 3% NaCl and had her magnesium repleted. CT showed no evidence of acute intracranial hemorrhage or transcortical infarct. EKG showed normal sinus rhythm. She was admitted to the MICU. Her sodium corrected to 112 on 3% and was changed to 2% **** with continued improvement to 117. TSH WNL. Osmolality improved in the MICU. Nephrology thinks her hyponatremia is from chronic hypoosmolar euvolemic hyponatremia due to her thiazide diuretics. Renal recommended correction of 6-8 mEq per 24 hours. She was hypertensive in the ICU and had her Nifedipine started as per pharmacy since she required it during her prior hospitalization. It was thought the hypertension and hyponatremia may have been due to her MAO-I (Tranylcypromine). Psych consulted and in agreement with her outpatient psychiatrist, MAO-I was restarted at 60mg instead of 90mg. She was stepped down to 7LA on 7/16 PM. Renal continued following with fluid restriction. Their goal was a Na of 128-130 but today 124 122 124 (next BMP at 6PM). 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 They want AM urine lytes, uric acid, and urine osmolality. They want urine Na of 128 to be stable before discharge and for the patient to continue with water restriction after discharge. For transfer to [facility]      OVERNIGHT EVENTS: No acute events overnight.    SUBJECTIVE: Patient says she feels well but is hoping to go home by Sunday to make her scheduled massage.    Vital Signs Last 12 Hrs  T(F): 97.7 (07-18-19 @ 15:00), Max: 97.8 (07-18-19 @ 05:37)  HR: 72 (07-18-19 @ 09:24) (72 - 72)  BP: 130/78 (07-18-19 @ 12:05) (123/80 - 130/78)  BP(mean): --  RR: 16 (07-18-19 @ 09:24) (16 - 16)  SpO2: 99% (07-18-19 @ 09:24) (99% - 99%)  I&O's Summary    17 Jul 2019 07:01  -  18 Jul 2019 07:00  --------------------------------------------------------  IN: 0 mL / OUT: 1750 mL / NET: -1750 mL    18 Jul 2019 07:01  -  18 Jul 2019 17:19  --------------------------------------------------------  IN: 340 mL / OUT: 380 mL / NET: -40 mL        PHYSICAL EXAM:  Constitutional: NAD, sitting on the side of her bed.  HEENT: NC/AT, PERRLA, EOMI, no conjunctival pallor or scleral icterus, MMM  Neck: Supple, no JVD  Respiratory: Normal rate, rhythm, depth, effort. CTAB. No w/r/r.   Cardiovascular: RRR, normal S1 and S2, no m/r/g.   Gastrointestinal: +BS, soft NTND, no guarding or rebound tenderness, no palpable masses   Extremities: wwp; no cyanosis, clubbing or edema.   Vascular: Pulses equal and strong throughout.   Neurological: AAOx3, no CN deficits, strength and sensation intact throughout.   Skin: Several bruises from lab draws present, other other rashes or skin abrasions noted.      LABS:                        12.0   9.30  )-----------( 113      ( 18 Jul 2019 07:30 )             34.1     07-18    124<L>  |  87<L>  |  20  ----------------------------<  115<H>  5.2   |  26  |  0.95    Ca    9.3      18 Jul 2019 15:10  Phos  2.1     07-17  Mg     1.6     07-18            RADIOLOGY & ADDITIONAL TESTS:    MEDICATIONS  (STANDING):  dextrose 50% Injectable 12.5 Gram(s) IV Push once  dextrose 50% Injectable 25 Gram(s) IV Push once  dextrose 50% Injectable 25 Gram(s) IV Push once  heparin  Injectable 5000 Unit(s) SubCutaneous every 8 hours  NIFEdipine XL 30 milliGRAM(s) Oral every 24 hours  sodium chloride 1 Gram(s) Oral three times a day  tranylcypromine 60 milliGRAM(s) Oral every 24 hours    MEDICATIONS  (PRN):  dextrose 40% Gel 15 Gram(s) Oral once PRN Blood Glucose LESS THAN 70 milliGRAM(s)/deciliter  glucagon  Injectable 1 milliGRAM(s) IntraMuscular once PRN Glucose LESS THAN 70 milligrams/deciliter  LORazepam     Tablet 1 milliGRAM(s) Oral every 6 hours PRN Anxiety  melatonin 5 milliGRAM(s) Oral at bedtime PRN Insomnia TRANSFER FROM [facility] TO [facility]  HOSPITAL COURSE  Ms. Thompson is a 69-year-old female with a past medical history of depression, HTN, thoracic aortic aneurysm, and thrombocytopenia who was admitted 7/15 for loss of consciousness secondary to hyponatremia. On admission she had a sodium of 111, potassium of 2.6, Cl of 74, and Mg of 1.3. She was started on 3% NaCl and had her magnesium repleted. CT showed no evidence of acute intracranial hemorrhage or transcortical infarct. EKG showed normal sinus rhythm. She was admitted to the MICU. Her sodium corrected to 112 on 3% and was changed to 2% NaCl with continued improvement to 117.  Osmolality improved in the MICU. Nephrology thinks her hyponatremia is from chronic hypoosmolar euvolemic hyponatremia due to her thiazide diuretics. Renal recommended correction of 6-8 mEq per 24 hours. She was hypertensive in the ICU and had her Nifedipine started as per pharmacy since she required it during her prior hospitalization. It was thought the hypertension and hyponatremia may have been due to her MAO-I (Tranylcypromine). Psych consulted and in agreement with her outpatient psychiatrist, MAO-I was restarted at 60mg instead of 90mg. She was stepped down to 7LA on 7/16 PM. Renal continued following with fluid restriction. Their goal was a Na of 128-130 but today 124 122 124 (next BMP at 6PM). 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 They want AM urine lytes, uric acid, and urine osmolality. They want urine Na of 128 to be stable before discharge and for the patient to continue with water restriction after discharge. For transfer to [facility]      OVERNIGHT EVENTS: No acute events overnight.    SUBJECTIVE: Patient says she feels well but is hoping to go home by Sunday to make her scheduled massage.    Vital Signs Last 12 Hrs  T(F): 97.7 (07-18-19 @ 15:00), Max: 97.8 (07-18-19 @ 05:37)  HR: 72 (07-18-19 @ 09:24) (72 - 72)  BP: 130/78 (07-18-19 @ 12:05) (123/80 - 130/78)  BP(mean): --  RR: 16 (07-18-19 @ 09:24) (16 - 16)  SpO2: 99% (07-18-19 @ 09:24) (99% - 99%)  I&O's Summary    17 Jul 2019 07:01  -  18 Jul 2019 07:00  --------------------------------------------------------  IN: 0 mL / OUT: 1750 mL / NET: -1750 mL    18 Jul 2019 07:01  -  18 Jul 2019 17:19  --------------------------------------------------------  IN: 340 mL / OUT: 380 mL / NET: -40 mL        PHYSICAL EXAM:  Constitutional: NAD, sitting on the side of her bed.  HEENT: NC/AT, PERRLA, EOMI, no conjunctival pallor or scleral icterus, MMM  Neck: Supple, no JVD  Respiratory: Normal rate, rhythm, depth, effort. CTAB. No w/r/r.   Cardiovascular: RRR, normal S1 and S2, no m/r/g.   Gastrointestinal: +BS, soft NTND, no guarding or rebound tenderness, no palpable masses   Extremities: wwp; no cyanosis, clubbing or edema.   Vascular: Pulses equal and strong throughout.   Neurological: AAOx3, no CN deficits, strength and sensation intact throughout.   Skin: Several bruises from lab draws present, other other rashes or skin abrasions noted.      LABS:                        12.0   9.30  )-----------( 113      ( 18 Jul 2019 07:30 )             34.1     07-18    124<L>  |  87<L>  |  20  ----------------------------<  115<H>  5.2   |  26  |  0.95    Ca    9.3      18 Jul 2019 15:10  Phos  2.1     07-17  Mg     1.6     07-18            RADIOLOGY & ADDITIONAL TESTS:    MEDICATIONS  (STANDING):  dextrose 50% Injectable 12.5 Gram(s) IV Push once  dextrose 50% Injectable 25 Gram(s) IV Push once  dextrose 50% Injectable 25 Gram(s) IV Push once  heparin  Injectable 5000 Unit(s) SubCutaneous every 8 hours  NIFEdipine XL 30 milliGRAM(s) Oral every 24 hours  sodium chloride 1 Gram(s) Oral three times a day  tranylcypromine 60 milliGRAM(s) Oral every 24 hours    MEDICATIONS  (PRN):  dextrose 40% Gel 15 Gram(s) Oral once PRN Blood Glucose LESS THAN 70 milliGRAM(s)/deciliter  glucagon  Injectable 1 milliGRAM(s) IntraMuscular once PRN Glucose LESS THAN 70 milligrams/deciliter  LORazepam     Tablet 1 milliGRAM(s) Oral every 6 hours PRN Anxiety  melatonin 5 milliGRAM(s) Oral at bedtime PRN Insomnia HOSPITAL COURSE  Ms. Thompson is a 69-year-old female with a past medical history of depression, HTN, thoracic aortic aneurysm, and thrombocytopenia who was admitted 7/15 for loss of consciousness secondary to hyponatremia. On admission she had a sodium of 111, potassium of 2.6, Cl of 74, and Mg of 1.3. She was started on 3% NaCl and had her magnesium repleted. CT showed no evidence of acute intracranial hemorrhage or transcortical infarct. EKG showed normal sinus rhythm. She was admitted to the MICU. Her sodium corrected to 112 on 3% and was changed to 2% NaCl with continued improvement to 117.  Osmolality improved in the MICU. Nephrology thinks her hyponatremia is from chronic hypoosmolar euvolemic hyponatremia due to her thiazide diuretics. Renal recommended correction of 6-8 mEq per 24 hours. She was hypertensive in the ICU and had her Nifedipine started as per pharmacy since she required it during her prior hospitalization. It was thought the hypertension and hyponatremia may have been due to her MAO-I (Tranylcypromine). Psych consulted and in agreement with her outpatient psychiatrist, MAO-I was restarted at 60mg instead of 90mg. She was stepped down to 7LA on 7/16 PM. Renal continued following with fluid restriction. Their goal was a Na of 128-130 but today 124 122 124 (next BMP at 6PM). 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 They want AM urine lytes, uric acid, and urine osmolality. They want urine Na of 128 to be stable before discharge and for the patient to continue with water restriction after discharge.      OVERNIGHT EVENTS: No acute events overnight.    SUBJECTIVE: Patient says she feels well but is hoping to go home by Sunday to make her scheduled massage.    Vital Signs Last 12 Hrs  T(F): 97.7 (07-18-19 @ 15:00), Max: 97.8 (07-18-19 @ 05:37)  HR: 72 (07-18-19 @ 09:24) (72 - 72)  BP: 130/78 (07-18-19 @ 12:05) (123/80 - 130/78)  BP(mean): --  RR: 16 (07-18-19 @ 09:24) (16 - 16)  SpO2: 99% (07-18-19 @ 09:24) (99% - 99%)  I&O's Summary    17 Jul 2019 07:01  -  18 Jul 2019 07:00  --------------------------------------------------------  IN: 0 mL / OUT: 1750 mL / NET: -1750 mL    18 Jul 2019 07:01  -  18 Jul 2019 17:19  --------------------------------------------------------  IN: 340 mL / OUT: 380 mL / NET: -40 mL        PHYSICAL EXAM:  Constitutional: NAD, sitting on the side of her bed.  HEENT: NC/AT, PERRLA, EOMI, no conjunctival pallor or scleral icterus, MMM  Neck: Supple, no JVD  Respiratory: Normal rate, rhythm, depth, effort. CTAB. No w/r/r.   Cardiovascular: RRR, normal S1 and S2, no m/r/g.   Gastrointestinal: +BS, soft NTND, no guarding or rebound tenderness, no palpable masses   Extremities: wwp; no cyanosis, clubbing or edema.   Vascular: Pulses equal and strong throughout.   Neurological: AAOx3, no CN deficits, strength and sensation intact throughout.   Skin: Several bruises from lab draws present, other other rashes or skin abrasions noted.      LABS:                        12.0   9.30  )-----------( 113      ( 18 Jul 2019 07:30 )             34.1     07-18    124<L>  |  87<L>  |  20  ----------------------------<  115<H>  5.2   |  26  |  0.95    Ca    9.3      18 Jul 2019 15:10  Phos  2.1     07-17  Mg     1.6     07-18            RADIOLOGY & ADDITIONAL TESTS:    MEDICATIONS  (STANDING):  dextrose 50% Injectable 12.5 Gram(s) IV Push once  dextrose 50% Injectable 25 Gram(s) IV Push once  dextrose 50% Injectable 25 Gram(s) IV Push once  heparin  Injectable 5000 Unit(s) SubCutaneous every 8 hours  NIFEdipine XL 30 milliGRAM(s) Oral every 24 hours  sodium chloride 1 Gram(s) Oral three times a day  tranylcypromine 60 milliGRAM(s) Oral every 24 hours    MEDICATIONS  (PRN):  dextrose 40% Gel 15 Gram(s) Oral once PRN Blood Glucose LESS THAN 70 milliGRAM(s)/deciliter  glucagon  Injectable 1 milliGRAM(s) IntraMuscular once PRN Glucose LESS THAN 70 milligrams/deciliter  LORazepam     Tablet 1 milliGRAM(s) Oral every 6 hours PRN Anxiety  melatonin 5 milliGRAM(s) Oral at bedtime PRN Insomnia TRANSFER FROM [facility] TO [facility]    HOSPITAL COURSE  Ms. Thompson is a 69-year-old female with a past medical history of depression, HTN, thoracic aortic aneurysm, and thrombocytopenia who was admitted 7/15 for loss of consciousness secondary to hyponatremia. On admission she had a sodium of 111, potassium of 2.6, Cl of 74, and Mg of 1.3. She was started on 3% NaCl and had her magnesium repleted. CT showed no evidence of acute intracranial hemorrhage or transcortical infarct. EKG showed normal sinus rhythm. She was admitted to the MICU. Her sodium corrected to 112 on 3% and was changed to 2% NaCl with continued improvement to 117.  Osmolality improved in the MICU. Nephrology thinks her hyponatremia is from chronic hypoosmolar euvolemic hyponatremia due to her thiazide diuretics. Renal recommended correction of 6-8 mEq per 24 hours. She was hypertensive in the ICU and had her Nifedipine started as per pharmacy since she required it during her prior hospitalization. It was thought the hypertension and hyponatremia may have been due to her MAO-I (Tranylcypromine). Psych consulted and in agreement with her outpatient psychiatrist, MAO-I was restarted at 60mg instead of 90mg. She was stepped down to 7LA on 7/16 PM. Renal continued following with fluid restriction. Their goal was a Na of 128-130 but today 124 122 124 (next BMP at 6PM). 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 They want AM urine lytes, uric acid, and urine osmolality. They want urine Na of 128 to be stable before discharge and for the patient to continue with water restriction after discharge.      OVERNIGHT EVENTS: No acute events overnight.    SUBJECTIVE: Patient says she feels well but is hoping to go home by Sunday to make her scheduled massage.    Vital Signs Last 12 Hrs  T(F): 97.7 (07-18-19 @ 15:00), Max: 97.8 (07-18-19 @ 05:37)  HR: 72 (07-18-19 @ 09:24) (72 - 72)  BP: 130/78 (07-18-19 @ 12:05) (123/80 - 130/78)  BP(mean): --  RR: 16 (07-18-19 @ 09:24) (16 - 16)  SpO2: 99% (07-18-19 @ 09:24) (99% - 99%)  I&O's Summary    17 Jul 2019 07:01  -  18 Jul 2019 07:00  --------------------------------------------------------  IN: 0 mL / OUT: 1750 mL / NET: -1750 mL    18 Jul 2019 07:01  -  18 Jul 2019 17:19  --------------------------------------------------------  IN: 340 mL / OUT: 380 mL / NET: -40 mL        PHYSICAL EXAM:  Constitutional: NAD, sitting on the side of her bed.  HEENT: NC/AT, PERRLA, EOMI, no conjunctival pallor or scleral icterus, MMM  Neck: Supple, no JVD  Respiratory: Normal rate, rhythm, depth, effort. CTAB. No w/r/r.   Cardiovascular: RRR, normal S1 and S2, no m/r/g.   Gastrointestinal: +BS, soft NTND, no guarding or rebound tenderness, no palpable masses   Extremities: wwp; no cyanosis, clubbing or edema.   Vascular: Pulses equal and strong throughout.   Neurological: AAOx3, no CN deficits, strength and sensation intact throughout.   Skin: Several bruises from lab draws present, other other rashes or skin abrasions noted.      LABS:                        12.0   9.30  )-----------( 113      ( 18 Jul 2019 07:30 )             34.1     07-18    124<L>  |  87<L>  |  20  ----------------------------<  115<H>  5.2   |  26  |  0.95    Ca    9.3      18 Jul 2019 15:10  Phos  2.1     07-17  Mg     1.6     07-18            RADIOLOGY & ADDITIONAL TESTS:    MEDICATIONS  (STANDING):  dextrose 50% Injectable 12.5 Gram(s) IV Push once  dextrose 50% Injectable 25 Gram(s) IV Push once  dextrose 50% Injectable 25 Gram(s) IV Push once  heparin  Injectable 5000 Unit(s) SubCutaneous every 8 hours  NIFEdipine XL 30 milliGRAM(s) Oral every 24 hours  sodium chloride 1 Gram(s) Oral three times a day  tranylcypromine 60 milliGRAM(s) Oral every 24 hours    MEDICATIONS  (PRN):  dextrose 40% Gel 15 Gram(s) Oral once PRN Blood Glucose LESS THAN 70 milliGRAM(s)/deciliter  glucagon  Injectable 1 milliGRAM(s) IntraMuscular once PRN Glucose LESS THAN 70 milligrams/deciliter  LORazepam     Tablet 1 milliGRAM(s) Oral every 6 hours PRN Anxiety  melatonin 5 milliGRAM(s) Oral at bedtime PRN Insomnia

## 2019-07-18 NOTE — DIETITIAN INITIAL EVALUATION ADULT. - ENERGY NEEDS
Height 64"; .3#; #; 104%IBW  BMI 21.3  ABW used for calculations as pt between % of IBW. Needs estimated for maintenance in older adults; fluids per team 2/2 hyponatremia

## 2019-07-18 NOTE — PROGRESS NOTE ADULT - PROBLEM SELECTOR PLAN 7
# Hx of thrombocytopenia, unknown cause, saw a hematologist in the past  - AM CBC, peripheral smear # Hx of thrombocytopenia, unknown cause, saw a hematologist in the past.  - Peripheral smear 7/15 : 3.5% Band neutrophils. Slight schistocytes. Basophilic stippling, neetu cells, and giant platelets present.  - Follow daily CBC - Platelets 94 on admission 7/15 and 113 today 7/18 1) PCP Contacted on Admission: (Y/N) --> Name & Phone #:  2) Date of Contact with PCP:  3) PCP Contacted at Discharge: (Y/N)  4) Summary of Handoff Given to PCP:   5) Post-Discharge Appointment Date and Location:

## 2019-07-18 NOTE — PROGRESS NOTE ADULT - SUBJECTIVE AND OBJECTIVE BOX
[facility] DIVISION OF KIDNEY DISEASES AND HYPERTENSION -- FOLLOW UP NOTE  --------------------------------------------------------------------------------    24 hour events/subjective: WILLIAMS o/n. Pt no new medical complaints. Walking to bathroom. Urinating at baseline. No HA, CP/pressure, SoB, n/v/d/c.         PAST HISTORY  --------------------------------------------------------------------------------  No significant changes to PMH, PSH, FHx, SHx, unless otherwise noted    ALLERGIES & MEDICATIONS  --------------------------------------------------------------------------------  Allergies    No Known Allergies    Intolerances    Frazier (Unknown)    Standing Inpatient Medications  dextrose 50% Injectable 12.5 Gram(s) IV Push once  dextrose 50% Injectable 25 Gram(s) IV Push once  dextrose 50% Injectable 25 Gram(s) IV Push once  heparin  Injectable 5000 Unit(s) SubCutaneous every 8 hours  NIFEdipine XL 30 milliGRAM(s) Oral every 24 hours  potassium chloride    Tablet ER 20 milliEquivalent(s) Oral once  sodium chloride 1 Gram(s) Oral three times a day  tranylcypromine 60 milliGRAM(s) Oral every 24 hours    PRN Inpatient Medications  dextrose 40% Gel 15 Gram(s) Oral once PRN  glucagon  Injectable 1 milliGRAM(s) IntraMuscular once PRN  LORazepam     Tablet 1 milliGRAM(s) Oral every 6 hours PRN  melatonin 5 milliGRAM(s) Oral at bedtime PRN      REVIEW OF SYSTEMS  --------------------------------------------------------------------------------  Gen: No weight changes, fatigue, fevers/chills, weakness  Skin: No rashes  Head/Eyes/Ears/Mouth: No headache; Normal hearing; Normal vision w/o blurriness; No sinus pain/discomfort, sore throat  Respiratory: No dyspnea, cough, wheezing, hemoptysis  CV: No chest pain, PND, orthopnea  GI: No abdominal pain, diarrhea, constipation, nausea, vomiting, melena, hematochezia  : No increased frequency, dysuria, hematuria, nocturia  MSK: No joint pain/swelling; no back pain; no edema  Neuro: No dizziness/lightheadedness, weakness, seizures, numbness, tingling  Heme: No easy bruising or bleeding  Endo: No heat/cold intolerance  Psych: No significant nervousness, anxiety, stress, depression    All other systems were reviewed and are negative, except as noted.    VITALS/PHYSICAL EXAM  --------------------------------------------------------------------------------  T(C): 36.6 (07-18-19 @ 05:37), Max: 36.6 (07-18-19 @ 02:02)  HR: 72 (07-18-19 @ 09:24) (68 - 72)  BP: 130/78 (07-18-19 @ 12:05) (117/68 - 162/87)  RR: 16 (07-18-19 @ 09:24) (16 - 18)  SpO2: 99% (07-18-19 @ 09:24) (98% - 100%)  Wt(kg): --        07-17-19 @ 07:01  -  07-18-19 @ 07:00  --------------------------------------------------------  IN: 0 mL / OUT: 1750 mL / NET: -1750 mL    07-18-19 @ 07:01  -  07-18-19 @ 14:47  --------------------------------------------------------  IN: 340 mL / OUT: 380 mL / NET: -40 mL      Physical Exam:  	Gen: NAD, well-appearing  	HEENT: PERRL, supple neck, clear oropharynx  	Pulm: CTA B/L  	CV: RRR, S1S2; no rub  	Back: No spinal or CVA tenderness; no sacral edema  	Abd: +BS, soft, nontender/nondistended  	: No suprapubic tenderness  	UE: Warm, FROM, no clubbing, intact strength; no edema; no asterixis  	LE: Warm, FROM, no clubbing, intact strength; no edema  	Neuro: No focal deficits, intact gait  	Psych: Normal affect and mood  	Skin: Warm, without rashes  	Vascular access:    LABS/STUDIES  --------------------------------------------------------------------------------              12.0   9.30  >-----------<  113      [07-18-19 @ 07:30]              34.1     122  |  85  |  18  ----------------------------<  105      [07-18-19 @ 11:11]  3.8   |  27  |  0.77        Ca     9.4     [07-18-19 @ 11:11]      Mg     1.6     [07-18-19 @ 07:30]      Phos  2.1     [07-17-19 @ 04:15]            Creatinine Trend:  SCr 0.77 [07-18 @ 11:11]  SCr 0.72 [07-18 @ 07:30]  SCr 0.72 [07-18 @ 03:54]  SCr 0.82 [07-17 @ 22:16]  SCr 0.86 [07-17 @ 18:43]    Urinalysis - [07-15-19 @ 14:49]      Color Yellow / Appearance Clear / SG 1.010 / pH 7.5      Gluc NEGATIVE / Ketone NEGATIVE  / Bili NEGATIVE / Urobili 0.2       Blood Trace / Protein NEGATIVE / Leuk Est NEGATIVE / Nitrite NEGATIVE      RBC < 5 / WBC < 5 / Hyaline  / Gran  / Sq Epi  / Non Sq Epi 0-5 / Bacteria Present    Urine Creatinine 16      [07-15-19 @ 18:22]  Urine Sodium 50      [07-17-19 @ 11:09]  Urine Potassium 24      [07-17-19 @ 11:09]  Urine Chloride 43      [07-17-19 @ 11:09]  Urine Osmolality 304      [07-17-19 @ 11:09]    TSH 1.716      [07-15-19 @ 20:58]

## 2019-07-18 NOTE — PROGRESS NOTE ADULT - PROBLEM SELECTOR PLAN 8
Fluids:  Electrolytes;  Nutrition: regular diet   DVT: none  Dispo: 7Lach  Code: Full Fluids: None  Electrolytes: Replete as needed  Nutrition: Lactose free diet  DVT: SubQ Heparin  Dispo: RMF  Code: Full

## 2019-07-18 NOTE — PHYSICAL THERAPY INITIAL EVALUATION ADULT - PERTINENT HX OF CURRENT PROBLEM, REHAB EVAL
Ms. Thompson is a 69 year old  female with a past medical history significant for depression, HTN, thoracic aortic aneurysm and thrombocytopenia who presented to [facility] ED after losing balance and falling while walking. Patient denies hitting her head or LOC. She denies past episodes of dizziness or weakness

## 2019-07-18 NOTE — PROGRESS NOTE ADULT - ASSESSMENT
69F PMH  depression, HTN, thoracic aortic aneurysm and thrombocytopenia who presented to [facility] ED after fall due to generalized weakness, found to have Sodium 111, Potassium 2.6, Chloride 74, Magnesium 1.3 admitted to MICU for symptomatic hyponatremia. Now s/p approprate correction with 3% NaCl to Na 117; however with concern that Pt is still symptomatic after weakness walking this AM. Renal consulted for further management     #Severe Chronic Euvolemic Hypotonic Hypernatremia: improving   -Chronicity unclear, but Pt has been having symptoms since 7/12 with last known Na 130 in Oct 2018 at PCP   -At this time etio likely multifactorial initially suspect 2/2 thiazide use given comcomittant low Cl, K, Mg, less likely SIADH     Plan   -Agree with plan for correct Na 6-8 mEq within 24 hours, do not exceed 8 mEq , c/w q3hr BMP, strict i/o  -Currently Na 122, goal 128-13o by tomorrow afternoon   -C/w Water restriction      #Essential HTN   -On Grayson-HCTZ for past two years, BP above goal this admission   -C/w Nifedipine 30mg     #Hypokalemia, hypophosphatemia   -Replete to goal PRN 69F PMH  depression, HTN, thoracic aortic aneurysm and thrombocytopenia who presented to [facility] ED after fall due to generalized weakness, found to have Sodium 111, Potassium 2.6, Chloride 74, Magnesium 1.3 admitted to MICU for symptomatic hyponatremia. Now s/p approprate correction with 3% NaCl to Na 117; however with concern that Pt is still symptomatic after weakness walking this AM. Renal consulted for further management     #Severe Chronic Euvolemic Hypotonic Hypernatremia: improving   -Chronicity unclear, but Pt has been having symptoms since 7/12 with last known Na 130 in Oct 2018 at PCP   -At this time etio likely multifactorial initially suspect 2/2 thiazide use(given comcomittant low Cl, K, Mg). Suspect SIADH 2/2 Parnate, with latest Marlon 50, Uosmo     Plan   -Agree with plan for correct Na 6-8 mEq within 24 hours, do not exceed 8 mEq , c/w q3hr BMP, strict i/o  -For AM repeat Urine Na, urine creatinine, urine uric acid, urine osmolality .   -Currently Na 122, goal 128-130 by tomorrow afternoon, would ensure consistent Na 128 before d/c   -C/w Water restriction, discussed with Pt need to continue 1-1.5L water restriction as long as she is taking Parnate and need for routine BMPs with PCP      #Essential HTN   -On Grayson-HCTZ for past two years, BP above goal this admission, stop HZTZ due to electrolytes derangements   -C/w Nifedipine 30mg     #Hypokalemia, hypophosphatemia   -Replete to goal PRN

## 2019-07-18 NOTE — PROGRESS NOTE ADULT - SUBJECTIVE AND OBJECTIVE BOX
Interval / Overnight:    WILLIAMS overnight as per night team. The patient had a little trouble sleeping last night. She is without complaints otherwise.     VITAL SIGNS:  Vital Signs Last 24 Hrs  T(C): 36.5 (18 Jul 2019 15:00), Max: 36.6 (18 Jul 2019 02:02)  T(F): 97.7 (18 Jul 2019 15:00), Max: 97.9 (18 Jul 2019 02:02)  HR: 72 (18 Jul 2019 09:24) (68 - 72)  BP: 130/78 (18 Jul 2019 12:05) (121/68 - 162/87)  BP(mean): 101 (18 Jul 2019 03:59) (89 - 116)  RR: 16 (18 Jul 2019 09:24) (16 - 18)  SpO2: 99% (18 Jul 2019 09:24) (98% - 100%)    PHYSICAL EXAM:    General: in NAD, lying comfortably in bed  HEENT: normocephalic, atraumatic; PERRL, anicteric sclera; MMM  Neck: supple, no thyromegaly, no lymphadenopathy  Cardiovascular: +S1/S2, RRR, no M/G/R  Respiratory: clear to auscultation B/L; no wheezing, no rales, no rhonchi  Extremities: WWP; no edema, clubbing or cyanosis  Vascular: 2+ radial, DP/PT pulses B/L  Neurological: Alert to person, place and time. Speech improving, less slurred today.     MEDICATIONS:  MEDICATIONS  (STANDING):  dextrose 50% Injectable 12.5 Gram(s) IV Push once  dextrose 50% Injectable 25 Gram(s) IV Push once  dextrose 50% Injectable 25 Gram(s) IV Push once  heparin  Injectable 5000 Unit(s) SubCutaneous every 8 hours  NIFEdipine XL 30 milliGRAM(s) Oral every 24 hours  sodium chloride 1 Gram(s) Oral three times a day  tranylcypromine 60 milliGRAM(s) Oral every 24 hours    MEDICATIONS  (PRN):  dextrose 40% Gel 15 Gram(s) Oral once PRN Blood Glucose LESS THAN 70 milliGRAM(s)/deciliter  glucagon  Injectable 1 milliGRAM(s) IntraMuscular once PRN Glucose LESS THAN 70 milligrams/deciliter  LORazepam     Tablet 1 milliGRAM(s) Oral every 6 hours PRN Anxiety  melatonin 5 milliGRAM(s) Oral at bedtime PRN Insomnia      ALLERGIES:  Allergies    No Known Allergies    Intolerances    Frazier (Unknown)      LABS:                        12.0   9.30  )-----------( 113      ( 18 Jul 2019 07:30 )             34.1     07-18    124<L>  |  87<L>  |  20  ----------------------------<  115<H>  5.2   |  26  |  0.95    Ca    9.3      18 Jul 2019 15:10  Phos  2.1     07-17  Mg     1.6     07-18          CAPILLARY BLOOD GLUCOSE      POCT Blood Glucose.: 102 mg/dL (16 Jul 2019 21:48)      RADIOLOGY & ADDITIONAL TESTS: Reviewed. Hospital Course:    68 y/o  female with a medical history of depression, HTN, thoracic aortic aneurysm and thrombocytopenia admitted for LOC secondary to hyponatremia. On admission, patient was hemodynamically stable; Na: 111, K: 2.6, Cl: 74, M.3. She was repleted with 30 ml of 3%NaCl at 30 ml/hr and 2g of MgSulfate IV. CT head had no evidence of acute intracranial hemorrhage or transcortical infarct. CXR unremarkable. EKG NSR. Patient was admitted to [facility] MICU for workup and treatment of hyponatremia. In MICU, Na corrected to 112 (7/15), while on 3%NaC1; NA remained stable and fluids were changed to 2%NaCl, Na continued to improve to 117. TSH WNL; urine Na, mildly improving urine osmality. Nephrology consulted due to hyponatremia and other electrolyte abnormalities (hypokalemia, hypomagnesemia and hypochloremia). Hyponatremia considered chronic hypoosmolar euvolemic hyponatremia and attributed to thiazide diuretic use. Renal plan and MICU team plan to correct Na 6-8 mEq within 24 hours. Of note, patient was hypertensive 160s-180s SBP, while in improved with nifedipine 30mg, which was required during previous hospitalization, as per pharmacy. Hypertension was attributed to MAO-I (Parnate). Psych consulted regarding inpatient medication management, parnate (MAO-I) to be restarted. Patient was stepped down to 7LA on  @ 9:30Pm. No significant interventions taken on 7LA. Renal still following recommend with continued fluid restriction. Patient deemed fit to be transferred to [facility] with renal suggesting D/C when patients Na is in the high 120's to low 130's.     Interval / Overnight:    WILLIAMS overnight as per night team. The patient had a little trouble sleeping last night. She is without complaints otherwise.     VITAL SIGNS:  Vital Signs Last 24 Hrs  T(C): 36.5 (2019 15:00), Max: 36.6 (2019 02:02)  T(F): 97.7 (2019 15:00), Max: 97.9 (2019 02:02)  HR: 72 (2019 09:24) (68 - 72)  BP: 130/78 (2019 12:05) (121/68 - 162/87)  BP(mean): 101 (2019 03:59) (89 - 116)  RR: 16 (2019 09:24) (16 - 18)  SpO2: 99% (2019 09:24) (98% - 100%)    PHYSICAL EXAM:    General: in NAD, lying comfortably in bed  HEENT: normocephalic, atraumatic; PERRL, anicteric sclera; MMM  Neck: supple, no thyromegaly, no lymphadenopathy  Cardiovascular: +S1/S2, RRR, no M/G/R  Respiratory: clear to auscultation B/L; no wheezing, no rales, no rhonchi  Extremities: WWP; no edema, clubbing or cyanosis  Vascular: 2+ radial, DP/PT pulses B/L  Neurological: Alert to person, place and time. Speech improving, less slurred today.     MEDICATIONS:  MEDICATIONS  (STANDING):  dextrose 50% Injectable 12.5 Gram(s) IV Push once  dextrose 50% Injectable 25 Gram(s) IV Push once  dextrose 50% Injectable 25 Gram(s) IV Push once  heparin  Injectable 5000 Unit(s) SubCutaneous every 8 hours  NIFEdipine XL 30 milliGRAM(s) Oral every 24 hours  sodium chloride 1 Gram(s) Oral three times a day  tranylcypromine 60 milliGRAM(s) Oral every 24 hours    MEDICATIONS  (PRN):  dextrose 40% Gel 15 Gram(s) Oral once PRN Blood Glucose LESS THAN 70 milliGRAM(s)/deciliter  glucagon  Injectable 1 milliGRAM(s) IntraMuscular once PRN Glucose LESS THAN 70 milligrams/deciliter  LORazepam     Tablet 1 milliGRAM(s) Oral every 6 hours PRN Anxiety  melatonin 5 milliGRAM(s) Oral at bedtime PRN Insomnia      ALLERGIES:  Allergies    No Known Allergies    Intolerances    Frazier (Unknown)      LABS:                        12.0   9.30  )-----------( 113      ( 2019 07:30 )             34.1     07-18    124<L>  |  87<L>  |  20  ----------------------------<  115<H>  5.2   |  26  |  0.95    Ca    9.3      2019 15:10  Phos  2.1     07-17  Mg     1.6     07-18          CAPILLARY BLOOD GLUCOSE      POCT Blood Glucose.: 102 mg/dL (2019 21:48)      RADIOLOGY & ADDITIONAL TESTS: Reviewed. Hospital Course:    70 y/o  female with a medical history of depression, HTN, thoracic aortic aneurysm and thrombocytopenia admitted for LOC secondary to hyponatremia. On admission, patient was hemodynamically stable; Na: 111, K: 2.6, Cl: 74, M.3. She was repleted with 30 ml of 3%NaCl at 30 ml/hr and 2g of MgSulfate IV. CT head had no evidence of acute intracranial hemorrhage or transcortical infarct. CXR unremarkable. EKG NSR. Patient was admitted to [facility] MICU for workup and treatment of hyponatremia. In MICU, Na corrected to 112 (7/15), while on 3%NaC1; NA remained stable and fluids were changed to 2%NaCl, Na continued to improve to 117. TSH WNL; urine Na, mildly improving urine osmality. Nephrology consulted due to hyponatremia and other electrolyte abnormalities (hypokalemia, hypomagnesemia and hypochloremia). Hyponatremia considered chronic hypoosmolar euvolemic hyponatremia and attributed to thiazide diuretic use. Renal plan and MICU team plan to correct Na 6-8 mEq within 24 hours. Of note, patient was hypertensive 160s-180s SBP, while in improved with nifedipine 30mg, which was required during previous hospitalization, as per pharmacy. Hypertension was attributed to MAO-I (Parnate). Psych consulted regarding inpatient medication management, parnate (MAO-I) to be restarted. Patient was stepped down to 7LA on  @ 9:30Pm. No significant interventions taken on 7LA. Renal still following recommend with continued fluid restriction. Patient deemed fit to be transferred to [facility] with renal suggesting D/C when patients Na is in the high 120's to low 130's. Of note pt does have uptrending Cr in the setting of volume restriction , Renal is following and consider  saline hydration, liberalizing volume restriction and improve Na intake.     Interval / Overnight:    WILLIAMS overnight as per night team. The patient had a little trouble sleeping last night. She is without complaints otherwise.     VITAL SIGNS:  Vital Signs Last 24 Hrs  T(C): 36.5 (2019 15:00), Max: 36.6 (2019 02:02)  T(F): 97.7 (2019 15:00), Max: 97.9 (2019 02:02)  HR: 72 (2019 09:24) (68 - 72)  BP: 130/78 (2019 12:05) (121/68 - 162/87)  BP(mean): 101 (2019 03:59) (89 - 116)  RR: 16 (2019 09:24) (16 - 18)  SpO2: 99% (2019 09:24) (98% - 100%)    PHYSICAL EXAM:    General: in NAD, lying comfortably in bed  HEENT: normocephalic, atraumatic; PERRL, anicteric sclera; MMM  Neck: supple, no thyromegaly, no lymphadenopathy  Cardiovascular: +S1/S2, RRR, no M/G/R  Respiratory: clear to auscultation B/L; no wheezing, no rales, no rhonchi  Extremities: WWP; no edema, clubbing or cyanosis  Vascular: 2+ radial, DP/PT pulses B/L  Neurological: Alert to person, place and time. Speech improving, less slurred today.     MEDICATIONS:  MEDICATIONS  (STANDING):  dextrose 50% Injectable 12.5 Gram(s) IV Push once  dextrose 50% Injectable 25 Gram(s) IV Push once  dextrose 50% Injectable 25 Gram(s) IV Push once  heparin  Injectable 5000 Unit(s) SubCutaneous every 8 hours  NIFEdipine XL 30 milliGRAM(s) Oral every 24 hours  sodium chloride 1 Gram(s) Oral three times a day  tranylcypromine 60 milliGRAM(s) Oral every 24 hours    MEDICATIONS  (PRN):  dextrose 40% Gel 15 Gram(s) Oral once PRN Blood Glucose LESS THAN 70 milliGRAM(s)/deciliter  glucagon  Injectable 1 milliGRAM(s) IntraMuscular once PRN Glucose LESS THAN 70 milligrams/deciliter  LORazepam     Tablet 1 milliGRAM(s) Oral every 6 hours PRN Anxiety  melatonin 5 milliGRAM(s) Oral at bedtime PRN Insomnia      ALLERGIES:  Allergies    No Known Allergies    Intolerances    Frazier (Unknown)      LABS:                        12.0   9.30  )-----------( 113      ( 2019 07:30 )             34.1     07-18    124<L>  |  87<L>  |  20  ----------------------------<  115<H>  5.2   |  26  |  0.95    Ca    9.3      2019 15:10  Phos  2.1     07-17  Mg     1.6     07-18          CAPILLARY BLOOD GLUCOSE      POCT Blood Glucose.: 102 mg/dL (2019 21:48)      RADIOLOGY & ADDITIONAL TESTS: Reviewed.

## 2019-07-18 NOTE — PHYSICAL THERAPY INITIAL EVALUATION ADULT - GAIT DEVIATIONS NOTED, PT EVAL
decreased weight-shifting ability/decreased andrew/decreased toe clearance, decreased step width at times

## 2019-07-18 NOTE — DIETITIAN INITIAL EVALUATION ADULT. - OTHER INFO
68 yo/female with PMHx depression, HTN, thoracic aortic aneurysm, thrombocytopenia, presented w/nausea, vomiting, and loss of balance. Found to be severely hyponatremic likely 2/2 SIADH. Pt seen in room, awake, alert, pleasant. She endorses good appetite PTA, doesn't usually cook at home, likes to order fish, soups, vegetables, and likes eggs and almonds. Pt on MAO-i, and avoids foods high in tyramine (hard cheeses, fermented meats, cured foods etc.). Currently w/good appetite and intake. No complaints of N/V/C/D or pain. +BM 7/16. NKFA. No difficulty chewing or swallowing. Skin intact pressure-wise. Discussed liberalized diet and purpose of fluid restriction.

## 2019-07-18 NOTE — DIETITIAN INITIAL EVALUATION ADULT. - ADD RECOMMEND
1. Encourage PO intake; reinforce fluid restriction 2. Monitor lytes and replete prn. 3. Bowel regimen per team

## 2019-07-18 NOTE — PROGRESS NOTE ADULT - PROBLEM SELECTOR PLAN 5
- Currently 1.5   - replete as necessary  - No level today (7/17) will get AM level. - Follow up Mg daily  - Replete as necessary # Hx of thrombocytopenia, unknown cause, saw a hematologist in the past.  - Peripheral smear 7/15 : 3.5% Band neutrophils. Slight schistocytes. Basophilic stippling, neetu cells, and giant platelets present.  - Follow daily CBC - Platelets 94 on admission 7/15 and 113 today 7/18

## 2019-07-18 NOTE — PROGRESS NOTE ADULT - ASSESSMENT
69 year old  female with a past medical history significant for depression, HTN, and thrombocytopenia who presented to [facility] ED after losing balance and falling while walking. She was admitted to [facility] MICU for workup and treatment of hyponatremia. Now on 7LA with sodium uptrending at an appropriate rate. Last level 120. 69 year old  female with a past medical history significant for depression, HTN, and thrombocytopenia who presented to [facility] ED after losing balance and falling while walking. She was admitted to [facility] MICU for workup and treatment of hyponatremia. Now on 7LA with sodium uptrending at an appropriate rate. For transfer to [facility]. Last level 125 (7/18/2019 6PM) 69 year old  female with a past medical history significant for depression, HTN, and thrombocytopenia who presented to [facility] ED after losing balance and falling while walking. She was admitted to [facility] MICU for workup and treatment of hyponatremia. Transferred to [facility] 7/16 with sodium uptrending at an appropriate rate. For transfer to [facility]. Last level 125 (7/18/2019 6PM) 69 year old  female with a past medical history significant for depression, HTN, and thrombocytopenia who presented to [facility] ED after losing balance and falling while walking. She was admitted to [facility] MICU for workup and treatment of hyponatremia. Transferred to [facility] 7/16 with sodium uptrending at an appropriate rate. Last level 125 (7/18/2019 6PM). 69 year old  female with a past medical history significant for depression, HTN, and thrombocytopenia who presented to [facility] ED after losing balance and falling while walking. She was admitted to [facility] MICU for workup and treatment of hyponatremia. Transferred to [facility] 7/16 with sodium uptrending at an appropriate rate. Last level 125 (7/18/2019 6PM). Transfer to [facility] from [facility]

## 2019-07-18 NOTE — PROGRESS NOTE ADULT - PROBLEM SELECTOR PLAN 3
#HTN  - Hold home Spironolactone  - Monitor vitals #HTN  - Continue to hold home Spironolactone  - Monitor vitals  - Continue Nifedipine 30mg QD

## 2019-07-18 NOTE — PROGRESS NOTE ADULT - PROBLEM SELECTOR PLAN 4
- Currently 4.1  - replete as necessary - Potassium 5.2 today 7/18 6PM  (3.5 -> 3.8 -> 5.2) History of depression  - Psychiatry recommends continuing home MAO-I but at 60mg instead of 90mg  - Outpatient psychiatrist contacted and agrees with above plan  - Ativan 1mg PRN Q6H for anxiety  - Melatonin at night as sleep aid

## 2019-07-18 NOTE — PHYSICAL THERAPY INITIAL EVALUATION ADULT - ADDITIONAL COMMENTS
Pt reports living alone at home and does not require to navigate any stairs. Her PLOF included walking without any AD and was able to negotiate subway stairs.

## 2019-07-19 ENCOUNTER — TRANSCRIPTION ENCOUNTER (OUTPATIENT)
Age: 69
End: 2019-07-19

## 2019-07-19 VITALS — SYSTOLIC BLOOD PRESSURE: 149 MMHG | HEART RATE: 82 BPM | DIASTOLIC BLOOD PRESSURE: 73 MMHG

## 2019-07-19 LAB
ANION GAP SERPL CALC-SCNC: 12 MMOL/L — SIGNIFICANT CHANGE UP (ref 5–17)
ANION GAP SERPL CALC-SCNC: 12 MMOL/L — SIGNIFICANT CHANGE UP (ref 5–17)
BUN SERPL-MCNC: 22 MG/DL — SIGNIFICANT CHANGE UP (ref 7–23)
BUN SERPL-MCNC: 23 MG/DL — SIGNIFICANT CHANGE UP (ref 7–23)
CALCIUM SERPL-MCNC: 8.3 MG/DL — LOW (ref 8.4–10.5)
CALCIUM SERPL-MCNC: 8.6 MG/DL — SIGNIFICANT CHANGE UP (ref 8.4–10.5)
CHLORIDE SERPL-SCNC: 93 MMOL/L — LOW (ref 96–108)
CHLORIDE SERPL-SCNC: 93 MMOL/L — LOW (ref 96–108)
CO2 SERPL-SCNC: 23 MMOL/L — SIGNIFICANT CHANGE UP (ref 22–31)
CO2 SERPL-SCNC: 24 MMOL/L — SIGNIFICANT CHANGE UP (ref 22–31)
CREAT SERPL-MCNC: 0.85 MG/DL — SIGNIFICANT CHANGE UP (ref 0.5–1.3)
CREAT SERPL-MCNC: 0.91 MG/DL — SIGNIFICANT CHANGE UP (ref 0.5–1.3)
GLUCOSE SERPL-MCNC: 100 MG/DL — HIGH (ref 70–99)
GLUCOSE SERPL-MCNC: 174 MG/DL — HIGH (ref 70–99)
OSMOLALITY UR: 464 MOSMOL/KG — SIGNIFICANT CHANGE UP (ref 100–650)
POTASSIUM SERPL-MCNC: 3.6 MMOL/L — SIGNIFICANT CHANGE UP (ref 3.5–5.3)
POTASSIUM SERPL-MCNC: 4 MMOL/L — SIGNIFICANT CHANGE UP (ref 3.5–5.3)
POTASSIUM SERPL-SCNC: 3.6 MMOL/L — SIGNIFICANT CHANGE UP (ref 3.5–5.3)
POTASSIUM SERPL-SCNC: 4 MMOL/L — SIGNIFICANT CHANGE UP (ref 3.5–5.3)
SODIUM SERPL-SCNC: 128 MMOL/L — LOW (ref 135–145)
SODIUM SERPL-SCNC: 129 MMOL/L — LOW (ref 135–145)
SODIUM UR-SCNC: 84 MMOL/L — SIGNIFICANT CHANGE UP

## 2019-07-19 PROCEDURE — 99232 SBSQ HOSP IP/OBS MODERATE 35: CPT

## 2019-07-19 PROCEDURE — 99233 SBSQ HOSP IP/OBS HIGH 50: CPT

## 2019-07-19 PROCEDURE — 99239 HOSP IP/OBS DSCHRG MGMT >30: CPT

## 2019-07-19 RX ORDER — TRANYLCYPROMINE SULFATE 10 MG/1
6 TABLET, FILM COATED ORAL
Qty: 0 | Refills: 0 | DISCHARGE
Start: 2019-07-19

## 2019-07-19 RX ORDER — NIFEDIPINE 30 MG
1 TABLET, EXTENDED RELEASE 24 HR ORAL
Qty: 30 | Refills: 0
Start: 2019-07-19 | End: 2019-08-17

## 2019-07-19 RX ORDER — SODIUM CHLORIDE 9 MG/ML
1 INJECTION INTRAMUSCULAR; INTRAVENOUS; SUBCUTANEOUS
Qty: 90 | Refills: 0
Start: 2019-07-19 | End: 2019-08-17

## 2019-07-19 RX ORDER — SPIRONOLACT/HYDROCHLOROTHIAZID 25 MG-25MG
1 TABLET ORAL
Qty: 0 | Refills: 0 | DISCHARGE

## 2019-07-19 RX ORDER — TRANYLCYPROMINE SULFATE 10 MG/1
1 TABLET, FILM COATED ORAL
Qty: 0 | Refills: 0 | DISCHARGE

## 2019-07-19 RX ADMIN — Medication 1 MILLIGRAM(S): at 08:14

## 2019-07-19 RX ADMIN — TRANYLCYPROMINE SULFATE 60 MILLIGRAM(S): 10 TABLET, FILM COATED ORAL at 10:58

## 2019-07-19 RX ADMIN — SODIUM CHLORIDE 1 GRAM(S): 9 INJECTION INTRAMUSCULAR; INTRAVENOUS; SUBCUTANEOUS at 05:49

## 2019-07-19 RX ADMIN — Medication 5 MILLIGRAM(S): at 00:27

## 2019-07-19 RX ADMIN — HEPARIN SODIUM 5000 UNIT(S): 5000 INJECTION INTRAVENOUS; SUBCUTANEOUS at 05:50

## 2019-07-19 RX ADMIN — SODIUM CHLORIDE 1 GRAM(S): 9 INJECTION INTRAMUSCULAR; INTRAVENOUS; SUBCUTANEOUS at 00:27

## 2019-07-19 RX ADMIN — SODIUM CHLORIDE 1 GRAM(S): 9 INJECTION INTRAMUSCULAR; INTRAVENOUS; SUBCUTANEOUS at 15:54

## 2019-07-19 NOTE — DISCHARGE NOTE PROVIDER - HOSPITAL COURSE
69-year-old female with hx of depression, HTN, thoracic aortic aneurysm, and thrombocytopenia admitted for LOC 2/2 hyponatremia to 111. She was started on 3% NaCl and transferred to MICU.  Her sodium corrected to 112 on 3% and was changed to 2% NaCl with continued improvement to 117. Nephrology following patient and believed the cause to be 2/2 chronic hypoosmolar euvolemic hyponatremia due to her thiazide diuretics. She was switched to Nifedipine. It was then thought the hypertension and hyponatremia may have been due to her MAO-I (Tranylcypromine). Psych consulted and in agreement with her outpatient psychiatrist, MAO-I was restarted at 60mg instead of 90mg. She continued fluid restriction and salt tabs were added. Sodium improved slowly to 129. Patient is stable for discharge w/ continued fluid restriction and salt tabs.        On the day of discharge, the patient was seen and examined. Symptoms improved. Vital signs are stable. Labs and imaging reviewed. Patient is medically optimized and hemodynamically stable. Return precautions discussed, medication teach back done, and importance of physician followup emphasized. The patient verbalized understanding.

## 2019-07-19 NOTE — DISCHARGE NOTE NURSING/CASE MANAGEMENT/SOCIAL WORK - NSDCDPATPORTLINK_GEN_ALL_CORE
You can access the [facility] Portal, offered by [facility], by registering with the following website: [email or web address]

## 2019-07-19 NOTE — PROGRESS NOTE BEHAVIORAL HEALTH - SUMMARY
69 year old  female with a past medical history significant for depression, HTN, thoracic aortic aneurysm and thrombocytopenia who presented to [facility] ED on 7/15 after losing balance and falling while walking. Patient denies hitting her head or LOC. She denies past episodes of dizziness or weakness. Patient states that two days ago she had 3 episodes of non-bilious and non-bloody vomiting after an episode of nausea. Patient states that she re-hydrated with 4 cups of water and felt better. The patient normal drinks 8 cups of seltzer per day and makes sure she stays "very hydrated". HD stable in the ED, found a Sodium 111, Potassium 2.6, Chloride 74, Magnesium 1.3. CT Head: No acute intracranial hemorrhage or transcortical infarct. She received 30 ml of 3%NaCl at 30 ml/hr and 2g of MgSulfate IV. She was admitted to [facility] MICU for workup and treatment of hyponatremia. She has been depressed since childhood and has failed multiple medications in the past including tofranil, all SSRIs, nardil (not as good as parnate), and ECT has been effective in the past. Pt has been asymptomatic of late. Has had two suicide attempts in the past, 1 severe overdose and 1 hanging attempt (used her bathrobe and shower curtain broke). Spoke at length with her outpatient psychiatrist, Dr. Chica Najera, who has followed her for many years. Pt has been on parnate for 10 years and is currently on 90 mg. She took 120 mg in the past. Her sodium has always been stable on parnate in the past. She denies current substance abuse but has abused alcohol (3/4 bottle of champagne nightly) and cocaine in the past.    1)Pt has been on parnate for 10 years and has severe recurrent depression with suicide attempts and hospitalizations and has failed multiple trials of antidepressants and has required ECT. Her outpatient psychiatrist and I recommend continuation of parnate which patient is very much in agreement with. I understand no other source of SIADH found. Recommend decreasing dose of parnate from 90 mg to 60 mg and continued treatment to manage electrolyte abnormalities that occur/continue.     2)If discontinuation of parnate is absolutely necessary would taper off rather than abruptly discontinue. Literature search suggested remeron is antidepressant with lowest risk of SIADH. Pt seems never to have tried remeron. There has to be a two week washout period after discontinuation of parnate before remeron or any other antidepressant could be started.     3)I will continue to follow.    4)Can continue ativan 1 mg q6h prn anxiety and melatonin 5 mg qhs prn insomnia
69 year old  female with a past medical history significant for depression, HTN, thoracic aortic aneurysm and thrombocytopenia who presented to [facility] ED on 7/15 after losing balance and falling while walking. Patient denies hitting her head or LOC. She denies past episodes of dizziness or weakness. Patient states that two days ago she had 3 episodes of non-bilious and non-bloody vomiting after an episode of nausea. Patient states that she re-hydrated with 4 cups of water and felt better. The patient normal drinks 8 cups of seltzer per day and makes sure she stays "very hydrated". HD stable in the ED, found a Sodium 111, Potassium 2.6, Chloride 74, Magnesium 1.3. CT Head: No acute intracranial hemorrhage or transcortical infarct. She received 30 ml of 3%NaCl at 30 ml/hr and 2g of MgSulfate IV. She was admitted to [facility] MICU for workup and treatment of hyponatremia. She has been depressed since childhood and has failed multiple medications in the past including tofranil, all SSRIs, nardil (not as good as parnate), and ECT has been effective in the past. Pt has been asymptomatic of late. Has had two suicide attempts in the past, 1 severe overdose and 1 hanging attempt (used her bathrobe and shower curtain broke). Spoke at length with her outpatient psychiatrist, Dr. Chica Najera, who has followed her for many years. Pt has been on parnate for 10 years and is currently on 90 mg. She took 120 mg in the past. Her sodium has always been stable on parnate in the past. She denies current substance abuse but has abused alcohol (3/4 bottle of champagne nightly) and cocaine in the past.    1)Pt has been on parnate for 10 years and has severe recurrent depression with suicide attempts and hospitalizations and has failed multiple trials of antidepressants and has required ECT. Her outpatient psychiatrist and I recommend continuation of parnate which patient is very much in agreement with. I understand no other source of SIADH found. Recommend decreasing dose of parnate from 90 mg to 60 mg and continued treatment to manage electrolyte abnormalities that occur/continue.     2)If discontinuation of parnate is absolutely necessary would taper off rather than abruptly discontinue. Literature search suggested remeron is antidepressant with lowest risk of SIADH. Pt seems never to have tried remeron. There has to be a two week washout period after discontinuation of parnate before remeron or any other antidepressant could be started.     3)I will continue to follow.    4)Can continue ativan 1 mg q6h prn anxiety and melatonin 5 mg qhs prn insomnia
69 year old  female with a past medical history significant for depression, HTN, thoracic aortic aneurysm and thrombocytopenia who presented to [facility] ED on 7/15 after losing balance and falling while walking. Patient denies hitting her head or LOC. She denies past episodes of dizziness or weakness. Patient states that two days ago she had 3 episodes of non-bilious and non-bloody vomiting after an episode of nausea. Patient states that she re-hydrated with 4 cups of water and felt better. The patient normal drinks 8 cups of seltzer per day and makes sure she stays "very hydrated". HD stable in the ED, found a Sodium 111, Potassium 2.6, Chloride 74, Magnesium 1.3. CT Head: No acute intracranial hemorrhage or transcortical infarct. She received 30 ml of 3%NaCl at 30 ml/hr and 2g of MgSulfate IV. She was admitted to [facility] MICU for workup and treatment of hyponatremia. She has been depressed since childhood and has failed multiple medications in the past including tofranil, all SSRIs, nardil (not as good as parnate), and ECT has been effective in the past. Pt has been asymptomatic of late. Has had two suicide attempts in the past, 1 severe overdose and 1 hanging attempt (used her bathrobe and shower curtain broke). Spoke at length with her outpatient psychiatrist, Dr. Chica Najera, who has followed her for many years. Pt has been on parnate for 10 years and is currently on 90 mg. She took 120 mg in the past. Her sodium has always been stable on parnate in the past. She denies current substance abuse but has abused alcohol (3/4 bottle of champagne nightly) and cocaine in the past.    1)Pt has been on parnate for 10 years and has severe recurrent depression with suicide attempts and hospitalizations and has failed multiple trials of antidepressants and has required ECT. Her outpatient psychiatrist and I recommend continuation of parnate which patient is very much in agreement with. I understand no other source of SIADH found. Recommend decreasing dose of parnate from 90 mg to 60 mg and continued treatment to manage electrolyte abnormalities that occur/continue.     2)If discontinuation of parnate is absolutely necessary would taper off rather than abruptly discontinue. Literature search suggested remeron is antidepressant with lowest risk of SIADH. Pt seems never to have tried remeron. There has to be a two week washout period after discontinuation of parnate before remeron or any other antidepressant could be started.     3)She has an appt with outpt psychiatrist, Dr. Najera, on 7/23/19 at 2:45 pm.    4)Can continue ativan 1 mg q6h prn anxiety and melatonin 5 mg qhs prn insomnia

## 2019-07-19 NOTE — PROGRESS NOTE BEHAVIORAL HEALTH - NSBHCONSULTMEDANXIETY_PSY_A_CORE FT
ativan 1 mg q6h prn anxiety, melatonin 5 mg qhs prn insomnia

## 2019-07-19 NOTE — PROGRESS NOTE BEHAVIORAL HEALTH - NSBHADMITCOUNSEL_PSY_A_CORE
risks and benefits of treatment options/importance of adherence to chosen treatment/diagnostic results/impressions and/or recommended studies/client/family/caregiver education/prognosis/instructions for management, treatment and follow up/risk factor reduction
instructions for management, treatment and follow up/diagnostic results/impressions and/or recommended studies/importance of adherence to chosen treatment/client/family/caregiver education/risks and benefits of treatment options/risk factor reduction/prognosis
risks and benefits of treatment options/risk factor reduction/diagnostic results/impressions and/or recommended studies/importance of adherence to chosen treatment/prognosis/instructions for management, treatment and follow up/client/family/caregiver education

## 2019-07-19 NOTE — PROGRESS NOTE ADULT - SUBJECTIVE AND OBJECTIVE BOX
***NOTE IN PROGRESS***    OVERNIGHT EVENTS:    SUBJECTIVE:    Vital Signs Last 12 Hrs  T(F): 98.3 (07-19-19 @ 01:40), Max: 98.3 (07-19-19 @ 01:40)  HR: 66 (07-19-19 @ 05:48) (66 - 75)  BP: 134/66 (07-19-19 @ 05:48) (124/69 - 134/74)  BP(mean): 91 (07-19-19 @ 05:48) (87 - 97)  RR: 16 (07-19-19 @ 05:48) (16 - 17)  SpO2: 100% (07-19-19 @ 05:48) (97% - 100%)  I&O's Summary    17 Jul 2019 07:01  -  18 Jul 2019 07:00  --------------------------------------------------------  IN: 0 mL / OUT: 1750 mL / NET: -1750 mL    18 Jul 2019 07:01  -  19 Jul 2019 06:43  --------------------------------------------------------  IN: 820 mL / OUT: 680 mL / NET: 140 mL        PHYSICAL EXAM:  Constitutional: NAD, comfortable in bed.  HEENT: NC/AT, PERRLA, EOMI, no conjunctival pallor or scleral icterus, MMM  Neck: Supple, no JVD  Respiratory: Normal rate, rhythm, depth, effort. CTAB. No w/r/r.   Cardiovascular: RRR, normal S1 and S2, no m/r/g.   Gastrointestinal: +BS, soft NTND, no guarding or rebound tenderness, no palpable masses   Extremities: wwp; no cyanosis, clubbing or edema.   Vascular: Pulses equal and strong throughout.   Neurological: AAOx3, no CN deficits, strength and sensation intact throughout.   Skin: No gross skin abnormalities or rashes        LABS:                        12.0   9.30  )-----------( 113      ( 18 Jul 2019 07:30 )             34.1     07-18    127<L>  |  91<L>  |  33<H>  ----------------------------<  118<H>  5.2   |  26  |  1.51<H>    Ca    9.2      18 Jul 2019 22:35  Mg     1.6     07-18            RADIOLOGY & ADDITIONAL TESTS:    MEDICATIONS  (STANDING):  dextrose 50% Injectable 12.5 Gram(s) IV Push once  dextrose 50% Injectable 25 Gram(s) IV Push once  dextrose 50% Injectable 25 Gram(s) IV Push once  heparin  Injectable 5000 Unit(s) SubCutaneous every 8 hours  NIFEdipine XL 30 milliGRAM(s) Oral every 24 hours  sodium chloride 1 Gram(s) Oral three times a day  sodium chloride 0.9%. 1000 milliLiter(s) (80 mL/Hr) IV Continuous <Continuous>  tranylcypromine 60 milliGRAM(s) Oral every 24 hours    MEDICATIONS  (PRN):  dextrose 40% Gel 15 Gram(s) Oral once PRN Blood Glucose LESS THAN 70 milliGRAM(s)/deciliter  glucagon  Injectable 1 milliGRAM(s) IntraMuscular once PRN Glucose LESS THAN 70 milligrams/deciliter  LORazepam     Tablet 1 milliGRAM(s) Oral every 6 hours PRN Anxiety  melatonin 5 milliGRAM(s) Oral at bedtime PRN Insomnia

## 2019-07-19 NOTE — DISCHARGE NOTE PROVIDER - NSDCCAREPROVSEEN_GEN_ALL_CORE_FT
Megan Elias Megan Elias seen and examined with house-staff during bedside rounds.  Resident note read, including vitals, physical findings, laboratory data, and radiological reports.   Revisions included below.  Direct personal management at bed side and extensive interpretation of the data.  Plan was outlined and discussed in details with the housestaff.  Decision making of high complexity  Action taken for acute disease activity to reflect the level of care provided:  - medication reconciliation  - review laboratory data

## 2019-07-19 NOTE — DISCHARGE NOTE PROVIDER - CARE PROVIDER_API CALL
Marlene Winter)  Nephrology  [address]  [place], [place] [address]  Phone: [phone]  Fax: [phone]  Follow Up Time:

## 2019-07-19 NOTE — PROGRESS NOTE ADULT - ASSESSMENT
69F PMH  depression, HTN, thoracic aortic aneurysm and thrombocytopenia who presented to [facility] ED after fall due to generalized weakness, found to have Sodium 111, Potassium 2.6, Chloride 74, Magnesium 1.3 admitted to MICU for symptomatic hyponatremia, hence Nephrology consult.       Hypotonic hyponatremia   in setting of Thiazide use and parnate use, which can cause SIADH  patient is not willing to discontinue parnate  latest Na  @ 129  urine lytes noted--> indicative of SIADH  recommend continuation of 1 L fluid restriction and Sodium tablet 1 g po tid  pt agreed to have Na followed as outpatient, she should check it on 7/22  pt is cleared for discharge from renal standpoint     #Essential HTN   on nifedipine 30 gm XL

## 2019-07-19 NOTE — DISCHARGE NOTE PROVIDER - NSDCCPCAREPLAN_GEN_ALL_CORE_FT
PRINCIPAL DISCHARGE DIAGNOSIS  Diagnosis: Hyponatremia  Assessment and Plan of Treatment: You came to the hospital because you were dizzy and lost consciousness. You were found to have an extremely low sodium level on admission (111). During your hospitalization, you were treated with fluids that had a high salt to water ratio which improved your levels initially. You were then placed on a fluid restriction for a maximum of 1 liter per day. Your sodium continued to improve but hit a plateau so you were also started on salt tabs three times per day. Your sodium again improved to 129. Please continue to limit your fluid intake to 1 liter per day and take the salt tabs as directed. Please follow up with your outpatient doctor on Monday to have your blood drawn to check your salt levels. Please also follow up with Dr. Winter (nephrology) for further guidance and management regarding your salt levels.      SECONDARY DISCHARGE DIAGNOSES  Diagnosis: Hypertension, unspecified type  Assessment and Plan of Treatment: Your blood pressure was high during your hospitalizaiton. You were previosuly taking spironolactone and hydrochlorothiazide. Please stop taking these medications and instead take nifedipine which has been prescribed to you and follow up with your doctor

## 2019-07-19 NOTE — DISCHARGE NOTE PROVIDER - NSDCFUADDAPPT_GEN_ALL_CORE_FT
Please follow up with your doctor on Monday. You are calling to make the appointment. Please have them check your sodium levels at that time.   Please follow up with your psychiatrist on Tuesday at 2:45pm  Please call Dr. Winter's office to make an appointment

## 2019-07-19 NOTE — PROGRESS NOTE ADULT - ATTENDING COMMENTS
Na improved - likely SIADH from combo of HCTZ and MAOI inhibitor. We first thought low Na was 2/2 thiazide, as she was also with low Cl, K, Mg and Ur Na/Ur Osm on the lower side for SIADH, improved w hypertonic and then fluid restriction while holding both potential agents however when MAOI inhibitor restarted her Na dropped down significantly again and repeat Ur Na/Ur Osm were very consistent at that time w SIADH. May have been the combo of the two meds that triggered it. Appreciate psych recs, decided on lowering dose to 60mg for her quality of life as she was uncontrolled on other agents. Had long discussion with patient for need for fluid restriction <1L, 1g TID NaCl tabs, and repeat blood work Monday w pcp, as well as long term this may improve now that HCTZ not on board, or may persist which would require frequent blood checks and continued fluid restriction/Na tabs. Her outpt psychiatrist and PCP can monitor her and if hyponatremia persisting can have another discussion of risks/benefits of decreasing further or stopping the MAOI inhibitor. Gave her our office # to f/u if she finds her Na to be persistently low on pcp f/u
Reviewed Na trends throughout day, Na dropping off any fluids, per pt fluid restricting, however Uosm and UrNa increasing now more consistent with underlying SIADH, likely 2/2 MAOi. Appreciate very much psych recs, since her depression has been longstanding and severe, and she's failed many other medications and is very hesitant to changing her medications, agree with keeping it on board but at lower dose to see how she feels/Na responds. Would also add 1g TID NaCl tablets. Repeat BMP now, and another BMP with a serum uric acid in the a.m. along with repeat morning Urine Na, urine creatinine, urine uric acid, urine osmolality
Na uptrending with fluid restriction and 1g NaCl tabs. Repeat BMP at 6pm, if Na </= 126 suggest increasing salt tabs to 2g. Would not d/c home until Na safetly in high 120's, low 130s to ensure she's stable given that we're keeping her on the suspected culprit of her SIADH, the MAOI inhibitor
Patient seen and examined with house-staff during bedside rounds.  Resident note read, including vitals, physical findings, laboratory data, and radiological reports.   Revisions included below.  Direct personal management at bed side and extensive interpretation of the data.  Plan was outlined and discussed in details with the housestaff.  Decision making of high complexity  Action taken for acute disease activity to reflect the level of care provided:  - medication reconciliation  - review laboratory data  The patient is well known to me. Mental status improved. The sodium level increased with fluid restriction. Patient is hemodynamically stable. Increased activity. Evaluation for discharge tomorrow if cleared by physical therapy
Patient seen and examined with house-staff during bedside rounds.  Resident note read, including vitals, physical findings, laboratory data, and radiological reports.   Revisions included below.  Direct personal management at bed side and extensive interpretation of the data.  Plan was outlined and discussed in details with the housestaff.  Decision making of high complexity  Action taken for acute disease activity to reflect the level of care provided:  - medication reconciliation  - review laboratory data  Patient is clinically stable. The sodium level increased. Continue fluid restriction. Increased activity. Possible discharge

## 2019-07-19 NOTE — PROGRESS NOTE ADULT - PROBLEM SELECTOR PLAN 6
Fluids: None  Electrolytes: Replete as needed  Nutrition: Lactose free diet  DVT: SubQ Heparin  Dispo: RMF  Code: Full

## 2019-07-19 NOTE — PROGRESS NOTE BEHAVIORAL HEALTH - NSBHCONSULTFOLLOWAFTERCARE_PSY_A_CORE FT
She should follow up with outpatient psychiatrist, Dr. Chica Najera, and with outpatient therapist, Milagros Fernandez. She has an appointment with Dr. Najera next Tuesday at 2:45 pm.
She should follow up with outpatient psychiatrist, Dr. Chica Najera, and with outpatient therapist, Milagros Fernandez.
She should follow up with outpatient psychiatrist, Dr. Chica Najera, and with outpatient therapist, Milagros Fernandez. She has an appointment with Dr. Vu next Tuesday at 3pm.

## 2019-07-19 NOTE — PROGRESS NOTE ADULT - SUBJECTIVE AND OBJECTIVE BOX
Physical Medicine and Rehabilitation Progress Note:    Patient is a 69y old  Female who presents with a chief complaint of Fall (18 Jul 2019 17:19)      HPI:  Ms. Thompson is a 69 year old  female with a past medical history significant for depression, HTN, thoracic aortic aneurysm and thrombocytopenia who presented to [facility] ED after losing balance and falling while walking. Patient denies hitting her head or LOC. She denies past episodes of dizziness or weakness. Patient states that two days ago she had 3 episodes of non-bilious and non-bloody vomiting after an episode of nausea. Patient states that she re-hydrated with 4 cups of water and felt better. The patient normall drinks 8 cups of seltzer per day and makes sure she stays "very hydrated". She says that when she gets dehydrated and tired, she makes sure to drink water. to  Last night the patient complained of trouble sleeping that did not get better with melatonin and was only able to sleep 3 hours after taking one of her prescribed lorazepam. Today she had jury duty and was walking across the walk way when she lost balance and fell. Patient admits to recent nausea, vomiting, and loss of balance. Patient denies headache, fever, changes in vision, dizziness, acid reflux, chest pain, palpitations, shortness of breath, changes in bowel movement or changes in urination. Patient denies changes in recent medication, travel, changes in weight or drinking habits.    ED Vitals: T 98.2 P 75 /88 RR 18 O2 99% on RA  ED Course: In the ED, Hgb 11.2, Sodium 111, Potassium 2.6, Chloride 74, Magnesium 1.3. CT Head: No acute intracranial hemorrhage or transcortical infarct. She received 30 ml of 3%NaCl at 30 ml/hr and 2g of MgSulfate IV per Dr. Elias at [facility] and she was admitted to [facility] MICU for workup and treatment of hyponatremia. (15 Jul 2019 17:54)                            12.0   9.30  )-----------( 113      ( 18 Jul 2019 07:30 )             34.1       07-19    128<L>  |  93<L>  |  23  ----------------------------<  100<H>  3.6   |  23  |  0.85    Ca    8.3<L>      19 Jul 2019 08:11  Mg     1.6     07-18      Vital Signs Last 24 Hrs  T(C): 36.8 (19 Jul 2019 01:40), Max: 37.2 (18 Jul 2019 17:34)  T(F): 98.3 (19 Jul 2019 01:40), Max: 99 (18 Jul 2019 17:34)  HR: 66 (19 Jul 2019 05:48) (66 - 75)  BP: 134/66 (19 Jul 2019 05:48) (123/80 - 134/74)  BP(mean): 91 (19 Jul 2019 05:48) (87 - 97)  RR: 16 (19 Jul 2019 05:48) (16 - 17)  SpO2: 100% (19 Jul 2019 05:48) (97% - 100%)    MEDICATIONS  (STANDING):  dextrose 50% Injectable 12.5 Gram(s) IV Push once  dextrose 50% Injectable 25 Gram(s) IV Push once  dextrose 50% Injectable 25 Gram(s) IV Push once  heparin  Injectable 5000 Unit(s) SubCutaneous every 8 hours  NIFEdipine XL 30 milliGRAM(s) Oral every 24 hours  sodium chloride 1 Gram(s) Oral three times a day  sodium chloride 0.9%. 1000 milliLiter(s) (80 mL/Hr) IV Continuous <Continuous>  tranylcypromine 60 milliGRAM(s) Oral every 24 hours    MEDICATIONS  (PRN):  dextrose 40% Gel 15 Gram(s) Oral once PRN Blood Glucose LESS THAN 70 milliGRAM(s)/deciliter  glucagon  Injectable 1 milliGRAM(s) IntraMuscular once PRN Glucose LESS THAN 70 milligrams/deciliter  LORazepam     Tablet 1 milliGRAM(s) Oral every 6 hours PRN Anxiety  melatonin 5 milliGRAM(s) Oral at bedtime PRN Insomnia    Currently Undergoing Physical Therapy at bedside.    Functional Status Assessment:    Previous Level of Function:     · Additional Comments	Pt reports living alone at home and does not require to navigate any stairs. Her PLOF included walking without any AD and was able to negotiate subway stairs.	    Cognitive Status Examination:   · Orientation	oriented to person, place, time and situation	  · Level of Consciousness	alert	  · Follows Commands and Answers Questions	100% of the time	  · Personal Safety and Judgment	intact	    Range of Motion Exam:   · Active Range of Motion Examination	bilateral  lower extremity Active ROM was WFL (within functional limits); bilateral upper extremity Active ROM was WFL (within functional limits)	    MMT Hip:     · Hip Flexion	4+ = good plus  4+ = good plus 	    MMT Knee:     · Knee Extension	4+ = good plus  4+ = good plus 	    MMT Ankle:     · Ankle Dorsiflexion	4+ = good plus  4+ = good plus 	  · Ankle Plantarflexion	4+ = good plus  4+ = good plus 	    Bed Mobility: Scooting/Bridging:     · Level of [place]	supervision	    Bed Mobility: Sit to Supine:     · Level of [place]	supervision	  · Physical Assist/Nonphysical Assist	verbal cues	    Bed Mobility: Supine to Sit:     · Level of [place]	supervision	  · Physical Assist/Nonphysical Assist	1 person assist	    Transfer: Chair to Bed:     · Level of [place]	supervision	  · Physical Assist/Nonphysical Assist	1 person assist	  · Assistive Device	no assistive device	    Bed/Chair Transfer Safety Analysis:     · Impairments Contributing to Impaired Transfers	impaired balance; decreased LE endurance	  · Transfer Safety Concerns Noted	decreased weight-shifting ability; losing balance	    Transfer: Sit to Stand:     · Level of [place]	supervision; contact guard	  · Physical Assist/Nonphysical Assist	verbal cues; 1 person assist	  · Assistive Device	no AD	    Transfer: Stand to Sit:     · Level of [place]	supervision	  · Physical Assist/Nonphysical Assist	1 person assist	  · Assistive Device	no assistive device	    Sit/Stand Transfer Safety Analysis:     · Transfer Safety Concerns Noted	losing balance	  · Impairments Contributing to Impaired Transfers	Pt experienced R foot cramp; pain	    Gait Skills:     · Level of [place]	supervision; contact guard	  · Physical Assist/Nonphysical Assist	1 person assist; verbal cues	  · Assistive Device	no assistive device. Pt attempted cane for 20 feet of ambulation.	  · Gait Distance	200 feet	    Gait Analysis:     · Gait Pattern Used	2-point gait 	  · Gait Deviations Noted	decreased weight-shifting ability; decreased toe clearance, decreased step width at times; decreased andrew	  · Impairments Contributing to Gait Deviations	decreased LE endurance, pt expressed feeling "stiff"; narrow base of support	    Stair Negotiation:     · Level of [place]	supervision	  · Physical Assist/Nonphysical Assist	1 person assist	  · Assistive Device	unilateral HR	  · Stair Pattern	step over step	  · Number of Stairs	12	    Balance Skills Assessment:     · Sitting Balance: Static	good balance 	  · Sitting Balance: Dynamic	good balance 	  · Sit-to-Stand Balance	Fair/Fair- 	  · Standing Balance: Static	fair balance 	  · Standing Balance: Dynamic	Fair/Fair- 	  · Identified Impairments Contributing to Balance Disturbance	decreased strength; LE endurance decreased, Pt feels stiff, dec ESTELITA	    Clinical Impressions:   · Criteria for Skilled Therapeutic Interventions	impairments found; functional limitations in following categories; risk reduction/prevention	  · Impairments Found (describe specific impairments)	gait, locomotion, and balance; muscle strength	  · Functional Limitations in Following Categories (describe specific limitations)	community/leisure; home management	  · Risk Reduction/Prevention (Describe Specific Areas of risk reduction/prevention)	risk factors	  · Risk Areas	fall	  · Rehab Potential	good, to achieve stated therapy goals	  · Therapy Frequency	2-3x/week	        PM&R Impression: as above    Disposition Plan Recommendations: d/c home, outpatient physical therapy

## 2019-07-19 NOTE — PROGRESS NOTE ADULT - SUBJECTIVE AND OBJECTIVE BOX
Patient is a 69y Female seen and evaluated at bedside.   pt seen and examined  eager to leave,   NS started overnight in light of SHIKHA    dextrose 40% Gel 15 once PRN  dextrose 50% Injectable 12.5 once  dextrose 50% Injectable 25 once  dextrose 50% Injectable 25 once  glucagon  Injectable 1 once PRN  heparin  Injectable 5000 every 8 hours  LORazepam     Tablet 1 every 6 hours PRN  melatonin 5 at bedtime PRN  NIFEdipine XL 30 every 24 hours  sodium chloride 1 three times a day  tranylcypromine 60 every 24 hours      Allergies    No Known Allergies    Intolerances    Frazier (Unknown)      T(C): , Max: 37.2 (07-18-19 @ 17:34)  T(F): , Max: 99 (07-18-19 @ 17:34)  HR: 90 (07-19-19 @ 08:57)  BP: 142/105 (07-19-19 @ 08:57)  BP(mean): 91 (07-19-19 @ 05:48)  RR: 20 (07-19-19 @ 08:57)  SpO2: 100% (07-19-19 @ 08:57)  Wt(kg): --    07-18 @ 07:01  -  07-19 @ 07:00  --------------------------------------------------------  IN: 820 mL / OUT: 680 mL / NET: 140 mL    07-19 @ 07:01  -  07-19 @ 13:26  --------------------------------------------------------  IN: 0 mL / OUT: 200 mL / NET: -200 mL          Review of Systems:  CONSTITUTIONAL: No fever or chills, No fatigue or tiredness.  EYES: No blurred or double vision.  RESPIRATORY: No shortness of breath, cough, hemoptysis  CARDIOVASCULAR: No Chest pain or shortness of breath  GASTROINTESTINAL: NO abdominal or flank pain, No nausea or vomiting, No diarrhea  GENITOURINARY: No dysuria or urinary burning, No difficulty passing urine, No hematuria  NEUROLOGICAL: No headaches or blurred vision  SKIN: No skin rashes   MUSCULOSKELETAL: No arthralgia, Joint pain, leg edema, No muscle pains      PHYSICAL EXAM:  GENERAL: NAD, agitated  HEAD:  Atraumatic, Normocephalic,   EYES: Bilateral conjunctiva and sclera normal   Oral cavity: Oral mucosa dry and pink  NECK: Neck supple, No JVD  CHEST/LUNG: Clear to auscultation bilaterally; No wheeze, no rales, no crepitations  HEART: Regular rate and rhythm. ROXIE II/VI at LPSB, No gallop, no rub   ABDOMEN: Soft, Nontender, BS+nt, No flank tenderness.   EXTREMITIES: No clubbing, cyanosis, or edema  Neurology: AAOx3, no focal neurological deficit  SKIN: No rashes or lesions            LABS:                        12.0   9.30  )-----------( 113      ( 18 Jul 2019 07:30 )             34.1     07-19    129<L>  |  93<L>  |  22  ----------------------------<  174<H>  4.0   |  24  |  0.91    Ca    8.6      19 Jul 2019 12:28  Mg     1.6     07-18            Osmolality, Random Urine: 464 mosmol/kg (07-19 @ 11:05)  Sodium, Random Urine: 84 mmol/L (07-19 @ 11:04)  Chloride, Random Urine: <20 mmol/L (07-18 @ 19:26)  Creatinine, Random Urine: 118 mg/dL (07-18 @ 19:26)  Sodium, Random Urine: <20 mmol/L (07-18 @ 19:26)        RADIOLOGY & ADDITIONAL STUDIES:

## 2019-07-19 NOTE — PROGRESS NOTE BEHAVIORAL HEALTH - PRIMARY DX
Recurrent major depressive disorder, in full remission

## 2019-07-19 NOTE — PROGRESS NOTE BEHAVIORAL HEALTH - RISK ASSESSMENT
low risk to self or others at this time

## 2019-07-19 NOTE — PROGRESS NOTE ADULT - PROBLEM SELECTOR PLAN 4
History of depression  - Psychiatry recommends continuing home MAO-I but at 60mg instead of 90mg  - Outpatient psychiatrist contacted and agrees with above plan  - Ativan 1mg PRN Q6H for anxiety  - Melatonin at night as sleep aid

## 2019-07-19 NOTE — PROGRESS NOTE ADULT - ASSESSMENT
69 year old  female with a past medical history significant for depression, HTN, and thrombocytopenia who presented to [facility] ED after losing balance and falling while walking. She was admitted to [facility] MICU for workup and treatment of hyponatremia. Transferred to [facility] 7/16 with sodium uptrending at an appropriate rate. Last level 125 (7/18/2019 6PM). Transfer to [facility] from [facility].

## 2019-07-19 NOTE — PROGRESS NOTE ADULT - PROBLEM SELECTOR PLAN 1
As per renal, likely multifactorial with thiazide diuretic most concerning (given K, Cl, and Mg levels).   - Currently AAOx3  - Strict Is and Os   - As per renal note 7/18, Na goal 128-130 by tomorrow afternoon.  - Continue with water restriction  - Continue NaCl tabs  - BMP Q6H  - AM urine electrolytes, urea

## 2019-07-19 NOTE — PROGRESS NOTE ADULT - PROBLEM SELECTOR PLAN 5
# Hx of thrombocytopenia, unknown cause, saw a hematologist in the past.  - Peripheral smear 7/15 : 3.5% Band neutrophils. Slight schistocytes. Basophilic stippling, neetu cells, and giant platelets present.  - Follow daily CBC - Platelets 94 on admission 7/15 and 113 today 7/18

## 2019-07-19 NOTE — PROGRESS NOTE BEHAVIORAL HEALTH - NSBHCONSULTRECOMMENDOTHER_PSY_A_CORE FT
1)Pt has been on parnate for 10 years and has severe recurrent depression with suicide attempts and hospitalizations and has failed multiple trials of antidepressants and has required ECT. Her outpatient psychiatrist and I recommend continuation of parnate which patient is very much in agreement with. I understand no other source of SIADH found. Recommend decreasing dose of parnate from 90 mg to 60 mg and continued treatment to manage electrolyte abnormalities that occur/continue.     2)If discontinuation of parnate is absolutely necessary would taper off rather than abruptly discontinue. Literature search suggested remeron is antidepressant with lowest risk of SIADH. Pt seems never to have tried remeron. There has to be a two week washout period after discontinuation of parnate before remeron or any other antidepressant could be started.     3)She has an appt with outpt psychiatrist, Dr. Najera, on 7/23/19 at 2:45 pm.    4)Can continue ativan 1 mg q6h prn anxiety and melatonin 5 mg qhs prn insomnia
1)Pt has been on parnate for 10 years and has severe recurrent depression with suicide attempts and hospitalizations and has failed multiple trials of antidepressants and has required ECT. Her outpatient psychiatrist and I recommend continuation of parnate which patient is very much in agreement with. I understand no other source of SIADH found. Recommend decreasing dose of parnate from 90 mg to 60 mg and continued treatment to manage electrolyte abnormalities that occur/continue.     2)If discontinuation of parnate is absolutely necessary would taper off rather than abruptly discontinue. Literature search suggested remeron is antidepressant with lowest risk of SIADH. Pt seems never to have tried remeron. There has to be a two week washout period after discontinuation of parnate before remeron or any other antidepressant could be started.     3)I will continue to follow.    4)Can continue ativan 1 mg q6h prn anxiety and melatonin 5 mg qhs prn insomnia
1)Pt has been on parnate for 10 years and has severe recurrent depression with suicide attempts and hospitalizations and has failed multiple trials of antidepressants and has required ECT. Her outpatient psychiatrist and I recommend continuation of parnate which patient is very much in agreement with. I understand no other source of SIADH found. Recommend decreasing dose of parnate from 90 mg to 60 mg and continued treatment to manage electrolyte abnormalities that occur/continue.     2)If discontinuation of parnate is absolutely necessary would taper off rather than abruptly discontinue. Literature search suggested remeron is antidepressant with lowest risk of SIADH. Pt seems never to have tried remeron. There has to be a two week washout period after discontinuation of parnate before remeron or any other antidepressant could be started.     3)I will continue to follow.    4)Can continue ativan 1 mg q6h prn anxiety and melatonin 5 mg qhs prn insomnia

## 2019-07-19 NOTE — PROGRESS NOTE ADULT - ASSESSMENT
69 year old  female with a past medical history significant for depression, HTN, and thrombocytopenia who presented to [facility] ED after losing balance and falling while walking. She was admitted to [facility] MICU for workup and treatment of hyponatremia. Transferred to [facility] 7/16 with sodium uptrending at an appropriate rate. Last level 125 (7/18/2019 6PM). Transfer to [facility] from [facility].      Problem/Plan - 1:  ·  Problem: Hyponatremia.  Plan: As per renal, likely multifactorial with thiazide diuretic most concerning (given K, Cl, and Mg levels).   - Currently AAOx3  - Strict Is and Os   - As per renal note 7/18, Na goal 128-130 by tomorrow afternoon.  - Continue with water restriction  - Continue NaCl tabs  - BMP Q6H  - AM urine electrolytes, urea.     Problem/Plan - 2:  ·  Problem: SHIKHA (acute kidney injury).  Plan: unclear etiology  NA<20 likely prerenal  IVF NS 80cc/hr  strict i&o  renal consult.     Problem/Plan - 3:  ·  Problem: Hypertension, unspecified type.  Plan: #HTN  - Continue to hold home Spironolactone  - Monitor vitals  - Continue Nifedipine 30mg QD.     Problem/Plan - 4:  ·  Problem: Recurrent major depressive disorder, in full remission.  Plan: History of depression  - Psychiatry recommends continuing home MAO-I but at 60mg instead of 90mg  - Outpatient psychiatrist contacted and agrees with above plan  - Ativan 1mg PRN Q6H for anxiety  - Melatonin at night as sleep aid.     Problem/Plan - 5:  ·  Problem: Thrombocytopenia.  Plan: # Hx of thrombocytopenia, unknown cause, saw a hematologist in the past.  - Peripheral smear 7/15 : 3.5% Band neutrophils. Slight schistocytes. Basophilic stippling, neetu cells, and giant platelets present.  - Follow daily CBC - Platelets 94 on admission 7/15 and 113 today 7/18.     Problem/Plan - 6:  Problem: Prophylactic measure. Plan: Fluids: None  Electrolytes: Replete as needed  Nutrition: Lactose free diet  DVT: SubQ Heparin  Dispo: [facility]  Code: Full.

## 2019-07-19 NOTE — PROGRESS NOTE BEHAVIORAL HEALTH - NSBHFUPINTERVALCCFT_PSY_A_CORE
69 year old  female with a past medical history significant for depression, HTN, thoracic aortic aneurysm and thrombocytopenia who presented to [facility] ED on 7/15 after losing balance and falling while walking. Patient denies hitting her head or LOC. She denies past episodes of dizziness or weakness. Patient states that two days ago she had 3 episodes of non-bilious and non-bloody vomiting after an episode of nausea. Patient states that she re-hydrated with 4 cups of water and felt better. The patient normal drinks 8 cups of seltzer per day and makes sure she stays "very hydrated". HD stable in the ED, found a Sodium 111, Potassium 2.6, Chloride 74, Magnesium 1.3. CT Head: No acute intracranial hemorrhage or transcortical infarct. She received 30 ml of 3%NaCl at 30 ml/hr and 2g of MgSulfate IV. She was admitted to [facility] MICU for workup and treatment of hyponatremia. She has been depressed since childhood and has failed multiple medications in the past including tofranil, all SSRIs, nardil (not as good as parnate), and ECT has been effective in the past. Pt has been asymptomatic of late. Has had two suicide attempts in the past, 1 severe overdose and 1 hanging attempt (used her bathrobe and shower curtain broke). Spoke at length with her outpatient psychiatrist, Dr. Chica Najera, who has followed her for many years. Pt has been on parnate for 10 years and is currently on 90 mg. She took 120 mg in the past. Her sodium has always been stable on parnate in the past. She denies current substance abuse but has abused alcohol (3/4 bottle of champagne nightly) and cocaine in the past.
69 year old  female with a past medical history significant for depression, HTN, thoracic aortic aneurysm and thrombocytopenia who presented to [facility] ED on 7/15 after losing balance and falling while walking. Patient denies hitting her head or LOC. She denies past episodes of dizziness or weakness. Patient states that two days ago she had 3 episodes of non-bilious and non-bloody vomiting after an episode of nausea. Patient states that she re-hydrated with 4 cups of water and felt better. The patient normal drinks 8 cups of seltzer per day and makes sure she stays "very hydrated". HD stable in the ED, found a Sodium 111, Potassium 2.6, Chloride 74, Magnesium 1.3. CT Head: No acute intracranial hemorrhage or transcortical infarct. She received 30 ml of 3%NaCl at 30 ml/hr and 2g of MgSulfate IV. She was admitted to [facility] MICU for workup and treatment of hyponatremia. She has been depressed since childhood and has failed multiple medications in the past including tofranil, all SSRIs, nardil (not as good as parnate), and ECT has been effective in the past. Pt has been asymptomatic of late. Has had two suicide attempts in the past, 1 severe overdose and 1 hanging attempt (used her bathrobe and shower curtain broke). Spoke at length with her outpatient psychiatrist, Dr. Chica Najera, who has followed her for many years. Pt has been on parnate for 10 years and is currently on 90 mg. She took 120 mg in the past. Her sodium has always been stable on parnate in the past. She denies current substance abuse but has abused alcohol (3/4 bottle of champagne nightly) and cocaine in the past.
69 year old  female with a past medical history significant for depression, HTN, thoracic aortic aneurysm and thrombocytopenia who presented to [facility] ED on 7/15 after losing balance and falling while walking. Patient denies hitting her head or LOC. She denies past episodes of dizziness or weakness. Patient states that two days ago she had 3 episodes of non-bilious and non-bloody vomiting after an episode of nausea. Patient states that she re-hydrated with 4 cups of water and felt better. The patient normal drinks 8 cups of seltzer per day and makes sure she stays "very hydrated". HD stable in the ED, found a Sodium 111, Potassium 2.6, Chloride 74, Magnesium 1.3. CT Head: No acute intracranial hemorrhage or transcortical infarct. She received 30 ml of 3%NaCl at 30 ml/hr and 2g of MgSulfate IV. She was admitted to [facility] MICU for workup and treatment of hyponatremia. She has been depressed since childhood and has failed multiple medications in the past including tofranil, all SSRIs, nardil (not as good as parnate), and ECT has been effective in the past. Pt has been asymptomatic of late. Has had two suicide attempts in the past, 1 severe overdose and 1 hanging attempt (used her bathrobe and shower curtain broke). Spoke at length with her outpatient psychiatrist, Dr. Chica Najera, who has followed her for many years. Pt has been on parnate for 10 years and is currently on 90 mg. She took 120 mg in the past. Her sodium has always been stable on parnate in the past. She denies current substance abuse but has abused alcohol (3/4 bottle of champagne nightly) and cocaine in the past.

## 2019-07-19 NOTE — PROGRESS NOTE BEHAVIORAL HEALTH - NSBHFUPINTERVALHXFT_PSY_A_CORE
Pt very anxious about decrease in parnate dose to 60 mg from 90 mg and threatened to leave AMA. Pt very anxious but calmed down when I reassured her, discussed the logic of decrease in dose, the fact that I discussed this with her outpatient psychiatrist, Dr. Najera, and she is in agreement with plan, and told her we hope not to decrease the parnate dose any further. Sodium 119. Pt also reporting difficulty sleeping. Got prn ativan 1mg for anxiety/sleep and melatonin 5 mg prn sleep.
Na up to 124. Renal more concerned about thiazide diuretic than parnate. Pt much less anxious. Eager for discharge. No ongoing signs of MAOI withdrawal or of worsening depression.
Na up to 128. Team called me early morning to let me know that pt had said she was suicidal after becoming frustrated by various things including a switch in her room, her meal not adhering to tyramine-restricted diet, and frustrations about how rapidly she was being discharged. By the time I was able to see her, she was calmly doing a crossword puzzle and retracted all suicidality. She never expressed a plan or intent. She looks forward to seeing her outpt psychiatrist, Dr. Najera, on Tuesday and wants her parnate dose ot be increased back to 90 mg.

## 2019-07-19 NOTE — PROGRESS NOTE ADULT - PROVIDER SPECIALTY LIST ADULT
Internal Medicine
MICU
Nephrology
Nephrology
Rehab Medicine
Nephrology

## 2019-07-19 NOTE — PROGRESS NOTE BEHAVIORAL HEALTH - PRN MEDS
ativan 1mg for anxiety, melatonin 5 mg prn insomnia

## 2019-07-19 NOTE — PROGRESS NOTE BEHAVIORAL HEALTH - AXIS III
HTN on nifedipine, SIADH with hyponatremia to 111 at its worst

## 2019-07-24 PROBLEM — F32.9 MAJOR DEPRESSIVE DISORDER, SINGLE EPISODE, UNSPECIFIED: Chronic | Status: ACTIVE | Noted: 2019-07-15

## 2019-07-24 PROBLEM — Z00.00 ENCOUNTER FOR PREVENTIVE HEALTH EXAMINATION: Status: ACTIVE | Noted: 2019-07-24

## 2019-07-24 PROBLEM — D69.6 THROMBOCYTOPENIA, UNSPECIFIED: Chronic | Status: ACTIVE | Noted: 2019-07-15

## 2019-07-24 PROBLEM — I10 ESSENTIAL (PRIMARY) HYPERTENSION: Chronic | Status: ACTIVE | Noted: 2019-07-15

## 2019-07-24 PROBLEM — K63.5 POLYP OF COLON: Chronic | Status: ACTIVE | Noted: 2019-07-15

## 2019-07-24 PROBLEM — I71.2 THORACIC AORTIC ANEURYSM, WITHOUT RUPTURE: Chronic | Status: ACTIVE | Noted: 2019-07-15

## 2019-08-05 ENCOUNTER — INBOUND DOCUMENT (OUTPATIENT)
Age: 69
End: 2019-08-05
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
## 2019-08-06 DIAGNOSIS — D69.6 THROMBOCYTOPENIA, UNSPECIFIED: ICD-10-CM

## 2019-08-06 DIAGNOSIS — T50.2X5A ADVERSE EFFECT OF CARBONIC-ANHYDRASE INHIBITORS, BENZOTHIADIAZIDES AND OTHER DIURETICS, INITIAL ENCOUNTER: ICD-10-CM

## 2019-08-06 DIAGNOSIS — T43.1X5A: ICD-10-CM

## 2019-08-06 DIAGNOSIS — E22.2 SYNDROME OF INAPPROPRIATE SECRETION OF ANTIDIURETIC HORMONE: ICD-10-CM

## 2019-08-06 DIAGNOSIS — R33.9 RETENTION OF URINE, UNSPECIFIED: ICD-10-CM

## 2019-08-06 DIAGNOSIS — N17.9 ACUTE KIDNEY FAILURE, UNSPECIFIED: ICD-10-CM

## 2019-08-06 DIAGNOSIS — E83.39 OTHER DISORDERS OF PHOSPHORUS METABOLISM: ICD-10-CM

## 2019-08-06 DIAGNOSIS — E87.6 HYPOKALEMIA: ICD-10-CM

## 2019-08-06 DIAGNOSIS — F33.42 MAJOR DEPRESSIVE DISORDER, RECURRENT, IN FULL REMISSION: ICD-10-CM
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
## 2019-08-06 DIAGNOSIS — I10 ESSENTIAL (PRIMARY) HYPERTENSION: ICD-10-CM

## 2019-08-06 DIAGNOSIS — E83.42 HYPOMAGNESEMIA: ICD-10-CM

## 2019-09-17 ENCOUNTER — APPOINTMENT (OUTPATIENT)
Dept: NEPHROLOGY | Facility: CLINIC | Age: 69
End: 2019-09-17
Payer: MEDICARE

## 2019-09-17 VITALS — RESPIRATION RATE: 16 BRPM | DIASTOLIC BLOOD PRESSURE: 80 MMHG | SYSTOLIC BLOOD PRESSURE: 140 MMHG | HEART RATE: 82 BPM

## 2019-09-17 DIAGNOSIS — R60.0 LOCALIZED EDEMA: ICD-10-CM

## 2019-09-17 DIAGNOSIS — Z78.9 OTHER SPECIFIED HEALTH STATUS: ICD-10-CM

## 2019-09-17 DIAGNOSIS — R55 SYNCOPE AND COLLAPSE: ICD-10-CM

## 2019-09-17 PROCEDURE — 99204 OFFICE O/P NEW MOD 45 MIN: CPT

## 2019-09-17 PROCEDURE — 99214 OFFICE O/P EST MOD 30 MIN: CPT

## 2019-09-22 NOTE — REVIEW OF SYSTEMS
[Anxiety] : anxiety [Difficulty Walking] : difficulty walking [Depression] : depression [Negative] : Endocrine

## 2019-09-24 ENCOUNTER — APPOINTMENT (OUTPATIENT)
Dept: NEPHROLOGY | Facility: CLINIC | Age: 69
End: 2019-09-24

## 2019-10-01 PROCEDURE — 84133 ASSAY OF URINE POTASSIUM: CPT

## 2019-10-01 PROCEDURE — 70450 CT HEAD/BRAIN W/O DYE: CPT

## 2019-10-01 PROCEDURE — 96375 TX/PRO/DX INJ NEW DRUG ADDON: CPT

## 2019-10-01 PROCEDURE — 71045 X-RAY EXAM CHEST 1 VIEW: CPT

## 2019-10-01 PROCEDURE — 85730 THROMBOPLASTIN TIME PARTIAL: CPT

## 2019-10-01 PROCEDURE — 96374 THER/PROPH/DIAG INJ IV PUSH: CPT

## 2019-10-01 PROCEDURE — 83930 ASSAY OF BLOOD OSMOLALITY: CPT

## 2019-10-01 PROCEDURE — 85610 PROTHROMBIN TIME: CPT

## 2019-10-01 PROCEDURE — 84300 ASSAY OF URINE SODIUM: CPT

## 2019-10-01 PROCEDURE — 86901 BLOOD TYPING SEROLOGIC RH(D): CPT

## 2019-10-01 PROCEDURE — 81001 URINALYSIS AUTO W/SCOPE: CPT

## 2019-10-01 PROCEDURE — 85027 COMPLETE CBC AUTOMATED: CPT

## 2019-10-01 PROCEDURE — 93005 ELECTROCARDIOGRAM TRACING: CPT

## 2019-10-01 PROCEDURE — 85025 COMPLETE CBC W/AUTO DIFF WBC: CPT

## 2019-10-01 PROCEDURE — 82436 ASSAY OF URINE CHLORIDE: CPT

## 2019-10-01 PROCEDURE — 80307 DRUG TEST PRSMV CHEM ANLYZR: CPT

## 2019-10-01 PROCEDURE — 80053 COMPREHEN METABOLIC PANEL: CPT

## 2019-10-01 PROCEDURE — 84100 ASSAY OF PHOSPHORUS: CPT

## 2019-10-01 PROCEDURE — 80048 BASIC METABOLIC PNL TOTAL CA: CPT

## 2019-10-01 PROCEDURE — 83935 ASSAY OF URINE OSMOLALITY: CPT

## 2019-10-01 PROCEDURE — 86900 BLOOD TYPING SEROLOGIC ABO: CPT

## 2019-10-01 PROCEDURE — 99291 CRITICAL CARE FIRST HOUR: CPT | Mod: 25

## 2019-10-01 PROCEDURE — 84443 ASSAY THYROID STIM HORMONE: CPT

## 2019-10-01 PROCEDURE — 86850 RBC ANTIBODY SCREEN: CPT

## 2019-10-01 PROCEDURE — 83735 ASSAY OF MAGNESIUM: CPT

## 2019-10-01 PROCEDURE — 97161 PT EVAL LOW COMPLEX 20 MIN: CPT

## 2019-10-01 PROCEDURE — 82570 ASSAY OF URINE CREATININE: CPT

## 2019-10-01 PROCEDURE — 82962 GLUCOSE BLOOD TEST: CPT

## 2019-10-01 PROCEDURE — 36415 COLL VENOUS BLD VENIPUNCTURE: CPT

## 2019-10-14 NOTE — HISTORY OF PRESENT ILLNESS
[FreeTextEntry1] : 68yo F referred for hyponatremia thought to be acute 2/2 poor PO intake in the setting of spironolactone/HCTZ and a suspected baseline of mild chronic SIADH from [facility], here to establish care:\par \par PCP Dr. Foster Gray\par \par Has been going to urgent care for labs sine hospitalization, hasn't followed up with her pcp yet. \par Notes a few days prior to hospitalization she had a few episodes of vomiting which are very atypical for her. Was on jury duty with low sleep and not normal PO intake during this time. \par Stopped spironolactone-HCTZ since hospitalization as it was suspected to be causing her hyponatremia. \par Her MAOi was decreased to 60mg as it is documented to be rarely associated with SIADH however she got depressed so had to increase it back to 90mg. \par Taking 1g TID Na tabs since hospitalization, notes LE edema and feels bloated. \par No excessive thirst, dry mouth, dizziness, confusion. Feels  quite well. No neurological sx.\par Has been doing physical therapy outpt since hospitalization, feels much stronger and more stable, no falls. \par No changes in medicines. Weight/appetite stable. \par \par Says she's "always" been told of low Na, has been going up and down but always mild and not seeming to be concerning to her or drs. At least for the last 3 years. 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 Family lives in [place]. \par \par OTC: Magnesium, Vit D, rosavin plus

## 2019-10-14 NOTE — CONSULT LETTER
[Dear  ___] : Dear  [unfilled], [Consult Letter:] : I had the pleasure of evaluating your patient, [unfilled]. [Please see my note below.] : Please see my note below. [Consult Closing:] : Thank you very much for allowing me to participate in the care of this patient.  If you have any questions, please do not hesitate to contact me. [Sincerely,] : Sincerely, [FreeTextEntry3] : Marlene Winter MD\par  of Medicine\par Division of Kidney Diseases and Hypertension\par [facility] \par [employer] [facility] Tel: [phone]\par Email: [email or web address] \par

## 2019-10-14 NOTE — PHYSICAL EXAM
[General Appearance - Alert] : alert [General Appearance - In No Acute Distress] : in no acute distress [Sclera] : the sclera and conjunctiva were normal [Extraocular Movements] : extraocular movements were intact [PERRL With Normal Accommodation] : pupils were equal in size, round, and reactive to light [Outer Ear] : the ears and nose were normal in appearance [Oropharynx] : the oropharynx was normal [Neck Appearance] : the appearance of the neck was normal [Jugular Venous Distention Increased] : there was no jugular-venous distention [Auscultation Breath Sounds / Voice Sounds] : lungs were clear to auscultation bilaterally [Heart Rate And Rhythm] : heart rate was normal and rhythm regular [Heart Sounds] : normal S1 and S2 [Murmurs] : no murmurs [Heart Sounds Gallop] : no gallops [Heart Sounds Pericardial Friction Rub] : no pericardial rub [No CVA Tenderness] : no ~M costovertebral angle tenderness [No Spinal Tenderness] : no spinal tenderness [Skin Color & Pigmentation] : normal skin color and pigmentation [Abnormal Walk] : normal gait [Skin Turgor] : normal skin turgor [] : no rash [Cranial Nerves] : cranial nerves 2-12 were intact [No Focal Deficits] : no focal deficits [Impaired Insight] : insight and judgment were intact [Oriented To Time, Place, And Person] : oriented to person, place, and time [Affect] : the affect was normal [FreeTextEntry1] : trace ankle edema

## 2019-10-15 NOTE — ED PROVIDER NOTE - CADM POA URETHRAL CATHETER
[FreeTextEntry1] : This note was authored by Arjun Frausto working as scribe for Dr. Chris Sibley. I, Dr. Chris Sibley, have reviewed the content of this note and confirm it is true and accurate. I personally performed the history and physical examination and made all the decisions.\par 10/15/19 
No

## 2019-12-18 ENCOUNTER — APPOINTMENT (OUTPATIENT)
Dept: NEPHROLOGY | Facility: CLINIC | Age: 69
End: 2019-12-18
Payer: MEDICARE

## 2019-12-18 VITALS — DIASTOLIC BLOOD PRESSURE: 70 MMHG | RESPIRATION RATE: 14 BRPM | SYSTOLIC BLOOD PRESSURE: 120 MMHG | HEART RATE: 80 BPM

## 2019-12-18 PROCEDURE — 99214 OFFICE O/P EST MOD 30 MIN: CPT

## 2019-12-18 RX ORDER — NIFEDIPINE 20 MG/1
CAPSULE ORAL
Refills: 0 | Status: DISCONTINUED | COMMUNITY
End: 2019-12-18

## 2019-12-18 NOTE — REVIEW OF SYSTEMS
[Difficulty Walking] : difficulty walking [Anxiety] : anxiety [Depression] : depression [Negative] : Heme/Lymph

## 2019-12-26 NOTE — HISTORY OF PRESENT ILLNESS
[FreeTextEntry1] : 68yo F with chronic hyponatremia likely SIADH (possibly 2/2 [facility]) and acute exacerbation req. [facility] hospitalization Aug/19 thought to be acute 2/2 poor PO intake in the setting of spironolactone/HCTZ use, here for f/u: \par \par PCP Dr. Foster Gray\par Diagnostic Radiologist Dr. Layne Srivastava [phone]\par \par Feels well. No edema, no new rashes. No urinary sx. \par No new medicines or NSAIDs\par Describes difficulty in initiating urine stream, she thinks maybe since she started taking the parnate. She did not to well from her biopolar when parnate decreased so back up to 90 and feeling well on that. \par \par Also states she used to see a dr who told her her ?ureter is narrowed in the past, gets annual sonograms, due. 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 Two grandnieces in [place] where she was raised. \par \par OTC: ergocalciferol weekly, rosavin plus

## 2019-12-26 NOTE — ASSESSMENT
[FreeTextEntry1] : 70yo F with chronic hyponatremia likely SIADH (possibly 2/2 [facility]) and acute exacerbation req. [facility] hospitalization Aug/19 thought to be acute 2/2 poor PO intake in the setting of spironolactone/HCTZ use, here for f/u: \par \par Acute on Chronic Hyponatremia-\par Reviewed Dec/19 labs. Cr 1.10 egfr 52 Na perfect 138 CO2 28 BUN 22\par Plt chronically mildly low, saw hematologist in past and told it's likely her normal, ruled out other pathology. Never did bone marrow biopsy. \par Nov/19 labs: Na 134 C 1.1 u/a 6-10rbc (per pt had anal fissure which is presumed to be her reason for mild hematuria)\par Also with mildly elevated Cr baseline 1.1 - will check cystatin C egfr next visit. [place] today of her h/o possible ureteral stenosis and occasional straining to urinate, she normal gets her imaging done by a diagnostic radiologist, since she has prior imaging to compare to, will call her to coordinate renal re-imaging. \par HTN well controlled, advised to avoid nsaids\par \par Hx:\par Sept 9 2019: Cr 1 BUN 29 Na 135 normal serum osm, urine Na 44-72 Uosm 415 \par Oct 2018: Na 130 \par Hyponatremia has been chronic and asymptomatic per pt and preceeding Aug/19 starting spironolactone/hctz.\par We first thought low Na was 2/2 thiazide inpatient, as she was also with low Cl, K, Mg and Ur Na/Ur Osm on the lower side for SIADH, improved w hypertonic and then fluid restriction while holding both potential agents however when MAOI inhibitor was restarted her Na dropped down significantly again and repeat Ur Na/Ur Osm were very consistent w SIADH. May have been the combo of the two meds and poor PO internet that triggered it. Psychiatry consulted inpatient who agreed and tried lowering dose to 60mg and she was discharged off diuretics and on 1g TID NA tabs however she was too depressed so she malik dose back up to 90mg. All subsequent Na checks outpt on this regimen have been normal since stopping Na tabs.  \par \par RTC in 4-5 months\par \par \par \par

## 2019-12-26 NOTE — PHYSICAL EXAM
[General Appearance - Alert] : alert [General Appearance - In No Acute Distress] : in no acute distress [Sclera] : the sclera and conjunctiva were normal [Extraocular Movements] : extraocular movements were intact [PERRL With Normal Accommodation] : pupils were equal in size, round, and reactive to light [Outer Ear] : the ears and nose were normal in appearance [Oropharynx] : the oropharynx was normal [Neck Appearance] : the appearance of the neck was normal [Jugular Venous Distention Increased] : there was no jugular-venous distention [Auscultation Breath Sounds / Voice Sounds] : lungs were clear to auscultation bilaterally [Heart Rate And Rhythm] : heart rate was normal and rhythm regular [Heart Sounds] : normal S1 and S2 [Heart Sounds Gallop] : no gallops [Murmurs] : no murmurs [Heart Sounds Pericardial Friction Rub] : no pericardial rub [No CVA Tenderness] : no ~M costovertebral angle tenderness [No Spinal Tenderness] : no spinal tenderness [Skin Color & Pigmentation] : normal skin color and pigmentation [Abnormal Walk] : normal gait [] : no rash [Skin Turgor] : normal skin turgor [No Focal Deficits] : no focal deficits [Cranial Nerves] : cranial nerves 2-12 were intact [Oriented To Time, Place, And Person] : oriented to person, place, and time [Impaired Insight] : insight and judgment were intact [Affect] : the affect was normal [FreeTextEntry1] : trace ankle edema

## 2020-04-15 ENCOUNTER — APPOINTMENT (OUTPATIENT)
Dept: NEPHROLOGY | Facility: CLINIC | Age: 70
End: 2020-04-15
Payer: MEDICARE

## 2020-04-15 PROCEDURE — 99442: CPT

## 2020-04-15 NOTE — REVIEW OF SYSTEMS
[Anxiety] : anxiety [Depression] : depression [Negative] : Heme/Lymph [de-identified] : controlled

## 2020-04-15 NOTE — HISTORY OF PRESENT ILLNESS
[FreeTextEntry1] : Discussed with patient : You have chosen to receive care through the use of tele-media.  It enables healthcare providers at different locations to provide safe, effective, and convenient care through the use of technology.  Please note this is a billable encounter.  As with any healthcare service, there are risks associated with the use of tele-media, including  issues.  You understand that I cannot physically examine you and that you may need to come to the office to complete the assessment.   Patient agreed verbally and understands the risks and benefits of tele-media as explained.  All questions regarding tele-media encounters were answered.\par                                                                                                                                                                                               69-year-old woman with a history of chronic hyponatremia, probably SIADH due to MAO inhibitor use.  She was previously on HCTZ for hypertension control and her hyponatremia worsened back in August.  She was switched to nifedipine 30 mg daily, and her BP remained stable at about 120/70.  She had labs drawn this morning, so we should know her serum sodium and other values within the next couple of days.  She is sheltering in place during the pandemic and has no COVID19 symptoms.

## 2020-04-15 NOTE — ASSESSMENT
[FreeTextEntry1] : 69-year-old patient of Dr. Winter, with hyponatremia and hypertension.  BP well controlled.  Serum sodium pending.  She will remain on nifedipine 30 mg daily.

## 2020-04-16 LAB
ANION GAP SERPL CALC-SCNC: 14 MMOL/L
APPEARANCE: CLEAR
BACTERIA: NEGATIVE
BASOPHILS # BLD AUTO: 0.06 K/UL
BASOPHILS NFR BLD AUTO: 1.1 %
BILIRUBIN URINE: NEGATIVE
BLOOD URINE: NEGATIVE
BUN SERPL-MCNC: 33 MG/DL
CALCIUM SERPL-MCNC: 10 MG/DL
CHLORIDE SERPL-SCNC: 96 MMOL/L
CO2 SERPL-SCNC: 27 MMOL/L
COLOR: YELLOW
CREAT SERPL-MCNC: 1.18 MG/DL
CREAT SPEC-SCNC: 40 MG/DL
CREAT SPEC-SCNC: 40 MG/DL
CREAT/PROT UR: NORMAL RATIO
CYSTATIN C SERPL-MCNC: 1.41 MG/L
EOSINOPHIL # BLD AUTO: 0.24 K/UL
EOSINOPHIL NFR BLD AUTO: 4.4 %
GFR/BSA.PRED SERPLBLD CYS-BASED-ARV: 45 ML/MIN
GLUCOSE QUALITATIVE U: NEGATIVE
GLUCOSE SERPL-MCNC: 85 MG/DL
HCT VFR BLD CALC: 40 %
HGB BLD-MCNC: 12.9 G/DL
HYALINE CASTS: 0 /LPF
IMM GRANULOCYTES NFR BLD AUTO: 1.8 %
KETONES URINE: NEGATIVE
LEUKOCYTE ESTERASE URINE: NEGATIVE
LYMPHOCYTES # BLD AUTO: 1.16 K/UL
LYMPHOCYTES NFR BLD AUTO: 21.4 %
MAN DIFF?: NORMAL
MCHC RBC-ENTMCNC: 30 PG
MCHC RBC-ENTMCNC: 32.3 GM/DL
MCV RBC AUTO: 93 FL
MICROALBUMIN 24H UR DL<=1MG/L-MCNC: <1.2 MG/DL
MICROALBUMIN/CREAT 24H UR-RTO: NORMAL MG/G
MICROSCOPIC-UA: NORMAL
MONOCYTES # BLD AUTO: 0.58 K/UL
MONOCYTES NFR BLD AUTO: 10.7 %
NEUTROPHILS # BLD AUTO: 3.28 K/UL
NEUTROPHILS NFR BLD AUTO: 60.6 %
NITRITE URINE: NEGATIVE
OSMOLALITY UR: 336 MOSM/KG
PH URINE: 6.5
PLATELET # BLD AUTO: 104 K/UL
POTASSIUM SERPL-SCNC: 4.7 MMOL/L
PROT UR-MCNC: <4 MG/DL
PROTEIN URINE: NEGATIVE
RBC # BLD: 4.3 M/UL
RBC # FLD: 13.2 %
RED BLOOD CELLS URINE: 1 /HPF
SODIUM ?TM SUB UR QN: 44 MMOL/L
SODIUM SERPL-SCNC: 137 MMOL/L
SPECIFIC GRAVITY URINE: 1.01
SQUAMOUS EPITHELIAL CELLS: 0 /HPF
URATE SERPL-MCNC: 6.2 MG/DL
UROBILINOGEN URINE: NORMAL
WBC # FLD AUTO: 5.42 K/UL
WHITE BLOOD CELLS URINE: 0 /HPF

## 2020-12-11 ENCOUNTER — APPOINTMENT (OUTPATIENT)
Dept: ORTHOPEDIC SURGERY | Facility: CLINIC | Age: 70
End: 2020-12-11
Payer: MEDICARE

## 2020-12-11 VITALS — WEIGHT: 120 LBS | BODY MASS INDEX: 19.99 KG/M2 | HEIGHT: 65 IN | RESPIRATION RATE: 16 BRPM

## 2020-12-11 DIAGNOSIS — Z87.820 PERSONAL HISTORY OF TRAUMATIC BRAIN INJURY: ICD-10-CM

## 2020-12-11 DIAGNOSIS — Z85.038 PERSONAL HISTORY OF OTHER MALIGNANT NEOPLASM OF LARGE INTESTINE: ICD-10-CM

## 2020-12-11 DIAGNOSIS — G89.29 PAIN IN RIGHT ANKLE AND JOINTS OF RIGHT FOOT: ICD-10-CM

## 2020-12-11 DIAGNOSIS — Z80.0 FAMILY HISTORY OF MALIGNANT NEOPLASM OF DIGESTIVE ORGANS: ICD-10-CM

## 2020-12-11 DIAGNOSIS — M25.571 PAIN IN RIGHT ANKLE AND JOINTS OF RIGHT FOOT: ICD-10-CM

## 2020-12-11 DIAGNOSIS — Z86.79 PERSONAL HISTORY OF OTHER DISEASES OF THE CIRCULATORY SYSTEM: ICD-10-CM

## 2020-12-11 DIAGNOSIS — S82.841K: ICD-10-CM

## 2020-12-11 PROCEDURE — 99203 OFFICE O/P NEW LOW 30 MIN: CPT

## 2020-12-11 PROCEDURE — 73630 X-RAY EXAM OF FOOT: CPT | Mod: 50

## 2020-12-11 PROCEDURE — 73502 X-RAY EXAM HIP UNI 2-3 VIEWS: CPT | Mod: RT

## 2020-12-11 PROCEDURE — 73610 X-RAY EXAM OF ANKLE: CPT | Mod: 50

## 2020-12-19 PROBLEM — M25.571 CHRONIC PAIN OF RIGHT ANKLE: Status: ACTIVE | Noted: 2020-12-11

## 2020-12-22 ENCOUNTER — NON-APPOINTMENT (OUTPATIENT)
Age: 70
End: 2020-12-22

## 2020-12-22 ENCOUNTER — APPOINTMENT (OUTPATIENT)
Dept: ORTHOPEDIC SURGERY | Facility: CLINIC | Age: 70
End: 2020-12-22

## 2020-12-28 ENCOUNTER — APPOINTMENT (OUTPATIENT)
Dept: ORTHOPEDIC SURGERY | Facility: CLINIC | Age: 70
End: 2020-12-28
Payer: MEDICARE

## 2020-12-28 VITALS — WEIGHT: 120 LBS | RESPIRATION RATE: 16 BRPM | HEIGHT: 65 IN | BODY MASS INDEX: 19.99 KG/M2

## 2020-12-28 DIAGNOSIS — S82.51XG: ICD-10-CM

## 2020-12-28 DIAGNOSIS — S82.831P: ICD-10-CM

## 2020-12-28 DIAGNOSIS — M25.551 PAIN IN RIGHT HIP: ICD-10-CM

## 2020-12-28 PROCEDURE — 20611 DRAIN/INJ JOINT/BURSA W/US: CPT | Mod: RT

## 2020-12-28 PROCEDURE — 99214 OFFICE O/P EST MOD 30 MIN: CPT | Mod: 25

## 2020-12-29 PROBLEM — M25.551 RIGHT HIP PAIN: Status: ACTIVE | Noted: 2020-12-11

## 2020-12-29 PROBLEM — S82.51XG: Status: ACTIVE | Noted: 2020-12-11

## 2020-12-29 PROBLEM — S82.831P: Status: ACTIVE | Noted: 2020-12-11

## 2020-12-30 ENCOUNTER — NON-APPOINTMENT (OUTPATIENT)
Age: 70
End: 2020-12-30

## 2021-01-04 ENCOUNTER — TRANSCRIPTION ENCOUNTER (OUTPATIENT)
Age: 71
End: 2021-01-04

## 2021-01-14 ENCOUNTER — APPOINTMENT (OUTPATIENT)
Dept: RHEUMATOLOGY | Facility: CLINIC | Age: 71
End: 2021-01-14
Payer: MEDICARE

## 2021-01-14 ENCOUNTER — OUTPATIENT (OUTPATIENT)
Dept: OUTPATIENT SERVICES | Facility: HOSPITAL | Age: 71
LOS: 1 days | End: 2021-01-14

## 2021-01-14 ENCOUNTER — APPOINTMENT (OUTPATIENT)
Dept: RADIOLOGY | Facility: CLINIC | Age: 71
End: 2021-01-14
Payer: MEDICARE

## 2021-01-14 VITALS
SYSTOLIC BLOOD PRESSURE: 130 MMHG | DIASTOLIC BLOOD PRESSURE: 74 MMHG | OXYGEN SATURATION: 97 % | BODY MASS INDEX: 19.83 KG/M2 | HEART RATE: 85 BPM | HEIGHT: 65 IN | TEMPERATURE: 97.6 F | WEIGHT: 119 LBS

## 2021-01-14 DIAGNOSIS — Z98.890 OTHER SPECIFIED POSTPROCEDURAL STATES: Chronic | ICD-10-CM

## 2021-01-14 PROCEDURE — 77080 DXA BONE DENSITY AXIAL: CPT | Mod: 26

## 2021-01-14 PROCEDURE — 36415 COLL VENOUS BLD VENIPUNCTURE: CPT

## 2021-01-14 PROCEDURE — 99203 OFFICE O/P NEW LOW 30 MIN: CPT | Mod: 25

## 2021-01-15 LAB
25(OH)D3 SERPL-MCNC: 62.3 NG/ML
ALBUMIN MFR SERPL ELPH: 57.7 %
ALBUMIN SERPL-MCNC: 4.3 G/DL
ALBUMIN/GLOB SERPL: 1.4 RATIO
ALPHA1 GLOB MFR SERPL ELPH: 3.9 %
ALPHA1 GLOB SERPL ELPH-MCNC: 0.3 G/DL
ALPHA2 GLOB MFR SERPL ELPH: 8.8 %
ALPHA2 GLOB SERPL ELPH-MCNC: 0.7 G/DL
ANION GAP SERPL CALC-SCNC: 19 MMOL/L
B-GLOBULIN MFR SERPL ELPH: 9.4 %
B-GLOBULIN SERPL ELPH-MCNC: 0.7 G/DL
BUN SERPL-MCNC: 30 MG/DL
CALCIUM SERPL-MCNC: 9.3 MG/DL
CALCIUM SERPL-MCNC: 9.3 MG/DL
CHLORIDE SERPL-SCNC: 100 MMOL/L
CO2 SERPL-SCNC: 22 MMOL/L
CREAT SERPL-MCNC: 1.1 MG/DL
GAMMA GLOB FLD ELPH-MCNC: 1.5 G/DL
GAMMA GLOB MFR SERPL ELPH: 20.2 %
GLUCOSE SERPL-MCNC: 98 MG/DL
INTERPRETATION SERPL IEP-IMP: NORMAL
PARATHYROID HORMONE INTACT: 56 PG/ML
POTASSIUM SERPL-SCNC: 4.6 MMOL/L
PROT SERPL-MCNC: 7.4 G/DL
PROT SERPL-MCNC: 7.4 G/DL
SODIUM SERPL-SCNC: 141 MMOL/L

## 2021-01-17 NOTE — ASSESSMENT
[FreeTextEntry1] : 70 year old woman with known history of osteoporosis with recent fracture of the right ankle followed by subsequent right hip fracture requiring ORIF. Patient currently treated with prolia every six months, previously treated with actonel prior to prolia.  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  Will repeat bone density, will check PTH, vitamin D, SPEP, and BMP.  Will try to obtain records from medical doctor regarding osteoporosis as well.  Patient will follow up after bone density completed.

## 2021-01-17 NOTE — PHYSICAL EXAM
[General Appearance - Alert] : alert [General Appearance - In No Acute Distress] : in no acute distress [General Appearance - Well-Appearing] : healthy appearing [] : normal voice and communication [Sclera] : the sclera and conjunctiva were normal [Impaired Insight] : insight and judgment were intact [Oriented To Time, Place, And Person] : oriented to person, place, and time [Affect] : the affect was normal [Mood] : the mood was normal [FreeTextEntry1] : decreased ROM of the right hip. No active synovitis of the upper and lower extremities bilaterally.

## 2021-01-17 NOTE — DATA REVIEWED
[FreeTextEntry1] : 	\par EXAM:  MRI RIGHT HIP WITHOUT CONTRAST\par \par HISTORY:  Right hip pain and difficulty walking and standing.\par \par TECHNIQUE:  Multiplanar, multi-sequence MRI of the right hip was obtained on a 3T scanner according to standard protocol.  All clockface descriptions presume that 12:00 is directed toward the head of the patient and 3:00 is located anteriorly. The transverse acetabular ligament lies at the 5:00 position.\par \par COMPARISON:  CT of 12/15/2020.\par \par FINDINGS: \par \par Osseous structures: There is a comminuted, intertrochanteric fracture of the proximal femur transfixed by a compression screw with intramedullary jean pierre. The susceptibility artifact from the metallic hardware obscures the surrounding structures and leads to inhomogeneous fat suppression. The overall fracture is better seen on the recent CT. No evidence of avascular necrosis of the visualized femoral head. There is small cartilage defect anterosuperior aspect of the acetabular roof. There is a tear of the anterosuperior to superior labrum. Small joint effusion is present. The ligamentum teres appears grossly intact.\par \par Mild osteoarthritis of the right sacroiliac joint. No occult fracture of the visualized right hemipelvis.\par \par Moderate multiloculated greater trochanteric fluid collection compatible with a bursitis.\par Thickening of the distal gluteus minimus tendon appears attached to the displaced fracture fragment compatible with tendinosis. \par The visualized gluteus medius tendon is grossly intact. The rectus femoris tendon is intact. \par Intact iliopsoas tendon.\par No proximal hamstring tendinosis or tear. \par \par Intramuscular edema signal within the vastus lateralis muscle compatible with grade 1 strain. \par \par There is no subcutaneous edema or focal fluid collection.\par \par Small free fluid in the visualized pelvis.\par \par IMPRESSION:  MRI of the right hip demonstrates:\par \par 1.  Comminuted and mildly displaced intertrochanteric fracture of the right proximal femur status post open reduction and internal fixation. The susceptibility artifact from the metallic hardware obscures the surrounding structures and leads to inhomogeneous fat suppression. The overall fracture is better seen on the recent CT. \par 2.  Small cartilage defect anterosuperior aspect of the acetabular roof. \par 3.  Tear of the anterosuperior to superior labrum.\par 4.  Small joint effusion. \par 5.  Gluteus minimus tendinosis. Moderate greater trochanteric bursitis.\par 6.  Grade 1 strain of the vastus lateralis muscle.\par \par Thank you for the opportunity to participate in the care of this patient.  \par  \par Isaac Song MD  - Electronically Signed: 12- 3:00 PM \par Physician to Physician Direct Line is: [phone]\par \par Xray of ankle, lateral malleolus with nonunion, medial malleolus with 50 % of the fracture surface with advanced osseous bridging.

## 2021-01-21 LAB — COLLAGEN CTX SERPL-MCNC: 144 PG/ML

## 2021-01-24 ENCOUNTER — TRANSCRIPTION ENCOUNTER (OUTPATIENT)
Age: 71
End: 2021-01-24

## 2021-02-08 NOTE — ED PROVIDER NOTE - CONSTITUTIONAL APPEARANCE HYGIENE, MLM
From: Judy Alves  To: MILTON Lindsey  Sent: 2/8/2021 12:01 PM CST  Subject: Other    Sugar  
good hygiene/well appearing

## 2021-02-18 ENCOUNTER — APPOINTMENT (OUTPATIENT)
Dept: RHEUMATOLOGY | Facility: CLINIC | Age: 71
End: 2021-02-18

## 2021-02-24 ENCOUNTER — APPOINTMENT (OUTPATIENT)
Dept: RHEUMATOLOGY | Facility: CLINIC | Age: 71
End: 2021-02-24
Payer: MEDICARE

## 2021-02-24 VITALS
WEIGHT: 120 LBS | SYSTOLIC BLOOD PRESSURE: 121 MMHG | HEIGHT: 65 IN | HEART RATE: 91 BPM | TEMPERATURE: 98.4 F | DIASTOLIC BLOOD PRESSURE: 73 MMHG | OXYGEN SATURATION: 96 % | BODY MASS INDEX: 19.99 KG/M2

## 2021-02-24 PROCEDURE — 99213 OFFICE O/P EST LOW 20 MIN: CPT

## 2021-02-26 NOTE — ASSESSMENT
[FreeTextEntry1] : 70 year old woman with known history of osteoporosis with recent fracture of the right ankle followed by subsequent right hip fracture requiring ORIF. Patient currently treated with prolia every six months last dose 10/2020, previously treated with actonel prior to prolia.  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  Reviewed bone density with patient, T score -2.6 in the left femoral neck, -2.5 in the left hip, and =2.2 in the lumbar spine.  Patient with decrease in bone density from previous dexa.  Discussed course of anabolic agent post surgery in April 2021, will repeat bmp in one month as history of CKD previously.  If unable to start anabolic agent, will continue prolia with next dose in April 2021.  Patient will follow up after bone density completed.

## 2021-02-26 NOTE — DATA REVIEWED
[FreeTextEntry1] : 	\par EXAM:  MRI RIGHT HIP WITHOUT CONTRAST\par \par HISTORY:  Right hip pain and difficulty walking and standing.\par \par TECHNIQUE:  Multiplanar, multi-sequence MRI of the right hip was obtained on a 3T scanner according to standard protocol.  All clockface descriptions presume that 12:00 is directed toward the head of the patient and 3:00 is located anteriorly. The transverse acetabular ligament lies at the 5:00 position.\par \par COMPARISON:  CT of 12/15/2020.\par \par FINDINGS: \par \par Osseous structures: There is a comminuted, intertrochanteric fracture of the proximal femur transfixed by a compression screw with intramedullary jean pierre. The susceptibility artifact from the metallic hardware obscures the surrounding structures and leads to inhomogeneous fat suppression. The overall fracture is better seen on the recent CT. No evidence of avascular necrosis of the visualized femoral head. There is small cartilage defect anterosuperior aspect of the acetabular roof. There is a tear of the anterosuperior to superior labrum. Small joint effusion is present. The ligamentum teres appears grossly intact.\par \par Mild osteoarthritis of the right sacroiliac joint. No occult fracture of the visualized right hemipelvis.\par \par Moderate multiloculated greater trochanteric fluid collection compatible with a bursitis.\par Thickening of the distal gluteus minimus tendon appears attached to the displaced fracture fragment compatible with tendinosis. \par The visualized gluteus medius tendon is grossly intact. The rectus femoris tendon is intact. \par Intact iliopsoas tendon.\par No proximal hamstring tendinosis or tear. \par \par Intramuscular edema signal within the vastus lateralis muscle compatible with grade 1 strain. \par \par There is no subcutaneous edema or focal fluid collection.\par \par Small free fluid in the visualized pelvis.\par \par IMPRESSION:  MRI of the right hip demonstrates:\par \par 1.  Comminuted and mildly displaced intertrochanteric fracture of the right proximal femur status post open reduction and internal fixation. The susceptibility artifact from the metallic hardware obscures the surrounding structures and leads to inhomogeneous fat suppression. The overall fracture is better seen on the recent CT. \par 2.  Small cartilage defect anterosuperior aspect of the acetabular roof. \par 3.  Tear of the anterosuperior to superior labrum.\par 4.  Small joint effusion. \par 5.  Gluteus minimus tendinosis. Moderate greater trochanteric bursitis.\par 6.  Grade 1 strain of the vastus lateralis muscle.\par \par Thank you for the opportunity to participate in the care of this patient.  \par  \par Isaac Song MD  - Electronically Signed: 12- 3:00 PM \par Physician to Physician Direct Line is: [phone]\par \par Xray of ankle, lateral malleolus with nonunion, medial malleolus with 50 % of the fracture surface with advanced osseous bridging.

## 2021-02-26 NOTE — PHYSICAL EXAM
[General Appearance - Alert] : alert [General Appearance - In No Acute Distress] : in no acute distress [General Appearance - Well-Appearing] : healthy appearing [] : no respiratory distress [Exaggerated Use Of Accessory Muscles For Inspiration] : no accessory muscle use [Oriented To Time, Place, And Person] : oriented to person, place, and time [Impaired Insight] : insight and judgment were intact [Affect] : the affect was normal [FreeTextEntry1] : Ambulating without assistive devices

## 2021-02-26 NOTE — HISTORY OF PRESENT ILLNESS
[FreeTextEntry1] : 70 year old woman with history of osteoporosis\par \par History of osteoporosis, currently treated with prolia, treated by PMD.\par Right hip fracture in June 2020, fell out of wheelchair while in rehab\par Missed dose in July , currently on Prolia\par Treated with actonel previously less than 5 years ago \par \par Patient with recent right ankle fracture, fell out bed\par Also hit head, had subdural hematoma, went to rehab for TBI\par Put in cast, Dr. 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 Thorpe\par Last bone density was about two years ago\par \par Patient is able to walk small amounts, not everyday\par No history of nephrolithiasis\par Cancerous polyp s/p colectomy in 2015, no history of radiation treatment in past\par No thyroid disease or parathyroid disease\par Takes mVI\par B complex\par Max D 75802 once per week\par Mother with osteoporosis, maybe sister as well\par \par No family history of hip fracture\par Previous alcohol use, quit when admitted to hospital\par Previous tobacco, quit five years ago\par \par February 24, 2021\par Patient returns for follow up\par Reviewed previous bone density results (scanned in chart)  \par Decrease in bone density compared to previous, although completed on different machine. \par Patient received last Prolia injection 10/2020.\par Discussed treatment options with patient.\par Treated with actonel briefly in past, stopped secondary to concern about side effects.  \par Labs from 1/14.2021\par Reviewed with patient \par Vitamin d 62\par Calcium 9.3\par PTH 56\par SPEP normal\par creatinine 1.1

## 2021-04-06 ENCOUNTER — APPOINTMENT (OUTPATIENT)
Dept: ORTHOPEDIC SURGERY | Facility: CLINIC | Age: 71
End: 2021-04-06
Payer: MEDICARE

## 2021-04-06 VITALS — RESPIRATION RATE: 16 BRPM | BODY MASS INDEX: 19.99 KG/M2 | HEIGHT: 65 IN | WEIGHT: 120 LBS

## 2021-04-06 DIAGNOSIS — S72.141K DISPLACED INTERTROCHANTERIC FRACTURE OF RIGHT FEMUR, SUBSEQUENT ENCOUNTER FOR CLOSED FRACTURE WITH NONUNION: ICD-10-CM

## 2021-04-06 DIAGNOSIS — S72.141G: ICD-10-CM

## 2021-04-06 PROCEDURE — 99214 OFFICE O/P EST MOD 30 MIN: CPT

## 2021-04-06 PROCEDURE — 73502 X-RAY EXAM HIP UNI 2-3 VIEWS: CPT | Mod: RT

## 2021-04-07 PROBLEM — S72.141G: Status: ACTIVE | Noted: 2020-12-29

## 2021-04-07 PROBLEM — S72.141K: Status: ACTIVE | Noted: 2021-04-07

## 2021-04-12 ENCOUNTER — APPOINTMENT (OUTPATIENT)
Dept: RHEUMATOLOGY | Facility: CLINIC | Age: 71
End: 2021-04-12
Payer: MEDICARE

## 2021-04-12 DIAGNOSIS — M81.0 AGE-RELATED OSTEOPOROSIS W/OUT CURRENT PATHOLOGICAL FRACTURE: ICD-10-CM

## 2021-04-12 PROCEDURE — 99442: CPT | Mod: 95

## 2021-04-12 NOTE — HISTORY OF PRESENT ILLNESS
[Home] : at home, [unfilled] , at the time of the visit. [Medical Office: ([facility])___] : at the medical office located in  [Verbal consent obtained from patient] : the patient, [unfilled] [FreeTextEntry1] : 70 year old woman with history of osteoporosis\par \par History of osteoporosis, currently treated with prolia, treated by PMD.\par Right hip fracture in June 2020, fell out of wheelchair while in rehab\par Missed dose in July , currently on Prolia\par Treated with actonel previously less than 5 years ago \par \par Patient with recent right ankle fracture, fell out bed\par Also hit head, had subdural hematoma, went to rehab for TBI\par Put in cast, Dr. 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 Thorpe\par Last bone density was about two years ago\par \par Patient is able to walk small amounts, not everyday\par No history of nephrolithiasis\par Cancerous polyp s/p colectomy in 2015, no history of radiation treatment in past\par No thyroid disease or parathyroid disease\par Takes mVI\par B complex\par Max D [id number] once per week\par Mother with osteoporosis, maybe sister as well\par \par No family history of hip fracture\par Previous alcohol use, quit when admitted to hospital\par Previous tobacco, quit five years ago\par \par February 24, 2021\par Patient returns for follow up\par Reviewed previous bone density results (scanned in chart)  \par Decrease in bone density compared to previous, although completed on different machine. \par Patient received last Prolia injection 10/2020.\par Discussed treatment options with patient.\par Treated with actonel briefly in past, stopped secondary to concern about side effects.  \par Labs from 1/14.2021\par Reviewed with patient \par Vitamin d 62\par Calcium 9.3\par PTH 56\par SPEP normal\par creatinine 1.1\par \par April 12, 2021\par Patient returns via telephonic visit.\par Discussed with patient.  You have chosen to receive care through the use of tele-media.  Telemedia enables health care providers at different locations to provide safe, effective, and convenient care through the use of technology.  Please note this is a billable encounter.  Patient understands that I cannot perform a physical exam and that patient may need to come to the clinic to complete the assessment.  Patient agreed verbally to understanding the risks of benefits of telemedia as explained.\par Spoke with patient regarding treatment of osteoporosis.  Currently treated with prolia, due for next dose in April.\par Had spoken to Dr. Merida, concerned about areas of nonunion following surgical ORIF.\par Discussed role of anabolic agents, such as forteo or tymlos.\par Discussed side effects of medications as well.

## 2021-04-12 NOTE — ASSESSMENT
[FreeTextEntry1] : 70 year old woman with known history of osteoporosis with recent fracture of the right ankle followed by subsequent right hip fracture requiring ORIF. Patient currently treated with prolia every six months last dose 10/2020, previously treated with actonel prior to prolia.  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  Patient also with nonunion of the intertrochanteric femur fracture. Reviewed bone density (1/14/2021) with patient, T score -2.6 in the left femoral neck, -2.5 in the left hip, and -2.2 in the lumbar spine.  Patient with decrease in bone density from previous dexa. Discussed course of anabolic agent, discussed risks and benefits including but not limited to osteosarcoma, hypercalcemia, and hyperparathyroidism. If unable to start anabolic agent, will continue prolia with next dose in April 2021.

## 2021-04-12 NOTE — DATA REVIEWED
[FreeTextEntry1] : 	\par EXAM:  MRI RIGHT HIP WITHOUT CONTRAST\par \par HISTORY:  Right hip pain and difficulty walking and standing.\par \par TECHNIQUE:  Multiplanar, multi-sequence MRI of the right hip was obtained on a 3T scanner according to standard protocol.  All clockface descriptions presume that 12:00 is directed toward the head of the patient and 3:00 is located anteriorly. The transverse acetabular ligament lies at the 5:00 position.\par \par COMPARISON:  CT of 12/15/2020.\par \par FINDINGS: \par \par Osseous structures: There is a comminuted, intertrochanteric fracture of the proximal femur transfixed by a compression screw with intramedullary jean pierre. The susceptibility artifact from the metallic hardware obscures the surrounding structures and leads to inhomogeneous fat suppression. The overall fracture is better seen on the recent CT. No evidence of avascular necrosis of the visualized femoral head. There is small cartilage defect anterosuperior aspect of the acetabular roof. There is a tear of the anterosuperior to superior labrum. Small joint effusion is present. The ligamentum teres appears grossly intact.\par \par Mild osteoarthritis of the right sacroiliac joint. No occult fracture of the visualized right hemipelvis.\par \par Moderate multiloculated greater trochanteric fluid collection compatible with a bursitis.\par Thickening of the distal gluteus minimus tendon appears attached to the displaced fracture fragment compatible with tendinosis. \par The visualized gluteus medius tendon is grossly intact. The rectus femoris tendon is intact. \par Intact iliopsoas tendon.\par No proximal hamstring tendinosis or tear. \par \par Intramuscular edema signal within the vastus lateralis muscle compatible with grade 1 strain. \par \par There is no subcutaneous edema or focal fluid collection.\par \par Small free fluid in the visualized pelvis.\par \par IMPRESSION:  MRI of the right hip demonstrates:\par \par 1.  Comminuted and mildly displaced intertrochanteric fracture of the right proximal femur status post open reduction and internal fixation. The susceptibility artifact from the metallic hardware obscures the surrounding structures and leads to inhomogeneous fat suppression. The overall fracture is better seen on the recent CT. \par 2.  Small cartilage defect anterosuperior aspect of the acetabular roof. \par 3.  Tear of the anterosuperior to superior labrum.\par 4.  Small joint effusion. \par 5.  Gluteus minimus tendinosis. Moderate greater trochanteric bursitis.\par 6.  Grade 1 strain of the vastus lateralis muscle.\par \par Thank you for the opportunity to participate in the care of this patient.  \par  \par Isaac Song MD  - Electronically Signed: 12- 3:00 PM \par Physician to Physician Direct Line is: [phone]\par \par Xray of ankle, lateral malleolus with nonunion, medial malleolus with 50 % of the fracture surface with advanced osseous bridging.

## 2021-04-21 ENCOUNTER — APPOINTMENT (OUTPATIENT)
Dept: ENDOCRINOLOGY | Facility: CLINIC | Age: 71
End: 2021-04-21

## 2021-06-02 ENCOUNTER — APPOINTMENT (OUTPATIENT)
Dept: UROLOGY | Facility: CLINIC | Age: 71
End: 2021-06-02
Payer: MEDICARE

## 2021-06-02 VITALS
DIASTOLIC BLOOD PRESSURE: 83 MMHG | OXYGEN SATURATION: 96 % | TEMPERATURE: 97.6 F | HEART RATE: 87 BPM | SYSTOLIC BLOOD PRESSURE: 152 MMHG

## 2021-06-02 DIAGNOSIS — R31.0 GROSS HEMATURIA: ICD-10-CM

## 2021-06-02 LAB
BILIRUB UR QL STRIP: NORMAL
CLARITY UR: CLEAR
COLLECTION METHOD: NORMAL
GLUCOSE UR-MCNC: NORMAL
HCG UR QL: 0.2 EU/DL
HGB UR QL STRIP.AUTO: NORMAL
KETONES UR-MCNC: NORMAL
LEUKOCYTE ESTERASE UR QL STRIP: NORMAL
NITRITE UR QL STRIP: NORMAL
PH UR STRIP: 6.5
PROT UR STRIP-MCNC: NORMAL
SP GR UR STRIP: 1.02

## 2021-06-02 PROCEDURE — 99205 OFFICE O/P NEW HI 60 MIN: CPT

## 2021-06-02 PROCEDURE — 51798 US URINE CAPACITY MEASURE: CPT

## 2021-06-03 NOTE — ASSESSMENT
[FreeTextEntry1] : Pt is a 70 year old female  who presents with recent three episodes of gross hematuria. Will send urine for culture and cytology. Pt will return for cystoscopy and I have sent her for a CT Urogram. \par \par Patient expressed understanding.

## 2021-06-03 NOTE — LETTER BODY
[Dear  ___] : Dear  [unfilled], [Consult Letter:] : I had the pleasure of evaluating your patient, [unfilled]. [Please see my note below.] : Please see my note below. [Consult Closing:] : Thank you very much for allowing me to participate in the care of this patient.  If you have any questions, please do not hesitate to contact me. [Sincerely,] : Sincerely, [FreeTextEntry3] : Dr. Katie Ramirez

## 2021-06-03 NOTE — HISTORY OF PRESENT ILLNESS
[FreeTextEntry1] : Pt is a 70 year old female  who presents with complaints of intermittent episodes of gross hematuria. She reports episodes occurred , 5/15, and  with no associated pelvic pain, back pain, fever, or chills. She also states that microscopic hematuria has been noted on recent urinalysis (records show 23 /HPF RBC). She states that she had an episode of gross hematuria many years ago in setting of UTI, but reports last UTI was 15-20 years ago. No hx stones. \par \par Of note, pt was admitted to the hospital last year (2020) s/p fall. She states that she injured her foot and had a subdural hematoma at the time. She was later discharged to a rehab center, but then fell out of wheelchair and broke hip. While in the hospital, she had a CT scan which demonstrated "severe right hydronephrosis and dilation of proximal ureter unchanged." She states that this has been noted repeatedly over last ten years. She is unsure if it was ever evaluated. \par \par Udip: negative\par \par Sexually active\par PMH: blood platelet disorder, insufficient bone regeneration, depression \par SH: right femur (), sigmoid colon resection () \par FH: colon cancer (mother), pancreatic cancer (father), Parkinson's (father), depression (sister), unsure if sister had kidney or gallbladder stones\par Social History:  former smoker (1ppd x 40 years quit 7), no EtOH, (former heavy)

## 2021-06-03 NOTE — PHYSICAL EXAM
[General Appearance - Well Developed] : well developed [General Appearance - Well Nourished] : well nourished [Normal Appearance] : normal appearance [Well Groomed] : well groomed [General Appearance - In No Acute Distress] : no acute distress [Edema] : no peripheral edema [Exaggerated Use Of Accessory Muscles For Inspiration] : no accessory muscle use [Urinary Bladder Findings] : the bladder was normal on palpation [Normal Station and Gait] : the gait and station were normal for the patient's age [] : no rash [No Focal Deficits] : no focal deficits [Oriented To Time, Place, And Person] : oriented to person, place, and time [Affect] : the affect was normal [Mood] : the mood was normal [Not Anxious] : not anxious [FreeTextEntry1] : normal exam, no prolapse

## 2021-06-07 LAB
BACTERIA UR CULT: NORMAL
URINE CYTOLOGY: NORMAL

## 2021-06-09 ENCOUNTER — APPOINTMENT (OUTPATIENT)
Dept: UROLOGY | Facility: CLINIC | Age: 71
End: 2021-06-09
Payer: MEDICARE

## 2021-06-09 VITALS
SYSTOLIC BLOOD PRESSURE: 136 MMHG | HEART RATE: 81 BPM | OXYGEN SATURATION: 96 % | TEMPERATURE: 97.8 F | DIASTOLIC BLOOD PRESSURE: 82 MMHG

## 2021-06-09 PROCEDURE — 52000 CYSTOURETHROSCOPY: CPT

## 2021-06-09 PROCEDURE — 99215 OFFICE O/P EST HI 40 MIN: CPT | Mod: 25

## 2021-06-16 LAB — BACTERIA UR CULT: NORMAL

## 2021-06-16 NOTE — ASSESSMENT
[FreeTextEntry1] : Pt is a 70 year old female  with recent three episodes of gross hematuria. Today she presents for cystoscopy evaluation. Cystoscopy was unremarkable. Pt tolerated procedure well. Will send urine for culture. \par \par We reviewed CTabd/pelvis scan 2021 which demonstrated moderate to severe right sided hydronephrosis to level of the UPJ and mild left-sided hydronephrosis. Also demonstrated b/l stones including tiny calcified stone noted just proximal to UPJ on right side and 0.4 x 0.3 x 0.7 cm stone present at the proximal left ureter at L4 level. She has longstanding hx of severe right hydronephrosis secondary to presumed long-standing UPJ obstruction.  Because of this, I have advised left ureteroscopy and removal of mid-ureteral stone as the left side is likely functioning as solitary kidney.  \par \par We reviewed all the risks and benefits of surgery and pt wishes to proceed. Surgical booking will reach out with dates and she will obtain necessary preoperative clearance.  Post-op I will obtain a renal scan to evaluate split function of kidneys. \par \par Patient expressed understanding.

## 2021-06-16 NOTE — HISTORY OF PRESENT ILLNESS
[FreeTextEntry1] : Pt is a 70 year old female  with recent three episodes of gross hematuria. She presents for cystoscopy evaluation.\par \par Recent History:\par Initially presented with complaints of intermittent episodes of gross hematuria. She reported episodes occurred , 5/15, and  with no associated pelvic pain, back pain, fever, or chills. She also stated that microscopic hematuria has been noted on recent urinalysis (records show 23 /HPF RBC). She stated that she had an episode of gross hematuria many years ago in setting of UTI, but reported last UTI was 15-20 years ago. No hx stones. \par \par Of note, pt was admitted to the hospital last year (2020) s/p fall. She stated that she injured her foot and had a subdural hematoma at the time. She was later discharged to a rehab center, but then fell out of wheelchair and broke hip. While in the hospital, she had a CT scan which demonstrated "severe right hydronephrosis and dilation of proximal ureter unchanged." She stated that this has been noted repeatedly over last ten years. She is unsure if it was ever evaluated. \par \par Ucx and Cytology 2021: negative \par \par Sexually active\par PMH: blood platelet disorder, insufficient bone regeneration, depression \par SH: right femur (), sigmoid colon resection () \par FH: colon cancer (mother), pancreatic cancer (father), Parkinson's (father), depression (sister), unsure if sister had kidney or gallbladder stones\par Social History:  former smoker (1ppd x 40 years quit 7), no EtOH, (former heavy)

## 2021-06-27 ENCOUNTER — TRANSCRIPTION ENCOUNTER (OUTPATIENT)
Age: 71
End: 2021-06-27

## 2021-06-28 ENCOUNTER — OUTPATIENT (OUTPATIENT)
Dept: OUTPATIENT SERVICES | Facility: HOSPITAL | Age: 71
LOS: 1 days | Discharge: HOME | End: 2021-06-28
Payer: MEDICARE

## 2021-06-28 ENCOUNTER — APPOINTMENT (OUTPATIENT)
Dept: UROLOGY | Facility: AMBULATORY SURGERY CENTER | Age: 71
End: 2021-06-28

## 2021-06-28 DIAGNOSIS — Z98.890 OTHER SPECIFIED POSTPROCEDURAL STATES: Chronic | ICD-10-CM

## 2021-06-28 PROCEDURE — 52356 CYSTO/URETERO W/LITHOTRIPSY: CPT | Mod: LT

## 2021-06-28 RX ORDER — PHENAZOPYRIDINE HCL 100 MG
1 TABLET ORAL
Qty: 21 | Refills: 0
Start: 2021-06-28 | End: 2021-07-04

## 2021-06-28 RX ORDER — AZTREONAM 2 G
1 VIAL (EA) INJECTION
Qty: 6 | Refills: 0
Start: 2021-06-28 | End: 2021-06-30

## 2021-06-28 RX ORDER — TAMSULOSIN HYDROCHLORIDE 0.4 MG/1
1 CAPSULE ORAL
Qty: 7 | Refills: 0
Start: 2021-06-28 | End: 2021-07-04

## 2021-06-28 NOTE — BRIEF OPERATIVE NOTE - NSICDXBRIEFPROCEDURE_GEN_ALL_CORE_FT
PROCEDURES:  Cystoscopy with left retrograde pyelography with ureteroscopy and procedure involving calculus 28-Jun-2021 17:12:56  Sam Lim

## 2021-06-29 ENCOUNTER — NON-APPOINTMENT (OUTPATIENT)
Age: 71
End: 2021-06-29

## 2021-06-29 ENCOUNTER — APPOINTMENT (OUTPATIENT)
Dept: UROLOGY | Facility: CLINIC | Age: 71
End: 2021-06-29
Payer: MEDICARE

## 2021-06-29 VITALS
OXYGEN SATURATION: 100 % | HEIGHT: 55 IN | HEART RATE: 80 BPM | DIASTOLIC BLOOD PRESSURE: 93 MMHG | SYSTOLIC BLOOD PRESSURE: 171 MMHG

## 2021-06-29 VITALS
HEART RATE: 78 BPM | TEMPERATURE: 98.2 F | DIASTOLIC BLOOD PRESSURE: 74 MMHG | SYSTOLIC BLOOD PRESSURE: 137 MMHG | OXYGEN SATURATION: 98 %

## 2021-06-29 VITALS
WEIGHT: 125 LBS | OXYGEN SATURATION: 91 % | HEART RATE: 136 BPM | SYSTOLIC BLOOD PRESSURE: 150 MMHG | RESPIRATION RATE: 20 BRPM | DIASTOLIC BLOOD PRESSURE: 84 MMHG | HEIGHT: 64 IN

## 2021-06-29 LAB
ALBUMIN SERPL ELPH-MCNC: 3.9 G/DL — SIGNIFICANT CHANGE UP (ref 3.3–5)
ALP SERPL-CCNC: 68 U/L — SIGNIFICANT CHANGE UP (ref 40–120)
ALT FLD-CCNC: 14 U/L — SIGNIFICANT CHANGE UP (ref 10–45)
ANION GAP SERPL CALC-SCNC: 11 MMOL/L — SIGNIFICANT CHANGE UP (ref 5–17)
APPEARANCE UR: ABNORMAL
APTT BLD: 30.3 SEC — SIGNIFICANT CHANGE UP (ref 27.5–35.5)
AST SERPL-CCNC: 40 U/L — SIGNIFICANT CHANGE UP (ref 10–40)
BACTERIA # UR AUTO: PRESENT /HPF
BASOPHILS # BLD AUTO: 0.01 K/UL — SIGNIFICANT CHANGE UP (ref 0–0.2)
BASOPHILS NFR BLD AUTO: 0.1 % — SIGNIFICANT CHANGE UP (ref 0–2)
BILIRUB SERPL-MCNC: 0.4 MG/DL — SIGNIFICANT CHANGE UP (ref 0.2–1.2)
BILIRUB UR-MCNC: ABNORMAL
BLD GP AB SCN SERPL QL: NEGATIVE — SIGNIFICANT CHANGE UP
BUN SERPL-MCNC: 32 MG/DL — HIGH (ref 7–23)
CALCIUM SERPL-MCNC: 7.4 MG/DL — LOW (ref 8.4–10.5)
CHLORIDE SERPL-SCNC: 80 MMOL/L — LOW (ref 96–108)
CO2 SERPL-SCNC: 22 MMOL/L — SIGNIFICANT CHANGE UP (ref 22–31)
COLOR SPEC: ABNORMAL
CREAT ?TM UR-MCNC: 19 MG/DL — SIGNIFICANT CHANGE UP
CREAT SERPL-MCNC: 1.73 MG/DL — HIGH (ref 0.5–1.3)
DIFF PNL FLD: ABNORMAL
EOSINOPHIL # BLD AUTO: 0.09 K/UL — SIGNIFICANT CHANGE UP (ref 0–0.5)
EOSINOPHIL NFR BLD AUTO: 0.9 % — SIGNIFICANT CHANGE UP (ref 0–6)
EPI CELLS # UR: SIGNIFICANT CHANGE UP /HPF (ref 0–5)
GLUCOSE SERPL-MCNC: 110 MG/DL — HIGH (ref 70–99)
GLUCOSE UR QL: 250
HCT VFR BLD CALC: 30.3 % — LOW (ref 34.5–45)
HGB BLD-MCNC: 10 G/DL — LOW (ref 11.5–15.5)
IMM GRANULOCYTES NFR BLD AUTO: 1.8 % — HIGH (ref 0–1.5)
INR BLD: 1 — SIGNIFICANT CHANGE UP (ref 0.88–1.16)
KETONES UR-MCNC: >=80 MG/DL
LACTATE SERPL-SCNC: 0.9 MMOL/L — SIGNIFICANT CHANGE UP (ref 0.5–2)
LEUKOCYTE ESTERASE UR-ACNC: ABNORMAL
LYMPHOCYTES # BLD AUTO: 0.57 K/UL — LOW (ref 1–3.3)
LYMPHOCYTES # BLD AUTO: 6 % — LOW (ref 13–44)
MCHC RBC-ENTMCNC: 29.2 PG — SIGNIFICANT CHANGE UP (ref 27–34)
MCHC RBC-ENTMCNC: 33 GM/DL — SIGNIFICANT CHANGE UP (ref 32–36)
MCV RBC AUTO: 88.6 FL — SIGNIFICANT CHANGE UP (ref 80–100)
MONOCYTES # BLD AUTO: 1.14 K/UL — HIGH (ref 0–0.9)
MONOCYTES NFR BLD AUTO: 11.9 % — SIGNIFICANT CHANGE UP (ref 2–14)
NEUTROPHILS # BLD AUTO: 7.59 K/UL — HIGH (ref 1.8–7.4)
NEUTROPHILS NFR BLD AUTO: 79.3 % — HIGH (ref 43–77)
NITRITE UR-MCNC: POSITIVE
NRBC # BLD: 0 /100 WBCS — SIGNIFICANT CHANGE UP (ref 0–0)
OSMOLALITY UR: 313 MOSM/KG — SIGNIFICANT CHANGE UP (ref 300–900)
PH UR: 7 — SIGNIFICANT CHANGE UP (ref 5–8)
PLATELET # BLD AUTO: 96 K/UL — LOW (ref 150–400)
POTASSIUM SERPL-MCNC: 3.6 MMOL/L — SIGNIFICANT CHANGE UP (ref 3.5–5.3)
POTASSIUM SERPL-SCNC: 3.6 MMOL/L — SIGNIFICANT CHANGE UP (ref 3.5–5.3)
PROT SERPL-MCNC: 6.9 G/DL — SIGNIFICANT CHANGE UP (ref 6–8.3)
PROT UR-MCNC: >=300 MG/DL
PROTHROM AB SERPL-ACNC: 12 SEC — SIGNIFICANT CHANGE UP (ref 10.6–13.6)
RBC # BLD: 3.42 M/UL — LOW (ref 3.8–5.2)
RBC # FLD: 13.9 % — SIGNIFICANT CHANGE UP (ref 10.3–14.5)
RBC CASTS # UR COMP ASSIST: ABNORMAL /HPF
RH IG SCN BLD-IMP: POSITIVE — SIGNIFICANT CHANGE UP
RH IG SCN BLD-IMP: POSITIVE — SIGNIFICANT CHANGE UP
SARS-COV-2 RNA SPEC QL NAA+PROBE: NEGATIVE — SIGNIFICANT CHANGE UP
SODIUM SERPL-SCNC: 113 MMOL/L — CRITICAL LOW (ref 135–145)
SODIUM UR-SCNC: 43 MMOL/L — SIGNIFICANT CHANGE UP
SP GR SPEC: <=1.005 — SIGNIFICANT CHANGE UP (ref 1–1.03)
UROBILINOGEN FLD QL: >=8 E.U./DL
WBC # BLD: 9.57 K/UL — SIGNIFICANT CHANGE UP (ref 3.8–10.5)
WBC # FLD AUTO: 9.57 K/UL — SIGNIFICANT CHANGE UP (ref 3.8–10.5)
WBC UR QL: ABNORMAL /HPF

## 2021-06-29 PROCEDURE — 70450 CT HEAD/BRAIN W/O DYE: CPT | Mod: 26,MA,59

## 2021-06-29 PROCEDURE — 74177 CT ABD & PELVIS W/CONTRAST: CPT | Mod: 26,MA

## 2021-06-29 PROCEDURE — 70496 CT ANGIOGRAPHY HEAD: CPT | Mod: 26,MA

## 2021-06-29 PROCEDURE — 99291 CRITICAL CARE FIRST HOUR: CPT

## 2021-06-29 PROCEDURE — 70498 CT ANGIOGRAPHY NECK: CPT | Mod: 26,MA

## 2021-06-29 PROCEDURE — 0042T: CPT

## 2021-06-29 PROCEDURE — 71045 X-RAY EXAM CHEST 1 VIEW: CPT | Mod: 26

## 2021-06-29 PROCEDURE — 51798 US URINE CAPACITY MEASURE: CPT | Mod: 58

## 2021-06-29 PROCEDURE — 93010 ELECTROCARDIOGRAM REPORT: CPT

## 2021-06-29 RX ORDER — ACETAMINOPHEN 500 MG
1000 TABLET ORAL ONCE
Refills: 0 | Status: COMPLETED | OUTPATIENT
Start: 2021-06-29 | End: 2021-06-29

## 2021-06-29 RX ORDER — SODIUM CHLORIDE 9 MG/ML
1000 INJECTION INTRAMUSCULAR; INTRAVENOUS; SUBCUTANEOUS
Refills: 0 | Status: DISCONTINUED | OUTPATIENT
Start: 2021-06-29 | End: 2021-06-30

## 2021-06-29 RX ADMIN — Medication 400 MILLIGRAM(S): at 23:56

## 2021-06-29 RX ADMIN — SODIUM CHLORIDE 100 MILLILITER(S): 9 INJECTION INTRAMUSCULAR; INTRAVENOUS; SUBCUTANEOUS at 23:02

## 2021-06-29 NOTE — ED PROVIDER NOTE - PROGRESS NOTE DETAILS
Urology consulted and will see the pt MICU consulted for hyponatremia. L stent in place. Jordan placed by Urology inially bloody, then irrigated to clear. Outpt Urology notes by Dr Ramirez reviewed, w/ known severe R hydro (not new).

## 2021-06-29 NOTE — CONSULT NOTE ADULT - ASSESSMENT
72 yo female s/p 6/28 cysto, L RPG, laserlito, stent; hematuria, clot retention, low Na, low platelets

## 2021-06-29 NOTE — CONSULT NOTE ADULT - SUBJECTIVE AND OBJECTIVE BOX
HPI: Pt w/ PMHx HTN, depression, thrombocytopenia, OP, PSHx ORIF R hip, ureteral colic s/p L Cystoscopy with left retrograde pyelography with ureteroscopy and procedure involving calculus and L ureteral stent yesterday w/ Dr Ramirez, now BIBA from home. Pt initially unable to state why she called EMS. EMS reported abd / back pain, however pt unable to state where pain is on arrival. Pt noted to altered. Pt admits to confusion. She states she was unable to urinate. When rolling th pt for rectal temp, pt noted to have blood in her diaper, and she notes there was some clots as well. Pt denies falls, trauma, HA, neck pain, CP, SOB, numbness/tingling, focal weakness    : above noted. S/P cysto, L RPG, laser litho, stent placement yesterday. +hematuria and lower abdominal discomfort.       PAST MEDICAL & SURGICAL HISTORY:  HTN (hypertension)    Depression    Thrombocytopenia    Colon polyp    Thoracic aortic aneurysm    History of eye surgery        MEDICATIONS  (STANDING):  acetaminophen  IVPB .. 1000 milliGRAM(s) IV Intermittent once  sodium chloride 0.9%. 1000 milliLiter(s) (100 mL/Hr) IV Continuous <Continuous>    MEDICATIONS  (PRN):      Allergies    No Known Allergies    Intolerances    Frazier (Unknown)      SOCIAL HISTORY:    FAMILY HISTORY:  Family history of colon cancer in mother        Vital Signs Last 24 Hrs  T(C): 35.6 (29 Jun 2021 21:46), Max: 35.6 (29 Jun 2021 21:46)  T(F): 96 (29 Jun 2021 21:46), Max: 96 (29 Jun 2021 21:46)  HR: 78 (29 Jun 2021 23:08) (72 - 136)  BP: 143/69 (29 Jun 2021 23:08) (123/70 - 150/84)  BP(mean): --  RR: 18 (29 Jun 2021 23:08) (18 - 20)  SpO2: 100% (29 Jun 2021 23:08) (91% - 100%)    On PE:  General: alert and awake  Abdomen: soft, mild lower abdominal pain    : mild L CVAT    EXT: no calf tenderness    LABS:                        10.0   9.57  )-----------( 96       ( 29 Jun 2021 21:48 )             30.3     06-29    113<LL>  |  80<L>  |  32<H>  ----------------------------<  110<H>  3.6   |  22  |  1.73<H>    Ca    7.4<L>      29 Jun 2021 21:48  Mg     1.7     06-29    TPro  6.9  /  Alb  3.9  /  TBili  0.4  /  DBili  x   /  AST  40  /  ALT  14  /  AlkPhos  68  06-29    PT/INR - ( 29 Jun 2021 21:48 )   PT: 12.0 sec;   INR: 1.00          PTT - ( 29 Jun 2021 21:48 )  PTT:30.3 sec  Urinalysis Basic - ( 29 Jun 2021 22:44 )    Color: Red / Appearance: Cloudy / SG: <=1.005 / pH: x  Gluc: x / Ketone: >=80 mg/dL  / Bili: Large / Urobili: >=8.0 E.U./dL   Blood: x / Protein: >=300 mg/dL / Nitrite: POSITIVE   Leuk Esterase: Large / RBC: Many /HPF / WBC 5-10 /HPF   Sq Epi: x / Non Sq Epi: 0-5 /HPF / Bacteria: Present /HPF        RADIOLOGY & ADDITIONAL STUDIES:< from: CT Abdomen and Pelvis w/ IV Cont (06.29.21 @ 22:11) >  Impression:  1.  Severe right hydronephrosis and proximal hydroureter ureter, with an abrupt transition point in the mid abdomen, which could represent a ureteral stricture.  2.  Left ureteral double pigtail stent in satisfactory position.      < end of copied text >

## 2021-06-29 NOTE — CONSULT NOTE ADULT - SUBJECTIVE AND OBJECTIVE BOX
**STROKE CODE CONSULT NOTE**    Last known well time: unknown    HPI: 71y Female with PMHx of HTN, depression, urethral stent placed on 7/28 due to bilary colic who presented to the ED with altered mental status, stroke code called in the ED. Patient states that she called the ambulance because she was not able to urinate. Patient alert and oriented x3, however, some confusion and difficulty following some commands/answering some questions. When asked specific questions, patient states that she is very tired, therefore, unable to obtain full history. Patient denies unilateral weakness, new vision loss, blurry vision, dizziness, numbness.   NIHSS 1 for LOC. CTH negative.     T(C): 35.6 (06-29-21 @ 21:46), Max: 35.6 (06-29-21 @ 21:46)  HR: 72 (06-29-21 @ 22:15) (72 - 136)  BP: 123/70 (06-29-21 @ 22:15) (123/70 - 150/84)  RR: 18 (06-29-21 @ 22:15) (18 - 20)  SpO2: 98% (06-29-21 @ 22:15) (91% - 98%)    PAST MEDICAL & SURGICAL HISTORY:  HTN (hypertension)    Depression    Thrombocytopenia    Colon polyp    Thoracic aortic aneurysm    History of eye surgery        FAMILY HISTORY:  Family history of colon cancer in mother        SOCIAL HISTORY:   Smoking status: unknown  Drinking: unknown   Drug Use: unknown     ROS:   as per HPI    MEDICATIONS  (STANDING):  sodium chloride 0.9%. 1000 milliLiter(s) (100 mL/Hr) IV Continuous <Continuous>    MEDICATIONS  (PRN):    Allergies    No Known Allergies    Intolerances    Frazier (Unknown)    Vital Signs Last 24 Hrs  T(C): 35.6 (29 Jun 2021 21:46), Max: 35.6 (29 Jun 2021 21:46)  T(F): 96 (29 Jun 2021 21:46), Max: 96 (29 Jun 2021 21:46)  HR: 72 (29 Jun 2021 22:15) (72 - 136)  BP: 123/70 (29 Jun 2021 22:15) (123/70 - 150/84)  BP(mean): --  RR: 18 (29 Jun 2021 22:15) (18 - 20)  SpO2: 98% (29 Jun 2021 22:15) (91% - 98%)    Physical exam:  Constitutional: No acute distress, conversant  Eyes: Anicteric sclerae, moist conjunctivae, see below for CNs  Neck: trachea midline, FROM, supple, no thyromegaly or lymphadenopathy  Cardiovascular: Regular rate and rhythm, no murmurs, rubs, or gallops. No carotid bruits.   Pulmonary: Anterior breath sounds clear bilaterally, no crackles or wheezing. No use of accessory muscles  GI: Abdomen soft, non-distended, non-tender  Extremities: Radial and DP pulses +2, no edema    Neurologic:  -Mental status: Awake & confused, however alert, oriented to person, place, and time. Able to name some objects such as pen, however, unable to name watch and key. Follows commands, however, difficulty following some (such as finger to nose). Attention/concentration intact. Fund of knowledge appropriate.  -Cranial nerves:   II: Visual fields are full to confrontation.  III, IV, VI: Extraocular movements are intact without nystagmus. Pupils equally round and reactive to light  V:  Facial sensation V1-V3 equal and intact   VII: Face is symmetric with normal eye closure and smile  VIII: Hearing is bilaterally intact to finger rub  IX, X: Uvula is midline and soft palate rises symmetrically  XI: Head turning and shoulder shrug are intact.  XII: Tongue protrudes midline  Motor: Normal bulk and tone. No pronator drift. Strength bilateral upper extremity 5/5, bilateral lower extremities 5/5.  Rapid alternating movements intact and symmetric  Sensation: Intact to light touch bilaterally. No neglect or extinction on double simultaneous testing.  Coordination: No dysmetria on finger-to-nose and heel-to-shin bilaterally    NIHSS: 1 ASPECT Score: 9    Fingerstick Blood Glucose: CAPILLARY BLOOD GLUCOSE  112 (29 Jun 2021 22:35)      POCT Blood Glucose.: 112 mg/dL (29 Jun 2021 21:18)    LABS:                        10.0   9.57  )-----------( 96       ( 29 Jun 2021 21:48 )             30.3     06-29    113<LL>  |  80<L>  |  32<H>  ----------------------------<  110<H>  3.6   |  22  |  1.73<H>    Ca    7.4<L>      29 Jun 2021 21:48    TPro  6.9  /  Alb  3.9  /  TBili  0.4  /  DBili  x   /  AST  40  /  ALT  14  /  AlkPhos  68  06-29    PT/INR - ( 29 Jun 2021 21:48 )   PT: 12.0 sec;   INR: 1.00          PTT - ( 29 Jun 2021 21:48 )  PTT:30.3 sec      RADIOLOGY & ADDITIONAL STUDIES:  < from: CT Brain Stroke Protocol (06.29.21 @ 22:11) >    IMPRESSION:  No acute intracranial hemorrhage or transcortical infarct.  < from: CT Perfusion w/ Maps w/ IV Cont (06.29.21 @ 22:12) >    IMPRESSION: No significant perfusion abnormality.      CT Abdomen and Pelvis w/ IV Cont (06.29.21 @ 22:11) >  Impression:  1.  Severe right hydronephrosis and proximal hydroureter ureter, with an abrupt transition point in the mid abdomen, which could represent a ureteral stricture.  2.  Left ureteral double pigtail stent in satisfactory position.      -----------------------------------------------------------------------------------------------------------------  IV-tPA (Y/N): no                              Bolus time:    Alteplase Dose Verification w/ RN:  Reason IV-tPA not given: current rectal bleeding, surgical procedure yesterday     Assessment:  71y Female with PMHx of HTN, depression, urethral stent placed on 7/28 due to bilary colic who presented to the ED with altered mental status, stroke code called in the ED. Patient states that she called the ambulance because she was not able to urinate. Nonfocal neurologic exam. NIHSS 1 for LOC.  Patient with no acute intracranial hemorrhage or transcortical infarct on HCT. CTP showing no perfusion deficit. TPA was not given due to rectal bleeding and surgical procedure yesterday. Unlikely stroke, recommend metabolic/infectious workup/ post surgical complication.                    **STROKE CODE CONSULT NOTE**    Last known well time: unknown    HPI: 71y Female with PMHx of HTN, depression, urethral stent placed on 7/28 due to bilary colic who presented to the ED with altered mental status, stroke code called in the ED. Patient states that she called the ambulance because she was not able to urinate. Patient alert and oriented x3, however, some confusion and difficulty following some commands/answering some questions. When asked specific questions, patient states that she is very tired, therefore, unable to obtain full history. Patient denies unilateral weakness, new vision loss, blurry vision, dizziness, numbness.   NIHSS 1 for LOC. CTH negative.     T(C): 35.6 (06-29-21 @ 21:46), Max: 35.6 (06-29-21 @ 21:46)  HR: 72 (06-29-21 @ 22:15) (72 - 136)  BP: 123/70 (06-29-21 @ 22:15) (123/70 - 150/84)  RR: 18 (06-29-21 @ 22:15) (18 - 20)  SpO2: 98% (06-29-21 @ 22:15) (91% - 98%)    PAST MEDICAL & SURGICAL HISTORY:  HTN (hypertension)    Depression    Thrombocytopenia    Colon polyp    Thoracic aortic aneurysm    History of eye surgery        FAMILY HISTORY:  Family history of colon cancer in mother        SOCIAL HISTORY:   Smoking status: unknown  Drinking: unknown   Drug Use: unknown     ROS:   as per HPI    MEDICATIONS  (STANDING):  sodium chloride 0.9%. 1000 milliLiter(s) (100 mL/Hr) IV Continuous <Continuous>    MEDICATIONS  (PRN):    Allergies    No Known Allergies    Intolerances    Frazier (Unknown)    Vital Signs Last 24 Hrs  T(C): 35.6 (29 Jun 2021 21:46), Max: 35.6 (29 Jun 2021 21:46)  T(F): 96 (29 Jun 2021 21:46), Max: 96 (29 Jun 2021 21:46)  HR: 72 (29 Jun 2021 22:15) (72 - 136)  BP: 123/70 (29 Jun 2021 22:15) (123/70 - 150/84)  BP(mean): --  RR: 18 (29 Jun 2021 22:15) (18 - 20)  SpO2: 98% (29 Jun 2021 22:15) (91% - 98%)    Physical exam:  Constitutional: No acute distress, conversant  Eyes: Anicteric sclerae, moist conjunctivae, see below for CNs  Neck: trachea midline, FROM, supple, no thyromegaly or lymphadenopathy  Cardiovascular: Regular rate and rhythm, no murmurs, rubs, or gallops. No carotid bruits.   Pulmonary: Anterior breath sounds clear bilaterally, no crackles or wheezing. No use of accessory muscles  GI: Abdomen soft, non-distended, non-tender  Extremities: Radial and DP pulses +2, no edema    Neurologic:  -Mental status: Awake & confused, however alert, oriented to person, place, and time. Able to name some objects such as pen, however, unable to name watch and key. Follows commands, however, difficulty following some (such as finger to nose). Attention/concentration intact. Fund of knowledge appropriate.  -Cranial nerves:   II: Visual fields are full to confrontation.  III, IV, VI: Extraocular movements are intact without nystagmus. Pupils equally round and reactive to light  V:  Facial sensation V1-V3 equal and intact   VII: Face is symmetric with normal eye closure and smile  VIII: Hearing is bilaterally intact to finger rub  IX, X: Uvula is midline and soft palate rises symmetrically  XI: Head turning and shoulder shrug are intact.  XII: Tongue protrudes midline  Motor: Normal bulk and tone. No pronator drift. Strength bilateral upper extremity 5/5, bilateral lower extremities 5/5.  Rapid alternating movements intact and symmetric  Sensation: Intact to light touch bilaterally. No neglect or extinction on double simultaneous testing.  Coordination: No dysmetria on finger-to-nose and heel-to-shin bilaterally    NIHSS: 1 ASPECT Score: 9    Fingerstick Blood Glucose: CAPILLARY BLOOD GLUCOSE  112 (29 Jun 2021 22:35)      POCT Blood Glucose.: 112 mg/dL (29 Jun 2021 21:18)    LABS:                        10.0   9.57  )-----------( 96       ( 29 Jun 2021 21:48 )             30.3     06-29    113<LL>  |  80<L>  |  32<H>  ----------------------------<  110<H>  3.6   |  22  |  1.73<H>    Ca    7.4<L>      29 Jun 2021 21:48    TPro  6.9  /  Alb  3.9  /  TBili  0.4  /  DBili  x   /  AST  40  /  ALT  14  /  AlkPhos  68  06-29    PT/INR - ( 29 Jun 2021 21:48 )   PT: 12.0 sec;   INR: 1.00          PTT - ( 29 Jun 2021 21:48 )  PTT:30.3 sec      RADIOLOGY & ADDITIONAL STUDIES:  < from: CT Brain Stroke Protocol (06.29.21 @ 22:11) >    IMPRESSION:  No acute intracranial hemorrhage or transcortical infarct.  < from: CT Perfusion w/ Maps w/ IV Cont (06.29.21 @ 22:12) >    IMPRESSION: No significant perfusion abnormality.      CT Abdomen and Pelvis w/ IV Cont (06.29.21 @ 22:11) >  Impression:  1.  Severe right hydronephrosis and proximal hydroureter ureter, with an abrupt transition point in the mid abdomen, which could represent a ureteral stricture.  2.  Left ureteral double pigtail stent in satisfactory position.    < from: CT Angio Head w/ IV Cont (06.29.21 @ 22:12) >  IMPRESSION: Unremarkable CTA examination of the head and neck.        -----------------------------------------------------------------------------------------------------------------  IV-tPA (Y/N): no                              Bolus time:    Alteplase Dose Verification w/ RN:  Reason IV-tPA not given: current rectal bleeding, surgical procedure yesterday     Assessment:  71y Female with PMHx of HTN, depression, urethral stent placed on 7/28 due to bilary colic who presented to the ED with altered mental status, stroke code called in the ED. Patient states that she called the ambulance because she was not able to urinate. Nonfocal neurologic exam. NIHSS 1 for LOC.  Patient with no acute intracranial hemorrhage or transcortical infarct on HCT. CTP showing no perfusion deficit. CTA unremarkable. TPA was not given due to rectal bleeding and surgical procedure yesterday. Unlikely stroke, recommend metabolic/infectious workup/ post surgical complication.

## 2021-06-29 NOTE — ED ADULT NURSE NOTE - OBJECTIVE STATEMENT
Pt presents to ED c/o altered mental status. BIBEMS. Alert and oriented to self and location, confused about day of week. Pt with confused speech, unable to report how long this has been going on for. Upgraded to Dr. Thomas- at bedside. Finger stick done, 12 lead EKG done. Continuous cardiac/pulse oximetry monitoring initiated. IV access obtained. Labs drawn, sent to lab. Stroke code called by Dr. Thomas. Brought to CT scan, stroke team at bedside. Rectal temp noted to be 96. Noted to be bleeding rectally.

## 2021-06-29 NOTE — CONSULT NOTE ADULT - PROBLEM SELECTOR RECOMMENDATION 9
-#20fr 3 way ocampo placed. About 225 cc bloody urine noted. Irrigated till light pink.  -f/u Urine color, may require CBI-  -will follow  -CT findings R hydro chronic  -continue present care per medicine  -continue ocampo  -f/u Ucx  -IV antibx

## 2021-06-29 NOTE — ED PROVIDER NOTE - PHYSICAL EXAMINATION
Constitutional: Well appearing, well nourished, awake but confused, fatigued, oriented to person, place, time/situation and in no apparent distress.   Head atraumatic, normocephalic. No signs of trauma  ENMT: Airway patent. Mildly dry MM  Eyes: Clear bilaterally, PERRL, EOMI. pt able to report baseline loss of peripheral vision on L  Chest: no trauma  Cardiac: Normal rate, regular rhythm.  Heart sounds S1, S2.  No murmurs, rubs or gallops.  Respiratory: Breaths sounds equal and clear b/l. No increased WOB, tachypnea, hypoxia, or accessory mm use. Pt speaks in full sentences.   Gastrointestinal: Abd soft, NT, ND, NABS. No guarding, rebound, or rigidity. No pulsatile abdominal masses. No organomegaly appreciated. no trauma  Musculoskeletal: Range of motion is not limited. FROM all joints x 4 ext w/o dec ROM, pain, or signs of trauma  Neuro: Alert and oriented x 3, face symmetric. Pt has difficulty word finding, but no clear dysarthria. no garbled speech. Strength 5/5 x 4 ext and symmetric, nml gross motor movement. normal sensation. pt needs frequent re-direction w/ finger to nose, had difficulty on R, but does not pass point  Skin: Skin normal color for race, warm, dry and intact. No evidence of rash. scattered ecchymoses to ext  Psych: Alert and oriented to person, place, time/situation. confused

## 2021-06-29 NOTE — ED ADULT TRIAGE NOTE - NS ED TRIAGE EKG
Call if new or worsening neurological changes or if you have any neurological related questions.   Continue taking keppra  Return in early June, sooner if problems/concerns  
EKG completed

## 2021-06-29 NOTE — ED ADULT TRIAGE NOTE - OTHER COMPLAINTS
pt BIBA from home for abd and back pain, also blood in urine, pt reports she had a procedure to have a kidney stone removed on 6/28 but does not seem to realize it was yesterday, pt is awake and A&O to person and place only, unable to tell the year, only able to answer some questions, unable to verbalize her allergies or specify where her pain is, HR noted at 136 and spo2 at 91% on RA, speech clear, no facial droop or arm drift, FS pending, pt upgraded to Dr. Thomas pt BIBA from home for abd and back pain, also blood in urine, pt reports she had a procedure to have a kidney stone removed on 6/28 but does not seem to realize it was yesterday, pt is awake and A&O to person and place only, unable to tell the year, only able to answer some questions, unable to verbalize her allergies or specify where her pain is, HR noted at 136 and spo2 at 91% on RA, unable to obtain temp, speech clear, no facial droop or arm drift, FS pending, pt upgraded to Dr. Thomas

## 2021-06-29 NOTE — ED ADULT NURSE NOTE - NSIMPLEMENTINTERV_GEN_ALL_ED
Implemented All Fall Risk Interventions:  [place] to call system. Call bell, personal items and telephone within reach. Instruct patient to call for assistance. Room bathroom lighting operational. Non-slip footwear when patient is off stretcher. Physically safe environment: no spills, clutter or unnecessary equipment. Stretcher in lowest position, wheels locked, appropriate side rails in place. Provide visual cue, wrist band, yellow gown, etc. Monitor gait and stability. Monitor for mental status changes and reorient to person, place, and time. Review medications for side effects contributing to fall risk. Reinforce activity limits and safety measures with patient and family.

## 2021-06-29 NOTE — ED PROVIDER NOTE - CLINICAL SUMMARY MEDICAL DECISION MAKING FREE TEXT BOX
Pt p/w AMS, apparently called EMS for hematuria / urinary retention. Pt made stroke code for AMS, but also CT a/p for other complaints w/ recent ureteral stent. Afebrile rectally. No SIRS criteria. No brina neuro deficits. Neuro feels not c/w stroke. Pt w/ prior admission for SIADH, and no recent labs, so may be recurrent. CMP returns w/ Na 113. In the setting of prior SIADH, AMS, MICU consulted. Ureteral stent in place. Jordan placed and bladder irrigated. No indication for CBI at this time a/p uro. no acute further uro intervention. R sided hydro known and not acute. Hx thrombocytopenia, my be causing additional hematuria in this pt. Mild anemia. Will continue to trend labs, gentle hydration. Anticipate admission

## 2021-06-29 NOTE — ED PROVIDER NOTE - OBJECTIVE STATEMENT
Pt w/ PMHx HTN, depression, thrombocytopenia, OP, PSHx ORIF R hip, ureteral colic s/p L Cystoscopy with left retrograde pyelography with ureteroscopy and procedure involving calculus and L ureteral stent yesterday w/ Dr Ramirez, now BIBA from home. Pt initially unable to state why she called EMS. EMS reported abd / back pain, however pt unable to state where pain is on arrival. Pt noted to altered. Pt admits to confusion. She states she was unable to urinate. When rolling th pt for rectal temp, pt noted to have blood in her diaper, and she notes there was some clots as well. Pt denies falls, trauma, HA, neck pain, CP, SOB, numbness/tingling, focal weakness.

## 2021-06-29 NOTE — PHYSICAL EXAM
[General Appearance - Well Developed] : well developed [General Appearance - Well Nourished] : well nourished [Normal Appearance] : normal appearance [Well Groomed] : well groomed [General Appearance - In No Acute Distress] : no acute distress [Urinary Bladder Findings] : the bladder was normal on palpation [Edema] : no peripheral edema [Exaggerated Use Of Accessory Muscles For Inspiration] : no accessory muscle use [] : no respiratory distress [Oriented To Time, Place, And Person] : oriented to person, place, and time [Affect] : the affect was normal [Mood] : the mood was normal [Not Anxious] : not anxious [Normal Station and Gait] : the gait and station were normal for the patient's age [No Focal Deficits] : no focal deficits

## 2021-06-29 NOTE — ED PROVIDER NOTE - CARE PLAN
Principal Discharge DX:	Hyponatremia  Secondary Diagnosis:	Altered mental status, unspecified altered mental status type  Secondary Diagnosis:	Hematuria, unspecified type

## 2021-06-29 NOTE — ED ADULT NURSE NOTE - OTHER COMPLAINTS
pt BIBA from home for abd and back pain, also blood in urine, pt reports she had a procedure to have a kidney stone removed on 6/28 but does not seem to realize it was yesterday, pt is awake and A&O to person and place only, unable to tell the year, only able to answer some questions, unable to verbalize her allergies or specify where her pain is, HR noted at 136 and spo2 at 91% on RA, unable to obtain temp, speech clear, no facial droop or arm drift, FS pending, pt upgraded to Dr. Thomas

## 2021-06-30 ENCOUNTER — INPATIENT (INPATIENT)
Facility: HOSPITAL | Age: 71
LOS: 7 days | Discharge: HOME | DRG: 643 | End: 2021-07-08
Attending: STUDENT IN AN ORGANIZED HEALTH CARE EDUCATION/TRAINING PROGRAM
Payer: MEDICARE

## 2021-06-30 DIAGNOSIS — D64.9 ANEMIA, UNSPECIFIED: ICD-10-CM

## 2021-06-30 DIAGNOSIS — F33.42 MAJOR DEPRESSIVE DISORDER, RECURRENT, IN FULL REMISSION: ICD-10-CM

## 2021-06-30 DIAGNOSIS — Z98.890 OTHER SPECIFIED POSTPROCEDURAL STATES: Chronic | ICD-10-CM

## 2021-06-30 DIAGNOSIS — R31.9 HEMATURIA, UNSPECIFIED: ICD-10-CM

## 2021-06-30 DIAGNOSIS — E87.1 HYPO-OSMOLALITY AND HYPONATREMIA: ICD-10-CM

## 2021-06-30 DIAGNOSIS — R63.8 OTHER SYMPTOMS AND SIGNS CONCERNING FOOD AND FLUID INTAKE: ICD-10-CM

## 2021-06-30 DIAGNOSIS — N39.0 URINARY TRACT INFECTION, SITE NOT SPECIFIED: ICD-10-CM

## 2021-06-30 DIAGNOSIS — R41.82 ALTERED MENTAL STATUS, UNSPECIFIED: ICD-10-CM

## 2021-06-30 DIAGNOSIS — I10 ESSENTIAL (PRIMARY) HYPERTENSION: ICD-10-CM

## 2021-06-30 DIAGNOSIS — F32.9 MAJOR DEPRESSIVE DISORDER, SINGLE EPISODE, UNSPECIFIED: ICD-10-CM

## 2021-06-30 LAB
ALBUMIN SERPL ELPH-MCNC: 3.4 G/DL — SIGNIFICANT CHANGE UP (ref 3.3–5)
ALP SERPL-CCNC: 64 U/L — SIGNIFICANT CHANGE UP (ref 40–120)
ALT FLD-CCNC: 12 U/L — SIGNIFICANT CHANGE UP (ref 10–45)
AMMONIA BLD-MCNC: 16 UMOL/L — SIGNIFICANT CHANGE UP (ref 11–55)
AMPHET UR-MCNC: NEGATIVE — SIGNIFICANT CHANGE UP
AMPHET UR-MCNC: NEGATIVE — SIGNIFICANT CHANGE UP
ANION GAP SERPL CALC-SCNC: 10 MMOL/L — SIGNIFICANT CHANGE UP (ref 5–17)
ANION GAP SERPL CALC-SCNC: 11 MMOL/L — SIGNIFICANT CHANGE UP (ref 5–17)
ANION GAP SERPL CALC-SCNC: 12 MMOL/L — SIGNIFICANT CHANGE UP (ref 5–17)
ANION GAP SERPL CALC-SCNC: 13 MMOL/L — SIGNIFICANT CHANGE UP (ref 5–17)
ANION GAP SERPL CALC-SCNC: 9 MMOL/L — SIGNIFICANT CHANGE UP (ref 5–17)
ANISOCYTOSIS BLD QL: SLIGHT — SIGNIFICANT CHANGE UP
APPEARANCE UR: ABNORMAL
APTT BLD: 29.2 SEC — SIGNIFICANT CHANGE UP (ref 27.5–35.5)
AST SERPL-CCNC: 38 U/L — SIGNIFICANT CHANGE UP (ref 10–40)
BACTERIA # UR AUTO: ABNORMAL /HPF
BARBITURATES UR SCN-MCNC: NEGATIVE — SIGNIFICANT CHANGE UP
BARBITURATES UR SCN-MCNC: NEGATIVE — SIGNIFICANT CHANGE UP
BASOPHILS # BLD AUTO: 0 K/UL — SIGNIFICANT CHANGE UP (ref 0–0.2)
BASOPHILS # BLD AUTO: 0.03 K/UL
BASOPHILS NFR BLD AUTO: 0 % — SIGNIFICANT CHANGE UP (ref 0–2)
BASOPHILS NFR BLD AUTO: 0.3 %
BENZODIAZ UR-MCNC: NEGATIVE — SIGNIFICANT CHANGE UP
BENZODIAZ UR-MCNC: NEGATIVE — SIGNIFICANT CHANGE UP
BILIRUB SERPL-MCNC: 0.2 MG/DL — SIGNIFICANT CHANGE UP (ref 0.2–1.2)
BILIRUB UR-MCNC: NEGATIVE — SIGNIFICANT CHANGE UP
BUN SERPL-MCNC: 27 MG/DL — HIGH (ref 7–23)
BUN SERPL-MCNC: 27 MG/DL — HIGH (ref 7–23)
BUN SERPL-MCNC: 28 MG/DL — HIGH (ref 7–23)
BUN SERPL-MCNC: 29 MG/DL — HIGH (ref 7–23)
BUN SERPL-MCNC: 30 MG/DL — HIGH (ref 7–23)
BURR CELLS BLD QL SMEAR: PRESENT — SIGNIFICANT CHANGE UP
CALCIUM SERPL-MCNC: 6.8 MG/DL — LOW (ref 8.4–10.5)
CALCIUM SERPL-MCNC: 7 MG/DL — LOW (ref 8.4–10.5)
CALCIUM SERPL-MCNC: 7 MG/DL — LOW (ref 8.4–10.5)
CALCIUM SERPL-MCNC: 7.1 MG/DL — LOW (ref 8.4–10.5)
CALCIUM SERPL-MCNC: 7.1 MG/DL — LOW (ref 8.4–10.5)
CHLORIDE SERPL-SCNC: 80 MMOL/L — LOW (ref 96–108)
CHLORIDE SERPL-SCNC: 85 MMOL/L — LOW (ref 96–108)
CHLORIDE SERPL-SCNC: 89 MMOL/L — LOW (ref 96–108)
CHLORIDE SERPL-SCNC: 90 MMOL/L — LOW (ref 96–108)
CHLORIDE SERPL-SCNC: 91 MMOL/L — LOW (ref 96–108)
CO2 SERPL-SCNC: 18 MMOL/L — LOW (ref 22–31)
CO2 SERPL-SCNC: 18 MMOL/L — LOW (ref 22–31)
CO2 SERPL-SCNC: 20 MMOL/L — LOW (ref 22–31)
CO2 SERPL-SCNC: 20 MMOL/L — LOW (ref 22–31)
CO2 SERPL-SCNC: 22 MMOL/L — SIGNIFICANT CHANGE UP (ref 22–31)
COCAINE METAB.OTHER UR-MCNC: NEGATIVE — SIGNIFICANT CHANGE UP
COCAINE METAB.OTHER UR-MCNC: NEGATIVE — SIGNIFICANT CHANGE UP
COLOR SPEC: ABNORMAL
CREAT SERPL-MCNC: 1.64 MG/DL — HIGH (ref 0.5–1.3)
CREAT SERPL-MCNC: 1.65 MG/DL — HIGH (ref 0.5–1.3)
CREAT SERPL-MCNC: 1.78 MG/DL — HIGH (ref 0.5–1.3)
CREAT SERPL-MCNC: 2.08 MG/DL — HIGH (ref 0.5–1.3)
CREAT SERPL-MCNC: 2.29 MG/DL — HIGH (ref 0.5–1.3)
CULTURE RESULTS: NO GROWTH — SIGNIFICANT CHANGE UP
DACRYOCYTES BLD QL SMEAR: SLIGHT — SIGNIFICANT CHANGE UP
DIFF PNL FLD: ABNORMAL
EOSINOPHIL # BLD AUTO: 0.08 K/UL
EOSINOPHIL # BLD AUTO: 0.16 K/UL — SIGNIFICANT CHANGE UP (ref 0–0.5)
EOSINOPHIL NFR BLD AUTO: 0.8 %
EOSINOPHIL NFR BLD AUTO: 2.6 % — SIGNIFICANT CHANGE UP (ref 0–6)
EPI CELLS # UR: SIGNIFICANT CHANGE UP /HPF (ref 0–5)
ETHANOL SERPL-MCNC: <10 MG/DL — SIGNIFICANT CHANGE UP (ref 0–10)
FERRITIN SERPL-MCNC: 116 NG/ML — SIGNIFICANT CHANGE UP (ref 15–150)
GIANT PLATELETS BLD QL SMEAR: PRESENT — SIGNIFICANT CHANGE UP
GLUCOSE SERPL-MCNC: 82 MG/DL — SIGNIFICANT CHANGE UP (ref 70–99)
GLUCOSE SERPL-MCNC: 83 MG/DL — SIGNIFICANT CHANGE UP (ref 70–99)
GLUCOSE SERPL-MCNC: 84 MG/DL — SIGNIFICANT CHANGE UP (ref 70–99)
GLUCOSE SERPL-MCNC: 92 MG/DL — SIGNIFICANT CHANGE UP (ref 70–99)
GLUCOSE SERPL-MCNC: 95 MG/DL — SIGNIFICANT CHANGE UP (ref 70–99)
GLUCOSE UR QL: 250
HCT VFR BLD CALC: 28.2 % — LOW (ref 34.5–45)
HCT VFR BLD CALC: 34.1 %
HCV AB S/CO SERPL IA: 0.04 S/CO — SIGNIFICANT CHANGE UP
HCV AB SERPL-IMP: SIGNIFICANT CHANGE UP
HGB BLD-MCNC: 10.7 G/DL
HGB BLD-MCNC: 9.7 G/DL — LOW (ref 11.5–15.5)
HYPOCHROMIA BLD QL: SLIGHT — SIGNIFICANT CHANGE UP
IMM GRANULOCYTES NFR BLD AUTO: 1.1 %
INR BLD: 1.03 — SIGNIFICANT CHANGE UP (ref 0.88–1.16)
IRON SATN MFR SERPL: 17 % — SIGNIFICANT CHANGE UP (ref 14–50)
IRON SATN MFR SERPL: 46 UG/DL — SIGNIFICANT CHANGE UP (ref 30–160)
KETONES UR-MCNC: 40 MG/DL
LEUKOCYTE ESTERASE UR-ACNC: ABNORMAL
LYMPHOCYTES # BLD AUTO: 0.61 K/UL — LOW (ref 1–3.3)
LYMPHOCYTES # BLD AUTO: 0.75 K/UL
LYMPHOCYTES # BLD AUTO: 9.7 % — LOW (ref 13–44)
LYMPHOCYTES NFR BLD AUTO: 7.3 %
MACROCYTES BLD QL: SLIGHT — SIGNIFICANT CHANGE UP
MAGNESIUM SERPL-MCNC: 1.7 MG/DL — SIGNIFICANT CHANGE UP (ref 1.6–2.6)
MAGNESIUM SERPL-MCNC: 1.8 MG/DL — SIGNIFICANT CHANGE UP (ref 1.6–2.6)
MAN DIFF?: NORMAL
MANUAL SMEAR VERIFICATION: SIGNIFICANT CHANGE UP
MCHC RBC-ENTMCNC: 29.4 PG
MCHC RBC-ENTMCNC: 30.1 PG — SIGNIFICANT CHANGE UP (ref 27–34)
MCHC RBC-ENTMCNC: 31.4 GM/DL
MCHC RBC-ENTMCNC: 34.4 GM/DL — SIGNIFICANT CHANGE UP (ref 32–36)
MCV RBC AUTO: 87.6 FL — SIGNIFICANT CHANGE UP (ref 80–100)
MCV RBC AUTO: 93.7 FL
METHADONE UR-MCNC: NEGATIVE — SIGNIFICANT CHANGE UP
METHADONE UR-MCNC: NEGATIVE — SIGNIFICANT CHANGE UP
MICROCYTES BLD QL: SLIGHT — SIGNIFICANT CHANGE UP
MONOCYTES # BLD AUTO: 0.44 K/UL — SIGNIFICANT CHANGE UP (ref 0–0.9)
MONOCYTES # BLD AUTO: 1.26 K/UL
MONOCYTES NFR BLD AUTO: 12.3 %
MONOCYTES NFR BLD AUTO: 7 % — SIGNIFICANT CHANGE UP (ref 2–14)
NEUTROPHILS # BLD AUTO: 5.04 K/UL — SIGNIFICANT CHANGE UP (ref 1.8–7.4)
NEUTROPHILS # BLD AUTO: 8.03 K/UL
NEUTROPHILS NFR BLD AUTO: 78.2 %
NEUTROPHILS NFR BLD AUTO: 79.8 % — HIGH (ref 43–77)
NITRITE UR-MCNC: POSITIVE
OPIATES UR-MCNC: NEGATIVE — SIGNIFICANT CHANGE UP
OPIATES UR-MCNC: NEGATIVE — SIGNIFICANT CHANGE UP
OSMOLALITY SERPL: 249 MOSM/KG — LOW (ref 280–301)
OSMOLALITY UR: 218 MOSM/KG — LOW (ref 300–900)
OSMOLALITY UR: 220 MOSM/KG — LOW (ref 300–900)
OVALOCYTES BLD QL SMEAR: SLIGHT — SIGNIFICANT CHANGE UP
PCP SPEC-MCNC: SIGNIFICANT CHANGE UP
PCP SPEC-MCNC: SIGNIFICANT CHANGE UP
PCP UR-MCNC: NEGATIVE — SIGNIFICANT CHANGE UP
PCP UR-MCNC: NEGATIVE — SIGNIFICANT CHANGE UP
PH UR: 7 — SIGNIFICANT CHANGE UP (ref 5–8)
PHOSPHATE SERPL-MCNC: 3.1 MG/DL — SIGNIFICANT CHANGE UP (ref 2.5–4.5)
PHOSPHATE SERPL-MCNC: 3.1 MG/DL — SIGNIFICANT CHANGE UP (ref 2.5–4.5)
PLAT MORPH BLD: ABNORMAL
PLATELET # BLD AUTO: 105 K/UL
PLATELET # BLD AUTO: 89 K/UL — LOW (ref 150–400)
POIKILOCYTOSIS BLD QL AUTO: SIGNIFICANT CHANGE UP
POLYCHROMASIA BLD QL SMEAR: SLIGHT — SIGNIFICANT CHANGE UP
POTASSIUM SERPL-MCNC: 3.3 MMOL/L — LOW (ref 3.5–5.3)
POTASSIUM SERPL-MCNC: 3.8 MMOL/L — SIGNIFICANT CHANGE UP (ref 3.5–5.3)
POTASSIUM SERPL-MCNC: 4 MMOL/L — SIGNIFICANT CHANGE UP (ref 3.5–5.3)
POTASSIUM SERPL-SCNC: 3.3 MMOL/L — LOW (ref 3.5–5.3)
POTASSIUM SERPL-SCNC: 3.8 MMOL/L — SIGNIFICANT CHANGE UP (ref 3.5–5.3)
POTASSIUM SERPL-SCNC: 4 MMOL/L — SIGNIFICANT CHANGE UP (ref 3.5–5.3)
PROT SERPL-MCNC: 6.1 G/DL — SIGNIFICANT CHANGE UP (ref 6–8.3)
PROT UR-MCNC: >=300 MG/DL
PROTHROM AB SERPL-ACNC: 12.3 SEC — SIGNIFICANT CHANGE UP (ref 10.6–13.6)
RBC # BLD: 3.22 M/UL — LOW (ref 3.8–5.2)
RBC # BLD: 3.64 M/UL
RBC # FLD: 13.8 % — SIGNIFICANT CHANGE UP (ref 10.3–14.5)
RBC # FLD: 14.5 %
RBC BLD AUTO: ABNORMAL
RBC CASTS # UR COMP ASSIST: ABNORMAL /HPF
SCHISTOCYTES BLD QL AUTO: SLIGHT — SIGNIFICANT CHANGE UP
SODIUM SERPL-SCNC: 111 MMOL/L — CRITICAL LOW (ref 135–145)
SODIUM SERPL-SCNC: 115 MMOL/L — CRITICAL LOW (ref 135–145)
SODIUM SERPL-SCNC: 120 MMOL/L — CRITICAL LOW (ref 135–145)
SODIUM SERPL-SCNC: 120 MMOL/L — CRITICAL LOW (ref 135–145)
SODIUM SERPL-SCNC: 122 MMOL/L — LOW (ref 135–145)
SP GR SPEC: 1.01 — SIGNIFICANT CHANGE UP (ref 1–1.03)
SPECIMEN SOURCE: SIGNIFICANT CHANGE UP
SPHEROCYTES BLD QL SMEAR: SLIGHT — SIGNIFICANT CHANGE UP
THC UR QL: NEGATIVE — SIGNIFICANT CHANGE UP
THC UR QL: NEGATIVE — SIGNIFICANT CHANGE UP
TIBC SERPL-MCNC: 263 UG/DL — SIGNIFICANT CHANGE UP (ref 220–430)
TSH SERPL-MCNC: 2.92 UIU/ML — SIGNIFICANT CHANGE UP (ref 0.27–4.2)
UIBC SERPL-MCNC: 217 UG/DL — SIGNIFICANT CHANGE UP (ref 110–370)
URATE SERPL-MCNC: 4.1 MG/DL — SIGNIFICANT CHANGE UP (ref 2.5–7)
UROBILINOGEN FLD QL: 4 E.U./DL
VARIANT LYMPHS # BLD: 0.9 % — SIGNIFICANT CHANGE UP (ref 0–6)
WBC # BLD: 6.32 K/UL — SIGNIFICANT CHANGE UP (ref 3.8–10.5)
WBC # FLD AUTO: 10.26 K/UL
WBC # FLD AUTO: 6.32 K/UL — SIGNIFICANT CHANGE UP (ref 3.8–10.5)
WBC UR QL: < 5 /HPF — SIGNIFICANT CHANGE UP

## 2021-06-30 PROCEDURE — 76770 US EXAM ABDO BACK WALL COMP: CPT | Mod: 26

## 2021-06-30 PROCEDURE — 99223 1ST HOSP IP/OBS HIGH 75: CPT

## 2021-06-30 PROCEDURE — 99222 1ST HOSP IP/OBS MODERATE 55: CPT

## 2021-06-30 PROCEDURE — 99233 SBSQ HOSP IP/OBS HIGH 50: CPT

## 2021-06-30 PROCEDURE — 99223 1ST HOSP IP/OBS HIGH 75: CPT | Mod: GC

## 2021-06-30 PROCEDURE — 99233 SBSQ HOSP IP/OBS HIGH 50: CPT | Mod: GC

## 2021-06-30 RX ORDER — HYDROMORPHONE HYDROCHLORIDE 2 MG/ML
0.5 INJECTION INTRAMUSCULAR; INTRAVENOUS; SUBCUTANEOUS ONCE
Refills: 0 | Status: DISCONTINUED | OUTPATIENT
Start: 2021-06-30 | End: 2021-06-30

## 2021-06-30 RX ORDER — ACETAMINOPHEN 500 MG
1000 TABLET ORAL ONCE
Refills: 0 | Status: COMPLETED | OUTPATIENT
Start: 2021-06-30 | End: 2021-06-30

## 2021-06-30 RX ORDER — ACETAMINOPHEN 500 MG
850 TABLET ORAL ONCE
Refills: 0 | Status: COMPLETED | OUTPATIENT
Start: 2021-06-30 | End: 2021-06-30

## 2021-06-30 RX ORDER — POTASSIUM CHLORIDE 20 MEQ
10 PACKET (EA) ORAL
Refills: 0 | Status: COMPLETED | OUTPATIENT
Start: 2021-06-30 | End: 2021-06-30

## 2021-06-30 RX ORDER — MAGNESIUM SULFATE 500 MG/ML
2 VIAL (ML) INJECTION ONCE
Refills: 0 | Status: DISCONTINUED | OUTPATIENT
Start: 2021-06-30 | End: 2021-06-30

## 2021-06-30 RX ORDER — TRANYLCYPROMINE SULFATE 10 MG/1
60 TABLET, FILM COATED ORAL EVERY 24 HOURS
Refills: 0 | Status: DISCONTINUED | OUTPATIENT
Start: 2021-07-01 | End: 2021-07-07

## 2021-06-30 RX ORDER — CEFTRIAXONE 500 MG/1
1000 INJECTION, POWDER, FOR SOLUTION INTRAMUSCULAR; INTRAVENOUS EVERY 24 HOURS
Refills: 0 | Status: COMPLETED | OUTPATIENT
Start: 2021-06-30 | End: 2021-07-05

## 2021-06-30 RX ORDER — SODIUM CHLORIDE 5 G/100ML
1000 INJECTION, SOLUTION INTRAVENOUS
Refills: 0 | Status: DISCONTINUED | OUTPATIENT
Start: 2021-06-30 | End: 2021-06-30

## 2021-06-30 RX ORDER — CEFTRIAXONE 500 MG/1
1000 INJECTION, POWDER, FOR SOLUTION INTRAMUSCULAR; INTRAVENOUS ONCE
Refills: 0 | Status: COMPLETED | OUTPATIENT
Start: 2021-06-30 | End: 2021-06-30

## 2021-06-30 RX ADMIN — Medication 1000 MILLIGRAM(S): at 00:30

## 2021-06-30 RX ADMIN — Medication 1000 MILLIGRAM(S): at 00:15

## 2021-06-30 RX ADMIN — Medication 400 MILLIGRAM(S): at 17:03

## 2021-06-30 RX ADMIN — CEFTRIAXONE 100 MILLIGRAM(S): 500 INJECTION, POWDER, FOR SOLUTION INTRAMUSCULAR; INTRAVENOUS at 21:22

## 2021-06-30 RX ADMIN — SODIUM CHLORIDE 60 MILLILITER(S): 5 INJECTION, SOLUTION INTRAVENOUS at 03:44

## 2021-06-30 RX ADMIN — Medication 1000 MILLIGRAM(S): at 13:00

## 2021-06-30 RX ADMIN — Medication 850 MILLIGRAM(S): at 05:19

## 2021-06-30 RX ADMIN — Medication 340 MILLIGRAM(S): at 04:50

## 2021-06-30 RX ADMIN — Medication 400 MILLIGRAM(S): at 12:03

## 2021-06-30 RX ADMIN — Medication 1000 MILLIGRAM(S): at 18:19

## 2021-06-30 RX ADMIN — HYDROMORPHONE HYDROCHLORIDE 0.5 MILLIGRAM(S): 2 INJECTION INTRAMUSCULAR; INTRAVENOUS; SUBCUTANEOUS at 14:41

## 2021-06-30 RX ADMIN — CEFTRIAXONE 100 MILLIGRAM(S): 500 INJECTION, POWDER, FOR SOLUTION INTRAMUSCULAR; INTRAVENOUS at 02:20

## 2021-06-30 RX ADMIN — HYDROMORPHONE HYDROCHLORIDE 0.5 MILLIGRAM(S): 2 INJECTION INTRAMUSCULAR; INTRAVENOUS; SUBCUTANEOUS at 15:06

## 2021-06-30 NOTE — H&P ADULT - PROBLEM SELECTOR PLAN 4
Patient with positive UA, UCx sent and in the setting of recent procedure. Given CTX 1g in ED.  - f/u UCx and Bcx  - Continue with ctx 1g IV for now, advance to 2g if abscess or bacteremic Urethral stent placed on 6/28, and patient with hydronephrosis on CT abdomen. Urology consulted in ED (attending has not seen patient) and may need CBI?  - f/u urology recommendations service following  - Urology PA placed 20Fr 3 way ocampo. About 225 cc bloody urine noted and irrigated until light pink  - f/u Ucx

## 2021-06-30 NOTE — H&P ADULT - PROBLEM SELECTOR PLAN 2
Likely 2/2 to hyponatremiaAMS of unknown etiology, though likely secondary to hyponatremia. Must exclude common alternative etiologies.   - F/u Utox, blood alcohol level, serum ammonium  - Mgmt as per hyponatremia section Likely 2/2 to hyponatremia AMS of unknown etiology, though likely secondary to hyponatremia. Must exclude common alternative etiologies. Hypocalcemic to 7.1  - F/u Utox, blood alcohol level, serum ammonium  - Mgmt as per hyponatremia section Reported baseline of [id number] 2/2 to hyponatremia AMS of unknown etiology, though likely secondary to hyponatremia. Must exclude common alternative etiologies. Hypocalcemic to 7.1  - F/u Utox, blood alcohol level, serum ammonium  - Mgmt as per hyponatremia section Patient with positive UA, UCx sent and in the setting of recent procedure. Given CTX 1g in ED.  - f/u UCx and Bcx  - Continue with CTX 1g IV for now, advance to 2g if abscess or bacteremic

## 2021-06-30 NOTE — PROGRESS NOTE ADULT - PROBLEM SELECTOR PLAN 8
F: 2% NS 60 cc/hr  E: replete as needed, monitor Na and Ca  N: NPO until s/s eval   DVT PPX: SCDs (holding lovenox in setting of hematuria)     Dispo: 7Lach

## 2021-06-30 NOTE — PROGRESS NOTE ADULT - PROBLEM SELECTOR PLAN 1
- Start 2% hypertonic saline at 60 cc/hr   - Q4hr BMP to check for correction, goal is no more than Na of 118-120 in 24hrs  - Urine Na 43, Uosm 313 suggestive of SIADH picture  - f/u AM serum osm - Start 2% hypertonic saline at 60 cc/hr   - Q4hr BMP to check for correction, goal is no more than Na of 118-120 in 24hrs  - Urine Na 43, Uosm 313 suggestive of SIADH picture  - Serum osm 249

## 2021-06-30 NOTE — H&P ADULT - ASSESSMENT
71F PMH HTN, depression and urethral stent placed on 7/28 due to colic presents to the ED with AMS, found to have hyponatremia to 113 on admission BMP and ICU consult called.     70 y/o Female with PMHx HTN, depression and urethral stent placed on 7/28 due to colic presents to the ED with AMS, found to have hyponatremia 113 along with +UA and R hydronephrosis. Admitted to telemetry for further management.

## 2021-06-30 NOTE — H&P ADULT - ATTENDING COMMENTS
This patient was evaluated as an ICU consult, please refer to that note; the billing as also done on that note.

## 2021-06-30 NOTE — PROGRESS NOTE ADULT - PROBLEM SELECTOR PLAN 2
Patient with positive UA, UCx sent and in the setting of recent procedure. Given CTX 1g in ED.  - f/u UCx and Bcx  - Continue with CTX 1g IV for now, advance to 2g if abscess or bacteremic

## 2021-06-30 NOTE — BEHAVIORAL HEALTH ASSESSMENT NOTE - HPI (INCLUDE ILLNESS QUALITY, SEVERITY, DURATION, TIMING, CONTEXT, MODIFYING FACTORS, ASSOCIATED SIGNS AND SYMPTOMS)
INCOMPLETE    71 year old female with a past medical history of hypertension, thrombocytopenia, osteoporosis, depression and urethral stent placed on 6/28 due to ureteral colic presents to [facility] ED on 6/29 with altered mental status. Patient initially unable to state why she called EMS, but team reported abdominal/back pain. On admission stroke code was called as it was reported that pt had some expressive aphagia and only able to answer simple yes/no questions. CT head and CT angio head and neck negative for pathology. Patient also reports she was unable to urinate at home and unable to provide detailed history. In ED pt was hypothermic 96F tachycardic, hypertensive on room air saturating well. tachycardia and temperature resolved while in ED. Pt was noted to have electrode abnormalities including Na 113, Cl 80, BUN 32/Cr 1.73, Ca 7.4, Mg 1.7 and K 3.6. UA notable for large quantity of blood, cloudy, large bilirubin, ketones >80, SG < 1.05, >300 protein, +LE, +nitrites. Pt was given 1L NS and 1g IV tylenol in ED. Pt was admitted to medical telemetry floor for treatment.   Pt has an extensive history of depression that began in her childhood. She has attempted and failed many medications including tofranil, all SSRIs, and nardil (MAO-I less effective than parnate). ECT has been effective in the past. Pt has been asymptomatic from a depression standpoint while on parnate. Pt reports that she has required increasing doses of Parnate as she accumulates tolerance. She had two suicide attempts both 30-40 yrs ago, 1 severe overdose and 1 hanging attempt. Last year pt switched psychiatrists from Dr. Chica Najera, who has followed her for many years, to Dr. Brad Smith. The reason being Dr. Najera no longer sees patients. Pt has been on parnate for 102ears and is currently on 110 mg. Her sodium has always been stable on parnate in the past. Expect for her hospital admission July 16 2019 when she was admitted for symptomatic hyponatremia. She denies current substance abuse but has abused alcohol and cocaine in the past. INCOMPLETE    71 year old female with a past medical history of hypertension, thrombocytopenia, osteoporosis, depression and urethral stent placed on 6/28 due to ureteral colic presents to [facility] ED on 6/29 with altered mental status. Patient initially unable to state why she called EMS, but team reported abdominal/back pain. On admission stroke code was called as it was reported that pt had some expressive aphagia and only able to answer simple yes/no questions. CT head and CT angio head and neck negative for pathology. Patient also reports she was unable to urinate at home and unable to provide detailed history. In ED pt was hypothermic 96F tachycardic, hypertensive on room air saturating well. tachycardia and temperature resolved while in ED. Pt was noted to have electrode abnormalities including Na 113, Cl 80, BUN 32/Cr 1.73, Ca 7.4, Mg 1.7 and K 3.6. UA notable for large quantity of blood, cloudy, large bilirubin, ketones >80, SG < 1.05, >300 protein, +LE, +nitrites. Pt was given 1L NS and 1g IV tylenol in ED. Pt was admitted to medical telemetry floor for treatment. When seen pt is AOx3 and still unsure of why she initially called EMS. Pt does report increased PO intake of fluids post urologic procedure.  Pt has an extensive history of depression that began in her childhood. She has attempted and failed many medications including tofranil, all SSRIs, and nardil (MAO-I less effective than parnate). ECT has been effective in the past. Pt has been asymptomatic from a depression standpoint while on parnate. Pt reports that she has required increasing doses of Parnate as she accumulates tolerance. She had two suicide attempts both 30-40 yrs ago, 1 severe overdose and 1 hanging attempt. Last year pt switched psychiatrists from Dr. Chica Najera, who has followed her for many years, to Dr. Brad Smith. The reason being Dr. Najera no longer sees patients. Pt has been on parnate for 12 years and is currently on 110 mg being prescribed by Dr. Smith. Contacted Dr. Smith's office to further discuss case and waiting for response. Her sodium has been stable on parnate in the past, except for her hospital admission July 16 2019 when she was admitted for symptomatic hyponatremia. She denies current substance abuse but has abused alcohol and cocaine in the past. INCOMPLETE    71 year old female with a past medical history of hypertension, thrombocytopenia, osteoporosis, depression and urethral stent placed on 6/28 due to ureteral colic presents to [facility] ED on 6/29 with altered mental status. Patient initially unable to state why she called EMS, but team reported abdominal/back pain. On admission stroke code was called as it was reported that pt had some expressive aphagia and only able to answer simple yes/no questions. CT head and CT angio head and neck negative for pathology. Patient also reports she was unable to urinate at home and unable to provide detailed history. In ED pt was hypothermic 96F tachycardic, hypertensive on room air saturating well. tachycardia and temperature resolved while in ED. Pt was noted to have electrode abnormalities including Na 113, Cl 80, BUN 32/Cr 1.73, Ca 7.4, Mg 1.7 and K 3.6. UA notable for large quantity of blood, cloudy, large bilirubin, ketones >80, SG < 1.05, >300 protein, +LE, +nitrites. Pt was given 1L NS and 1g IV tylenol in ED. Pt was admitted to medical telemetry floor for treatment. When seen pt is AOx3 and still unsure of why she initially called EMS. Pt does report increased PO intake of fluids post urologic procedure.  Pt has an extensive history of depression that began in her childhood. She has attempted and failed many medications including tofranil, all SSRIs, and nardil (MAO-I less effective than parnate). ECT has been effective in the past. Pt has been asymptomatic from a depression standpoint while on parnate. Pt reports that she has required increasing doses of Parnate as she accumulates tolerance. She had two suicide attempts both 30-40 yrs ago, 1 severe overdose and 1 hanging attempt. Last year pt switched psychiatrists from Dr. Chica Najera, who has followed her for many years, to Dr. Brad Smith. The reason being Dr. Najera no longer sees patients. Pt has been on parnate for 12 years and is currently on 110 mg being prescribed by Dr. Smith. Contacted Dr. Smith's office to further discuss case and waiting for response. Pt reports that her sodium has been stable on parnate in the past, except for her hospital admission July 16 2019 when she was admitted for symptomatic hyponatremia. She denies current substance abuse but has abused alcohol and cocaine in the past. INCOMPLETE    71 year old female with a past medical history of hypertension, thrombocytopenia, osteoporosis, depression and urethral stent placed on 6/28 due to ureteral colic presents to [facility] ED on 6/29 with altered mental status. Patient initially unable to state why she called EMS, but team reported abdominal/back pain. On admission stroke code was called as it was reported that pt had some expressive aphagia and only able to answer simple yes/no questions. CT head and CT angio head and neck negative for pathology. Patient also reports she was unable to urinate at home and unable to provide detailed history. In ED pt was hypothermic 96F tachycardic, hypertensive on room air saturating well. tachycardia and temperature resolved while in ED. Pt was noted to have electrode abnormalities including Na 113, Cl 80, BUN 32/Cr 1.73, Ca 7.4, Mg 1.7 and K 3.6. UA notable for large quantity of blood, cloudy, large bilirubin, ketones >80, SG < 1.05, >300 protein, +LE, +nitrites. Pt was given 1L NS and 1g IV tylenol in ED. Pt was admitted to medical telemetry floor for treatment.  Psychiatry was consulted for medication recommendations re restarting pt's home antidepressants   When seen pt is AOx3 and still unsure of why she initially called EMS. Pt does report increased PO intake of fluids post urologic procedure. Pt has an extensive history of depression that began in her childhood. She has attempted and failed many medications including tofranil, all SSRIs, and nardil (MAO-I less effective than parnate). ECT has been effective in the past. Pt has been asymptomatic from a depression standpoint while on parnate. Pt reports that she has required increasing doses of Parnate as she accumulates tolerance. She had two suicide attempts both 30-40 yrs ago, 1 severe overdose and 1 hanging attempt. Last year pt switched psychiatrists from Dr. Chica Najera, who has followed her for many years, to Dr. Brad Smith. The reason being Dr. Najera no longer sees patients. Pt has been on parnate for 12 years and is currently on 110 mg being prescribed by Dr. Smith. Contacted Dr. Smith's office to further discuss case and waiting for response. Pt reports that her sodium has been stable on parnate in the past, except for her hospital admission July 16 2019 when she was admitted for symptomatic hyponatremia. She denies current substance abuse but has abused alcohol and cocaine in the past. 71 year old female with a past medical history of hypertension, thrombocytopenia, osteoporosis, depression and urethral stent placed on 6/28 due to ureteral colic presents to [facility] ED on 6/29 with altered mental status. Patient initially unable to state why she called EMS, but team reported abdominal/back pain. On admission stroke code was called as it was reported that pt had some expressive aphagia and only able to answer simple yes/no questions. CT head and CT angio head and neck negative for pathology. Patient also reports she was unable to urinate at home and unable to provide detailed history. In ED pt was hypothermic 96F tachycardic, hypertensive on room air saturating well. tachycardia and temperature resolved while in ED. Pt was noted to have electrode abnormalities including Na 113, Cl 80, BUN 32/Cr 1.73, Ca 7.4, Mg 1.7 and K 3.6. UA notable for large quantity of blood, cloudy, large bilirubin, ketones >80, SG < 1.05, >300 protein, +LE, +nitrites. Pt was given 1L NS and 1g IV tylenol in ED. Pt was admitted to medical telemetry floor for treatment.  Psychiatry was consulted for medication recommendations re restarting pt's home antidepressants   When seen pt is AOx3 and still unsure of why she initially called EMS. Pt does report increased PO intake of fluids post urologic procedure. Pt has an extensive history of depression that began in her childhood. She has attempted and failed many medications including tofranil, all SSRIs, and nardil (MAO-I less effective than parnate). ECT has been effective in the past. Pt has been asymptomatic from a depression standpoint while on parnate. Pt reports that she has required increasing doses of Parnate as she accumulates tolerance. She had two suicide attempts both 30-40 yrs ago, 1 severe overdose and 1 hanging attempt. Last year pt switched psychiatrists from Dr. Chica Najera, who has followed her for many years, to Dr. Brad Smith. The reason being Dr. Najera no longer sees patients. Pt has been on parnate for 12 years and is currently on 110 mg being prescribed by Dr. Smith. Contacted Dr. Smith's office to further discuss case and waiting for response. Pt reports that her sodium has been stable on parnate in the past, except for her hospital admission July 16 2019 when she was admitted for symptomatic hyponatremia. She denies current substance abuse but has abused alcohol and cocaine in the past.

## 2021-06-30 NOTE — H&P ADULT - PROBLEM SELECTOR PLAN 5
Patient with a history of hypertension, though unclear which medications patient takes at home. Would hold off on antihypertensives for now  - Med rec in AM

## 2021-06-30 NOTE — H&P ADULT - NSHPPHYSICALEXAM_GEN_ALL_CORE
Constitutional: NAD, resting comfortably in bed  Head: NC/AT  Eyes: PERRL, EOMI, anicteric sclera  ENT: no nasal discharge; uvula midline, no oropharyngeal erythema or exudates; MMM  Neck: supple; no JVD   Respiratory: CTA B/L; no W/R/R  Cardiac: +S1/S2; RRR; no M/R/G  Gastrointestinal: soft, NT/ND; no rebound or guarding; +BSx4  Back: spine midline, no bony tenderness or step-offs; no CVAT B/L  Extremities: WWP, no clubbing or cyanosis; no peripheral edema  Musculoskeletal: NROM x4; no joint swelling, tenderness or erythema  Vascular: 2+ radial, femoral, DP/PT pulses B/L  Dermatologic: skin warm, dry and intact; no rashes, wounds, or scars  Lymphatic: no submandibular or cervical LAD  Neurologic: AAOx3; CNII-XII grossly intact; no focal deficits  Psychiatric: affect and characteristics of appearance, verbalizations, behaviors are appropriate Constitutional: NAD, patient laying in bed with eyes closed and lethargic, able to respond to simle questions and commands, unable to answer open-ended questions with more than a few words  Head: NC/AT  Eyes: PERRL, EOMI, anicteric sclera  ENT: no nasal discharge; uvula midline, no oropharyngeal erythema or exudates; MMM  Neck: supple; no JVD   Respiratory: CTA B/L; no W/R/R  Cardiac: +S1/S2; RRR; no M/R/G  Gastrointestinal: admitted to non-specific TTP in all 4 quadrants and CVA tenderness, soft, ND; no rebound or guarding; +BSx4  Back: spine midline, no bony tenderness or step-offs; no CVAT B/L  Extremities: WWP, no clubbing or cyanosis; trace pitting edema  Musculoskeletal: NROM x4; no joint swelling, tenderness or erythema  Vascular: 2+ radial, femoral, DP/PT pulses B/L  Dermatologic: skin warm, dry and intact; petechiae on anterior chest, no wounds, or scars  Lymphatic: no submandibular or cervical LAD  Neurologic: AAOx2, not oriented to time; CNII-XII grossly intact; no focal deficits Constitutional: NAD, patient laying in bed with eyes closed and lethargic  Head: NC/AT  Eyes: PERRL, EOMI, anicteric sclera  ENT: no nasal discharge; uvula midline, no oropharyngeal erythema or exudates; MMM  Neck: supple; no JVD   Respiratory: CTA B/L; no W/R/R  Cardiac: +S1/S2; RRR; no M/R/G  Gastrointestinal: admitted to non-specific TTP in all 4 quadrants including CVA tenderness, soft, ND; no rebound or guarding; +BSx4  Extremities: WWP, no clubbing or cyanosis; trace bilateral pitting edema  Musculoskeletal: NROM x4; no joint swelling, tenderness or erythema  Vascular: 2+ radial, femoral, DP/PT pulses B/L  Dermatologic: skin warm, dry and intact; petechiae on anterior chest, no wounds, or scars  Lymphatic: no submandibular or cervical LAD  Neurologic: AAOx2, not oriented to time; able to respond to simple questions and commands, unable to answer open-ended questions with more than a few words; no focal deficits

## 2021-06-30 NOTE — PROGRESS NOTE ADULT - PROBLEM SELECTOR PLAN 4
Urethral stent placed on 6/28, and patient with hydronephrosis on CT abdomen. Urology consulted in ED   - Urology PA placed 20Fr 3 way ocampo. About 225 cc bloody urine noted and irrigated until light pink. no other recommendations at this time   - Urine cx positive for many bacteria, patient started on ceftriaxone IV until 7/7

## 2021-06-30 NOTE — H&P ADULT - HISTORY OF PRESENT ILLNESS
INCOMPLETE NOTE    71F PMH HTN, depression and urethral stent placed on  due to colic presents to the ED with AMS. Stroke code called upon arrival and negative for pathology. Patient reports she was unable to urinate at home and is now with altered mental status. Patient is AAOx3 however mental status is altered from normal, and patient is with some expressive aphagia and is unable to answer questions. Unable to participate in ROS or review home medication. BMP revealed sodium of 113, and patient started on NS at 100 mL/hr and ICU consult called for further treatment. CT abdomen performed with severe right sided hydronephrosis.     ROS: Unable to participate due to current mental status    ED Course  Vitals: 96F (rectal),  -> 74, 150/84, 91-97% on RA, RR 20  Labs: notable for Hgb 10.0/Hct 30.3, Plt 96, Na 113, Cl 80, BUN 32/Cr 1.73, Ca 7.4, eGFR 29; UA +large blood, cloudy, large bii, ketones >80, SG < 1.05, >300 protein, +LE, +nitrites  Imagin) CT Head - no acute intracranial hemorrhage or infarct  2) CT Angio H&N - unremarkable   3) CT abd and pelvis w/ IV contrast - Severe right hydronephrosis and proximal hydroureter ureter, with an abrupt transition point in the mid abdomen, which could represent a ureteral stricture. Left ureteral double pigtail stent in satisfactory position.  Management: IV tylenol 1g x1, 1L NS bolus   INCOMPLETE NOTE    70 y/o Female with PMHx HTN, depression and urethral stent placed on  due to colic presents to the ED with AMS. Stroke code called upon arrival and negative for pathology. Patient reports she was unable to urinate at home and unable to provide detailed history. Patient is reportedly AAOx3 at baseline. On arrival, she had some expressive aphagia and unable to answer questions including ROS or review home medications. BMP revealed sodium of 113, and patient ordered for 1L NS. ICU consult called for further treatment.     ROS: Unable to participate due to current mental status    ED Course  Vitals: 96F (rectal),  -> 74, 150/84, 91-97% on RA, RR 20  Labs: notable for Hgb 10.0/Hct 30.3, Plt 96, Na 113, Cl 80, BUN 32/Cr 1.73, Ca 7.4, eGFR 29; UA +large blood, cloudy, large bii, ketones >80, SG < 1.05, >300 protein, +LE, +nitrites  Imagin) CT Head - no acute intracranial hemorrhage or infarct  2) CT Angio H&N - unremarkable   3) CT abd and pelvis w/ IV contrast - Severe right hydronephrosis and proximal hydroureter ureter, with an abrupt transition point in the mid abdomen, which could represent a ureteral stricture. Left ureteral double pigtail stent in satisfactory position.  Management: IV tylenol 1g x1, 1L NS bolus   INCOMPLETE NOTE    72 y/o Female with PMHx HTN, thrombocytopenia, osteoporosis, depression and urethral stent placed on  due to ureteral colic presents to the ED with AMS. Patient initially unable to state why she called EMS, but team reported abdominal/back pain. Stroke code called upon arrival and negative for pathology. Patient reports she was unable to urinate at home and unable to provide detailed history. Patient is reportedly AAOx3 at baseline. On arrival, she had some expressive aphagia and unable to answer questions including ROS or review home medications.     ROS: Unable to participate due to current mental status    ED Course  Vitals: 96F (rectal),  -> 74, 150/84, 91-97% on RA, RR 20  Labs: notable for Hgb 10.0/Hct 30.3, Plt 96, Na 113, Cl 80, BUN 32/Cr 1.73, Ca 7.4, eGFR 29; UA +large blood, cloudy, large bii, ketones >80, SG < 1.05, >300 protein, +LE, +nitrites  Imagin) CT Head - no acute intracranial hemorrhage or infarct  2) CT Angio H&N - unremarkable   3) CT abd and pelvis w/ IV contrast - Severe right hydronephrosis and proximal hydroureter ureter, with an abrupt transition point in the mid abdomen, which could represent a ureteral stricture. Left ureteral double pigtail stent in satisfactory position.  Management: IV tylenol 1g x1, 1L NS bolus   INCOMPLETE NOTE    70 y/o Female with PMHx HTN, thrombocytopenia, osteoporosis, depression and urethral stent placed on  due to ureteral colic presents to the ED with AMS. Patient initially unable to state why she called EMS, but team reported abdominal/back pain. Stroke code called upon arrival and negative for pathology. Patient reports she was unable to urinate at home and unable to provide detailed history. Patient is reportedly AAOx3 at baseline. On arrival, she had some expressive aphagia and only able to answer simple yes/no questions. ROS negative, unable to review home medications.       ED Course  Vitals: 96F (rectal),  -> 74, 150/84, 91-97% on RA, RR 20  Labs: notable for Hgb 10.0/Hct 30.3, Plt 96, Na 113, Cl 80, BUN 32/Cr 1.73, Ca 7.4, eGFR 29; UA +large blood, cloudy, large bii, ketones >80, SG < 1.05, >300 protein, +LE, +nitrites  Imagin) CT Head - no acute intracranial hemorrhage or infarct  2) CT Angio H&N - unremarkable   3) CT abd and pelvis w/ IV contrast - Severe right hydronephrosis and proximal hydroureter ureter, with an abrupt transition point in the mid abdomen, which could represent a ureteral stricture. Left ureteral double pigtail stent in satisfactory position.  Management: IV tylenol 1g x1, 1L NS bolus   70 y/o Female with PMHx HTN, thrombocytopenia, osteoporosis, depression and urethral stent placed on  due to ureteral colic presents to the ED with AMS. Patient initially unable to state why she called EMS, but team reported abdominal/back pain. Stroke code called upon arrival and negative for pathology. Patient reports she was unable to urinate at home and unable to provide detailed history. Patient is reportedly AAOx3 at baseline. On arrival, she had some expressive aphagia and only able to answer simple yes/no questions. ROS negative, unable to review home medications.       ED Course  Vitals: 96F (rectal),  -> 74, 150/84, 91-97% on RA, RR 20  Labs: notable for Hgb 10.0/Hct 30.3, Plt 96, Na 113, Cl 80, BUN 32/Cr 1.73, Ca 7.4, eGFR 29; UA +large blood, cloudy, large bii, ketones >80, SG < 1.05, >300 protein, +LE, +nitrites  Imagin) CT Head - no acute intracranial hemorrhage or infarct  2) CT Angio H&N - unremarkable   3) CT abd and pelvis w/ IV contrast - Severe right hydronephrosis and proximal hydroureter ureter, with an abrupt transition point in the mid abdomen, which could represent a ureteral stricture. Left ureteral double pigtail stent in satisfactory position.  Management: IV tylenol 1g x1, 1L NS bolus

## 2021-06-30 NOTE — H&P ADULT - PROBLEM SELECTOR PLAN 8
F: 2% NS 60 cc/hr  E: replete as needed, monitor Na  N:  DVT PPX: SCDs but no lovenox in setting of hematuria    Dispo: [facility] F: 2% NS 60 cc/hr  E: replete as needed, monitor Na and Ca  N:  DVT PPX: SCDs but no lovenox in setting of hematuria    Dispo: [facility] F: 2% NS 60 cc/hr  E: replete as needed, monitor Na and Ca  N: NPO until s/s eval   DVT PPX: SCDs (holding lovenox in setting of hematuria)     Dispo: [facility]

## 2021-06-30 NOTE — BEHAVIORAL HEALTH ASSESSMENT NOTE - RISK ASSESSMENT
Low Acute Suicide Risk Pt has been on parnate for 12 years most recently increasing dose to 110mg Parnate qd last year. Pt has severe recurrent depression with suicide attempts and hospitalizations and has failed multiple trials of antidepressants and has required ECT. We recommend continuation of parnate which patient is very much in agreement with as she is currently experiencing withdrawal symptoms. SIADH could potentially a stress response to recent procedure vs infection less likely parnate as pt has been on medication chronically without symptoms.   Recommend: decreasing dose of parnate from 110 mg to 60 mg daily in the morning as pt takes at home, Mirtazapine 15mg at bedtime standing (as pt admits to noncompliance with this medication) and continued treatment to manage electrolyte abnormalities that occur/continue. Pt has been on parnate for 12 years most recently increasing dose to 110mg Parnate qd last year. Pt has severe recurrent depression with suicide attempts and hospitalizations and has failed multiple trials of antidepressants and has required ECT. We recommend continuation of parnate which patient is very much in agreement with as she is currently experiencing withdrawal symptoms. SIADH could potentially a stress response to recent procedure vs infection less likely parnate as pt has been on medication chronically without symptoms.     Recommend: decreasing dose of parnate from 110 mg to 60 mg daily in the morning as pt takes at home, Mirtazapine 7.5mg at bedtime standing (as patient prefers this dose as opposed to home dose of 15mg due to self reported increased appetite), and continued treatment to manage electrolyte abnormalities that occur/continue.    patient made aware that side effects of mirtazapine decrease as the dose increases. Side effects include increased appetitie and sedation.

## 2021-06-30 NOTE — H&P ADULT - PROBLEM SELECTOR PLAN 6
Unknown chronicity or etiology of anemia, however patient with hematuria on UA following procedure yesterday.  - Maintain active T/S, transfuse to goal > 7.0   - Obtain iron studies Unknown chronicity or etiology of anemia, however patient with hematuria on UA following procedure yesterday.  - Maintain active T/S, transfuse to goal > 7.0   - f/u iron panel in AM

## 2021-06-30 NOTE — CONSULT NOTE ADULT - ASSESSMENT
71F PMH HTN, depression and urethral stent placed on 7/28 due to colic presents to the ED with AMS, found to have hyponatremia to 113 on admission BMP and ICU consult called.    NEUROLOGY  #Altered mental status  AMS of unknown etiology, though likely secondary to hyponatremia. UA negative. Must exclude common alternative etiologies.   - Please order Utox, blood alcohol level, serum ammonium  - Mgmt as per hyponatremia section    #Depression  No complaints at this time  - Med rec in AM    CARDIOLOGY  #HTN  Patient with a history of hypertension, though unclear which medications patient takes at home. Would hold off on antihypertensives for now  - Med rec in AM    PULMONARY  No active issues    RENAL  #Hyponatremia  NOTE IN PROGRESS    GASTROENTEROLOGY  No active issues    INFECTIOUS DISEASE  Afebrile and without leukocytosis, UA negative for infectious etiology. NTD at this time    ENDOCRINE  No active issues    HEME  #Anemia  Unknown chronicity or etiology of anemia, however patient with hematuria on UA following procedure yesterday.  - Maintain active T/S, transfuse to goal > 7.0   - Obtain iron studies    DVT ppx - hold in the setting of gross hematuria  GI ppx - no indication    DISPOSITION- TBD 71F PMH HTN, depression and urethral stent placed on 7/28 due to colic presents to the ED with AMS, found to have hyponatremia to 113 on admission BMP and ICU consult called.    NEUROLOGY  #Altered mental status  AMS of unknown etiology, though likely secondary to hyponatremia. Must exclude common alternative etiologies.   - Please order Utox, blood alcohol level, serum ammonium  - Mgmt as per hyponatremia section    #Depression  No complaints at this time  - Med rec in AM    CARDIOLOGY  EKG normal no heart block  #HTN  Patient with a history of hypertension, though unclear which medications patient takes at home. Would hold off on antihypertensives for now  - Med rec in AM    PULMONARY  No active issues    RENAL  #Hyponatremia  - Start 2% hypertonic saline at 60 cc/hr   - Q4hr BMP to check for correction, goal is no more than 6-8 in 24hrs  - Obtain urine and serum osmolarity prior to initiation     #Hydronephrosis  Urethral stent placed on 6/28, and patient with hydronephrosis on CT abdomen. Urology consulted in ED and may need CBI  - f/u urology recommendations service following    GASTROENTEROLOGY  No active issues    INFECTIOUS DISEASE  #Urinary tract infection  Patient with positive UA, UCx sent and in the setting of recent procedure. Given CTX 1g in ED.  - f/u UCx  - Continue with ctx 1g IV for now    ENDOCRINE  No active issues    HEME  #Anemia  Unknown chronicity or etiology of anemia, however patient with hematuria on UA following procedure yesterday.  - Maintain active T/S, transfuse to goal > 7.0   - Obtain iron studies    DVT ppx - hold in the setting of gross hematuria  GI ppx - no indication    DISPOSITION- [facility], patient with no seizure activity, is AAOx3 and VS stable  71F PMH HTN, depression and urethral stent placed on 7/28 due to colic presents to the ED with AMS, found to have hyponatremia to 113 on admission BMP and ICU consult called.    NEUROLOGY  #Altered mental status  AMS of unknown etiology, though likely secondary to hyponatremia. Must exclude common alternative etiologies.   - Please order Utox, blood alcohol level, serum ammonium  - Mgmt as per hyponatremia section    #Depression  No complaints at this time  - Med rec in AM    CARDIOLOGY  EKG normal no heart block  #HTN  Patient with a history of hypertension, though unclear which medications patient takes at home. Would hold off on antihypertensives for now  - Med rec in AM    PULMONARY  No active issues    RENAL  #Hyponatremia  - Start 2% hypertonic saline at 60 cc/hr   - Q4hr BMP to check for correction, goal is no more than 6-8 in 24hrs  - Obtain urine and serum osmolarity prior to initiation     #Hydronephrosis  Urethral stent placed on 6/28, and patient with hydronephrosis on CT abdomen. Urology consulted in ED and may need CBI  - f/u urology recommendations service following    GASTROENTEROLOGY  No active issues    INFECTIOUS DISEASE  #Urinary tract infection  Patient with positive UA, UCx sent and in the setting of recent procedure. Given CTX 1g in ED.  - f/u UCx  - Continue with ctx 1g IV for now    ENDOCRINE  No active issues    HEME  #Anemia  Unknown chronicity or etiology of anemia, however patient with hematuria on UA following procedure yesterday.  - Maintain active T/S, transfuse to goal > 7.0   - Obtain iron studies    DVT ppx - hold in the setting of gross hematuria, SCDs  GI ppx - no indication    DISPOSITION- [facility]

## 2021-06-30 NOTE — CONSULT NOTE ADULT - SUBJECTIVE AND OBJECTIVE BOX
Hematology Oncology Consult Note    The patient was seen and examined    71 year old female with a history of chronic thrombocytopenia (over 10 years), osteoporosis, depression, hypertension, , ureteral stent placement on  for ureteral colic/stone obstruction brought by EMS to ED with altered mental status, expressive aphasia, found to be hyponatremic. Evaluation negative for stroke or hemorrhage. Patient states she has been thrombocytopenic for years and has been evaluated in the past by Dr. Miki Billy at [facility] and was not given a diagnosis. Patient's platelet counts over the past three years have ranged between 85,000 and 105,000. On 21, platelets were 103,000, hemoglobin 12.0, WBC 4.35, Sodium was 136 at the time, BUN24, creatinine 1.12 by her PCP. She admits to a distant past history of excessive alcohol intake. Outpatient medications included Tamsulosin, Bactrim, Tranylcypromine. On 21, hemoglobin 9.7, platelet count 89,000. She admits to multiple bruises and hematuria presently but denies gingival bleeding, epistaxis blood in stool.  .    Interval History:    The patient denies chest pain or SOB.  No nausea/vomiting/fevers/chills/night sweats.  Has fatigue, no headaches/dizziness.  Appetite is diminished  Has abdominal/suprapubic pain and bloating/ no diarrhea/constipation.  No melena or hematochezia.    Has hematuria, has catheter and ureteral stent.  Has history of easy bruising/no bleeding.  No gingival bleeding or epistaxis.  No leg pain or leg swelling.  ROS is otherwise negative.    PAST MEDICAL & SURGICAL HISTORY:  HTN (hypertension)    Depression    Thrombocytopenia    Colon polyp    Thoracic aortic aneurysm    History of eye surgery        Allergies:Allergies    No Known Allergies    Intolerances    Frazier (Unknown)        Medications:MEDICATIONS  (STANDING):  cefTRIAXone   IVPB 1000 milliGRAM(s) IV Intermittent every 24 hours    MEDICATIONS  (PRN):          Social History: Former drinker, presently denies ETOH, smoking    FAMILY HISTORY:  Family history of colon cancer in mother        PHYSICAL EXAM:    T(F): 97.6 (21 @ 18:04), Max: 98.5 (21 @ 04:41)  HR: 64 (21 @ 16:00) (64 - 136)  BP: 142/72 (21 @ 16:00) (106/60 - 153/77)  RR: 18 (21 @ 16:00) (16 - 20)  SpO2: 100% (21 @ 16:00) (78% - 100%)  Wt(kg): --    Daily Height in cm: 162.56 (2021 21:21)    Daily     Gen: well developed, well nourished, uncomfortable, agitated  HEENT: normocephalic/atraumatic, has conjunctival pallor, no scleral icterus, no oral thrush/mucosal bleeding/mucositis  Neck: supple, no masses, no JVD  Cardiovascular: RR, nl S1S2, no murmurs/rubs/gallops  Respiratory: clear air entry b/l anteriorly  Gastrointestinal: BS+, soft, mildly distended and tender, no masses, no splenomegaly, no hepatomegaly, no evidence for ascites  Extremities: no clubbing/cyanosis, no edema, no calf tenderness  Neurological: no focal deficits  Skin: no rash on visible skin, has excessive ecchymoses on extremities, few petechiae right neck, shoulder. No bleeding from puncture sites.   Lymph Nodes:  no significant peripheral adenopathy   Musculoskeletal:  full ROM  Psychiatric:  agitated            Labs:                          9.7    6.32  )-----------( 89       ( 2021 07:38 )             28.2     CBC Full  -  ( 2021 07:38 )  WBC Count : 6.32 K/uL  RBC Count : 3.22 M/uL  Hemoglobin : 9.7 g/dL  Hematocrit : 28.2 %  Platelet Count - Automated : 89 K/uL  Mean Cell Volume : 87.6 fl  Mean Cell Hemoglobin : 30.1 pg  Mean Cell Hemoglobin Concentration : 34.4 gm/dL  Auto Neutrophil # : 5.04 K/uL  Auto Lymphocyte # : 0.61 K/uL  Auto Monocyte # : 0.44 K/uL  Auto Eosinophil # : 0.16 K/uL  Auto Basophil # : 0.00 K/uL  Auto Neutrophil % : 79.8 %  Auto Lymphocyte % : 9.7 %  Auto Monocyte % : 7.0 %  Auto Eosinophil % : 2.6 %  Auto Basophil % : 0.0 %    PT/INR - ( 2021 07:38 )   PT: 12.3 sec;   INR: 1.03          PTT - ( 2021 07:38 )  PTT:29.2 sec    06-30    122<L>  |  90<L>  |  28<H>  ----------------------------<  83  4.0   |  20<L>  |  2.08<H>    Ca    7.0<L>      2021 17:31  Phos  3.1       Mg     1.7         TPro  6.1  /  Alb  3.4  /  TBili  0.2  /  DBili  x   /  AST  38  /  ALT  12  /  AlkPhos  64        Urinalysis Basic - ( 2021 01:47 )    Color: Red / Appearance: Turbid / S.010 / pH: x  Gluc: x / Ketone: 40 mg/dL  / Bili: Negative / Urobili: 4.0 E.U./dL   Blood: x / Protein: >=300 mg/dL / Nitrite: POSITIVE   Leuk Esterase: Moderate / RBC: Many /HPF / WBC < 5 /HPF   Sq Epi: x / Non Sq Epi: 0-5 /HPF / Bacteria: Many /HPF        Other Labs:    Cultures:    Pathology:    Imaging Studies:

## 2021-06-30 NOTE — PROGRESS NOTE ADULT - SUBJECTIVE AND OBJECTIVE BOX
CC: Patient is a 71y old  Female who presents with a chief complaint of Hyponatremia, Altered Mental Status (2021 04:14)      INTERVAL EVENTS: WILLIAMS    SUBJECTIVE / INTERVAL HPI: Patient seen and examined at bedside.     ROS: negative unless otherwise stated above.    VITAL SIGNS:  Vital Signs Last 24 Hrs  T(C): 36.4 (2021 05:33), Max: 36.9 (2021 04:41)  T(F): 97.6 (2021 05:33), Max: 98.5 (2021 04:41)  HR: 68 (2021 05:30) (68 - 136)  BP: 136/70 (2021 05:30) (106/60 - 150/84)  BP(mean): 94 (2021 05:30) (94 - 94)  RR: 18 (2021 05:30) (16 - 20)  SpO2: 78% (2021 05:30) (78% - 100%)      21 @ 07:01  -  21 @ 07:00  --------------------------------------------------------  IN: 0 mL / OUT: 2550 mL / NET: -2550 mL        PHYSICAL EXAM:    General: NAD  HEENT: MMM  Neck: supple  Cardiovascular: +S1/S2; RRR  Respiratory: CTA B/L; no W/R/R  Gastrointestinal: soft, NT/ND  Extremities: WWP; no edema, clubbing or cyanosis  Vascular: 2+ radial, DP/PT pulses B/L  Neurological: AAOx2    MEDICATIONS:  MEDICATIONS  (STANDING):  cefTRIAXone   IVPB 1000 milliGRAM(s) IV Intermittent every 24 hours  sodium chloride 2% . 1000 milliLiter(s) (60 mL/Hr) IV Continuous <Continuous>    MEDICATIONS  (PRN):      ALLERGIES:  Allergies    No Known Allergies    Intolerances    Frazier (Unknown)      LABS:                        10.0   9.57  )-----------( 96       ( 2021 21:48 )             30.3     06-30    111<LL>  |  80<L>  |  30<H>  ----------------------------<  92  3.8   |  22  |  1.64<H>    Ca    7.1<L>      2021 01:47  Mg     1.7         TPro  6.9  /  Alb  3.9  /  TBili  0.4  /  DBili  x   /  AST  40  /  ALT  14  /  AlkPhos  68      PT/INR - ( 2021 21:48 )   PT: 12.0 sec;   INR: 1.00          PTT - ( 2021 21:48 )  PTT:30.3 sec  Urinalysis Basic - ( 2021 01:47 )    Color: Red / Appearance: Turbid / S.010 / pH: x  Gluc: x / Ketone: 40 mg/dL  / Bili: Negative / Urobili: 4.0 E.U./dL   Blood: x / Protein: >=300 mg/dL / Nitrite: POSITIVE   Leuk Esterase: Moderate / RBC: Many /HPF / WBC < 5 /HPF   Sq Epi: x / Non Sq Epi: 0-5 /HPF / Bacteria: Many /HPF      CAPILLARY BLOOD GLUCOSE  112 (2021 22:35)      POCT Blood Glucose.: 112 mg/dL (2021 21:18)      RADIOLOGY & ADDITIONAL TESTS: Reviewed. CC: Patient is a 71y old  Female who presents with a chief complaint of Hyponatremia, Altered Mental Status (2021 04:14)      INTERVAL EVENTS: WILLIAMS    SUBJECTIVE / INTERVAL HPI: Patient seen and examined at bedside.     ROS: negative unless otherwise stated above.    VITAL SIGNS:  Vital Signs Last 24 Hrs  T(C): 36.4 (2021 05:33), Max: 36.9 (2021 04:41)  T(F): 97.6 (2021 05:33), Max: 98.5 (2021 04:41)  HR: 68 (2021 05:30) (68 - 136)  BP: 136/70 (2021 05:30) (106/60 - 150/84)  BP(mean): 94 (2021 05:30) (94 - 94)  RR: 18 (2021 05:30) (16 - 20)  SpO2: 78% (2021 05:30) (78% - 100%)      21 @ 07:01  -  21 @ 07:00  --------------------------------------------------------  IN: 0 mL / OUT: 2550 mL / NET: -2550 mL        PHYSICAL EXAM:    General: NAD  HEENT: MMM  Neck: supple  Cardiovascular: +S1/S2; RRR  Respiratory: CTA B/L; no W/R/R  Gastrointestinal: soft, NT/ND  Extremities: WWP; no edema, clubbing or cyanosis  Vascular: 2+ radial, DP/PT pulses B/L  Neurological: AAOx2  Skin: non blanching petechial rash seen on chest     MEDICATIONS:  MEDICATIONS  (STANDING):  cefTRIAXone   IVPB 1000 milliGRAM(s) IV Intermittent every 24 hours  sodium chloride 2% . 1000 milliLiter(s) (60 mL/Hr) IV Continuous <Continuous>    MEDICATIONS  (PRN):      ALLERGIES:  Allergies    No Known Allergies    Intolerances    Frazier (Unknown)      LABS:                        10.0   9.57  )-----------( 96       ( 2021 21:48 )             30.3     06-30    111<LL>  |  80<L>  |  30<H>  ----------------------------<  92  3.8   |  22  |  1.64<H>    Ca    7.1<L>      2021 01:47  Mg     1.7         TPro  6.9  /  Alb  3.9  /  TBili  0.4  /  DBili  x   /  AST  40  /  ALT  14  /  AlkPhos  68      PT/INR - ( 2021 21:48 )   PT: 12.0 sec;   INR: 1.00          PTT - ( 2021 21:48 )  PTT:30.3 sec  Urinalysis Basic - ( 2021 01:47 )    Color: Red / Appearance: Turbid / S.010 / pH: x  Gluc: x / Ketone: 40 mg/dL  / Bili: Negative / Urobili: 4.0 E.U./dL   Blood: x / Protein: >=300 mg/dL / Nitrite: POSITIVE   Leuk Esterase: Moderate / RBC: Many /HPF / WBC < 5 /HPF   Sq Epi: x / Non Sq Epi: 0-5 /HPF / Bacteria: Many /HPF      CAPILLARY BLOOD GLUCOSE  112 (2021 22:35)      POCT Blood Glucose.: 112 mg/dL (2021 21:18)      RADIOLOGY & ADDITIONAL TESTS: Reviewed.

## 2021-06-30 NOTE — CONSULT NOTE ADULT - ASSESSMENT
71 year old female with a history of chronic thrombocytopenia (over 10 years), osteoporosis, depression, hypertension, , ureteral stent placement on 6/28 for ureteral colic/stone obstruction brought by EMS to ED with altered mental status, expressive aphasia, found to be hyponatremic. Evaluation negative for stroke or hemorrhage. Patient states she has been thrombocytopenic for years and has been evaluated in the past by Dr. Miki Billy at [facility] and was not given a diagnosis according to patient.. Patient's platelet counts over the past three years have ranged between 85,000 and 105,000. On 6/17/21, platelets were 103,000, hemoglobin 12.0, WBC 4.35by her PCP. She admits to a distant past history of excessive alcohol intake. Outpatient medications included Tamsulosin, Bactrim, Tranylcypromine. On 6/30/21, hemoglobin 9.7, platelet count 89,000.     Thrombocytopenia -  chronic, possibly autoimmune , with superimposed acute thrombocytopenia due to  blood loss, recent antibiotics and other medications which may cause thrombocytopenia. Possible low grade myelodysplastic syndrome. Not due to splenic sequestration as spleen size normal on CT. Today's count still within patient's chronic baseline.  Anemia - acute, due to  blood loss, catheter/ureteral calculus  Ecchymoses - likely trauma induced during period of mental status changes, thrombocytopenia and possible medication induced qualitative platelet disorder. INR/PTT normal.  Electrolyte imbalance-Hyponatremia - treatment and close monitoring in progress    Plan- Monitor CBC, trend platelets. If possible, avoid platelet suppressing medications. Check Iron panel, ferritin, B12, folate and supplement as needed. Also check LDH, Haptoglobin, ROSALVA, Rheumatoid factor, TSH. Will follow. Discussed with resident team on [address] [place].  Thank you for consultation.

## 2021-06-30 NOTE — PATIENT PROFILE ADULT - NSPROPOAURINARYCATHETER_GEN_A_NUR
Bed: 12  Expected date: 8/31/17  Expected time: 4:53 PM  Means of arrival: [facility]  Comments:  Back Pain  
Lab spoke with supervisor at [place], Per Del Mccann, no drug screening needed.  
Oriented patient to call light.  Informed patient to call with any needs or continuing pain medication needs.  
Patient had vasovagal reaction when sitting up.   EDMD aware of abnormal vitals. RN at bedside monitoring patient.  Significant other at bedside.  
Pt updated on plan of care. Girlfriend at bedside. Lights dimmed. Call light within reach.   
no

## 2021-06-30 NOTE — H&P ADULT - PROBLEM SELECTOR PLAN 1
Na of 113 on admission, downtrended to 111 @1:47am.   - Start 2% hypertonic saline at 60 cc/hr   - Q4hr BMP to check for correction, goal is no more than Na of 118-120 in 24hrs  - Obtain urine and serum osmolarity Na of 113 on admission, downtrended to 111 @1:47am.   - Start 2% hypertonic saline at 60 cc/hr   - Q4hr BMP to check for correction, goal is no more than Na of 118-120 in 24hrs  - Urine Na 43, Uosm 313 suggestive of ?SIADH picture  - f/u AM serum osm

## 2021-06-30 NOTE — BEHAVIORAL HEALTH ASSESSMENT NOTE - NSBHCONSULTRECOMMENDOTHER_PSY_A_CORE FT
Pt has been on parnate for 12 years most recently increasing dose to 110mg Parnate qd last year. Pt has severe recurrent depression with suicide attempts and hospitalizations and has failed multiple trials of antidepressants and has required ECT. We recommend continuation of parnate which patient is very much in agreement with as she is currently experiencing withdrawal symptoms. SIADH could potentially a stress response to recent procedure vs infection less likely parnate as pt has been on medication chronically without symptoms. Recommend decreasing dose of parnate from 110 mg to 60 mg daily in the morning as pt takes at home, Mirtazapine 15mg at bedtime standing (as pt admits to noncompliance with this medication) and continued treatment to manage electrolyte abnormalities that occur/continue.       Psychiatry will continue to follow.

## 2021-06-30 NOTE — PROGRESS NOTE ADULT - ASSESSMENT
72 y/o Female with PMHx HTN, depression and urethral stent placed on 7/28 due to colic presents to the ED with AMS, found to have hyponatremia 113 along with +UA and R hydronephrosis.

## 2021-06-30 NOTE — BEHAVIORAL HEALTH ASSESSMENT NOTE - SUMMARY
71 year old female with a past medical history of hypertension, thrombocytopenia, osteoporosis, depression and urethral stent placed on 6/28 due to ureteral colic presents to [facility] ED on 6/29 with altered mental status. Patient initially unable to state why she called EMS, but team reported abdominal/back pain. On admission stroke code was called as it was reported that pt had some expressive aphagia and only able to answer simple yes/no questions. CT head and CT angio head and neck negative for pathology. Patient also reports she was unable to urinate at home and unable to provide detailed history. In ED pt was hypothermic 96F tachycardic, hypertensive on room air saturating well. tachycardia and temperature resolved while in ED. Pt was noted to have electrode abnormalities including Na 113, Cl 80, BUN 32/Cr 1.73, Ca 7.4, Mg 1.7 and K 3.6. UA notable for large quantity of blood, cloudy, large bilirubin, ketones >80, SG < 1.05, >300 protein, +LE, +nitrites. Pt was given 1L NS and 1g IV tylenol in ED. Pt was admitted to medical telemetry floor for treatment.   Pt has an extensive history of depression that began in her childhood. She has attempted and failed many medications including tofranil, all SSRIs, and nardil (MAO-I less effective than parnate). ECT has been effective in the past. Pt has been asymptomatic from a depression standpoint while on parnate. Pt reports that she has required increasing doses of Parnate as she accumulates tolerance. She had two suicide attempts both 30-40 yrs ago, 1 severe overdose and 1 hanging attempt. Last year pt switched psychiatrists from Dr. Chica Najera, who has followed her for many years, to Dr. Brad Smith. The reason being Dr. Najera no longer sees patients. Pt has been on parnate for 102ears and is currently on 110 mg. Her sodium has always been stable on parnate in the past. Expect for her hospital admission July 16 2019 when she was admitted for symptomatic hyponatremia. She denies current substance abuse but has abused alcohol and cocaine in the past. 71 year old female with a past medical history of hypertension, thrombocytopenia, osteoporosis, depression and urethral stent placed on 6/28 due to ureteral colic presents to [facility] ED on 6/29 with altered mental status. 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 Source being stress from recent infection vs procedure less likely medication or psychogenic polydispisa. Pt has been on parnated for 12 years and her sodium has been stable on parnate in the past, except for her hospital admission July 16 2019 when she was admitted for symptomatic hyponatremia. Pt admitted to medical telemetry floor for hyponatremia and UTI management. 71 year old female with a past medical history of hypertension, thrombocytopenia, osteoporosis, depression and urethral stent placed on 6/28 due to ureteral colic presents to [facility] ED on 6/29 with altered mental status. 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 Source being stress from recent infection vs procedure less likely medication or psychogenic polydipsia. Pt has been on parnate for 12 years and reports that her sodium has been stable on parnate in the past, except for her hospital admission July 16 2019 when she was admitted for symptomatic hyponatremia. Pt admitted to medical telemetry floor for hyponatremia and UTI management.

## 2021-06-30 NOTE — PROGRESS NOTE ADULT - ATTENDING COMMENTS
Suspect SIADH related to MAO inhibitor. Decrease dose as suggested by Psych. D/c 2% saline and fluid restrict. Urology f/u appreciated. Stent removal next week. Continue Ceftriaxone.

## 2021-06-30 NOTE — H&P ADULT - PROBLEM SELECTOR PLAN 3
Urethral stent placed on 6/28, and patient with hydronephrosis on CT abdomen. Urology consulted in ED (attending has not seen patient) and may need CBI?  - f/u urology recommendations service following Reported baseline of A&Ox3. Likely 2/2 combination of hyponatremia, UTI, and hypocalcemia. Must exclude common alternative etiologies.   - serum ammonia within normal limits  - SHARONDA negative  - ordered utox to r/o other organic etiologies  - Mgmt as per hyponatremia and UTI section

## 2021-06-30 NOTE — PROGRESS NOTE ADULT - PROBLEM SELECTOR PLAN 5
Patient with a history of hypertension, though unclear which medications patient takes at home. Would hold off on antihypertensives for now. Patient with a history of hypertension, though unclear which medications patient takes at home.  - Would hold off on antihypertensives for now.

## 2021-06-30 NOTE — PROGRESS NOTE ADULT - PROBLEM SELECTOR PLAN 3
Reported baseline of A&Ox3. Likely 2/2 combination of hyponatremia, UTI, and hypocalcemia. Must exclude common alternative etiologies.   - serum ammonia within normal limits  - SHARONDA negative  - utox negative   - Mgmt as per hyponatremia and UTI section

## 2021-06-30 NOTE — PROGRESS NOTE ADULT - PROBLEM SELECTOR PLAN 6
Unknown chronicity or etiology of anemia, however patient with hematuria on UA following procedure yesterday.  - Maintain active T/S, transfuse to goal > 7.0   - f/u iron panel Unknown chronicity or etiology of anemia, however patient with hematuria on UA following procedure yesterday.  - Maintain active T/S, transfuse to goal > 7.0   - iron panel labs in normal range

## 2021-06-30 NOTE — CONSULT NOTE ADULT - SUBJECTIVE AND OBJECTIVE BOX
HPI  71F PMH HTN, depression and urethral stent placed on 7/28 due to colic presents to the ED with AMS. Stroke code called upon arrival and negative for pathology. Patient reports she was unable to urinate at home and is now with altered mental status. Patient is AAOx3 however mental status is altered from normal, and patient is with some expressive aphagia and is unable to answer questions. Unable to participate in ROS or review home medication. BMP revealed sodium of 113, and patient started on NS at 100 mL/hr and ICU consult called for further treatment. CT abdomen performed with severe right sided hydronephrosis.     ROS: Unable to participate due to current mental status    Home Rx: Unable to participate due to current mental status    VITAL SIGNS:  Vital Signs Last 24 Hrs  T(C): 35.6 (29 Jun 2021 21:46), Max: 35.6 (29 Jun 2021 21:46)  T(F): 96 (29 Jun 2021 21:46), Max: 96 (29 Jun 2021 21:46)  HR: 81 (29 Jun 2021 23:56) (72 - 136)  BP: 129/62 (29 Jun 2021 23:56) (123/70 - 150/84)  RR: 18 (29 Jun 2021 23:56) (18 - 20)  SpO2: 100% (29 Jun 2021 23:56) (91% - 100%)    PHYSICAL EXAM:  General: WDWN, appears in good health   HEENT: NC/AT; PERRL, anicteric sclera; MMM  Neck: supple, no JVD appreciated   Cardiovascular: +S1/S2; RRR  Respiratory: CTA B/L; no W/R/R  Gastrointestinal: soft, NT/ND, no diarrhea or emesis noted  Vascular: 2+ radial, DP/PT pulses B/L  Neurological: AAOx3; no focal deficits    MEDICATIONS:  MEDICATIONS  (STANDING):  sodium chloride 0.9%. 1000 milliLiter(s) (100 mL/Hr) IV Continuous <Continuous>    MEDICATIONS  (PRN):      ALLERGIES:  Allergies    No Known Allergies    Intolerances    Frazier (Unknown)      LABS:                        10.0   9.57  )-----------( 96       ( 29 Jun 2021 21:48 )             30.3     06-29    113<LL>  |  80<L>  |  32<H>  ----------------------------<  110<H>  3.6   |  22  |  1.73<H>    Ca    7.4<L>      29 Jun 2021 21:48  Mg     1.7     06-29    TPro  6.9  /  Alb  3.9  /  TBili  0.4  /  DBili  x   /  AST  40  /  ALT  14  /  AlkPhos  68  06-29    PT/INR - ( 29 Jun 2021 21:48 )   PT: 12.0 sec;   INR: 1.00          PTT - ( 29 Jun 2021 21:48 )  PTT:30.3 sec  Urinalysis Basic - ( 29 Jun 2021 22:44 )    Color: Red / Appearance: Cloudy / SG: <=1.005 / pH: x  Gluc: x / Ketone: >=80 mg/dL  / Bili: Large / Urobili: >=8.0 E.U./dL   Blood: x / Protein: >=300 mg/dL / Nitrite: POSITIVE   Leuk Esterase: Large / RBC: Many /HPF / WBC 5-10 /HPF   Sq Epi: x / Non Sq Epi: 0-5 /HPF / Bacteria: Present /HPF      CAPILLARY BLOOD GLUCOSE  112 (29 Jun 2021 22:35)    RADIOLOGY & ADDITIONAL TESTS: Reviewed.

## 2021-06-30 NOTE — PROGRESS NOTE ADULT - SUBJECTIVE AND OBJECTIVE BOX
STATUS POST:  Cystoscopy with L ureteroscopy, retrograde pyelography, laser lithotripsy, ureteral stent insertion        SUBJECTIVE: Repeat serum Na 115 this AM from 111 overnight.    cefTRIAXone   IVPB 1000 milliGRAM(s) IV Intermittent every 24 hours      Vital Signs Last 24 Hrs  T(C): 36.4 (2021 05:33), Max: 36.9 (2021 04:41)  T(F): 97.6 (2021 05:33), Max: 98.5 (2021 04:41)  HR: 74 (2021 08:44) (68 - 136)  BP: 153/77 (2021 08:44) (106/60 - 153/77)  BP(mean): 106 (2021 08:44) (94 - 106)  RR: 18 (2021 08:44) (16 - 20)  SpO2: 99% (2021 08:44) (78% - 100%)  I&O's Detail    2021 07:01  -  2021 07:00  --------------------------------------------------------  IN:  Total IN: 0 mL    OUT:    Indwelling Catheter - Urethral (mL): 1050 mL    Voided (mL): 1500 mL  Total OUT: 2550 mL    Total NET: -2550 mL      2021 07:01  -  2021 09:33  --------------------------------------------------------  IN:    sodium chloride 2%: 180 mL  Total IN: 180 mL    OUT:  Total OUT: 0 mL    Total NET: 180 mL          Physical Exam:  General: No acute distress, resting comfortably in bed  C/V: normal sinus rhythm  Pulm: Nonlabored breathing, no respiratory distress  Abd: soft, non-tender, non-distended, incisions clean/dry/intact.  Extrem: warm and well perfused, no edema, SCDs in place    LABS:                        9.7    6.32  )-----------( 89       ( 2021 07:38 )             28.2     06-30    115<LL>  |  85<L>  |  27<H>  ----------------------------<  95  3.3<L>   |  20<L>  |  1.78<H>    Ca    7.0<L>      2021 07:38  Phos  3.1     06-  Mg     1.8         TPro  6.1  /  Alb  3.4  /  TBili  0.2  /  DBili  x   /  AST  38  /  ALT  12  /  AlkPhos  64  06-30    PT/INR - ( 2021 07:38 )   PT: 12.3 sec;   INR: 1.03          PTT - ( 2021 07:38 )  PTT:29.2 sec  Urinalysis Basic - ( 2021 01:47 )    Color: Red / Appearance: Turbid / S.010 / pH: x  Gluc: x / Ketone: 40 mg/dL  / Bili: Negative / Urobili: 4.0 E.U./dL   Blood: x / Protein: >=300 mg/dL / Nitrite: POSITIVE   Leuk Esterase: Moderate / RBC: Many /HPF / WBC < 5 /HPF   Sq Epi: x / Non Sq Epi: 0-5 /HPF / Bacteria: Many /HPF        RADIOLOGY & ADDITIONAL STUDIES:   STATUS POST:  Cystoscopy with L ureteroscopy, retrograde pyelography, laser lithotripsy, ureteral stent insertion        SUBJECTIVE: Repeat serum Na 115 this AM from 111 overnight. Exam limited given pt's AMS. Denied back or flank pain.    cefTRIAXone   IVPB 1000 milliGRAM(s) IV Intermittent every 24 hours      Vital Signs Last 24 Hrs  T(C): 36.4 (2021 05:33), Max: 36.9 (2021 04:41)  T(F): 97.6 (2021 05:33), Max: 98.5 (2021 04:41)  HR: 74 (2021 08:44) (68 - 136)  BP: 153/77 (2021 08:44) (106/60 - 153/77)  BP(mean): 106 (2021 08:44) (94 - 106)  RR: 18 (2021 08:44) (16 - 20)  SpO2: 99% (2021 08:44) (78% - 100%)  I&O's Detail    2021 07:01  -  2021 07:00  --------------------------------------------------------  IN:  Total IN: 0 mL    OUT:    Indwelling Catheter - Urethral (mL): 1050 mL    Voided (mL): 1500 mL  Total OUT: 2550 mL    Total NET: -2550 mL      2021 07:01  -  2021 09:33  --------------------------------------------------------  IN:    sodium chloride 2%: 180 mL  Total IN: 180 mL    OUT:  Total OUT: 0 mL    Total NET: 180 mL          Physical Exam:  General: No acute distress, resting comfortably in bed  Neuro: AOx2  Pulm: Nonlabored breathing, no respiratory distress  Abd: soft, non-tender, non-distended  : no CVAT. ocampo in place draining clear red urine, no clots.  Extrem: warm and well perfused, no edema, SCDs in place    LABS:                        9.7    6.32  )-----------( 89       ( 2021 07:38 )             28.2     06-    115<LL>  |  85<L>  |  27<H>  ----------------------------<  95  3.3<L>   |  20<L>  |  1.78<H>    Ca    7.0<L>      2021 07:38  Phos  3.1       Mg     1.8         TPro  6.1  /  Alb  3.4  /  TBili  0.2  /  DBili  x   /  AST  38  /  ALT  12  /  AlkPhos  64  06-30    PT/INR - ( 2021 07:38 )   PT: 12.3 sec;   INR: 1.03          PTT - ( 2021 07:38 )  PTT:29.2 sec  Urinalysis Basic - ( 2021 01:47 )    Color: Red / Appearance: Turbid / S.010 / pH: x  Gluc: x / Ketone: 40 mg/dL  / Bili: Negative / Urobili: 4.0 E.U./dL   Blood: x / Protein: >=300 mg/dL / Nitrite: POSITIVE   Leuk Esterase: Moderate / RBC: Many /HPF / WBC < 5 /HPF   Sq Epi: x / Non Sq Epi: 0-5 /HPF / Bacteria: Many /HPF        RADIOLOGY & ADDITIONAL STUDIES:

## 2021-06-30 NOTE — PROGRESS NOTE ADULT - ASSESSMENT
71F with PMHx HTN, platelet disorder, depression, ?hyponatremia, and gross hematuria s/p cystoscopy, L ureteroscopy laser lithotripsy and ureteral stent placement on 6/28. Intra-operative urine cultures were negative. Pt called answering service 6/29 reporting urinary retention w/ gross hematuria. On presentation to ED, pt with AMS (AOx2), stroke coke called with negative work up and imaging. Pt was in urinary retention with placement of ocampo catheter by . Noted to be hyponatremic to 113 with pre-operative value of 136. UA grossly positive. CTAP notable for known severe right and L ureteral stent in correct position. Serum Na improving on hypertonic saline. Remains AOx2. No CVAT. Ocampo draining with improvement in gross hematuria, does not require CBI (pt's with ureteral stents can have intermittent gross hematuria until the stent is removed).    Recommendations:  - Treatment of UTI per primary team  - Please send urine culture  - Continue to monitor urinary output, trend creatinine  - Ureteral stent placed on 6/28 in correct position  - Will likely require ocampo catheter at discharge  - Nephrology on board  - Rest of care per primary  - No acute urologic intervention at this time, will continue to follow  - Can follow up with Dr. Ramirez within 1 week of discharge  - Discussed with  attending     71F with PMHx HTN, platelet disorder, depression, ?hyponatremia, and gross hematuria s/p cystoscopy, L ureteroscopy laser lithotripsy and ureteral stent placement on 6/28. Intra-operative urine cultures were negative. Pt called answering service 6/29 reporting urinary retention w/ gross hematuria. On presentation to ED, pt with AMS (AOx2), stroke coke called with negative work up and imaging. Pt was in urinary retention with placement of ocampo catheter by . Noted to be hyponatremic to 113 with pre-operative value of 136. UA grossly positive. CTAP notable for known severe right and L ureteral stent in correct position. Serum Na improving on hypertonic saline. Remains AOx2. No CVAT. Ocampo draining with improvement in gross hematuria, does not require CBI (pt's with ureteral stents can have intermittent gross hematuria until the stent is removed).    Recommendations:  - Treatment of UTI per primary team  - Please send urine culture  - Continue to monitor urinary output, trend creatinine  - Ureteral stent placed on 6/28 in correct position, will be removed in office next week  - Will likely require ocampo catheter at discharge  - Nephrology on board  - Rest of care per primary  - No acute urologic intervention at this time, will continue to follow  - Can follow up with Dr. Ramirez within 1 week of discharge  - Discussed with  attending

## 2021-06-30 NOTE — CONSULT NOTE ADULT - ASSESSMENT
70 y/o Female with PMHx HTN, thrombocytopenia, osteoporosis, depression and urethral stent placed on 6/28 due to ureteral colic presents to the ED with AMS. Nephrology consulted for hyponatremia of 115 on admission.     Assessment:  Hyponatremia in a euvolemic state, supported by physical exam and vital signs. Potential etiologies include ADH secretion in response to pain, increased free water intake (psychogenic polydipsia) and SIADH from phychiatric medications.   - Urine Na of 49 on admission with UOsm of 313 and Serum Osm 249  - Serum Na 113-->111 following 0.9% NaCl bolus -->115-->120 following 2% hypertonic saline   - Uric acid within normal limits   - UA indicative of active infection +/- contributions from recent procedural manipulation     Plan/Reccomendations:   - D/C 2% hypertonic saline due to Serum Na of 120 (increase of 9 within 24/hr)  - consider free water/DDAVP administration to prevent further Na increase   - repeat urine electrolytes and osmolality   - consider repeat UA   - renal sonogram   - TSH ordered       Eitan Toth DO  PGY-1 Internal Medicine  72 y/o Female with PMHx HTN, thrombocytopenia, osteoporosis, depression and urethral stent placed on 6/28 due to ureteral colic presents to the ED with AMS. Nephrology consulted for hyponatremia of 115 on admission.     Assessment:  Hyponatremia in a euvolemic state, supported by physical exam and vital signs. Potential etiologies include ADH secretion in response to pain, increased free water intake (psychogenic polydipsia) and SIADH from phychiatric medications.   - Urine Na of 49 on admission with UOsm of 313 and Serum Osm 249  - Serum Na 113-->111 following 0.9% NaCl bolus -->115-->120 following 2% hypertonic saline   - Uric acid within normal limits   - UA indicative of active infection +/- contributions from recent procedural manipulation     Plan/Reccomendations:   - D/C 2% hypertonic saline due to Serum Na of 120 (increase of 9 within 24/hr)  - consider free water/DDAVP administration to prevent further Na increase   - BMP q2hrs until stable serum Na has been reached   - repeat urine electrolytes and osmolality   - consider repeat UA   - renal sonogram   - TSH ordered       Eitan Toth DO  PGY-1 Internal Medicine

## 2021-06-30 NOTE — H&P ADULT - NSHPLABSRESULTS_GEN_ALL_CORE
LABS:                         10.0   9.57  )-----------( 96       ( 2021 21:48 )             30.3         111<LL>  |  80<L>  |  30<H>  ----------------------------<  92  3.8   |  22  |  1.64<H>    Ca    7.1<L>      2021 01:47  Mg     1.7         TPro  6.9  /  Alb  3.9  /  TBili  0.4  /  DBili  x   /  AST  40  /  ALT  14  /  AlkPhos  68      PT/INR - ( 2021 21:48 )   PT: 12.0 sec;   INR: 1.00          PTT - ( 2021 21:48 )  PTT:30.3 sec  Urinalysis Basic - ( 2021 01:47 )    Color: Red / Appearance: Turbid / S.010 / pH: x  Gluc: x / Ketone: 40 mg/dL  / Bili: Negative / Urobili: 4.0 E.U./dL   Blood: x / Protein: >=300 mg/dL / Nitrite: POSITIVE   Leuk Esterase: Moderate / RBC: Many /HPF / WBC < 5 /HPF   Sq Epi: x / Non Sq Epi: 0-5 /HPF / Bacteria: Many /HPF            Lactate, Blood: 0.9 mmol/L ( @ 22:14)      RADIOLOGY, EKG & ADDITIONAL TESTS: .  LABS:                         10.0   9.57  )-----------( 96       ( 2021 21:48 )             30.3         111<LL>  |  80<L>  |  30<H>  ----------------------------<  92  3.8   |  22  |  1.64<H>    Ca    7.1<L>      2021 01:47  Mg     1.7         TPro  6.9  /  Alb  3.9  /  TBili  0.4  /  DBili  x   /  AST  40  /  ALT  14  /  AlkPhos  68      PT/INR - ( 2021 21:48 )   PT: 12.0 sec;   INR: 1.00          PTT - ( 2021 21:48 )  PTT:30.3 sec  Urinalysis Basic - ( 2021 01:47 )    Color: Red / Appearance: Turbid / S.010 / pH: x  Gluc: x / Ketone: 40 mg/dL  / Bili: Negative / Urobili: 4.0 E.U./dL   Blood: x / Protein: >=300 mg/dL / Nitrite: POSITIVE   Leuk Esterase: Moderate / RBC: Many /HPF / WBC < 5 /HPF   Sq Epi: x / Non Sq Epi: 0-5 /HPF / Bacteria: Many /HPF            Lactate, Blood: 0.9 mmol/L ( @ 22:14)      RADIOLOGY, EKG & ADDITIONAL TESTS: Reviewed

## 2021-06-30 NOTE — BEHAVIORAL HEALTH ASSESSMENT NOTE - CASE SUMMARY
Patient is a 71 year old female PPH of depression and past medical history of hypertension, thrombocytopenia, osteoporosis, and urethral stent placed on 6/28 due to ureteral colic presents to [facility] ED on 6/29 with altered mental status. 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 Psychiatry was consulted for recommendations for med management re restarting home antidepressants. Patient has hx of treatment resistant depression, with poor response to multiple antidepressants; now stable for several years on Parnate. Pt's parnate dosage has been constant for 1 year, without any significant changes in her  sodium level, making it a less likely culprit for hyponatremia. Patient is a 71 year old female PPH of depression and past medical history of hypertension, thrombocytopenia, osteoporosis, and urethral stent placed on 6/28 due to ureteral colic presents to [facility] ED on 6/29 with altered mental status. 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 Psychiatry was consulted for recommendations for med management re restarting home antidepressants. Patient has hx of treatment resistant depression, with poor response to multiple antidepressants; now stable for several years on Parnate. Pt's parnate dosage has been constant for 1 year, without any significant changes in her  sodium level, making it a less likely culprit for the current hyponatremia. Patient can be restarted at the max recommended dosage of 60mg with mitrazapine 15mg po qh.

## 2021-06-30 NOTE — CONSULT NOTE ADULT - ATTENDING COMMENTS
70 yo woman seen for hyponatremia  h/o HTN, thrombocytopenia, osteoporosis, depression and urethral stent placed on 6/28 due to ureteral colic presents to the ED with AMS.   Na 115  up to 120 after saline bolus and 2% saline infusion.  Has reached 24 hour goal (limiting rate of rise)- as noted above- dc'd 2% saline  etiology unclear- unlikely related to her parnate use as she has lex on this medication for many years and has Na 137-140 range from earlier in year  component of ADH secretion from pain, anxiety with large oral intake of water, SHIKHA with creat up to 1.78 from baseline of 1.1 earlier in year    If Na if continues to rise will need correct over correction with free water with or without dDAVP  reviewed in detail with med team-  to maintain close follow up    Also with SHIKHA with u/a with blood and protein- but may be residua from lithotripsy and stent  to follow trend      reviewed with med team
Ms. Thompson is a 70 y/o female with ureteral stone and stent placement, CT shows R hydro - urology consulted, a/o x3 but not at baseline, VSS.  The patient was evaluated with the resident, management decisions made, see above for the details, I agree with the A/P.    -AMS  -right ureteral stone  -right hydronephrosis s/p ureteral stent  -hyponatremia  -UTI, not meeting sepsis  >cautious correction of Na  >f/u with urology  >hypertonic fluid with frequent Na check  >empiric antibiotics  Please see above for the details

## 2021-06-30 NOTE — PROGRESS NOTE ADULT - PROBLEM SELECTOR PLAN 7
Hx of depression, no complaints at this time  - Med rec Hx of depression, no complaints at this time  - Medications reconciled, Dr. Brad Tomas prescribes Mirtazepine 15 mg QD and Parnate 10 mg 11 tablets daily.

## 2021-07-01 DIAGNOSIS — N17.9 ACUTE KIDNEY FAILURE, UNSPECIFIED: ICD-10-CM

## 2021-07-01 DIAGNOSIS — D69.6 THROMBOCYTOPENIA, UNSPECIFIED: ICD-10-CM

## 2021-07-01 LAB
ANION GAP SERPL CALC-SCNC: 12 MMOL/L — SIGNIFICANT CHANGE UP (ref 5–17)
ANION GAP SERPL CALC-SCNC: 13 MMOL/L — SIGNIFICANT CHANGE UP (ref 5–17)
ANION GAP SERPL CALC-SCNC: 13 MMOL/L — SIGNIFICANT CHANGE UP (ref 5–17)
ANION GAP SERPL CALC-SCNC: 16 MMOL/L — SIGNIFICANT CHANGE UP (ref 5–17)
BUN SERPL-MCNC: 26 MG/DL — HIGH (ref 7–23)
BUN SERPL-MCNC: 27 MG/DL — HIGH (ref 7–23)
BUN SERPL-MCNC: 30 MG/DL — HIGH (ref 7–23)
BUN SERPL-MCNC: 32 MG/DL — HIGH (ref 7–23)
CALCIUM SERPL-MCNC: 6.9 MG/DL — LOW (ref 8.4–10.5)
CALCIUM SERPL-MCNC: 7.1 MG/DL — LOW (ref 8.4–10.5)
CALCIUM SERPL-MCNC: 7.1 MG/DL — LOW (ref 8.4–10.5)
CALCIUM SERPL-MCNC: 7.4 MG/DL — LOW (ref 8.4–10.5)
CHLORIDE SERPL-SCNC: 90 MMOL/L — LOW (ref 96–108)
CHLORIDE SERPL-SCNC: 91 MMOL/L — LOW (ref 96–108)
CO2 SERPL-SCNC: 18 MMOL/L — LOW (ref 22–31)
CO2 SERPL-SCNC: 19 MMOL/L — LOW (ref 22–31)
CO2 SERPL-SCNC: 19 MMOL/L — LOW (ref 22–31)
CO2 SERPL-SCNC: 20 MMOL/L — LOW (ref 22–31)
COVID-19 SPIKE DOMAIN AB INTERP: POSITIVE
COVID-19 SPIKE DOMAIN ANTIBODY RESULT: >250 U/ML — HIGH
CREAT SERPL-MCNC: 2.21 MG/DL — HIGH (ref 0.5–1.3)
CREAT SERPL-MCNC: 2.28 MG/DL — HIGH (ref 0.5–1.3)
CREAT SERPL-MCNC: 3.29 MG/DL — HIGH (ref 0.5–1.3)
CREAT SERPL-MCNC: 3.52 MG/DL — HIGH (ref 0.5–1.3)
CULTURE RESULTS: NO GROWTH — SIGNIFICANT CHANGE UP
GLUCOSE SERPL-MCNC: 104 MG/DL — HIGH (ref 70–99)
GLUCOSE SERPL-MCNC: 121 MG/DL — HIGH (ref 70–99)
GLUCOSE SERPL-MCNC: 123 MG/DL — HIGH (ref 70–99)
GLUCOSE SERPL-MCNC: 93 MG/DL — SIGNIFICANT CHANGE UP (ref 70–99)
HAPTOGLOB SERPL-MCNC: 78 MG/DL — SIGNIFICANT CHANGE UP (ref 34–200)
HCT VFR BLD CALC: 30.5 % — LOW (ref 34.5–45)
HGB BLD-MCNC: 10 G/DL — LOW (ref 11.5–15.5)
LDH SERPL L TO P-CCNC: 413 U/L — HIGH (ref 50–242)
MAGNESIUM SERPL-MCNC: 1.9 MG/DL — SIGNIFICANT CHANGE UP (ref 1.6–2.6)
MCHC RBC-ENTMCNC: 29.7 PG — SIGNIFICANT CHANGE UP (ref 27–34)
MCHC RBC-ENTMCNC: 32.8 GM/DL — SIGNIFICANT CHANGE UP (ref 32–36)
MCV RBC AUTO: 90.5 FL — SIGNIFICANT CHANGE UP (ref 80–100)
NRBC # BLD: 0 /100 WBCS — SIGNIFICANT CHANGE UP (ref 0–0)
OSMOLALITY UR: 336 MOSM/KG — SIGNIFICANT CHANGE UP (ref 300–900)
PHOSPHATE SERPL-MCNC: 4.6 MG/DL — HIGH (ref 2.5–4.5)
PLATELET # BLD AUTO: 90 K/UL — LOW (ref 150–400)
POTASSIUM SERPL-MCNC: 3.4 MMOL/L — LOW (ref 3.5–5.3)
POTASSIUM SERPL-MCNC: 3.7 MMOL/L — SIGNIFICANT CHANGE UP (ref 3.5–5.3)
POTASSIUM SERPL-MCNC: 4 MMOL/L — SIGNIFICANT CHANGE UP (ref 3.5–5.3)
POTASSIUM SERPL-MCNC: 4.3 MMOL/L — SIGNIFICANT CHANGE UP (ref 3.5–5.3)
POTASSIUM SERPL-SCNC: 3.4 MMOL/L — LOW (ref 3.5–5.3)
POTASSIUM SERPL-SCNC: 3.7 MMOL/L — SIGNIFICANT CHANGE UP (ref 3.5–5.3)
POTASSIUM SERPL-SCNC: 4 MMOL/L — SIGNIFICANT CHANGE UP (ref 3.5–5.3)
POTASSIUM SERPL-SCNC: 4.3 MMOL/L — SIGNIFICANT CHANGE UP (ref 3.5–5.3)
RBC # BLD: 3.37 M/UL — LOW (ref 3.8–5.2)
RBC # FLD: 14.3 % — SIGNIFICANT CHANGE UP (ref 10.3–14.5)
RHEUMATOID FACT SERPL-ACNC: 14 IU/ML — HIGH (ref 0–13)
SARS-COV-2 IGG+IGM SERPL QL IA: >250 U/ML — HIGH
SARS-COV-2 IGG+IGM SERPL QL IA: POSITIVE
SODIUM SERPL-SCNC: 121 MMOL/L — LOW (ref 135–145)
SODIUM SERPL-SCNC: 122 MMOL/L — LOW (ref 135–145)
SODIUM SERPL-SCNC: 122 MMOL/L — LOW (ref 135–145)
SODIUM SERPL-SCNC: 126 MMOL/L — LOW (ref 135–145)
SODIUM UR-SCNC: 74 MMOL/L — SIGNIFICANT CHANGE UP
SPECIMEN SOURCE: SIGNIFICANT CHANGE UP
TSH SERPL-MCNC: 2.62 UIU/ML — SIGNIFICANT CHANGE UP (ref 0.27–4.2)
WBC # BLD: 6.8 K/UL — SIGNIFICANT CHANGE UP (ref 3.8–10.5)
WBC # FLD AUTO: 6.8 K/UL — SIGNIFICANT CHANGE UP (ref 3.8–10.5)

## 2021-07-01 PROCEDURE — 99233 SBSQ HOSP IP/OBS HIGH 50: CPT | Mod: GC

## 2021-07-01 PROCEDURE — 99233 SBSQ HOSP IP/OBS HIGH 50: CPT

## 2021-07-01 PROCEDURE — 99223 1ST HOSP IP/OBS HIGH 75: CPT | Mod: GC

## 2021-07-01 RX ORDER — SODIUM CHLORIDE 9 MG/ML
500 INJECTION, SOLUTION INTRAVENOUS
Refills: 0 | Status: DISCONTINUED | OUTPATIENT
Start: 2021-07-01 | End: 2021-07-02

## 2021-07-01 RX ORDER — CALCIUM CARBONATE 500(1250)
1 TABLET ORAL DAILY
Refills: 0 | Status: DISCONTINUED | OUTPATIENT
Start: 2021-07-01 | End: 2021-07-07

## 2021-07-01 RX ORDER — POTASSIUM CHLORIDE 20 MEQ
40 PACKET (EA) ORAL DAILY
Refills: 0 | Status: DISCONTINUED | OUTPATIENT
Start: 2021-07-01 | End: 2021-07-01

## 2021-07-01 RX ORDER — ACETAMINOPHEN 500 MG
1000 TABLET ORAL ONCE
Refills: 0 | Status: COMPLETED | OUTPATIENT
Start: 2021-07-01 | End: 2021-07-01

## 2021-07-01 RX ORDER — ACETAMINOPHEN 500 MG
650 TABLET ORAL EVERY 6 HOURS
Refills: 0 | Status: DISCONTINUED | OUTPATIENT
Start: 2021-07-01 | End: 2021-07-08

## 2021-07-01 RX ORDER — MAGNESIUM SULFATE 500 MG/ML
1 VIAL (ML) INJECTION ONCE
Refills: 0 | Status: COMPLETED | OUTPATIENT
Start: 2021-07-01 | End: 2021-07-01

## 2021-07-01 RX ORDER — SODIUM BICARBONATE 1 MEQ/ML
650 SYRINGE (ML) INTRAVENOUS EVERY 12 HOURS
Refills: 0 | Status: DISCONTINUED | OUTPATIENT
Start: 2021-07-01 | End: 2021-07-07

## 2021-07-01 RX ORDER — POTASSIUM CHLORIDE 20 MEQ
40 PACKET (EA) ORAL ONCE
Refills: 0 | Status: COMPLETED | OUTPATIENT
Start: 2021-07-01 | End: 2021-07-01

## 2021-07-01 RX ORDER — POLYETHYLENE GLYCOL 3350 17 G/17G
17 POWDER, FOR SOLUTION ORAL ONCE
Refills: 0 | Status: COMPLETED | OUTPATIENT
Start: 2021-07-01 | End: 2021-07-02

## 2021-07-01 RX ADMIN — TRANYLCYPROMINE SULFATE 60 MILLIGRAM(S): 10 TABLET, FILM COATED ORAL at 05:12

## 2021-07-01 RX ADMIN — Medication 100 GRAM(S): at 10:15

## 2021-07-01 RX ADMIN — Medication 1000 MILLIGRAM(S): at 11:00

## 2021-07-01 RX ADMIN — Medication 650 MILLIGRAM(S): at 18:16

## 2021-07-01 RX ADMIN — Medication 650 MILLIGRAM(S): at 19:16

## 2021-07-01 RX ADMIN — SODIUM CHLORIDE 250 MILLILITER(S): 9 INJECTION, SOLUTION INTRAVENOUS at 14:54

## 2021-07-01 RX ADMIN — Medication 400 MILLIGRAM(S): at 10:15

## 2021-07-01 RX ADMIN — Medication 650 MILLIGRAM(S): at 10:15

## 2021-07-01 RX ADMIN — Medication 40 MILLIEQUIVALENT(S): at 10:15

## 2021-07-01 RX ADMIN — CEFTRIAXONE 100 MILLIGRAM(S): 500 INJECTION, POWDER, FOR SOLUTION INTRAMUSCULAR; INTRAVENOUS at 21:43

## 2021-07-01 NOTE — PROGRESS NOTE ADULT - ASSESSMENT
Ms. Thompson is a 70 y/o Female with PMHx HTN, depression and urethral stent placed on 7/28 due to kidney stones admitted to  for severe hyponatremia and UTI, stable for step down to regional medical floor.  Ms. Thompson is a 70 y/o Female with PMHx HTN, depression and urethral stent placed on 7/28 due to kidney stones admitted for severe hyponatremia and UTI. Course complicated by persistent hyponatremia with perplexing etiology.

## 2021-07-01 NOTE — PROGRESS NOTE ADULT - PROBLEM SELECTOR PLAN 8
Hx of depression, patient feels very down and depressed today.   - Medications reconciled, Dr. Brad Tomas prescribes Mirtazepine 15 mg QD and Parnate 10 mg 11 tablets daily.  - Per psych recommendations patient restarted on 60 mg parnate, mirtazepine being held.   - On prior admission patient was discharged on 60 mg parnate but ignored medical advice and restarted her home dose of 110 mg. Unknown chronicity or etiology of anemia, however patient with hematuria on UA following procedure yesterday.  - Maintain active T/S, transfuse to goal > 7.0   - iron panel labs in normal range suggestive of anemia of chronic disease, not RIYA.

## 2021-07-01 NOTE — PROGRESS NOTE ADULT - SUBJECTIVE AND OBJECTIVE BOX
SUBJECTIVE: AVSS. Denies fevers, chills, back or flank pain.     cefTRIAXone   IVPB 1000 milliGRAM(s) IV Intermittent every 24 hours      Vital Signs Last 24 Hrs  T(C): 36.6 (2021 05:45), Max: 36.7 (2021 10:06)  T(F): 97.8 (2021 05:45), Max: 98.1 (2021 10:06)  HR: 66 (2021 03:56) (60 - 66)  BP: 116/67 (2021 03:56) (116/67 - 150/79)  BP(mean): 85 (2021 03:56) (85 - 107)  RR: 18 (2021 03:56) (18 - 20)  SpO2: 100% (2021 03:56) (96% - 100%)  I&O's Detail    2021 07:01  -  2021 07:00  --------------------------------------------------------  IN:    IV PiggyBack: 350 mL    Oral Fluid: 150 mL    sodium chloride 2%: 300 mL  Total IN: 800 mL    OUT:    Indwelling Catheter - Urethral (mL): 2300 mL    Voided (mL): 2070 mL  Total OUT: 4370 mL    Total NET: -3570 mL          Physical Exam:  General: No acute distress, resting comfortably in bed  Pulm: Nonlabored breathing, no respiratory distress  Abd: soft, non-tender, non-distended  : No CVAT. Urine dark red, clot in catheter but draining, flushed to clear pink without difficulty.  Extrem: warm and well perfused, no edema, SCDs in place    LABS:                        10.0   6.80  )-----------( 90       ( 2021 06:00 )             30.5     07-01    121<L>  |  90<L>  |  26<H>  ----------------------------<  104<H>  3.7   |  18<L>  |  2.28<H>    Ca    7.4<L>      2021 06:00  Phos  4.6     07-01  Mg     1.9     07-01    TPro  6.1  /  Alb  3.4  /  TBili  0.2  /  DBili  x   /  AST  38  /  ALT  12  /  AlkPhos  64  06-30    PT/INR - ( 2021 07:38 )   PT: 12.3 sec;   INR: 1.03          PTT - ( 2021 07:38 )  PTT:29.2 sec  Urinalysis Basic - ( 2021 01:47 )    Color: Red / Appearance: Turbid / S.010 / pH: x  Gluc: x / Ketone: 40 mg/dL  / Bili: Negative / Urobili: 4.0 E.U./dL   Blood: x / Protein: >=300 mg/dL / Nitrite: POSITIVE   Leuk Esterase: Moderate / RBC: Many /HPF / WBC < 5 /HPF   Sq Epi: x / Non Sq Epi: 0-5 /HPF / Bacteria: Many /HPF        RADIOLOGY & ADDITIONAL STUDIES:

## 2021-07-01 NOTE — PROGRESS NOTE ADULT - PROBLEM SELECTOR PLAN 8
F: Fluid restrictions  E: replete as needed, monitor Na and Ca  N: NPO until s/s eval   DVT PPX: SCDs (holding lovenox in setting of hematuria)     Dispo: 7Lach F: Fluid restrictions  E: replete as needed, monitor Na and Ca  N: mechanical soft   DVT PPX: SCDs (holding lovenox in setting of hematuria)     Dispo: 7Lach to 7Wo Hx of depression, patient feels very down and depressed today.   - Medications reconciled, Dr. Brad Tomas prescribes Mirtazepine 15 mg QD and Parnate 10 mg 11 tablets daily.  - Per psych recommendations patient restarted on 60 mg parnate, mirtazepine being held.   - On prior admission patient was discharged on 60 mg parnate but ignored medical advice and restarted her home dose of 110 mg.

## 2021-07-01 NOTE — PROGRESS NOTE ADULT - PROBLEM SELECTOR PLAN 5
Urethral stent placed on 6/28, and patient with hydronephrosis on CT abdomen. Urology consulted in ED   - Urology PA placed 20Fr 3 way ocampo. About 225 cc bloody urine noted and irrigated until light pink. no other recommendations at this time   - Urology recommendations stent removal next week, no intervention at this time.   - Patient has chronic right UPJ obstruction  - U/S showed no stones at this time RESOLVED. Reported baseline of A&Ox3.

## 2021-07-01 NOTE — PROGRESS NOTE ADULT - PROBLEM SELECTOR PLAN 4
Urethral stent placed on 6/28, and patient with hydronephrosis on CT abdomen. Urology consulted in ED   - Urology PA placed 20Fr 3 way ocampo. About 225 cc bloody urine noted and irrigated until light pink. no other recommendations at this time   - Urine cx no growth  - Urology recommendations stent removal next week, no intervention at this time.   - Patient has chronic UPJ obstruction  - U/S showed no stones at this time Urethral stent placed on 6/28, and patient with hydronephrosis on CT abdomen. Urology consulted in ED   - Urology PA placed 20Fr 3 way ocampo. About 225 cc bloody urine noted and irrigated until light pink. no other recommendations at this time   - Urology recommendations stent removal next week, no intervention at this time.   - Patient has chronic UPJ obstruction  - U/S showed no stones at this time Urethral stent placed on 6/28, and patient with hydronephrosis on CT abdomen. Urology consulted in ED   - Urology PA placed 20Fr 3 way ocampo. About 225 cc bloody urine noted and irrigated until light pink. no other recommendations at this time   - Urology recommendations stent removal next week, no intervention at this time.   - Patient has chronic right UPJ obstruction  - U/S showed no stones at this time RESOLVED. Reported baseline of A&Ox3. Likely 2/2 combination of hyponatremia, UTI, and hypocalcemia. Must exclude common alternative etiologies. On arrival patient A&Ox2, on 7/1 is A&Ox3  - SHARONDA negative  - U tox negative   - U Cx negative

## 2021-07-01 NOTE — PROGRESS NOTE ADULT - SUBJECTIVE AND OBJECTIVE BOX
Transfer from [facility] to [facility]    HOSPITAL COURSE  71 year old F with a past medical history of hypertension, thrombocytopenia, osteoporosis, depression and urethral stent placed on  due to ureteral colic presents to [facility] ED on  with altered mental status. EMS reported abdominal/back pain. On admission stroke code was called, CT head and CT angio head and neck were negative for pathology. Due to retention and history of stent, ocampo catheter was put in place and flushed by urology.  Pt was admitted to medical telemetry floor for treatment of severe hyponatremia with altered mental status. 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 Ocampo remained bloody throughout telemetry stay. Pt was started on 2% NaCl to increase sodium which trended up to 120 by 8 pm (lowest value of 111 at 1:47 am - correction of 9 within 24 hours.) 2% NaCl discontinued due to over correcting and patient switched to fluid restriction. Psychiatry was consulted for medication recommendations regarding restarting pt's home antidepressants. Pt has an extensive history of depression controlled by MAO-i parnate 110 mg qd and mirtazapine 15 mg qd. Per psych recs patient continued on parnate 60 mg. Mirtazepine is being held due to thrombocytopenia per heme onc rec. Patient started on sodium bicarbonate 650 mg BID on  to continue sodium correction. Q4 bmps, urine osm and serum osm's to be continued until patient at 130 per renal recommendations.  Heme-onc consulted for patients thrombocytopenia, noting that it could be likely due to Mirtazipine; however, rheum panel ordered to rule out rheumatologic etiology.       INTERVAL EVENTS/Subjective: Patient states she has back pain and would like the fluid restriction to be lifted.     ROS: negative unless otherwise stated above.    ICU Vital Signs Last 24 Hrs  T(C): 36.4 (2021 13:00), Max: 36.6 (2021 21:15)  T(F): 97.5 (2021 13:00), Max: 97.9 (2021 21:15)  HR: 67 (2021 13:00) (60 - 72)  BP: 134/68 (2021 13:00) (116/67 - 150/79)  BP(mean): 96 (2021 12:17) (85 - 107)  RR: 17 (2021 13:00) (17 - 20)  SpO2: 97% (2021 13:00) (96% - 100%)        21 @ 07:01  -  07-- @ 07:00  --------------------------------------------------------  IN: 800 mL / OUT: 4370 mL / NET: -3570 mL        PHYSICAL EXAM:    General: NAD  HEENT: MMM  Neck: supple with non blanching petechial rash  Cardiovascular: +S1/S2; RRR  Respiratory: CTA B/L; no W/R/R  Gastrointestinal: soft, NT/ND  Extremities: WWP; no edema, clubbing or cyanosis  Back: TTP at flanks, and paraspinal to touch  Vascular: 2+ radial, DP/PT pulses B/L  Neurological: AAOx3; no focal deficits    MEDICATIONS  (STANDING):  cefTRIAXone   IVPB 1000 milliGRAM(s) IV Intermittent every 24 hours  dextrose 5%. 500 milliLiter(s) (250 mL/Hr) IV Continuous <Continuous>  sodium bicarbonate 650 milliGRAM(s) Oral every 12 hours  tranylcypromine 60 milliGRAM(s) Oral every 24 hours    MEDICATIONS  (PRN):  acetaminophen   Tablet .. 650 milliGRAM(s) Oral every 6 hours PRN Mild Pain (1 - 3)  LORazepam   Injectable 1 milliGRAM(s) IntraMuscular every 6 hours PRN Agitation      ALLERGIES:  Allergies    No Known Allergies    Intolerances    Frazier (Unknown)      LABS:                         10.0   6.80  )-----------( 90       ( 2021 06:00 )             30.5     07-    126<L>  |  91<L>  |  27<H>  ----------------------------<  93  3.4<L>   |  19<L>  |  2.21<H>    Ca    6.9<L>      2021 11:22  Phos  4.6       Mg     1.9         TPro  6.1  /  Alb  3.4  /  TBili  0.2  /  DBili  x   /  AST  38  /  ALT  12  /  AlkPhos  64  06-30    PT/INR - ( 2021 07:38 )   PT: 12.3 sec;   INR: 1.03          PTT - ( 2021 07:38 )  PTT:29.2 sec  Urinalysis Basic - ( 2021 01:47 )    Color: Red / Appearance: Turbid / S.010 / pH: x  Gluc: x / Ketone: 40 mg/dL  / Bili: Negative / Urobili: 4.0 E.U./dL   Blood: x / Protein: >=300 mg/dL / Nitrite: POSITIVE   Leuk Esterase: Moderate / RBC: Many /HPF / WBC < 5 /HPF   Sq Epi: x / Non Sq Epi: 0-5 /HPF / Bacteria: Many /HPF                RADIOLOGY, EKG & ADDITIONAL TESTS: Reviewed.     PT/INR - ( 2021 07:38 )   PT: 12.3 sec;   INR: 1.03          PTT - ( 2021 07:38 )  PTT:29.2 sec  Urinalysis Basic - ( 2021 01:47 )    Color: Red / Appearance: Turbid / S.010 / pH: x  Gluc: x / Ketone: 40 mg/dL  / Bili: Negative / Urobili: 4.0 E.U./dL   Blood: x / Protein: >=300 mg/dL / Nitrite: POSITIVE   Leuk Esterase: Moderate / RBC: Many /HPF / WBC < 5 /HPF   Sq Epi: x / Non Sq Epi: 0-5 /HPF / Bacteria: Many /HPF      CAPILLARY BLOOD GLUCOSE  112 (2021 22:35)      POCT Blood Glucose.: 112 mg/dL (2021 21:18)      RADIOLOGY & ADDITIONAL TESTS: Reviewed. Transfer from [facility]    HOSPITAL COURSE  71 year old F with a past medical history of hypertension, thrombocytopenia, osteoporosis, depression and urethral stent placed on  due to ureteral colic presents to [facility] ED on  with altered mental status. EMS reported abdominal/back pain. On admission stroke code was called, CT head and CT angio head and neck were negative for pathology. Due to retention and history of stent, ocampo catheter was put in place and flushed by urology.  Pt was admitted to medical telemetry floor for treatment of severe hyponatremia with altered mental status. 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 Ocampo remained bloody throughout telemetry stay. Pt was started on 2% NaCl to increase sodium which trended up to 120 by 8 pm (lowest value of 111 at 1:47 am - correction of 9 within 24 hours.) 2% NaCl discontinued due to over correcting and patient switched to fluid restriction. Psychiatry was consulted for medication recommendations regarding restarting pt's home antidepressants. Pt has an extensive history of depression controlled by MAO-i parnate 110 mg qd and mirtazapine 15 mg qd. Per psych recs patient continued on parnate 60 mg. Mirtazepine is being held due to thrombocytopenia per heme onc rec. Patient started on sodium bicarbonate 650 mg BID on  to continue sodium correction. Q4 bmps, urine osm and serum osm's to be continued until patient at 130 per renal recommendations.  Heme-onc consulted for patients thrombocytopenia, noting that it could be likely due to Mirtazipine; however, rheum panel ordered to rule out rheumatologic etiology.       INTERVAL EVENTS/Subjective: Patient states she has back pain and would like the fluid restriction to be lifted.     ROS: negative unless otherwise stated above.    Vital Signs Last 24 Hrs  T(C): 36.4 (2021 13:00), Max: 36.6 (2021 21:15)  T(F): 97.5 (2021 13:00), Max: 97.9 (2021 21:15)  HR: 67 (2021 13:00) (60 - 72)  BP: 134/68 (2021 13:00) (116/67 - 149/77)  BP(mean): 96 (2021 12:17) (85 - 107)  RR: 17 (2021 13:00) (17 - 20)  SpO2: 97% (2021 13:00) (96% - 100%)         @ 07:  -   @ 07:00  --------------------------------------------------------  IN: 800 mL / OUT: 4370 mL / NET: -3570 mL     @ : @ 21:00  --------------------------------------------------------  IN: 440 mL / OUT: 250 mL / NET: 190 mL            PHYSICAL EXAM:    General: NAD  HEENT: MMM  Neck: supple with non blanching petechial rash  Cardiovascular: +S1/S2; RRR  Respiratory: CTA B/L; no W/R/R  Gastrointestinal: soft, NT/ND  Extremities: WWP; no edema, clubbing or cyanosis  Back: TTP at flanks, and paraspinal to touch  Vascular: 2+ radial, DP/PT pulses B/L  Neurological: AAOx3; no focal deficits, moving all extremities spontaneously    MEDICATIONS  (STANDING):  cefTRIAXone   IVPB 1000 milliGRAM(s) IV Intermittent every 24 hours  dextrose 5%. 500 milliLiter(s) (250 mL/Hr) IV Continuous <Continuous>  sodium bicarbonate 650 milliGRAM(s) Oral every 12 hours  tranylcypromine 60 milliGRAM(s) Oral every 24 hours    MEDICATIONS  (PRN):  acetaminophen   Tablet .. 650 milliGRAM(s) Oral every 6 hours PRN Mild Pain (1 - 3)  LORazepam   Injectable 1 milliGRAM(s) IntraMuscular every 6 hours PRN Agitation      ALLERGIES:  Allergies    No Known Allergies    Intolerances    Frazier (Unknown)      LABS:                         10.0   6.80  )-----------( 90       ( 2021 06:00 )             30.5     0701    126<L>  |  91<L>  |  27<H>  ----------------------------<  93  3.4<L>   |  19<L>  |  2.21<H>    Ca    6.9<L>      2021 11:22  Phos  4.6     07-  Mg     1.9         TPro  6.1  /  Alb  3.4  /  TBili  0.2  /  DBili  x   /  AST  38  /  ALT  12  /  AlkPhos  64  06-30    PT/INR - ( 2021 07:38 )   PT: 12.3 sec;   INR: 1.03          PTT - ( 2021 07:38 )  PTT:29.2 sec  Urinalysis Basic - ( 2021 01:47 )    Color: Red / Appearance: Turbid / S.010 / pH: x  Gluc: x / Ketone: 40 mg/dL  / Bili: Negative / Urobili: 4.0 E.U./dL   Blood: x / Protein: >=300 mg/dL / Nitrite: POSITIVE   Leuk Esterase: Moderate / RBC: Many /HPF / WBC < 5 /HPF   Sq Epi: x / Non Sq Epi: 0-5 /HPF / Bacteria: Many /HPF                RADIOLOGY, EKG & ADDITIONAL TESTS: Reviewed.     PT/INR - ( 2021 07:38 )   PT: 12.3 sec;   INR: 1.03          PTT - ( 2021 07:38 )  PTT:29.2 sec  Urinalysis Basic - ( 2021 01:47 )    Color: Red / Appearance: Turbid / S.010 / pH: x  Gluc: x / Ketone: 40 mg/dL  / Bili: Negative / Urobili: 4.0 E.U./dL   Blood: x / Protein: >=300 mg/dL / Nitrite: POSITIVE   Leuk Esterase: Moderate / RBC: Many /HPF / WBC < 5 /HPF   Sq Epi: x / Non Sq Epi: 0-5 /HPF / Bacteria: Many /HPF      CAPILLARY BLOOD GLUCOSE  112 (2021 22:35)      POCT Blood Glucose.: 112 mg/dL (2021 21:18)      RADIOLOGY & ADDITIONAL TESTS: Reviewed.

## 2021-07-01 NOTE — PROGRESS NOTE BEHAVIORAL HEALTH - NSBHCONSULTRECOMMENDOTHER_PSY_A_CORE FT
Pt has been on parnate for 12 years most recently increasing dose to 110mg Parnate qd last year. Pt has severe recurrent depression with suicide attempts and hospitalizations and has failed multiple trials of antidepressants and has required ECT. We recommend continuation of parnate which patient is very much in agreement with as she is currently experiencing withdrawal symptoms. SIADH could potentially a stress response to recent procedure vs infection less likely parnate as pt has been on medication chronically without symptoms. Recommend decreasing dose of parnate from 110 mg to 60 mg daily in the morning as pt takes at home, Mirtazapine 15mg at bedtime standing (as pt admits to noncompliance with this medication) and continued treatment to manage electrolyte abnormalities that occur/continue.       Psychiatry will continue to follow. no

## 2021-07-01 NOTE — PROGRESS NOTE ADULT - ASSESSMENT
72 y/o Female with PMHx HTN, depression and urethral stent placed on 7/28 due to colic presents to the ED with AMS, found to have hyponatremia along with +UA and R hydronephrosis.  70 y/o Female with PMHx HTN, depression and urethral stent placed on 7/28 due to kidney stones admitted to  for severe hyponatremia and UTI, stable for step down to regional medical floor.

## 2021-07-01 NOTE — PROGRESS NOTE ADULT - ASSESSMENT
71 year old female with a history of chronic thrombocytopenia (over 10 years), osteoporosis, depression, hypertension, , ureteral stent placement on 6/28 for ureteral colic/stone obstruction brought by EMS to ED with altered mental status, expressive aphasia, found to be hyponatremic. Evaluation negative for stroke or hemorrhage. Patient states she has been thrombocytopenic for years and has been evaluated in the past by Dr. Miki Billy at [facility] and was not given a diagnosis according to patient.. Patient's platelet counts over the past three years have ranged between 85,000 and 105,000. On 6/17/21, platelets were 103,000, hemoglobin 12.0, WBC 4.35by her PCP. She admits to a distant past history of excessive alcohol intake. Outpatient medications included Tamsulosin, Bactrim, Tranylcypromine. Today, hemoglobin 10.0, platelet count 90,000.     Thrombocytopenia -  chronic, possibly autoimmune , with superimposed acute thrombocytopenia due to  blood loss, recent antibiotics and other medications which may cause thrombocytopenia. Possible low grade myelodysplastic syndrome. Not due to splenic sequestration as spleen size normal on CT. Today's count 90.000 still within patient's chronic baseline.  Anemia - acute, due to  blood loss, catheter/ureteral calculus. Stable at 10.0 today. Hemoglobin was 12.0 as outpatient on 6/17. Doubt hemolysis. Haptoglobin 78. Iron panel an ferritin normal. TSH normal.  Ecchymoses - likely trauma induced during period of mental status changes, thrombocytopenia and possible medication induced qualitative platelet disorder. INR/PTT normal.  Electrolyte imbalance-Hyponatremia - treatment and close monitoring in progress    Plan- Monitor CBC, trend platelets. If possible, avoid platelet suppressing medications. Check, B12, folate and supplement as needed. Also check ROSALVA, Rheumatoid factor. Relatively stable hematologically.

## 2021-07-01 NOTE — SWALLOW BEDSIDE ASSESSMENT ADULT - COMMENTS
Pt received awake, c/o pain in lower back. RN aware. Language skills are intact at the conversational level. Pt noted to have 1/2 eaten dry bagel at bedside. Reports that she has no difficulty swallowing but chewing dry food is difficult and "hard to digest."

## 2021-07-01 NOTE — PROGRESS NOTE ADULT - ASSESSMENT
71F with PMHx HTN, platelet disorder, depression, ?hyponatremia, and gross hematuria s/p cystoscopy, L ureteroscopy laser lithotripsy and ureteral stent placement on 6/28. Intra-operative urine cultures were negative. Pt called answering service 6/29 reporting urinary retention w/ gross hematuria. On presentation to ED, pt with AMS (AOx2), stroke coke called with negative work up and imaging. Pt was in urinary retention with placement of ocampo catheter by . Noted to be hyponatremic to 113 with pre-operative value of 136. UA grossly positive. CTAP notable for known severe right and L ureteral stent in correct position. Serum Na improving on hypertonic saline. Remains AOx2. No CVAT. Ocampo draining with improvement in gross hematuria, does not require CBI (pt's with ureteral stents can have intermittent gross hematuria until the stent is removed).    Recommendations:  - Treatment of UTI per primary team  - Please send urine culture  - Continue to monitor urinary output, trend creatinine  - Ureteral stent placed on 6/28 in correct position, will be removed in office next week  - Will likely require ocampo catheter at discharge  - Nephrology on board  - Rest of care per primary  - No acute urologic intervention at this time, will continue to follow  - Can follow up with Dr. Ramirez within 1 week of discharge  - Discussed with  attending

## 2021-07-01 NOTE — PROGRESS NOTE ADULT - SUBJECTIVE AND OBJECTIVE BOX
CC: Patient is a 71y old  Female who presents with a chief complaint of Hyponatremia, Altered Mental Status (2021 19:23)    INTERVAL EVENTS: WILLIAMS    SUBJECTIVE / INTERVAL HPI: Patient seen and examined at bedside.     ROS: negative unless otherwise stated above.    VITAL SIGNS:  Vital Signs Last 24 Hrs  T(C): 36.6 (2021 05:45), Max: 36.7 (2021 10:06)  T(F): 97.8 (2021 05:45), Max: 98.1 (2021 10:06)  HR: 66 (2021 03:56) (60 - 74)  BP: 116/67 (2021 03:56) (116/67 - 153/77)  BP(mean): 85 (2021 03:56) (85 - 107)  RR: 18 (2021 03:56) (18 - 20)  SpO2: 100% (2021 03:56) (96% - 100%)      21 @ 07:01  -  - @ 07:00  --------------------------------------------------------  IN: 800 mL / OUT: 4370 mL / NET: -3570 mL        PHYSICAL EXAM:    General: NAD  HEENT: MMM  Neck: supple with non blanching petechial rash  Cardiovascular: +S1/S2; RRR  Respiratory: CTA B/L; no W/R/R  Gastrointestinal: soft, NT/ND  Extremities: WWP; no edema, clubbing or cyanosis  Vascular: 2+ radial, DP/PT pulses B/L  Neurological: AAOx3; no focal deficits    MEDICATIONS:  MEDICATIONS  (STANDING):  cefTRIAXone   IVPB 1000 milliGRAM(s) IV Intermittent every 24 hours  tranylcypromine 60 milliGRAM(s) Oral every 24 hours    MEDICATIONS  (PRN):    ALLERGIES:  Allergies    No Known Allergies    Intolerances    Frazier (Unknown)      LABS:                        10.0   6.80  )-----------( 90       ( 2021 06:00 )             30.5     07-    121<L>  |  90<L>  |  26<H>  ----------------------------<  104<H>  3.7   |  18<L>  |  2.28<H>    Ca    7.4<L>      2021 06:00  Phos  4.6     07-  Mg     1.9     07-01    TPro  6.1  /  Alb  3.4  /  TBili  0.2  /  DBili  x   /  AST  38  /  ALT  12  /  AlkPhos  64  06-30    PT/INR - ( 2021 07:38 )   PT: 12.3 sec;   INR: 1.03          PTT - ( 2021 07:38 )  PTT:29.2 sec  Urinalysis Basic - ( 2021 01:47 )    Color: Red / Appearance: Turbid / S.010 / pH: x  Gluc: x / Ketone: 40 mg/dL  / Bili: Negative / Urobili: 4.0 E.U./dL   Blood: x / Protein: >=300 mg/dL / Nitrite: POSITIVE   Leuk Esterase: Moderate / RBC: Many /HPF / WBC < 5 /HPF   Sq Epi: x / Non Sq Epi: 0-5 /HPF / Bacteria: Many /HPF      CAPILLARY BLOOD GLUCOSE  112 (2021 22:35)      POCT Blood Glucose.: 112 mg/dL (2021 21:18)      RADIOLOGY & ADDITIONAL TESTS: Reviewed. Transfer from [facility] to [facility]    CC: Patient is a 71y old F who presents with a chief complaint of Hyponatremia, Altered Mental Status (2021 19:23)    HOSPITAL COURSE  71 year old F with a past medical history of hypertension, thrombocytopenia, osteoporosis, depression and urethral stent placed on  due to ureteral colic presents to [facility] ED on  with altered mental status. EMS reported abdominal/back pain. On admission stroke code was called, CT head and CT angio head and neck were negative for pathology. Patient reported she was unable to urinate at home. In ED pt was hypothermic 96F tachycardic, hypertensive on room air saturating well. Pt was noted to have electrolyte abnormalities including Na 113, Cl 80, BUN 32/Cr 1.73, Ca 7.4, Mg 1.7 and K 3.6 Platelets 96. UA notable for large quantity of blood, cloudy, large bilirubin, ketones >80, SG < 1.05, >300 protein, +LE, +nitrites. Jordan catheter was put in place and flushed by urology.  Pt was admitted to medical telemetry floor for treatment. 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 Jordan remained bloody throughout telemetry stay. Pt was started on 2% NaCl to increase sodium which trended up to 120 by 8 pm (lowest value of 111 at 1:47 am - correction of 9 within 24 hours. 2% NaCl held due to over correcting and patient switched over fluid restriction. Psychiatry was consulted for medication recommendations re restarting pt's home antidepressants. Pt has an extensive history of depression controlled by MAO-i parnate 110 mg daily and mirtazapine 15 mg nightly. Psych consulted patient continued on parnate 60 mg, mirtazapine being held. Patient started on sodium bicarbonate 650 mg BID on  to continue sodium correction. Q4 bmps, urine osm and serum osm's to be continued until patient at baseline per renal recommendations.  Heme-onc consulted for patients thrombocytopenia, rheum panel ordered.    Patient to be stepped down to [facility].     INTERVAL EVENTS: WILLIAMS    SUBJECTIVE / INTERVAL HPI: Patient seen and examined at bedside.     ROS: negative unless otherwise stated above.    VITAL SIGNS:  Vital Signs Last 24 Hrs  T(C): 36.6 (2021 05:45), Max: 36.7 (2021 10:06)  T(F): 97.8 (2021 05:45), Max: 98.1 (2021 10:06)  HR: 66 (2021 03:56) (60 - 74)  BP: 116/67 (2021 03:56) (116/67 - 153/77)  BP(mean): 85 (2021 03:56) (85 - 107)  RR: 18 (2021 03:56) (18 - 20)  SpO2: 100% (2021 03:56) (96% - 100%)      21 @ 07:01  -  - @ 07:00  --------------------------------------------------------  IN: 800 mL / OUT: 4370 mL / NET: -3570 mL        PHYSICAL EXAM:    General: NAD  HEENT: MMM  Neck: supple with non blanching petechial rash  Cardiovascular: +S1/S2; RRR  Respiratory: CTA B/L; no W/R/R  Gastrointestinal: soft, NT/ND  Extremities: WWP; no edema, clubbing or cyanosis  Vascular: 2+ radial, DP/PT pulses B/L  Neurological: AAOx3; no focal deficits    MEDICATIONS:  MEDICATIONS  (STANDING):  cefTRIAXone   IVPB 1000 milliGRAM(s) IV Intermittent every 24 hours  tranylcypromine 60 milliGRAM(s) Oral every 24 hours    MEDICATIONS  (PRN):    ALLERGIES:  Allergies    No Known Allergies    Intolerances    Frazier (Unknown)      LABS:                        10.0   6.80  )-----------( 90       ( 2021 06:00 )             30.5     07-    121<L>  |  90<L>  |  26<H>  ----------------------------<  104<H>  3.7   |  18<L>  |  2.28<H>    Ca    7.4<L>      2021 06:00  Phos  4.6     07-  Mg     1.9     07-01    TPro  6.1  /  Alb  3.4  /  TBili  0.2  /  DBili  x   /  AST  38  /  ALT  12  /  AlkPhos  64  06-30    PT/INR - ( 2021 07:38 )   PT: 12.3 sec;   INR: 1.03          PTT - ( 2021 07:38 )  PTT:29.2 sec  Urinalysis Basic - ( 2021 01:47 )    Color: Red / Appearance: Turbid / S.010 / pH: x  Gluc: x / Ketone: 40 mg/dL  / Bili: Negative / Urobili: 4.0 E.U./dL   Blood: x / Protein: >=300 mg/dL / Nitrite: POSITIVE   Leuk Esterase: Moderate / RBC: Many /HPF / WBC < 5 /HPF   Sq Epi: x / Non Sq Epi: 0-5 /HPF / Bacteria: Many /HPF      CAPILLARY BLOOD GLUCOSE  112 (2021 22:35)      POCT Blood Glucose.: 112 mg/dL (2021 21:18)      RADIOLOGY & ADDITIONAL TESTS: Reviewed. Transfer from [facility] to [facility]    CC: Patient is a 71y old F who presents with a chief complaint of Hyponatremia, Altered Mental Status (2021 19:23)    HOSPITAL COURSE  71 year old F with a past medical history of hypertension, thrombocytopenia, osteoporosis, depression and urethral stent placed on  due to ureteral colic presents to [facility] ED on  with altered mental status. EMS reported abdominal/back pain. On admission stroke code was called, CT head and CT angio head and neck were negative for pathology. Due to retention and history of stent, ocampo catheter was put in place and flushed by urology.  Pt was admitted to medical telemetry floor for treatment of severe hyponatremia with altered mental status. 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 Ocampo remained bloody throughout telemetry stay. Pt was started on 2% NaCl to increase sodium which trended up to 120 by 8 pm (lowest value of 111 at 1:47 am - correction of 9 within 24 hours. 2% NaCl held due to over correcting and patient switched over fluid restriction. Psychiatry was consulted for medication recommendations re restarting pt's home antidepressants. Pt has an extensive history of depression controlled by MAO-i parnate 110 mg daily and mirtazapine 15 mg nightly. Psych consulted patient continued on parnate 60 mg, mirtazapine being held. Patient started on sodium bicarbonate 650 mg BID on  to continue sodium correction. Q4 bmps, urine osm and serum osm's to be continued until patient at baseline per renal recommendations.  Heme-onc consulted for patients thrombocytopenia, rheum panel ordered.    Patient to be stepped down to [facility].     INTERVAL EVENTS: WILLIAMS-10pm sodium 120.     SUBJECTIVE / INTERVAL HPI: Patient seen and examined at bedside.     ROS: negative unless otherwise stated above.    VITAL SIGNS:  Vital Signs Last 24 Hrs  T(C): 36.6 (2021 05:45), Max: 36.7 (2021 10:06)  T(F): 97.8 (2021 05:45), Max: 98.1 (2021 10:06)  HR: 66 (2021 03:56) (60 - 74)  BP: 116/67 (2021 03:56) (116/67 - 153/77)  BP(mean): 85 (2021 03:56) (85 - 107)  RR: 18 (2021 03:56) (18 - 20)  SpO2: 100% (2021 03:56) (96% - 100%)      21 @ 07:01  -  -- @ 07:00  --------------------------------------------------------  IN: 800 mL / OUT: 4370 mL / NET: -3570 mL        PHYSICAL EXAM:    General: NAD  HEENT: MMM  Neck: supple with non blanching petechial rash  Cardiovascular: +S1/S2; RRR  Respiratory: CTA B/L; no W/R/R  Gastrointestinal: soft, NT/ND  Extremities: WWP; no edema, clubbing or cyanosis  Vascular: 2+ radial, DP/PT pulses B/L  Neurological: AAOx3; no focal deficits    MEDICATIONS:  MEDICATIONS  (STANDING):  cefTRIAXone   IVPB 1000 milliGRAM(s) IV Intermittent every 24 hours  tranylcypromine 60 milliGRAM(s) Oral every 24 hours    MEDICATIONS  (PRN):    ALLERGIES:  Allergies    No Known Allergies    Intolerances    Frazier (Unknown)      LABS:                        10.0   6.80  )-----------( 90       ( 2021 06:00 )             30.5     07-01    121<L>  |  90<L>  |  26<H>  ----------------------------<  104<H>  3.7   |  18<L>  |  2.28<H>    Ca    7.4<L>      2021 06:00  Phos  4.6     07-01  Mg     1.9     07-01    TPro  6.1  /  Alb  3.4  /  TBili  0.2  /  DBili  x   /  AST  38  /  ALT  12  /  AlkPhos  64  06-30    PT/INR - ( 2021 07:38 )   PT: 12.3 sec;   INR: 1.03          PTT - ( 2021 07:38 )  PTT:29.2 sec  Urinalysis Basic - ( 2021 01:47 )    Color: Red / Appearance: Turbid / S.010 / pH: x  Gluc: x / Ketone: 40 mg/dL  / Bili: Negative / Urobili: 4.0 E.U./dL   Blood: x / Protein: >=300 mg/dL / Nitrite: POSITIVE   Leuk Esterase: Moderate / RBC: Many /HPF / WBC < 5 /HPF   Sq Epi: x / Non Sq Epi: 0-5 /HPF / Bacteria: Many /HPF      CAPILLARY BLOOD GLUCOSE  112 (2021 22:35)      POCT Blood Glucose.: 112 mg/dL (2021 21:18)      RADIOLOGY & ADDITIONAL TESTS: Reviewed. Transfer from [facility] to [facility]    CC: Patient is a 71y old F who presents with a chief complaint of Hyponatremia, Altered Mental Status (2021 19:23)    HOSPITAL COURSE  71 year old F with a past medical history of hypertension, thrombocytopenia, osteoporosis, depression and urethral stent placed on  due to ureteral colic presents to [facility] ED on  with altered mental status. EMS reported abdominal/back pain. On admission stroke code was called, CT head and CT angio head and neck were negative for pathology. Due to retention and history of stent, ocampo catheter was put in place and flushed by urology.  Pt was admitted to medical telemetry floor for treatment of severe hyponatremia with altered mental status. 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 Ocampo remained bloody throughout telemetry stay. Pt was started on 2% NaCl to increase sodium which trended up to 120 by 8 pm (lowest value of 111 at 1:47 am - correction of 9 within 24 hours.) 2% NaCl discontinued due to over correcting and patient switched to fluid restriction. Psychiatry was consulted for medication recommendations regarding restarting pt's home antidepressants. Pt has an extensive history of depression controlled by MAO-i parnate 110 mg daily and mirtazapine 15 mg nightly. Psych consulted patient continued on parnate 60 mg, mirtazapine being held. Patient started on sodium bicarbonate 650 mg BID on  to continue sodium correction. Q4 bmps, urine osm and serum osm's to be continued until patient at 130 per renal recommendations.  Heme-onc consulted for patients thrombocytopenia, noting that it could be likely due to Mirtazipine; however, rheum panel ordered to rule out rheumatologic etiology.       INTERVAL EVENTS: WILLIAMS-10pm sodium 120.     SUBJECTIVE / INTERVAL HPI: Patient seen and examined at bedside.     ROS: negative unless otherwise stated above.    VITAL SIGNS:  Vital Signs Last 24 Hrs  T(C): 36.6 (2021 05:45), Max: 36.7 (2021 10:06)  T(F): 97.8 (2021 05:45), Max: 98.1 (2021 10:06)  HR: 66 (2021 03:56) (60 - 74)  BP: 116/67 (2021 03:56) (116/67 - 153/77)  BP(mean): 85 (2021 03:56) (85 - 107)  RR: 18 (2021 03:56) (18 - 20)  SpO2: 100% (2021 03:56) (96% - 100%)      21 @ 07:01  -  07-- @ 07:00  --------------------------------------------------------  IN: 800 mL / OUT: 4370 mL / NET: -3570 mL        PHYSICAL EXAM:    General: NAD  HEENT: MMM  Neck: supple with non blanching petechial rash  Cardiovascular: +S1/S2; RRR  Respiratory: CTA B/L; no W/R/R  Gastrointestinal: soft, NT/ND  Extremities: WWP; no edema, clubbing or cyanosis  Vascular: 2+ radial, DP/PT pulses B/L  Neurological: AAOx3; no focal deficits    MEDICATIONS:  MEDICATIONS  (STANDING):  cefTRIAXone   IVPB 1000 milliGRAM(s) IV Intermittent every 24 hours  tranylcypromine 60 milliGRAM(s) Oral every 24 hours    MEDICATIONS  (PRN):    ALLERGIES:  Allergies    No Known Allergies    Intolerances    Frazier (Unknown)      LABS:                        10.0   6.80  )-----------( 90       ( 2021 06:00 )             30.5     07-01    121<L>  |  90<L>  |  26<H>  ----------------------------<  104<H>  3.7   |  18<L>  |  2.28<H>    Ca    7.4<L>      2021 06:00  Phos  4.6     07-  Mg     1.9     07    TPro  6.1  /  Alb  3.4  /  TBili  0.2  /  DBili  x   /  AST  38  /  ALT  12  /  AlkPhos  64  06-30    PT/INR - ( 2021 07:38 )   PT: 12.3 sec;   INR: 1.03          PTT - ( 2021 07:38 )  PTT:29.2 sec  Urinalysis Basic - ( 2021 01:47 )    Color: Red / Appearance: Turbid / S.010 / pH: x  Gluc: x / Ketone: 40 mg/dL  / Bili: Negative / Urobili: 4.0 E.U./dL   Blood: x / Protein: >=300 mg/dL / Nitrite: POSITIVE   Leuk Esterase: Moderate / RBC: Many /HPF / WBC < 5 /HPF   Sq Epi: x / Non Sq Epi: 0-5 /HPF / Bacteria: Many /HPF      CAPILLARY BLOOD GLUCOSE  112 (2021 22:35)      POCT Blood Glucose.: 112 mg/dL (2021 21:18)      RADIOLOGY & ADDITIONAL TESTS: Reviewed. Transfer from [facility] to [facility]    CC: Patient is a 71y old F who presents with a chief complaint of Hyponatremia, Altered Mental Status (2021 19:23)    HOSPITAL COURSE  71 year old F with a past medical history of hypertension, thrombocytopenia, osteoporosis, depression and urethral stent placed on  due to ureteral colic presents to [facility] ED on  with altered mental status. EMS reported abdominal/back pain. On admission stroke code was called, CT head and CT angio head and neck were negative for pathology. Due to retention and history of stent, ocampo catheter was put in place and flushed by urology.  Pt was admitted to medical telemetry floor for treatment of severe hyponatremia with altered mental status. 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 Ocampo remained bloody throughout telemetry stay. Pt was started on 2% NaCl to increase sodium which trended up to 120 by 8 pm (lowest value of 111 at 1:47 am - correction of 9 within 24 hours.) 2% NaCl discontinued due to over correcting and patient switched to fluid restriction. Psychiatry was consulted for medication recommendations regarding restarting pt's home antidepressants. Pt has an extensive history of depression controlled by MAO-i parnate 110 mg qd and mirtazapine 15 mg qd. Per psych recs patient continued on parnate 60 mg. Mirtazepine is being held due to thrombocytopenia per heme onc rec. Patient started on sodium bicarbonate 650 mg BID on  to continue sodium correction. Q4 bmps, urine osm and serum osm's to be continued until patient at 130 per renal recommendations.  Heme-onc consulted for patients thrombocytopenia, noting that it could be likely due to Mirtazipine; however, rheum panel ordered to rule out rheumatologic etiology.       INTERVAL EVENTS: WILLIAMS-10pm sodium 120.     SUBJECTIVE / INTERVAL HPI: Patient seen and examined at bedside.   Was comfortable this morning and did not have any complaints. Patient informed team she has sodium tablets at home.     ROS: negative unless otherwise stated above.    VITAL SIGNS:  Vital Signs Last 24 Hrs  T(C): 36.6 (2021 05:45), Max: 36.7 (2021 10:06)  T(F): 97.8 (2021 05:45), Max: 98.1 (2021 10:06)  HR: 66 (2021 03:56) (60 - 74)  BP: 116/67 (2021 03:56) (116/67 - 153/77)  BP(mean): 85 (2021 03:56) (85 - 107)  RR: 18 (2021 03:56) (18 - 20)  SpO2: 100% (2021 03:56) (96% - 100%)      21 @ 07:01  -  - @ 07:00  --------------------------------------------------------  IN: 800 mL / OUT: 4370 mL / NET: -3570 mL        PHYSICAL EXAM:    General: NAD  HEENT: MMM  Neck: supple with non blanching petechial rash  Cardiovascular: +S1/S2; RRR  Respiratory: CTA B/L; no W/R/R  Gastrointestinal: soft, NT/ND  Extremities: WWP; no edema, clubbing or cyanosis  Vascular: 2+ radial, DP/PT pulses B/L  Neurological: AAOx3; no focal deficits    MEDICATIONS:  MEDICATIONS  (STANDING):  cefTRIAXone   IVPB 1000 milliGRAM(s) IV Intermittent every 24 hours  tranylcypromine 60 milliGRAM(s) Oral every 24 hours    MEDICATIONS  (PRN):    ALLERGIES:  Allergies    No Known Allergies    Intolerances    Frazier (Unknown)      LABS:                        10.0   6.80  )-----------( 90       ( 2021 06:00 )             30.5     07-01    121<L>  |  90<L>  |  26<H>  ----------------------------<  104<H>  3.7   |  18<L>  |  2.28<H>    Ca    7.4<L>      2021 06:00  Phos  4.6     07-01  Mg     1.9     07-01    TPro  6.1  /  Alb  3.4  /  TBili  0.2  /  DBili  x   /  AST  38  /  ALT  12  /  AlkPhos  64  06-30    PT/INR - ( 2021 07:38 )   PT: 12.3 sec;   INR: 1.03          PTT - ( 2021 07:38 )  PTT:29.2 sec  Urinalysis Basic - ( 2021 01:47 )    Color: Red / Appearance: Turbid / S.010 / pH: x  Gluc: x / Ketone: 40 mg/dL  / Bili: Negative / Urobili: 4.0 E.U./dL   Blood: x / Protein: >=300 mg/dL / Nitrite: POSITIVE   Leuk Esterase: Moderate / RBC: Many /HPF / WBC < 5 /HPF   Sq Epi: x / Non Sq Epi: 0-5 /HPF / Bacteria: Many /HPF      CAPILLARY BLOOD GLUCOSE  112 (2021 22:35)      POCT Blood Glucose.: 112 mg/dL (2021 21:18)      RADIOLOGY & ADDITIONAL TESTS: Reviewed.

## 2021-07-01 NOTE — PROGRESS NOTE ADULT - PROBLEM SELECTOR PLAN 6
Unknown chronicity or etiology of anemia, however patient with hematuria on UA following procedure yesterday.  - Maintain active T/S, transfuse to goal > 7.0   - iron panel labs in normal range Patient with a history of hypertension, controlled by nifedipine   - Holding off on antihypertensives for now

## 2021-07-01 NOTE — PROGRESS NOTE ADULT - PROBLEM SELECTOR PLAN 2
Patient with positive UA, in the setting of recent procedure, suspicion for infection.   - Given CTX 1g in ED.  - U Cx and blood cx negative  - Continue with CTX 1g IV, advance to 2g if abscess or bacteremic

## 2021-07-01 NOTE — PROGRESS NOTE ADULT - PROBLEM SELECTOR PLAN 9
F: Fluid restrictions  E: replete as needed, monitor Na and Ca  N: mechanical soft   DVT PPX: SCDs (holding lovenox in setting of thrombocytopenia and hematuria)     Dispo: [facility] to [facility] Hx of depression, patient feels very down and depressed today.   - Medications reconciled, Dr. Brad Tomas prescribes Mirtazepine 15 mg QD and Parnate 10 mg 11 tablets daily.  - Per psych recommendations patient restarted on 60 mg parnate, mirtazepine being held.   - On prior admission patient was discharged on 60 mg parnate but ignored medical advice and restarted her home dose of 110 mg.  - Concern for thrombocytopenia and SIADH being medication-induced as above

## 2021-07-01 NOTE — SWALLOW BEDSIDE ASSESSMENT ADULT - SWALLOW EVAL: DIAGNOSIS
Functional mechanical swallow w no overt signs of aspiration. Modified texture solids rec'd due to Pt preference for soft solids

## 2021-07-01 NOTE — PROGRESS NOTE BEHAVIORAL HEALTH - NSBHFUPINTERVALHXFT_PSY_A_CORE
Patient seen at bedside. She presents in bed, with sleep mask covering her eyes.   She reports that her mood is low as she only received 1/2 of her regular dosage of parnate. States that she was offered 6 tabs however she dropped one tablet on the floor and only took 5. Asks repeatedly to have her dosage increased, at least to 70mg po daily  (per pt she was prescribed this dosage in a rehab facility and was ok with it). She is able to verbalize understanding of the reasons behind decreasing her parnate dosage. States that she is experiencing withdrawal symptoms which is current affecting her mood. Denies any SI/HI. Presents AAox3.   Chart review shows that patient's Na increased to 120.

## 2021-07-01 NOTE — PROGRESS NOTE ADULT - ATTENDING COMMENTS
Patient discussed with resident team and plan of care reviewed. I have personally reviewed all pertinent labs and imaging and performed an independent history and physical. 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 Hospital course also notable for acute kidney injury and urinary tract infection. Stepped down to [facility] 7/1.    Family history, social history, past medical history, and home medications from ICU H&P were reviewed and are unchanged except as noted below.    Na improving but above goal rate; giving D5 now so as to prevent rapid overcorrection, goal Na 127-129 at 6am tomorrow. Unclear why creatinine is increasing, ureteral stent in place per urology evaluation – will monitor urine output, Lasix scan per renal. Appreciate psych recs – decreasing dose of MAOI for now, mirtazapine on hold as heme/onc concerned may be related to thrombocytopenia. Will need to get collateral from patient’s psychiatrist in am for med history.

## 2021-07-01 NOTE — PROGRESS NOTE ADULT - PROBLEM SELECTOR PLAN 3
Reported baseline of A&Ox3. Likely 2/2 combination of hyponatremia, UTI, and hypocalcemia. Must exclude common alternative etiologies. On arrival patient A&Ox2, 7/1 patient back to A&Ox3  - serum ammonia within normal limits  - SHARONDA negative  - U tox negative   - U Cx negative Reported baseline of A&Ox3. Likely 2/2 combination of hyponatremia, UTI, and hypocalcemia. Must exclude common alternative etiologies. On arrival patient A&Ox2, 7/1 patient back to A&Ox3  - serum ammonia within normal limits  - SHARONDA negative  - U tox negative   - f/u U Cx Reported baseline of A&Ox3. Likely 2/2 combination of hyponatremia, UTI, and hypocalcemia. Must exclude common alternative etiologies. On arrival patient A&Ox2, on 7/1 is A&Ox3  - serum ammonia within normal limits  - SHARONDA negative  - U tox negative   - U Cx negative RESOLVED. Reported baseline of A&Ox3. Likely 2/2 combination of hyponatremia, UTI, and hypocalcemia. Must exclude common alternative etiologies. On arrival patient A&Ox2, on 7/1 is A&Ox3  - serum ammonia within normal limits  - SHARONDA negative  - U tox negative   - U Cx negative Hx of thrombocytopenia according to patient.  - Platelets 96 on this admission  - Platelets 89 on prior admission in 2019   - Per heme onc recommendations patient's home medication of mirtazapine should be held, as it could be causing the low plts.   - f/u rheum labs ordered

## 2021-07-01 NOTE — PROGRESS NOTE BEHAVIORAL HEALTH - RISK ASSESSMENT
patient presents at chronic risk due to her chronic mental illness and past hx of SA  patient presents at low acute risk: denies any SI, presents future oriented and motivated for treatment; good therapeutic alliance;

## 2021-07-01 NOTE — PROGRESS NOTE ADULT - PROBLEM SELECTOR PLAN 9
F: Fluid restrictions  E: replete as needed, monitor Na and Ca  N: mechanical soft   DVT PPX: SCDs (holding lovenox in setting of thrombocytopenia and hematuria)     Dispo: [facility] to [facility]

## 2021-07-01 NOTE — PROGRESS NOTE ADULT - PROBLEM SELECTOR PLAN 3
Hx of thrombocytopenia according to patient.  - Platelets 96 on this admission  - Platelets 89 on prior admission in 2019   - Per heme onc recommendations patient's home medication of mirtazapine should be held, as it could be causing the low plts.   - f/u rheum labs ordered Baseline Cr from 2019 ~0.9-1.0 per chart review  - Steadily increasing Cr of unknown etiology  - Renal consulted for hyponatremia  - Order renal lytes in AM  - Euvolemic on exam with only slight hyperosmolar urine with increased urine Na  - Differential remains wide; Renal consider nuclear scan for persisting SHIKHA with hyponatremia

## 2021-07-01 NOTE — PROGRESS NOTE ADULT - PROBLEM SELECTOR PLAN 1
- Patient was on 2% hypertonic saline at 60 cc/hr on 6/30 which was d/c per renal recommendation after Na increased 8 in 24 hours. On fluid restriction now  - Q4hr BMP, U osm and serum osm to check values  - Urine Na 43, Uosm 313 suggestive of SIADH picture  - Serum osm 249   - Urine osm 220 - Patient was on 2% hypertonic saline at 60 cc/hr on 6/30 which was d/c per renal recommendation after Na increased 8 in 24 hours.  - On fluid restriction now with sodium bicarbonate 650 mg oral BID.  - Q4hr BMP, U osm and serum osm to check values  - Urine Na 43, Uosm 313 suggestive of SIADH picture  - Serum osm 249   - Urine osm 220 - Patient was on 2% hypertonic saline at 60 cc/hr on 6/30 which was d/c per renal recommendation after Na increased 8 in 24 hours.  - Now on 1L fluid restriction.    with sodium bicarbonate 650 mg oral BID.  - Q4hr BMP, U osm and serum osm to check values  - Urine Na 43, Uosm 313 suggestive of SIADH picture  - Serum osm 249   - Urine osm 220 - Patient was on 2% hypertonic saline at 60 cc/hr on 6/30 which was d/c per renal recommendation after Na increased 8 in 24 hours.  - Now on 1L fluid restriction.   - Started on sodium bicarbonate 650 mg oral BID.  - Q4hr BMP, U osm and serum osm to check values  - Patients use of MAOi's, recent urology procedure and reccomendations to hydrate heavily and combined lab values of Urine Na 43, Uosm 313 suggestive of SIADH picture

## 2021-07-01 NOTE — PROGRESS NOTE ADULT - PROBLEM SELECTOR PLAN 6
Patient with a history of hypertension, controlled by nifedipine   - Holding off on antihypertensives for now - Urology recommendations stent removal next week, no intervention at this time.   - Patient has chronic right UPJ obstruction  - U/S showed no stones at this time  - F/U urology recommendations

## 2021-07-01 NOTE — PROGRESS NOTE ADULT - SUBJECTIVE AND OBJECTIVE BOX
The patient was seen and examined.      71 year old female with a history of chronic thrombocytopenia (over 10 years), osteoporosis, depression, hypertension, , ureteral stent placement on  for ureteral colic/stone obstruction brought by EMS to ED with altered mental status, expressive aphasia, found to be hyponatremic. Evaluation negative for stroke or hemorrhage. Patient states she has been thrombocytopenic for years and has been evaluated in the past by Dr. Miki Billy at [facility] and was not given a diagnosis according to patient.. Patient's platelet counts over the past three years have ranged between 85,000 and 105,000. On 21, platelets were 103,000, hemoglobin 12.0, WBC 4.35by her PCP. She admits to a distant past history of excessive alcohol intake. Outpatient medications included Tamsulosin, Bactrim, Tranylcypromine. Today, hemoglobin 10.0, platelet count 90,000.         .         Allergies    No Known Allergies    Intolerances    Frazier (Unknown)      Medications:  MEDICATIONS  (STANDING):  cefTRIAXone   IVPB 1000 milliGRAM(s) IV Intermittent every 24 hours  sodium bicarbonate 650 milliGRAM(s) Oral every 12 hours  tranylcypromine 60 milliGRAM(s) Oral every 24 hours    MEDICATIONS  (PRN):          Interval History:    The patient denies chest pain or SOB. Complains of dry mouth. No nausea/vomiting/fevers/chills/night sweats.  Has fatigue, no headaches/dizziness.  Appetite is decreased.  Has  abdominal discomfort and bloatedness / constipation.  No melena or hematochezia.    Has dysuria/hematuria. Catheter remains.  No history of easy bruising/bleeding.  No gingival bleeding or epistaxis.  No leg pain or leg swelling.    ROS is otherwise negative.    PHYSICAL EXAM:    T(F): 97.8 (21 @ 05:45), Max: 97.9 (21 @ 21:15)  HR: 72 (21 @ 08:40) (60 - 72)  BP: 127/63 (21 @ 08:40) (116/67 - 150/79)  RR: 18 (21 @ 08:40) (18 - 20)  SpO2: 100% (21 @ 08:40) (96% - 100%)  Wt(kg): --    Daily     Daily     Gen: well developed, well nourished, uncomfortable  HEENT: normocephalic/atraumatic, mild conjunctival pallor, no scleral icterus, no oral thrush/mucosal bleeding/mucositis  Neck: supple, no masses, no JVD  Cardiovascular: RR, nl S1S2, no murmurs/rubs/gallops  Respiratory: clear air entry b/l  Gastrointestinal: BS+, soft, mildly tender and distended, no masses, no splenomegaly, no hepatomegaly, no evidence for ascites  Extremities: no clubbing/cyanosis, no edema, no calf tenderness  Neurological: no focal deficits  Skin: no rash on visible skin, has excessive ecchymoses on extremities, trauma related petechiae as before  Lymph Nodes:  no significant peripheral adenopathy   Musculoskeletal:  full ROM  Psychiatric:  mood stable        Labs:                          10.0   6.80  )-----------( 90       ( 2021 06:00 )             30.5     CBC Full  -  ( 2021 06:00 )  WBC Count : 6.80 K/uL  RBC Count : 3.37 M/uL  Hemoglobin : 10.0 g/dL  Hematocrit : 30.5 %  Platelet Count - Automated : 90 K/uL  Mean Cell Volume : 90.5 fl  Mean Cell Hemoglobin : 29.7 pg  Mean Cell Hemoglobin Concentration : 32.8 gm/dL  Auto Neutrophil # : x  Auto Lymphocyte # : x  Auto Monocyte # : x  Auto Eosinophil # : x  Auto Basophil # : x  Auto Neutrophil % : x  Auto Lymphocyte % : x  Auto Monocyte % : x  Auto Eosinophil % : x  Auto Basophil % : x    PT/INR - ( 2021 07:38 )   PT: 12.3 sec;   INR: 1.03          PTT - ( 2021 07:38 )  PTT:29.2 sec    -    121<L>  |  90<L>  |  26<H>  ----------------------------<  104<H>  3.7   |  18<L>  |  2.28<H>    Ca    7.4<L>      2021 06:00  Phos  4.6     07-  Mg     1.9     07-    TPro  6.1  /  Alb  3.4  /  TBili  0.2  /  DBili  x   /  AST  38  /  ALT  12  /  AlkPhos  64  06      Urinalysis Basic - ( 2021 01:47 )    Color: Red / Appearance: Turbid / S.010 / pH: x  Gluc: x / Ketone: 40 mg/dL  / Bili: Negative / Urobili: 4.0 E.U./dL   Blood: x / Protein: >=300 mg/dL / Nitrite: POSITIVE   Leuk Esterase: Moderate / RBC: Many /HPF / WBC < 5 /HPF   Sq Epi: x / Non Sq Epi: 0-5 /HPF / Bacteria: Many /HPF        Other Labs:    Cultures:    Pathology:    Imaging Studies:

## 2021-07-01 NOTE — PROGRESS NOTE ADULT - ATTENDING COMMENTS
Hyponatremia 2/2 SIADH +/- nausea/urinary retention/fluid intake - euvolemic - acute on chronic - improved and at goal this am with fluid restriction and hypertonic saline yesterday however this afternoon Na at/near goal for tmrw morning. Giving D5W 500cc and recheck serum Na, urine Osm, urine Na.   SHIKHA on CKD II 2/2 chronic R hydro - unclear etiology for SHIKHA, may be obstructive - check renal nuclear scan. May be due to gross hematuria/pigment nephropathy which requires supportive care/ocampo. Non oliguric. d/w Dr Ramirez  Hypocalcemia - may be related to SHIKHA, h/o alcoholism although denies active use. Check vit D 25, 1,25, PTH and supplement calcium.

## 2021-07-01 NOTE — PROGRESS NOTE ADULT - PROBLEM SELECTOR PLAN 4
RESOLVED. Reported baseline of A&Ox3. Hx of thrombocytopenia according to patient.  - Platelets 96 on this admission  - Platelets 89 on prior admission in 2019   - Per heme onc recommendations patient's home medication of mirtazapine should be held, as it could be causing the low plts.   - f/u rheum labs ordered  - f/u Heme/onc recommendations

## 2021-07-01 NOTE — PROGRESS NOTE BEHAVIORAL HEALTH - SUMMARY
71 year old female with a past medical history of hypertension, thrombocytopenia, osteoporosis, depression and urethral stent placed on 6/28 due to ureteral colic presents to [facility] ED on 6/29 with altered mental status. 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 Psychiatry was consulted for recommendations re pt's home medications.   Patient presents today with worsening mood and physical symptoms consistent with MAOI withdrawal after not receiving the parnate for 2 days and lowering the dosage to the max recommended of 60mg (home dosage of 110mg). Patient has significant physical and mental dependency on the parnate.   Patient's Na today is 120.  Plan:  -continue parnate 60mg po daily for treatment of depression;   -restart mirtazapine 15mg po qhs  -prns for anxiety/agitation lorazepam 1mg po q6h prn  -CL to follow

## 2021-07-01 NOTE — PROGRESS NOTE ADULT - PROBLEM SELECTOR PLAN 5
Patient with a history of hypertension, though unclear which medications patient takes at home.  - Would hold off on antihypertensives for now. Patient with a history of hypertension, though unclear which medications patient takes at home.  - Holding off on antihypertensives for now Urethral stent placed on 6/28, and patient with hydronephrosis on CT abdomen. Urology consulted in ED   - Urology PA placed 20Fr 3 way ocampo. About 225 cc bloody urine noted and irrigated until light pink. no other recommendations at this time   - Urology recommendations stent removal next week, no intervention at this time.   - Patient has chronic right UPJ obstruction  - U/S showed no stones at this time

## 2021-07-01 NOTE — PROGRESS NOTE ADULT - ATTENDING COMMENTS
Pt awake and alert with Na 121 this AM. Continue fluid restriction and avoid Mirtazapine with drug interaction with MAO inhibitor. continue low dose Parnate and add Nabicarb tabs. Renal to see with acute on chronic kidney injury. Urology following. No plan for intervention on chronic  right urethral stricture per Urology.

## 2021-07-01 NOTE — PROGRESS NOTE ADULT - PROBLEM SELECTOR PLAN 7
Unknown chronicity or etiology of anemia, however patient with hematuria on UA following procedure yesterday.  - Maintain active T/S, transfuse to goal > 7.0   - iron panel labs in normal range suggestive of anemia of chronic disease, not RIYA. Patient with a history of hypertension, controlled by nifedipine   - Holding off on antihypertensives for now  - Restart nifedipine once hyponatremia resolved

## 2021-07-01 NOTE — PROGRESS NOTE ADULT - PROBLEM SELECTOR PLAN 7
Hx of depression, no complaints at this time  - Medications reconciled, Dr. Brad Tomas prescribes Mirtazepine 15 mg QD and Parnate 10 mg 11 tablets daily.  - Per psych recommendations patient restarted on 60 mg parnate Hx of depression, no complaints at this time  - Medications reconciled, Dr. Brad Tomas prescribes Mirtazepine 15 mg QD and Parnate 10 mg 11 tablets daily.  - Per psych recommendations patient restarted on 60 mg parnate, mirtazepine being held Unknown chronicity or etiology of anemia, however patient with hematuria on UA following procedure yesterday.  - Maintain active T/S, transfuse to goal > 7.0   - iron panel labs in normal range suggestive of anemia of chronic disease, not RIYA.

## 2021-07-01 NOTE — PROGRESS NOTE ADULT - SUBJECTIVE AND OBJECTIVE BOX
Patient is a 71y Female seen and evaluated at bedside. She reports no acute events/changes overnight. Her mentation is greatly improved on examination compared to yesterday and her distress level has decreased greatly. She is using the restroom without difficulty and is on a fluid restriction diet.        Meds:    cefTRIAXone   IVPB 1000 every 24 hours  sodium bicarbonate 650 every 12 hours  tranylcypromine 60 every 24 hours      T(C): , Max: 36.6 (21 @ 21:15)  T(F): , Max: 97.9 (21 @ 21:15)  HR: 72 (21 @ 08:40)  BP: 127/63 (21 @ 08:40)  BP(mean): 90 (21 @ 08:40)  RR: 18 (21 @ 08:40)  SpO2: 100% (21 @ 08:40)  Wt(kg): --     @ :  -   @ 07:00  --------------------------------------------------------  IN: 800 mL / OUT: 4370 mL / NET: -3570 mL     @ :  -   @ 11:23  --------------------------------------------------------  IN: 440 mL / OUT: 150 mL / NET: 290 mL          Review of Systems:  CONSTITUTIONAL: No fever or chills, No fatigue or tiredness  RESPIRATORY: No shortness of breath, cough, hemoptysis  CARDIOVASCULAR: No chest pain or shortness of breath  GASTROINTESTINAL: continued suprapubic discomfort, No nausea or vomiting, No diarrhea  GENITOURINARY: No dysuria or urinary burning, No difficulty passing urine, hematuria present   NEUROLOGICAL: No headaches or blurred vision  SKIN: No skin rashes but + scattered ecchymoses   MUSCULOSKELETAL: No arthralgia, No leg edema, No muscle pain      PHYSICAL EXAM:  GENERAL: well-developed, well nourished, alert, no acute distress at present  NECK: supple, No JVD  CHEST/LUNG: Clear to auscultation bilaterally  HEART: normal S1S2, RRR  ABDOMEN: Soft, mild suprapubic tenderness, +BS, No flank tenderness bilateral  EXTREMITIES: No clubbing, cyanosis, or edema, + scattered ecchymoses   NEUROLOGY: AAO x3, no focal neurological deficit  LINES: [place]       LABS:                        10.0   6.80  )-----------( 90       ( 2021 06:00 )             30.5     07    121<L>  |  90<L>  |  26<H>  ----------------------------<  104<H>  3.7   |  18<L>  |  2.28<H>    Ca    7.4<L>      2021 06:00  Phos  4.6       Mg     1.9         TPro  6.1  /  Alb  3.4  /  TBili  0.2  /  DBili  x   /  AST  38  /  ALT  12  /  AlkPhos  64  06-30      PT/INR - ( 2021 07:38 )   PT: 12.3 sec;   INR: 1.03          PTT - ( 2021 07:38 )  PTT:29.2 sec  Urinalysis Basic - ( 2021 01:47 )    Color: Red / Appearance: Turbid / S.010 / pH: x  Gluc: x / Ketone: 40 mg/dL  / Bili: Negative / Urobili: 4.0 E.U./dL   Blood: x / Protein: >=300 mg/dL / Nitrite: POSITIVE   Leuk Esterase: Moderate / RBC: Many /HPF / WBC < 5 /HPF   Sq Epi: x / Non Sq Epi: 0-5 /HPF / Bacteria: Many /HPF      Sodium, Random Urine: 74 mmol/L ( @ 09:30)  Osmolality, Random Urine: 336 mosm/kg ( @ 09:30)  Osmolality, Random Urine: 220 mosm/kg ( @ 14:48)  Osmolality, Random Urine: 218 mosm/kg ( @ 13:21)  Creatinine, Random Urine: 19 mg/dL ( @ 22:56)  Sodium, Random Urine: 43 mmol/L ( @ 22:56)  Osmolality, Random Urine: 313 mosm/kg ( @ 22:56)        RADIOLOGY & ADDITIONAL STUDIES: Reviewed

## 2021-07-01 NOTE — PROGRESS NOTE ADULT - PROBLEM SELECTOR PLAN 1
- Patient was on 2% hypertonic saline at 60 cc/hr on 6/30 which was d/c per renal recommendation after Na increased 8 in 24 hours.  - Now on 1L fluid restriction.   - Started on sodium bicarbonate 650 mg oral BID.  - Q4hr BMP, U osm and serum osm to check values  - Patients use of MAOi's, recent urology procedure and reccomendations to hydrate heavily and combined lab values of Urine Na 43, Uosm 313 suggestive of SIADH picture - Patient was on 2% hypertonic saline at 60 cc/hr on 6/30 which was d/c per renal recommendation after Na increased 8 in 24 hours.  - Now on 1L fluid restriction.   - Started on sodium bicarbonate 650 mg oral BID.  - Q4hr BMP, U osm and serum osm to check values  - SIADH (medication or pain induced) or Psychogenic polydipsia as cause  - Renal consulted follow up recommendations

## 2021-07-01 NOTE — PROGRESS NOTE ADULT - ASSESSMENT
72 y/o Female with PMHx HTN, thrombocytopenia, osteoporosis, depression and urethral stent placed on 6/28 due to ureteral colic presents to the ED with AMS. Nephrology consulted for hyponatremia of 115 on admission.     Assessment:  Hyponatremia in a euvolemic state, supported by physical exam and vital signs. Potential etiologies include ADH secretion in response to pain, increased free water intake (psychogenic polydipsia) and SIADH from phychiatric medications.   - Urine Na of 49 on admission with UOsm of 313 and Serum Osm 249  - Serum Na 113-->111 following 0.9% NaCl bolus -->115-->120 following 2% hypertonic saline, stopped 2% NaCl and Na stabilized to 120-122  - Uric acid within normal limits   - UA indicative of active infection +/- contributions from recent procedural manipulation   - TSH within normal range     Plan/Reccomendations:   - Following BMP, restart 2% NaCl at a rate of 60cc/hr with a goal of Na 127 by tomorrow 6AM   - close monitoring of Na correction  - BMP q4hrs   - repeat urine electrolytes and osmolality daily   - consider repeat UA   - renal sonogram       Eitan Toth DO  PGY-1 Internal Medicine  72 y/o Female with PMHx HTN, thrombocytopenia, osteoporosis, depression and urethral stent placed on 6/28 due to ureteral colic presents to the ED with AMS. Nephrology consulted for hyponatremia of 115 on admission.     Assessment:  #Hyponatremia in a euvolemic state, supported by physical exam and vital signs. Potential etiologies include ADH secretion in response to pain, increased free water intake (psychogenic polydipsia) and SIADH from phychiatric medications.   - D/C'd 2% NaCl due to reaching of goal Na 127  - 500cc D5W to prevent over-correction   - q4 BMP's    #SHIKHA - possible etiologies include intrarenal process due to recent procedure, infection (dirty UA) and post renal obstruction from blood clots   - repeat UA  - Urine Na and osmolality daily, prn for Na monitoring     #Hypocalcemia - possible etiology of SHIKHA causing decreased Ca cycling   - PTH  - Calcidiol  - Calcitriol       Eitan Toth DO  PGY-1 Internal Medicine  70 y/o Female with PMHx HTN, thrombocytopenia, osteoporosis, depression and urethral stent placed on 6/28 due to ureteral colic presents to the ED with AMS. Nephrology consulted for hyponatremia of 115 on admission.     Assessment:  #Hyponatremia in a euvolemic state, supported by physical exam and vital signs. Potential etiologies include ADH secretion in response to pain, increased free water intake (psychogenic polydipsia) and SIADH from phychiatric medications.   - D/C'd 2% NaCl due to reaching of goal Na 127  - 500cc D5W to prevent over-correction   - q4 BMP's    #SHIKHA - possible etiologies include intrarenal process due to recent procedure, infection (dirty UA) and post renal obstruction from blood clots   known obstructive disease, R hydronephrosis x10y   - repeat UA  - Urine Na and osmolality daily, prn for Na monitoring     #Hypocalcemia - possible etiology of SHIKHA causing decreased Ca cycling   - PTH  - Calcidiol  - Calcitriol       Eitan Toth DO  PGY-1 Internal Medicine  72 y/o Female with PMHx HTN, thrombocytopenia, osteoporosis, depression and urethral stent placed on 6/28 due to ureteral colic presents to the ED with AMS. Nephrology consulted for hyponatremia of 115 on admission.     Assessment:  #Hyponatremia in a euvolemic state, supported by physical exam and vital signs. Potential etiologies include ADH secretion in response to pain, increased free water intake (psychogenic polydipsia) and SIADH from phychiatric medications.   - D/C'd 2% NaCl due to reaching of goal Na 127  - 500cc D5W to prevent over-correction   - q4 BMP's    #SHIKHA - possible etiologies include intrarenal process due to recent procedure, infection (dirty UA) and post renal obstruction from blood clots   known obstructive disease, R hydronephrosis x10y, pending Uro recs (nuclear scan vs perc nephrostomy)   - repeat UA  - Urine Na and osmolality daily, prn for Na monitoring     #Hypocalcemia - possible etiology of SHIKHA causing decreased Ca cycling   - PTH  - Calcidiol  - Calcitriol       Eitan Toth DO  PGY-1 Internal Medicine  70 y/o Female with PMHx HTN, thrombocytopenia, osteoporosis, depression and urethral stent placed on 6/28 due to ureteral colic presents to the ED with AMS. Nephrology consulted for hyponatremia of 115 on admission.     Assessment:  #Acute on Chronic Hyponatremia in a euvolemic state, supported by physical exam and vital signs. Potential etiologies include ADH secretion in response to pain, increased free water intake (psychogenic polydipsia) and SIADH from psychiatric medications.   - D/C'd 2% NaCl due to reaching of goal Na 127  - 500cc D5W to prevent over-correction   - recheck BMP Ur Na Ur Osm this evening    #SHIKHA - possible etiologies include ureterospasm/vasospasm due to recent stent placement in L ureter, pigment nephropathy causing ATN from her gross hematuria, or repeat obstruction however her ureteral stent is in place and she's non oliguric.   known obstructive disease, R hydronephrosis x10y, please obtain renal nuclear scan to evaluate relative renal perfusion and screen for new obstruction, may require PCR.     #Hypocalcemia - possible etiology of SHIKHA causing decreased Ca cycling   - please check PTH, Calcidiol, Calcitriol and please start Calcium carbonate supplementation       Eitan Toth,   PGY-1 Internal Medicine

## 2021-07-01 NOTE — PROGRESS NOTE ADULT - PROBLEM SELECTOR PLAN 2
Patient with positive UA, UCx sent and in the setting of recent procedure. Given CTX 1g in ED.  - U Cx negative, Blood Cx negative  - Continue with CTX 1g IV, advance to 2g if abscess or bacteremic Patient with positive UA, UCx sent and in the setting of recent procedure. Given CTX 1g in ED.  - f/u U Cx   - Blood Cx negative  - Continue with CTX 1g IV, advance to 2g if abscess or bacteremic Patient with positive UA, UCx sent and in the setting of recent procedure. Given CTX 1g in ED.  - U Cx negative  - Blood Cx negative  - Continue with CTX 1g IV, advance to 2g if abscess or bacteremic Patient with positive UA, in the setting of recent procedure, suspicion for infection.   - Given CTX 1g in ED.  - U Cx and blood cx negative  - Continue with CTX 1g IV, advance to 2g if abscess or bacteremic

## 2021-07-02 LAB
24R-OH-CALCIDIOL SERPL-MCNC: 47.5 NG/ML — SIGNIFICANT CHANGE UP (ref 30–80)
ANA TITR SER: NEGATIVE — SIGNIFICANT CHANGE UP
ANION GAP SERPL CALC-SCNC: 11 MMOL/L — SIGNIFICANT CHANGE UP (ref 5–17)
ANION GAP SERPL CALC-SCNC: 12 MMOL/L — SIGNIFICANT CHANGE UP (ref 5–17)
ANION GAP SERPL CALC-SCNC: 13 MMOL/L — SIGNIFICANT CHANGE UP (ref 5–17)
ANION GAP SERPL CALC-SCNC: 15 MMOL/L — SIGNIFICANT CHANGE UP (ref 5–17)
BUN SERPL-MCNC: 33 MG/DL — HIGH (ref 7–23)
BUN SERPL-MCNC: 33 MG/DL — HIGH (ref 7–23)
BUN SERPL-MCNC: 34 MG/DL — HIGH (ref 7–23)
BUN SERPL-MCNC: 36 MG/DL — HIGH (ref 7–23)
CALCIUM SERPL-MCNC: 6.8 MG/DL — LOW (ref 8.4–10.5)
CALCIUM SERPL-MCNC: 6.9 MG/DL — LOW (ref 8.4–10.5)
CALCIUM SERPL-MCNC: 7.4 MG/DL — LOW (ref 8.4–10.5)
CALCIUM SERPL-MCNC: 7.6 MG/DL — LOW (ref 8.4–10.5)
CALCIUM SERPL-MCNC: 8.1 MG/DL — LOW (ref 8.4–10.5)
CALCIUM UR-MCNC: <1 MG/DL — SIGNIFICANT CHANGE UP
CHLORIDE SERPL-SCNC: 88 MMOL/L — LOW (ref 96–108)
CHLORIDE SERPL-SCNC: 89 MMOL/L — LOW (ref 96–108)
CHLORIDE SERPL-SCNC: 91 MMOL/L — LOW (ref 96–108)
CHLORIDE SERPL-SCNC: 92 MMOL/L — LOW (ref 96–108)
CO2 SERPL-SCNC: 17 MMOL/L — LOW (ref 22–31)
CO2 SERPL-SCNC: 20 MMOL/L — LOW (ref 22–31)
CO2 SERPL-SCNC: 20 MMOL/L — LOW (ref 22–31)
CO2 SERPL-SCNC: 21 MMOL/L — LOW (ref 22–31)
CREAT ?TM UR-MCNC: 18 MG/DL — SIGNIFICANT CHANGE UP
CREAT ?TM UR-MCNC: 36 MG/DL — SIGNIFICANT CHANGE UP
CREAT SERPL-MCNC: 3.76 MG/DL — HIGH (ref 0.5–1.3)
CREAT SERPL-MCNC: 3.88 MG/DL — HIGH (ref 0.5–1.3)
CREAT SERPL-MCNC: 3.9 MG/DL — HIGH (ref 0.5–1.3)
CREAT SERPL-MCNC: 4.22 MG/DL — HIGH (ref 0.5–1.3)
GLUCOSE SERPL-MCNC: 104 MG/DL — HIGH (ref 70–99)
GLUCOSE SERPL-MCNC: 104 MG/DL — HIGH (ref 70–99)
GLUCOSE SERPL-MCNC: 95 MG/DL — SIGNIFICANT CHANGE UP (ref 70–99)
GLUCOSE SERPL-MCNC: 99 MG/DL — SIGNIFICANT CHANGE UP (ref 70–99)
HCT VFR BLD CALC: 26.4 % — LOW (ref 34.5–45)
HCT VFR BLD CALC: 26.5 % — LOW (ref 34.5–45)
HGB BLD-MCNC: 8.8 G/DL — LOW (ref 11.5–15.5)
HGB BLD-MCNC: 8.8 G/DL — LOW (ref 11.5–15.5)
MAGNESIUM SERPL-MCNC: 2.4 MG/DL — SIGNIFICANT CHANGE UP (ref 1.6–2.6)
MCHC RBC-ENTMCNC: 29 PG — SIGNIFICANT CHANGE UP (ref 27–34)
MCHC RBC-ENTMCNC: 29.4 PG — SIGNIFICANT CHANGE UP (ref 27–34)
MCHC RBC-ENTMCNC: 33.2 GM/DL — SIGNIFICANT CHANGE UP (ref 32–36)
MCHC RBC-ENTMCNC: 33.3 GM/DL — SIGNIFICANT CHANGE UP (ref 32–36)
MCV RBC AUTO: 87.5 FL — SIGNIFICANT CHANGE UP (ref 80–100)
MCV RBC AUTO: 88.3 FL — SIGNIFICANT CHANGE UP (ref 80–100)
NRBC # BLD: 0 /100 WBCS — SIGNIFICANT CHANGE UP (ref 0–0)
NRBC # BLD: 0 /100 WBCS — SIGNIFICANT CHANGE UP (ref 0–0)
OSMOLALITY SERPL: 267 MOSM/KG — LOW (ref 280–301)
OSMOLALITY UR: 212 MOSM/KG — LOW (ref 300–900)
OSMOLALITY UR: 232 MOSM/KG — LOW (ref 300–900)
PHOSPHATE SERPL-MCNC: 4.4 MG/DL — SIGNIFICANT CHANGE UP (ref 2.5–4.5)
PHOSPHATE SERPL-MCNC: 4.6 MG/DL — HIGH (ref 2.5–4.5)
PLATELET # BLD AUTO: 83 K/UL — LOW (ref 150–400)
PLATELET # BLD AUTO: 91 K/UL — LOW (ref 150–400)
POTASSIUM SERPL-MCNC: 3.3 MMOL/L — LOW (ref 3.5–5.3)
POTASSIUM SERPL-MCNC: 3.6 MMOL/L — SIGNIFICANT CHANGE UP (ref 3.5–5.3)
POTASSIUM SERPL-MCNC: 3.6 MMOL/L — SIGNIFICANT CHANGE UP (ref 3.5–5.3)
POTASSIUM SERPL-MCNC: 3.8 MMOL/L — SIGNIFICANT CHANGE UP (ref 3.5–5.3)
POTASSIUM SERPL-SCNC: 3.3 MMOL/L — LOW (ref 3.5–5.3)
POTASSIUM SERPL-SCNC: 3.6 MMOL/L — SIGNIFICANT CHANGE UP (ref 3.5–5.3)
POTASSIUM SERPL-SCNC: 3.6 MMOL/L — SIGNIFICANT CHANGE UP (ref 3.5–5.3)
POTASSIUM SERPL-SCNC: 3.8 MMOL/L — SIGNIFICANT CHANGE UP (ref 3.5–5.3)
PROT ?TM UR-MCNC: 96 MG/DL — HIGH (ref 0–12)
PROT ?TM UR-MCNC: 97 MG/DL — HIGH (ref 0–12)
PROT/CREAT UR-RTO: 2.7 RATIO — HIGH (ref 0–0.2)
PROT/CREAT UR-RTO: 5.3 RATIO — HIGH (ref 0–0.2)
PTH-INTACT FLD-MCNC: 436 PG/ML — HIGH (ref 15–65)
RBC # BLD: 2.99 M/UL — LOW (ref 3.8–5.2)
RBC # BLD: 3.03 M/UL — LOW (ref 3.8–5.2)
RBC # FLD: 14.3 % — SIGNIFICANT CHANGE UP (ref 10.3–14.5)
RBC # FLD: 14.4 % — SIGNIFICANT CHANGE UP (ref 10.3–14.5)
SODIUM SERPL-SCNC: 120 MMOL/L — CRITICAL LOW (ref 135–145)
SODIUM SERPL-SCNC: 122 MMOL/L — LOW (ref 135–145)
SODIUM SERPL-SCNC: 123 MMOL/L — LOW (ref 135–145)
SODIUM SERPL-SCNC: 124 MMOL/L — LOW (ref 135–145)
SODIUM UR-SCNC: 51 MMOL/L — SIGNIFICANT CHANGE UP
SODIUM UR-SCNC: 81 MMOL/L — SIGNIFICANT CHANGE UP
SODIUM UR-SCNC: 92 MMOL/L — SIGNIFICANT CHANGE UP
VIT D25+D1,25 OH+D1,25 PNL SERPL-MCNC: 79.9 PG/ML — HIGH (ref 19.9–79.3)
WBC # BLD: 8.59 K/UL — SIGNIFICANT CHANGE UP (ref 3.8–10.5)
WBC # BLD: 8.7 K/UL — SIGNIFICANT CHANGE UP (ref 3.8–10.5)
WBC # FLD AUTO: 8.59 K/UL — SIGNIFICANT CHANGE UP (ref 3.8–10.5)
WBC # FLD AUTO: 8.7 K/UL — SIGNIFICANT CHANGE UP (ref 3.8–10.5)

## 2021-07-02 PROCEDURE — 99233 SBSQ HOSP IP/OBS HIGH 50: CPT

## 2021-07-02 PROCEDURE — 99233 SBSQ HOSP IP/OBS HIGH 50: CPT | Mod: GC

## 2021-07-02 PROCEDURE — 74176 CT ABD & PELVIS W/O CONTRAST: CPT | Mod: 26

## 2021-07-02 RX ORDER — SODIUM CHLORIDE 5 G/100ML
500 INJECTION, SOLUTION INTRAVENOUS
Refills: 0 | Status: DISCONTINUED | OUTPATIENT
Start: 2021-07-02 | End: 2021-07-04

## 2021-07-02 RX ORDER — ACETAMINOPHEN 500 MG
1000 TABLET ORAL ONCE
Refills: 0 | Status: COMPLETED | OUTPATIENT
Start: 2021-07-02 | End: 2021-07-02

## 2021-07-02 RX ORDER — SODIUM CHLORIDE 5 G/100ML
500 INJECTION, SOLUTION INTRAVENOUS
Refills: 0 | Status: DISCONTINUED | OUTPATIENT
Start: 2021-07-02 | End: 2021-07-02

## 2021-07-02 RX ADMIN — Medication 1 TABLET(S): at 12:51

## 2021-07-02 RX ADMIN — Medication 650 MILLIGRAM(S): at 13:10

## 2021-07-02 RX ADMIN — Medication 400 MILLIGRAM(S): at 01:00

## 2021-07-02 RX ADMIN — Medication 1 MILLIGRAM(S): at 01:39

## 2021-07-02 RX ADMIN — Medication 650 MILLIGRAM(S): at 05:18

## 2021-07-02 RX ADMIN — Medication 1000 MILLIGRAM(S): at 02:00

## 2021-07-02 RX ADMIN — Medication 650 MILLIGRAM(S): at 14:10

## 2021-07-02 RX ADMIN — CEFTRIAXONE 100 MILLIGRAM(S): 500 INJECTION, POWDER, FOR SOLUTION INTRAMUSCULAR; INTRAVENOUS at 21:51

## 2021-07-02 RX ADMIN — Medication 650 MILLIGRAM(S): at 19:17

## 2021-07-02 RX ADMIN — TRANYLCYPROMINE SULFATE 60 MILLIGRAM(S): 10 TABLET, FILM COATED ORAL at 09:28

## 2021-07-02 RX ADMIN — SODIUM CHLORIDE 30 MILLILITER(S): 5 INJECTION, SOLUTION INTRAVENOUS at 17:48

## 2021-07-02 RX ADMIN — POLYETHYLENE GLYCOL 3350 17 GRAM(S): 17 POWDER, FOR SOLUTION ORAL at 01:28

## 2021-07-02 RX ADMIN — Medication 1 MILLIGRAM(S): at 05:18

## 2021-07-02 RX ADMIN — SODIUM CHLORIDE 30 MILLILITER(S): 5 INJECTION, SOLUTION INTRAVENOUS at 13:31

## 2021-07-02 NOTE — PROGRESS NOTE ADULT - PROBLEM SELECTOR PLAN 5
RESOLVED. Reported baseline of A&Ox3.  -07/02 -- Patient having hallucinations overnight, otherwise A&Ox3 Metabolic encephalopathy 2/2 hyponatremia   RESOLVED. Reported baseline of A&Ox3.   -07/02 -- Patient having hallucinations overnight, otherwise A&Ox3

## 2021-07-02 NOTE — PROGRESS NOTE ADULT - ATTENDING COMMENTS
# ATN  # Hematuria   Creatinine elevation persistent >72 hrs. Treating as ATN.   Possibly 2/2 pigment nephropathy in setting of hematuria.   Checking Glomerulonephritis labs (C3, C4, pANCA, cANCA)    # Metabolic encephalopathy   # Hyponatremia   Presented with metabolic encephalopathy in setting of severe hyponatremia of Serum Na 111.   Mental status improved with partial correction of hyponatremia. Serum Na today ~122  Urine studies consistent with SIADH. Possible component of increased free water intake to worsened hyponatremia.   Hypertonic saline per renal recs

## 2021-07-02 NOTE — DIETITIAN INITIAL EVALUATION ADULT. - PROBLEM SELECTOR PLAN 4
Urethral stent placed on 6/28, and patient with hydronephrosis on CT abdomen. Urology consulted in ED (attending has not seen patient) and may need CBI?  - f/u urology recommendations service following  - Urology PA placed 20Fr 3 way ocampo. About 225 cc bloody urine noted and irrigated until light pink  - f/u Ucx

## 2021-07-02 NOTE — PROGRESS NOTE ADULT - SUBJECTIVE AND OBJECTIVE BOX
The patient was seen and examined.      71 year old female with a history of chronic thrombocytopenia (over 10 years), osteoporosis, depression, hypertension, , ureteral stent placement on 6/28 for ureteral colic/stone obstruction brought by EMS to ED with altered mental status, expressive aphasia, found to be hyponatremic. Evaluation negative for stroke or hemorrhage. Patient states she has been thrombocytopenic for years and has been evaluated in the past by Dr. Miki Billy at [facility] and was not given a diagnosis according to patient.. Patient's platelet counts over the past three years have ranged between 85,000 and 105,000. On 6/17/21, platelets were 103,000, hemoglobin 12.0, WBC 4.35by her PCP. She admits to a distant past history of excessive alcohol intake. Outpatient medications included Tamsulosin, Bactrim, Tranylcypromine. Today, hemoglobin 8.8, platelet count 91,000.         .         Allergies    No Known Allergies    Intolerances    Frazier (Unknown)      Medications:  MEDICATIONS  (STANDING):  calcium carbonate   1250 mG (OsCal) 1 Tablet(s) Oral daily  cefTRIAXone   IVPB 1000 milliGRAM(s) IV Intermittent every 24 hours  dextrose 5%. 500 milliLiter(s) (250 mL/Hr) IV Continuous <Continuous>  sodium bicarbonate 650 milliGRAM(s) Oral every 12 hours  tranylcypromine 60 milliGRAM(s) Oral every 24 hours    MEDICATIONS  (PRN):  acetaminophen   Tablet .. 650 milliGRAM(s) Oral every 6 hours PRN Mild Pain (1 - 3)  LORazepam   Injectable 1 milliGRAM(s) IntraMuscular every 6 hours PRN Agitation          Interval History:    The patient denies chest pain or SOB.  No nausea/vomiting/fevers/chills/night sweats.  Has fatigue, no headaches/dizziness.  Appetite is slightly better .  No abdominal pain but still feels bloated.  No melena or hematochezia.    Still with hematuria.  No history of easy bruising/bleeding.  No gingival bleeding or epistaxis.  No leg pain or leg swelling.    ROS is otherwise negative.    PHYSICAL EXAM:    T(F): 97.9 (07-02-21 @ 05:57), Max: 98 (07-01-21 @ 22:39)  HR: 69 (07-02-21 @ 05:57) (65 - 69)  BP: 121/70 (07-02-21 @ 05:57) (121/70 - 149/75)  RR: 20 (07-02-21 @ 05:57) (16 - 20)  SpO2: 100% (07-02-21 @ 05:57) (96% - 100%)  Wt(kg): --    Daily     Daily     Gen: well developed, well nourished, seems somewhat dysarthric  HEENT: normocephalic/atraumatic, has conjunctival pallor, no scleral icterus, no oral thrush/mucosal bleeding/mucositis  Neck: supple, no masses, no JVD  Cardiovascular: RR, nl S1S2, no murmurs/rubs/gallops  Respiratory: clear air entry b/l anteriorly  Gastrointestinal: BS+, soft, NT/ND, no masses, no splenomegaly, no hepatomegaly, no evidence for ascites  Extremities: no clubbing/cyanosis, no edema, no calf tenderness  Neurological: no focal deficits  Skin: no rash on visible skin, has excessive ecchymoses, especially on extremities, has  petechiae  on chest wall and neck as before in areas where patient scratches  Lymph Nodes:  no significant peripheral adenopathy   Musculoskeletal:  full ROM, frequent movements in bed  Psychiatric:  lethargic, ? intermittently confused        Labs:                          8.8    8.70  )-----------( 91       ( 02 Jul 2021 06:49 )             26.4     CBC Full  -  ( 02 Jul 2021 06:49 )  WBC Count : 8.70 K/uL  RBC Count : 2.99 M/uL  Hemoglobin : 8.8 g/dL  Hematocrit : 26.4 %  Platelet Count - Automated : 91 K/uL  Mean Cell Volume : 88.3 fl  Mean Cell Hemoglobin : 29.4 pg  Mean Cell Hemoglobin Concentration : 33.3 gm/dL  Auto Neutrophil # : x  Auto Lymphocyte # : x  Auto Monocyte # : x  Auto Eosinophil # : x  Auto Basophil # : x  Auto Neutrophil % : x  Auto Lymphocyte % : x  Auto Monocyte % : x  Auto Eosinophil % : x  Auto Basophil % : x        07-02    120<LL>  |  88<L>  |  36<H>  ----------------------------<  104<H>  3.6   |  17<L>  |  3.88<H>    Ca    6.9<L>      02 Jul 2021 06:49  Phos  4.6     07-02  Mg     2.4     07-02            Other Labs:    Cultures:    Pathology:    Imaging Studies:

## 2021-07-02 NOTE — PROGRESS NOTE ADULT - PROBLEM SELECTOR PLAN 3
Baseline Cr from 2019 ~0.9-1.0 per chart review  - Steadily increasing Cr of unknown etiology --> 3.52 07/01 @ 11pm  - Renal consulted for hyponatremia  - Euvolemic on exam with only slight hyperosmolar urine with increased urine Na  - Differential remains wide; Renal consider nuclear scan for persisting SHIKHA with hyponatremia Baseline Cr from 2019 ~0.9-1.0 per chart review  - Steadily increasing Cr of unknown etiology --> 3.52 07/01 @ 11pm; 3.88 07/02  - Euvolemic on exam with only slight hyperosmolar urine with increased urine Na  - Differential remains wide; Renal consider nuclear scan for persisting SHIKHA with hyponatremia  -STAT CT A/P performed 07/02 per nephrology and urology to r/o post-renal etiology Baseline Cr from 2019 ~0.9-1.0 per chart review  - Steadily increasing Cr of unknown etiology --> 3.52 07/01 @ 11pm; 3.88 07/02  - Euvolemic on exam with only slight hyperosmolar urine with increased urine Na  - Differential remains wide; Renal consider nuclear scan for persisting SHIKHA with hyponatremia  -STAT CT A/P performed 07/02 per nephrology and urology to r/o post-renal etiology -- severe right hydronephrosis with 2 mm UPJ stone, moderate L hydronephrosis stable, L uretal double-J stent in place and stable. No interval change in bilateral hydronephrosis.   -Patient to undergo nuclear medicine renal scan -- f/u results Baseline Cr from 2019 ~0.9-1.0 per chart review  - Steadily increasing Cr of unknown etiology --> 3.52 07/01 @ 11pm; 3.88 07/02; Cr elevation persisted for >72 hrs, treating as ATN  - Euvolemic on exam with only slight hyperosmolar urine with increased urine Na  - Differential remains wide; Renal consider nuclear scan for persisting ATN with hyponatremia  -STAT CT A/P performed 07/02 per nephrology and urology to r/o post-renal etiology -- severe right hydronephrosis with 2 mm UPJ stone, moderate L hydronephrosis stable, L uretal double-J stent in place and stable. No interval change in bilateral hydronephrosis.   -Patient to undergo nuclear medicine renal scan -- f/u results

## 2021-07-02 NOTE — PROVIDER CONTACT NOTE (CRITICAL VALUE NOTIFICATION) - BACKGROUND
In bed overnight, very depressed, requesting assisted suicide/euthenasia, stating that she cannot take the pain any longer.

## 2021-07-02 NOTE — PROGRESS NOTE ADULT - PROBLEM SELECTOR PLAN 1
- Patient was on 2% hypertonic saline at 60 cc/hr on 6/30 which was d/c per renal recommendation after Na increased 8 in 24 hours.  - Now on 1L fluid restriction.   - Started on sodium bicarbonate 650 mg oral BID.  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  - Now on 1L fluid restriction.   - Started on sodium bicarbonate 650 mg oral BID.  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 Recheck BMP q4hr and administer D5W if overcorrection occurs.   -monitor serum and urine Osm - Patient was on 2% hypertonic saline at 60 cc/hr on 6/30 which was d/c per renal recommendation after Na increased 8 in 24 hours.  - Now on 1L fluid restriction.   - Started on sodium bicarbonate 650 mg oral BID.  - Q4hr BMP, U osm and serum osm to check values  - SIADH (medication or pain induced) or Psychogenic polydipsia as cause  - Renal consulted follow up recommendations  -07/02 AM Crit Value - Na 120; per nephrology recs: 3% NaCl 30 cc/hr x 3 hr restarted. Recheck BMP q4hr and administer D5W if overcorrection occurs.   -07/02 2pm Na - 122  -monitor serum and urine Osm

## 2021-07-02 NOTE — PROGRESS NOTE BEHAVIORAL HEALTH - SUMMARY
71 year old female with a past medical history of hypertension, thrombocytopenia, osteoporosis, depression and urethral stent placed on 6/28 due to ureteral colic presents to [facility] ED on 6/29 with altered mental status. 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 Psychiatry was consulted for recommendations re pt's home medications.   Patient presents today with worsening mood and physical symptoms consistent with MAOI withdrawal after not receiving the parnate for 2 days and lowering the dosage to the max recommended of 60mg (home dosage of 110mg). Patient has significant physical and mental dependency on the parnate.   Patient's Na today is 120.  Plan:  -continue parnate 60mg po daily for treatment of depression;   -restart mirtazapine 15mg po qhs  -prns for anxiety/agitation lorazepam 1mg po q6h prn  -CL to follow. 71 year old female with a past medical history of hypertension, thrombocytopenia, osteoporosis, depression and urethral stent placed on 6/28 due to ureteral colic presents to [facility] ED on 6/29 with altered mental status. 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 Psychiatry was consulted for recommendations re pt's home medications.   Patient presents today with worsening mood and physical symptoms consistent with MAOI withdrawal after not receiving the parnate for 2 days and lowering the dosage to the max recommended of 60mg (home dosage of 110mg). Patient has significant physical and mental dependency on the parnate.   Patient's Na today is 120.  Plan:  - Collateral obtained from Patient's outpatient Psychiatrist Dr. Brad Smith who most recently saw patient on 4/14/21 - At that time, he was prescribing Parnate 110mg qd and Mirtazepine 15mg qhs. A BMP was drawn notable for Na+ 138.  -continue parnate 60mg po daily for treatment of depression;   -restart mirtazapine 15mg po qhs  -prns for anxiety/agitation lorazepam 1mg po q6h prn  - No active SI   -CL to follow. 71 year old female with a past medical history of hypertension, thrombocytopenia, osteoporosis, depression and urethral stent placed on 6/28 due to ureteral colic presents to [facility] ED on 6/29 with altered mental status. 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 Psychiatry was consulted for recommendations re pt's home medications.   Patient presents today with worsening mood and physical symptoms consistent with MAOI withdrawal after not receiving the parnate for 2 days and lowering the dosage to the max recommended of 60mg (home dosage of 110mg). Patient has significant physical and mental dependency on the parnate.   Patient's Na today is 120.  Plan:  - Collateral obtained from Patient's outpatient Psychiatrist Dr. Brad Smith who most recently saw patient on 4/14/21 - At that time, he was prescribing Parnate 110mg qd and Mirtazepine 15mg qhs. A BMP was drawn notable for Na+ 138.  -continue parnate 60mg po daily for treatment of depression;   -restart mirtazapine 15mg po qhs  -prns for anxiety/agitation lorazepam 1mg po q6h prn  - No active SI - does not need enhanced supervision, but Low Threshold to re-initiate enhanced supervision  -CL to follow.

## 2021-07-02 NOTE — PROGRESS NOTE BEHAVIORAL HEALTH - NSBHFUPINTERVALHXFT_PSY_A_CORE
Pt seen and examined bedside. Overnight night and presently has enhanced supervision for stating that "She wants to die" early this morning. Upon further discussion patient explained that she said this to hasten the delivery of her pain medication, and did not have any suicidal ideations or plans to harm herself. At that time the patient was in extreme pain and felt that it was taking too long to get her pain medication. Presently, patient denies any suicidal ideations or plans to harm herself.    Patient's sodium is 120 on this morning BMP. Pt seen and examined bedside AOx3. Overnight night patient was put on enhanced supervision for stating that "She wants to die" early this morning. Upon further discussion patient explained that she said this to hasten the delivery of her pain medication, and did not have any suicidal ideations or plans to harm herself. At that time the patient was in extreme pain and felt that it was taking too long to get her pain medication. Presently, patient denies any suicidal ideations or plans to harm herself.  Patient's sodium is 120 on this morning BMP. Pt seen and examined bedside AOx3. Overnight night patient was put on enhanced supervision for stating that "She wants to die" early this morning. Upon further discussion patient explained that she said this to hasten the delivery of her pain medication, and did not have any suicidal ideations or plans to harm herself. At that time the patient was in extreme pain and felt that it was taking too long to get her pain medication. Presently, patient denies any suicidal ideations or plans to harm herself.  Patient's sodium is 120 on this morning BMP.  Spoke to Patient's outpatient Psychiatrist Dr. Brad Smith who most recently saw patient on 4/14/21 - At that time, he was prescribing Parnate 110mg qd and Mirtazepine 15mg qhs. A BMP was drawn notable for Na+ 138. Pt seen and examined bedside AOx3. Overnight night patient was put on enhanced supervision for stating that "She wants to die" early this morning. Upon further discussion patient explained that she said this to hasten the delivery of her pain medication, and did not have any suicidal ideations or plans to harm herself. At that time the patient was in extreme pain and felt that it was taking too long to get her pain medication. Pt also reported visual hallucinations. Specifically, she saw cabinets filled with flowers all around her room. Presently, patient denies any suicidal ideations, plans to harm herself or visual hallucinations.  Patient's sodium is 120 on this morning BMP.  Spoke to Patient's outpatient Psychiatrist Dr. Brad Smith who most recently saw patient on 4/14/21 - At that time, he was prescribing Parnate 110mg qd and Mirtazepine 15mg qhs. A BMP was drawn notable for Na+ 138.

## 2021-07-02 NOTE — PROGRESS NOTE ADULT - SUBJECTIVE AND OBJECTIVE BOX
SUBJECTIVE: Stepped down to regional floor. Concern about self harm, placed on 24hr observation. Denies fevers, chills, n/v, back/flank pain.     cefTRIAXone   IVPB 1000 milliGRAM(s) IV Intermittent every 24 hours      Vital Signs Last 24 Hrs  T(C): 36.6 (02 Jul 2021 05:57), Max: 36.7 (01 Jul 2021 22:39)  T(F): 97.9 (02 Jul 2021 05:57), Max: 98 (01 Jul 2021 22:39)  HR: 69 (02 Jul 2021 05:57) (65 - 69)  BP: 121/70 (02 Jul 2021 05:57) (121/70 - 149/75)  BP(mean): 96 (01 Jul 2021 12:17) (96 - 96)  RR: 20 (02 Jul 2021 05:57) (16 - 20)  SpO2: 100% (02 Jul 2021 05:57) (96% - 100%)  I&O's Detail    01 Jul 2021 07:01  -  02 Jul 2021 07:00  --------------------------------------------------------  IN:    IV PiggyBack: 300 mL    Oral Fluid: 240 mL  Total IN: 540 mL    OUT:    Indwelling Catheter - Urethral (mL): 1325 mL  Total OUT: 1325 mL    Total NET: -785 mL          Physical Exam:  General: No acute distress, resting comfortably in bed  Pulm: Nonlabored breathing, no respiratory distress  Abd: soft, non-tender  : ocampo with clear red/pink urine, no clots. Draining well.    LABS:                        8.8    8.70  )-----------( 91       ( 02 Jul 2021 06:49 )             26.4     07-02    120<LL>  |  88<L>  |  36<H>  ----------------------------<  104<H>  3.6   |  17<L>  |  3.88<H>    Ca    6.9<L>      02 Jul 2021 06:49  Phos  4.6     07-02  Mg     2.4     07-02            RADIOLOGY & ADDITIONAL STUDIES:

## 2021-07-02 NOTE — PROGRESS NOTE ADULT - SUBJECTIVE AND OBJECTIVE BOX
Patient is a 71y Female seen and evaluated at bedside. Her mentation is slowed slightly today although she did receive lorazepam overnight. She reports no acute events/changes overnight. Urology is working closely with the medicine and nephrology teams.       Meds:    acetaminophen   Tablet .. 650 every 6 hours PRN  calcium carbonate   1250 mG (OsCal) 1 daily  cefTRIAXone   IVPB 1000 every 24 hours  dextrose 5%. 500 <Continuous>  LORazepam   Injectable 1 every 6 hours PRN  sodium bicarbonate 650 every 12 hours  tranylcypromine 60 every 24 hours      T(C): , Max: 36.7 (07-01-21 @ 22:39)  T(F): , Max: 98 (07-01-21 @ 22:39)  HR: 69 (07-02-21 @ 05:57)  BP: 121/70 (07-02-21 @ 05:57)  BP(mean): 96 (07-01-21 @ 12:17)  RR: 20 (07-02-21 @ 05:57)  SpO2: 100% (07-02-21 @ 05:57)  Wt(kg): --    07-01 @ 07:01  -  07-02 @ 07:00  --------------------------------------------------------  IN: 660 mL / OUT: 1325 mL / NET: -665 mL    07-02 @ 07:01  -  07-02 @ 12:00  --------------------------------------------------------  IN: 0 mL / OUT: 650 mL / NET: -650 mL          Review of Systems:  CONSTITUTIONAL: No fever or chills, mild fatigue and tiredness   RESPIRATORY: No shortness of breath, cough, hemoptysis  CARDIOVASCULAR: No chest pain or shortness of breath  GASTROINTESTINAL: No abdominal or flank pain, No nausea or vomiting, No diarrhea  GENITOURINARY: No dysuria or urinary burning, No difficulty passing urine, persistent hematuria   NEUROLOGICAL: No headaches or blurred vision  SKIN: No skin rashes   MUSCULOSKELETAL: No arthralgia, No leg edema, No muscle pain      PHYSICAL EXAM:  GENERAL: well-developed, well nourished, alert, no acute distress at present. Mentation slightly slowed from yesterday.   NECK: supple, No JVD  CHEST/LUNG: Clear to auscultation bilaterally  HEART: normal S1S2, RRR  ABDOMEN: Soft, mild suprapubic tenderness, +BS, intermittent left sided flank tenderness    EXTREMITIES: No clubbing, cyanosis, or edema   NEUROLOGY: AAO x3, no focal neurological deficit        LABS:                        8.8    8.70  )-----------( 91       ( 02 Jul 2021 06:49 )             26.4     07-02    120<LL>  |  88<L>  |  36<H>  ----------------------------<  104<H>  3.6   |  17<L>  |  3.88<H>    Ca    6.9<L>      02 Jul 2021 06:49  Phos  4.6     07-02  Mg     2.4     07-02            Creatinine, Random Urine: 18 mg/dL (07-02 @ 11:00)  Protein/Creatinine Ratio Calculation: 5.3 Ratio (07-02 @ 11:00)  Osmolality, Random Urine: 232 mosm/kg (07-02 @ 11:00)  Sodium, Random Urine: 81 mmol/L (07-02 @ 11:00)  Sodium, Random Urine: 51 mmol/L (07-02 @ 03:51)  Osmolality, Random Urine: 212 mosm/kg (07-02 @ 03:51)  Sodium, Random Urine: 74 mmol/L (07-01 @ 09:30)  Osmolality, Random Urine: 336 mosm/kg (07-01 @ 09:30)  Osmolality, Random Urine: 220 mosm/kg (06-30 @ 14:48)  Osmolality, Random Urine: 218 mosm/kg (06-30 @ 13:21)        RADIOLOGY & ADDITIONAL STUDIES: CTAP today, results pending

## 2021-07-02 NOTE — PROGRESS NOTE ADULT - SUBJECTIVE AND OBJECTIVE BOX
BEVERLY FINE, 71y, Female  MRN-[id number]  Patient is a 71y old  Female who presents with a chief complaint of Hyponatremia, Altered Mental Status (01 Jul 2021 18:26)      OVERNIGHT EVENTS: Around 2:30 AM, patient was having suicidal ideation. She was stating she wanted a physician to assist in killing herself. She was agitated and required 2mg Ativan IV one time dose. According to nursing, patient stated that she was seeing people in her room overnight. Patient was placed on 1:1.     SUBJECTIVE: This morning patient was complaining that she did not get enough sleep. 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 WHen asked if anyone was in her room overnight, patient denied seeing any people. Otherwise, patient was able to state her name and where she is today. She complains of some mild abdominal pain around her stent but  otherwise denies any complaints. She denies any current SI and she states that she feels bad for everyone who had to stay up for her overnight.     12 Point ROS Negative unless noted otherwise above.  -------------------------------------------------------------------------------  VITAL SIGNS:  Vital Signs Last 24 Hrs  T(C): 36.6 (02 Jul 2021 05:57), Max: 36.7 (01 Jul 2021 22:39)  T(F): 97.9 (02 Jul 2021 05:57), Max: 98 (01 Jul 2021 22:39)  HR: 69 (02 Jul 2021 05:57) (65 - 72)  BP: 121/70 (02 Jul 2021 05:57) (121/70 - 149/75)  BP(mean): 96 (01 Jul 2021 12:17) (90 - 96)  RR: 20 (02 Jul 2021 05:57) (16 - 20)  SpO2: 100% (02 Jul 2021 05:57) (96% - 100%)  I&O's Summary    01 Jul 2021 07:01  -  02 Jul 2021 07:00  --------------------------------------------------------  IN: 540 mL / OUT: 1325 mL / NET: -785 mL    PHYSICAL EXAM:    General: NAD  HEENT:  EOMI, PERRLA, anicteric sclera; moist mucosal membranes.  Cardiovascular: RRR, +S1/S2; NO M/R/G  Respiratory: CTA B/L; no W/R/R  Gastrointestinal: soft, NT/ND; +BSx4  Extremities: WWP; no edema or cyanosis  Vascular: 2+ radial b/l  Neurological: AAOx3; delusions present     ALLERGIES:  Allergies    No Known Allergies    Intolerances    Frazier (Unknown)      MEDICATIONS:  MEDICATIONS  (STANDING):  calcium carbonate   1250 mG (OsCal) 1 Tablet(s) Oral daily  cefTRIAXone   IVPB 1000 milliGRAM(s) IV Intermittent every 24 hours  dextrose 5%. 500 milliLiter(s) (250 mL/Hr) IV Continuous <Continuous>  sodium bicarbonate 650 milliGRAM(s) Oral every 12 hours  tranylcypromine 60 milliGRAM(s) Oral every 24 hours    MEDICATIONS  (PRN):  acetaminophen   Tablet .. 650 milliGRAM(s) Oral every 6 hours PRN Mild Pain (1 - 3)  LORazepam   Injectable 1 milliGRAM(s) IntraMuscular every 6 hours PRN Agitation      -------------------------------------------------------------------------------  LABS:                        8.8    8.70  )-----------( 91       ( 02 Jul 2021 06:49 )             26.4     07-02    x   |  x   |  x   ----------------------------<  x   3.6   |  x   |  x     Ca    7.1<L>      01 Jul 2021 23:13  Phos  4.6     07-01  Mg     2.4     07-02      CAPILLARY BLOOD GLUCOSE      Culture - Blood (collected 30 Jun 2021 06:26)  Source: .Blood Blood-Peripheral  Preliminary Report (02 Jul 2021 07:01):    No growth at 2 days.    Culture - Blood (collected 30 Jun 2021 06:26)  Source: .Blood Blood-Peripheral  Preliminary Report (02 Jul 2021 07:01):    No growth at 2 days.    Culture - Urine (collected 30 Jun 2021 01:20)  Source: .Urine Clean Catch (Midstream)  Final Report (01 Jul 2021 09:30):    No growth      COVID-19 PCR: Negative (29 Jun 2021 22:15)      RADIOLOGY & ADDITIONAL TESTS: Reviewed.   BEVERLY FINE, 71y, Female  MRN-[id number]  Patient is a 71y old  Female who presents with a chief complaint of Hyponatremia, Altered Mental Status (01 Jul 2021 18:26)      OVERNIGHT EVENTS: Around 2:30 AM, patient was having suicidal ideation. She was stating she wanted a physician to assist in killing herself. She was agitated and required 2mg Ativan IV one time dose. According to nursing, patient stated that she was seeing people in her room overnight. Patient was placed on 1:1.     SUBJECTIVE: This morning patient was complaining that she did not get enough sleep. 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 When asked if anyone was in her room overnight, patient denied seeing any people. Otherwise, patient was able to state her name and where she is today. Patient seems to be aware that her visions are "dreams." She complains of some mild abdominal pain around her stent but  otherwise denies any complaints. She denies any current SI and she states that she feels bad for everyone who had to stay up for her overnight.     12 Point ROS Negative unless noted otherwise above.  -------------------------------------------------------------------------------  VITAL SIGNS:  Vital Signs Last 24 Hrs  T(C): 36.6 (02 Jul 2021 05:57), Max: 36.7 (01 Jul 2021 22:39)  T(F): 97.9 (02 Jul 2021 05:57), Max: 98 (01 Jul 2021 22:39)  HR: 69 (02 Jul 2021 05:57) (65 - 72)  BP: 121/70 (02 Jul 2021 05:57) (121/70 - 149/75)  BP(mean): 96 (01 Jul 2021 12:17) (90 - 96)  RR: 20 (02 Jul 2021 05:57) (16 - 20)  SpO2: 100% (02 Jul 2021 05:57) (96% - 100%)  I&O's Summary    01 Jul 2021 07:01  -  02 Jul 2021 07:00  --------------------------------------------------------  IN: 540 mL / OUT: 1325 mL / NET: -785 mL    PHYSICAL EXAM:    General: NAD  HEENT:  EOMI, PERRLA, anicteric sclera; moist mucosal membranes.  Cardiovascular: RRR, +S1/S2; NO M/R/G  Respiratory: CTA B/L; no W/R/R  Gastrointestinal: soft, NT/ND; +BSx4  Extremities: WWP; no edema or cyanosis  Vascular: 2+ radial b/l  Neurological: AAOx3; delusions present     ALLERGIES:  Allergies    No Known Allergies    Intolerances    Frazier (Unknown)      MEDICATIONS:  MEDICATIONS  (STANDING):  calcium carbonate   1250 mG (OsCal) 1 Tablet(s) Oral daily  cefTRIAXone   IVPB 1000 milliGRAM(s) IV Intermittent every 24 hours  dextrose 5%. 500 milliLiter(s) (250 mL/Hr) IV Continuous <Continuous>  sodium bicarbonate 650 milliGRAM(s) Oral every 12 hours  tranylcypromine 60 milliGRAM(s) Oral every 24 hours    MEDICATIONS  (PRN):  acetaminophen   Tablet .. 650 milliGRAM(s) Oral every 6 hours PRN Mild Pain (1 - 3)  LORazepam   Injectable 1 milliGRAM(s) IntraMuscular every 6 hours PRN Agitation      -------------------------------------------------------------------------------  LABS:                        8.8    8.70  )-----------( 91       ( 02 Jul 2021 06:49 )             26.4     07-02    x   |  x   |  x   ----------------------------<  x   3.6   |  x   |  x     Ca    7.1<L>      01 Jul 2021 23:13  Phos  4.6     07-01  Mg     2.4     07-02      CAPILLARY BLOOD GLUCOSE      Culture - Blood (collected 30 Jun 2021 06:26)  Source: .Blood Blood-Peripheral  Preliminary Report (02 Jul 2021 07:01):    No growth at 2 days.    Culture - Blood (collected 30 Jun 2021 06:26)  Source: .Blood Blood-Peripheral  Preliminary Report (02 Jul 2021 07:01):    No growth at 2 days.    Culture - Urine (collected 30 Jun 2021 01:20)  Source: .Urine Clean Catch (Midstream)  Final Report (01 Jul 2021 09:30):    No growth      COVID-19 PCR: Negative (29 Jun 2021 22:15)      RADIOLOGY & ADDITIONAL TESTS: Reviewed.   BEVERLY FINE, 71y, Female  MRN-[id number]  Patient is a 71y old  Female who presents with a chief complaint of Hyponatremia, Altered Mental Status (01 Jul 2021 18:26)      OVERNIGHT EVENTS: Around 2:30 AM, patient was having suicidal ideation. She was stating she wanted a physician to assist in killing herself. She was agitated and required 2mg Ativan IV one time dose. According to nursing, patient stated that she was seeing people in her room overnight. Patient was placed on 1:1.     SUBJECTIVE: This morning patient was complaining that she did not get enough sleep. 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 When asked if anyone was in her room overnight, patient denied seeing any people. Otherwise, patient was able to state her name and where she is today. Patient seems to be aware that her visions are "dreams." She complains of some mild abdominal pain around her stent but  otherwise denies any complaints. She denies any current SI and she states that she feels bad for everyone who had to stay up for her overnight.     12 Point ROS Negative unless noted otherwise above.  -------------------------------------------------------------------------------  VITAL SIGNS:  Vital Signs Last 24 Hrs  T(C): 36.6 (02 Jul 2021 05:57), Max: 36.7 (01 Jul 2021 22:39)  T(F): 97.9 (02 Jul 2021 05:57), Max: 98 (01 Jul 2021 22:39)  HR: 69 (02 Jul 2021 05:57) (65 - 72)  BP: 121/70 (02 Jul 2021 05:57) (121/70 - 149/75)  BP(mean): 96 (01 Jul 2021 12:17) (90 - 96)  RR: 20 (02 Jul 2021 05:57) (16 - 20)  SpO2: 100% (02 Jul 2021 05:57) (96% - 100%)  I&O's Summary    01 Jul 2021 07:01  -  02 Jul 2021 07:00  --------------------------------------------------------  IN: 540 mL / OUT: 1325 mL / NET: -785 mL    PHYSICAL EXAM:    General: NAD  HEENT:  EOMI, PERRLA, anicteric sclera; moist mucosal membranes.  Cardiovascular: RRR, +S1/S2; NO M/R/G  Respiratory: CTA B/L; no W/R/R  Gastrointestinal: soft, NT/ND; +BSx4  Extremities: WWP; no edema or cyanosis  Vascular: 2+ radial b/l  Neurological: AAOx3; delusions present     ALLERGIES:  Allergies    No Known Allergies    Intolerances    Frazier (Unknown)      MEDICATIONS:  MEDICATIONS  (STANDING):  calcium carbonate   1250 mG (OsCal) 1 Tablet(s) Oral daily  cefTRIAXone   IVPB 1000 milliGRAM(s) IV Intermittent every 24 hours  dextrose 5%. 500 milliLiter(s) (250 mL/Hr) IV Continuous <Continuous>  sodium bicarbonate 650 milliGRAM(s) Oral every 12 hours  tranylcypromine 60 milliGRAM(s) Oral every 24 hours    MEDICATIONS  (PRN):  acetaminophen   Tablet .. 650 milliGRAM(s) Oral every 6 hours PRN Mild Pain (1 - 3)  LORazepam   Injectable 1 milliGRAM(s) IntraMuscular every 6 hours PRN Agitation      -------------------------------------------------------------------------------  LABS:                          8.8    8.59  )-----------( 83       ( 02 Jul 2021 14:55 )             26.5                           8.8    8.70  )-----------( 91       ( 02 Jul 2021 06:49 )             26.4     07-02 @ 14:55    122<L>  |  89<L>  |  34<H>  ----------------------------<  99  3.8   |  21<L>  |  4.22<H>      Ca    7.4<L>      02 Jul 2021 14:55  Phos  4.4     07-02  Mg     2.4     07-02      02 Jul 2021 @ 06:49 am -- critical value Na+ 120     CAPILLARY BLOOD GLUCOSE      Culture - Blood (collected 30 Jun 2021 06:26)  Source: .Blood Blood-Peripheral  Preliminary Report (02 Jul 2021 07:01):    No growth at 2 days.    Culture - Blood (collected 30 Jun 2021 06:26)  Source: .Blood Blood-Peripheral  Preliminary Report (02 Jul 2021 07:01):    No growth at 2 days.    Culture - Urine (collected 30 Jun 2021 01:20)  Source: .Urine Clean Catch (Midstream)  Final Report (01 Jul 2021 09:30):    No growth      COVID-19 PCR: Negative (29 Jun 2021 22:15)      RADIOLOGY & ADDITIONAL TESTS: Reviewed.

## 2021-07-02 NOTE — CHART NOTE - NSCHARTNOTEFT_GEN_A_CORE
Pt with a hx of depression and SI with previous attempts and has been evaluated by psych this admission. I was paged at 0230 by nursing stating the patient became very upset stating she is tired and wants to die and asked the nurse if this hospital will be able to perform physician assisted suicide. When I went to evaluated the patient she was asleep. Decision was made to put the patient on 1:1 and have psych reevaluate later in the day.

## 2021-07-02 NOTE — PROGRESS NOTE ADULT - PROBLEM SELECTOR PLAN 7
Patient with a history of hypertension, controlled by nifedipine   - Holding off on antihypertensives for now  - Restart nifedipine once hyponatremia resolved

## 2021-07-02 NOTE — PROGRESS NOTE ADULT - ATTENDING COMMENTS
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 stat CT shows worsening hydro on L side to moderate from mild however no obvious hematoma/obstruction and she's maintained good UO. BUN stable and good hemodynamics, good PO intake, make prerenal SHIKHA from initial polyuria unlikely. Will discuss new CT findings with urology.  Na up to 122 with 3% 90cc hypertonic saline, cont for 3 more hours then recheck urine Na, urine Cr, urine Osm  u/a with gross hematuria which is likely causing falsely elevated proteinuria. Low suspicion for GN, however would check ANCA, anti GBM, ROSALVA, ds DNA, C3 C4 and cryocrit/cryoglobulin (barely + RF which can be false + in cryo).   Suspect her SHIKHA is due to ischemic ATN from intraop hypotension +/- toxic ATN from hemoglobinuria. Remains non oliguric, w/o uremia and acceptable potassium levels.   Hypocalcemia likely due to secondary hyperparathyroidism, on ca supplements, would start calcitriol 0.25 mcg daily as well.

## 2021-07-02 NOTE — DIETITIAN INITIAL EVALUATION ADULT. - ADD RECOMMEND
1. mechanical diet, fluids per team 2. Monitor % PO intake 3. Monitor BM 4. Monitor labs: BMP, lytes, replete prn 1. NPO, adv diet when feasible to mechanical soft diet per SLP recs, fluids per team 2. Monitor % PO intake 3. Monitor BM 4. Monitor labs: BMP, lytes, replete prn 1. NPO, adv diet when feasible to mechanical soft diet per SLP recs, fluids per team 2. Monitor % PO intake and need for ONS 3. Monitor BM 4. Monitor labs: BMP, lytes, replete prn

## 2021-07-02 NOTE — PROGRESS NOTE ADULT - ASSESSMENT
71 year old female with a history of chronic thrombocytopenia (over 10 years), osteoporosis, depression, hypertension, , ureteral stent placement on 6/28 for ureteral colic/stone obstruction brought by EMS to ED with altered mental status, expressive aphasia, found to be hyponatremic. Evaluation negative for stroke or hemorrhage. Patient states she has been thrombocytopenic for years and has been evaluated in the past by Dr. Miki Billy at [facility] and was not given a diagnosis according to patient.. Patient's platelet counts over the past three years have ranged between 85,000 and 105,000. On 6/17/21, platelets were 103,000, hemoglobin 12.0, WBC 4.35by her PCP. She admits to a distant past history of excessive alcohol intake. Outpatient medications included Tamsulosin, Bactrim, Tranylcypromine. Today, hemoglobin 8.8, platelet count 91,000.     Thrombocytopenia -  chronic, possibly autoimmune , with superimposed acute thrombocytopenia due to  blood loss, recent antibiotics and other medications which may cause thrombocytopenia. Possible low grade myelodysplastic syndrome. Not due to splenic sequestration as spleen size normal on CT. Today's count 91.000 still within patient's chronic baseline. However, platelets may be dysfunctional due to present renal failure and rising creatinine.  Anemia - acute, due to  blood loss, catheter/ureteral calculus. Down to 8.8 today. Hemoglobin was 12.0 as outpatient on 6/17. Doubt hemolysis. Haptoglobin 78. Iron panel an ferritin normal. TSH normal.  Ecchymoses - likely trauma induced during period of mental status changes, thrombocytopenia, qualitative platelet disorder in presence of renal failure and possible medication induced qualitative platelet disorder. INR/PTT normal.  Electrolyte imbalance-Hyponatremia - Sodium remains low at 120. Treatment and close monitoring in progress by medical and renal teams.    Plan- Monitor CBC, trend platelets. If possible, avoid platelet suppressing medications. Check, B12, folate and supplement as needed. Also check ROSALVA, If bleeding continues and/or procedures planned, would consider platelet transfusion as patient's platelets are likely dysfunctional at the present time. Discussed with Dr. Winter and  renal team.

## 2021-07-02 NOTE — PROGRESS NOTE ADULT - PROBLEM SELECTOR PLAN 4
Hx of thrombocytopenia according to patient.  - Platelets 96 on this admission  - Platelets 89 on prior admission in 2019   - Per heme onc recommendations patient's home medication of mirtazapine should be held, as it could be causing the low plts.   - f/u rheum labs ordered  - f/u Heme/onc recommendations Hx of thrombocytopenia according to patient.  - Platelets 96 on this admission  - Platelets 89 on prior admission in 2019   - Per heme onc recommendations patient's home medication of mirtazapine should be held, as it could be causing the low plts.   - f/u Heme/onc recommendations -- avoid platelet suppressing medications, check ROSALVA, if procedures planned or bleeding continues, would consider platelet transfusion as patient's platelets are likely dysfunctional at present time - discussed with nephrology Dr. Winter and renal team -- Both in agreement.

## 2021-07-02 NOTE — DIETITIAN INITIAL EVALUATION ADULT. - PROBLEM SELECTOR PLAN 3
Reported baseline of A&Ox3. Likely 2/2 combination of hyponatremia, UTI, and hypocalcemia. Must exclude common alternative etiologies.   - serum ammonia within normal limits  - SHARONDA negative  - ordered utox to r/o other organic etiologies  - Mgmt as per hyponatremia and UTI section

## 2021-07-02 NOTE — DIETITIAN INITIAL EVALUATION ADULT. - OTHER CALCULATIONS
ABW used to calculate energy needs due to pt's current body weight within % IBW (104%). Needs adjusted for older adults. Increased needs 2/2 UTI/worsening kidney function. Fluids per team 2/2 hydronephrosis

## 2021-07-02 NOTE — PROGRESS NOTE ADULT - ASSESSMENT
70 y/o Female with PMHx HTN, thrombocytopenia, osteoporosis, depression and urethral stent placed on 6/28 due to ureteral colic presents to the ED with AMS. Nephrology consulted for hyponatremia of 115 on admission.     Assessment:  #Acute on Chronic Hyponatremia in a euvolemic state, supported by physical exam and vital signs. Potential etiologies include ADH secretion in response to pain, increased free water intake (psychogenic polydipsia) and SIADH from psychiatric medications.   - Restart 2% NaCl today 60cc/hr   - recheck BMP q4hr and administer D5W if overcorrection occurs     #SHIKHA - possible etiologies include ureterospasm/vasospasm due to recent stent placement in L ureter, pigment nephropathy (last contrast exposure 6/29) causing ATN from her gross hematuria, or repeat obstruction however her ureteral stent is in place and she's non oliguric.   known obstructive disease, R hydronephrosis x10y  - STAT CT abdomen pelvis to evaluate worsening kidney function     #Anemia - normocytic, concern for blood loss post procedure   - track Hb and transfuse if falls below 6  - monitor urine appearance      #Hypocalcemia - possible etiology of SHIKHA causing decreased Ca cycling   - continue calcium carbonate 1250mg daily        Eitan Toth DO  PGY-1 Internal Medicine  72 y/o Female with PMHx HTN, thrombocytopenia, osteoporosis, depression and urethral stent placed on 6/28 due to ureteral colic presents to the ED with AMS. Nephrology consulted for hyponatremia of 115 on admission.     Assessment:  #Acute on Chronic Hyponatremia in a euvolemic state, supported by physical exam and vital signs. Potential etiologies include ADH secretion in response to pain, increased free water intake (psychogenic polydipsia) and SIADH from psychiatric medications.   - Restart 3% NaCl 30cc/hr x3 hours   - recheck BMP q4hr and administer D5W if overcorrection occurs   - serum and urine Osm     #SHIKHA - possible etiologies include ureterospasm/vasospasm due to recent stent placement in L ureter, pigment nephropathy (last contrast exposure 6/29) causing ATN from her gross hematuria, or repeat obstruction however her ureteral stent is in place and she's non oliguric.   known obstructive disease, R hydronephrosis x10y  - STAT CT abdomen pelvis to evaluate worsening kidney function     #Anemia - normocytic, concern for blood loss post procedure   - track Hb and transfuse if falls below 6  - monitor urine appearance      #Hypocalcemia - possible etiology of SHIKHA causing decreased Ca cycling   - continue calcium carbonate 1250mg daily        Eitan Toth DO  PGY-1 Internal Medicine  72 y/o Female with PMHx HTN, thrombocytopenia, osteoporosis, depression and urethral stent placed on 6/28 due to ureteral colic presents to the ED with AMS. Nephrology consulted for hyponatremia of 115 on admission.     Assessment:  #Acute on Chronic Hyponatremia in a euvolemic state, supported by physical exam and vital signs. Potential etiologies include ADH secretion in response to pain, increased free water intake (psychogenic polydipsia) and SIADH from psychiatric medications.   - Restart 3% NaCl 30cc/hr x3 hours   - recheck BMP q4hr and administer D5W if overcorrection occurs   - serum and urine Osm     #SHIKHA - possible etiologies include ureterospasm/vasospasm due to recent stent placement in L ureter, pigment nephropathy causing ATN from her gross hematuria, contrast induced nephropathy and/or repeat obstruction however her ureteral stent is in place and she's non oliguric.   known obstructive disease, R hydronephrosis x10y  - STAT CT abdomen pelvis to evaluate worsening kidney function     #Anemia - normocytic, concern for blood loss post procedure. Chronic thrombocytopenia thought to be autoimmune in nature, has been stable. IF needs procedure like PCNs, per heme she's recommended to have platelet transfusion.     #Hypocalcemia - possible etiology of SHIKHA causing decreased Ca cycling   - continue calcium carbonate 1250mg daily      Eitan Toth DO  PGY-1 Internal Medicine

## 2021-07-02 NOTE — PROGRESS NOTE ADULT - ASSESSMENT
71F with PMHx HTN, platelet disorder, depression, ?hyponatremia, and gross hematuria s/p cystoscopy, L ureteroscopy laser lithotripsy and ureteral stent placement on 6/28. Intra-operative urine cultures were negative. Pt called answering service 6/29 reporting urinary retention w/ gross hematuria. On presentation to ED, pt with AMS (AOx2), stroke coke called with negative work up and imaging. Pt was in urinary retention with placement of ocampo catheter by . Noted to be hyponatremic to 113 with pre-operative value of 136. UA grossly positive. CTAP notable for known severe right and L ureteral stent in correct position. AOx3. No CVAT. Ocampo draining with improvement in gross hematuria, does not require CBI. Hgb decrease this AM, seems unlikely that this is 2/2 gross heme given urinary color. Worsening SHIKHA also seems unlikely to be post-renal. Pt with longstanding non-functioning R kidney, severely hydronephrotic, and good urinary output, most likely all originating from L kidney, in addition to a recently placed ureteral stent which is noted to be in correct positioning with recent imaging.    Recommendations:  - Repeat CBC and BMP at noon  - Obtain Non Contrast CTAP to assess kidneys and bladder for a post renal etiology to her worsening kidney function  - Make NPO for possible PCN if imaging shows post renal etiology to worsening kidney function  - If hgb continues to downtrend, may require blood transfusion  - Continue to monitor urinary output, color, and trend creatinine  - Ureteral stent placed on 6/28 in correct position, will be removed in office next week  - Will likely require ocampo catheter at discharge  - Nephrology on board, appreciate recs  - Rest of care per primary  - No acute urologic intervention at this time, will continue to follow  - Can follow up with Dr. Ramirez within 1 week of discharge  - Discussed with  attending

## 2021-07-02 NOTE — PROGRESS NOTE ADULT - PROBLEM SELECTOR PLAN 6
Progress Notes by Zoey Duarte at 10/23/18 08:45 AM     Author:  Zoey Duarte Service:  (none) Author Type:  Technician     Filed:  10/23/18 08:48 AM Encounter Date:  10/23/2018 Status:  Signed     :  Zoey Duarte (Technician)            [facility] online order #[[id number]] [id number][[id number]]        Revision History        User Key Date/Time User Provider Type Action    > CA1.1 10/23/18 08:48 AM Zoey Duarte Technician Sign    M - Manual, T - Template             - Urology recommendations stent removal next week, no intervention at this time.   - Patient has chronic right UPJ obstruction  - U/S showed no stones at this time  - Hematuria noted in Jordan -- Follow Urology Recs. - Urology recommendations stent removal next week, no intervention at this time.   - Patient has chronic right UPJ obstruction  - U/S showed no stones at this time  - Hematuria noted in Jordan, however lightening in appearance - Urology recommendations stent removal next week, no intervention at this time.   - Patient has chronic right UPJ obstruction  - U/S showed no stones at this time  - Hematuria noted in Jordan, however lightening in appearance  -nephrology and urology following

## 2021-07-02 NOTE — PROGRESS NOTE ADULT - ASSESSMENT
Ms. Thompson is a 70 y/o Female with PMHx HTN, depression and urethral stent placed on 7/28 due to kidney stones admitted for severe hyponatremia and UTI. Course complicated by persistent hyponatremia with perplexing etiology.

## 2021-07-02 NOTE — DIETITIAN INITIAL EVALUATION ADULT. - PROBLEM SELECTOR PLAN 1
Na of 113 on admission, downtrended to 111 @1:47am.   - Start 2% hypertonic saline at 60 cc/hr   - Q4hr BMP to check for correction, goal is no more than Na of 118-120 in 24hrs  - Urine Na 43, Uosm 313 suggestive of ?SIADH picture  - f/u AM serum osm

## 2021-07-02 NOTE — PROVIDER CONTACT NOTE (CRITICAL VALUE NOTIFICATION) - ACTION/TREATMENT ORDERED:
Laura BRAVO made aware, no interventions at this time
Continue to trend electrolytes  ICU consult pending for disposition
No action at this time. Plan to keep RN posted.

## 2021-07-02 NOTE — PROGRESS NOTE BEHAVIORAL HEALTH - RISK ASSESSMENT
Assessed at low acute risk for suicide - no current SI, recent suicidal comments without genuine intent or plan in the context of likely delirium and frustration with care, future-oriented and treatment-seeking, able to vocalize needs and seek assistance as desired Assessed at low acute risk for suicide - no current SI, recent suicidal comments without genuine intent or plan in the context of likely delirium and frustration with care, future-oriented and treatment-seeking, able to vocalize needs and seek assistance as desired. At somewhat elevated chronic risk given h/o depression, past suicide attempt but currently without active SI, engaged in outpt care, with good response to antidepressant medication and support in hospital

## 2021-07-02 NOTE — PROGRESS NOTE ADULT - PROBLEM SELECTOR PLAN 9
Hx of depression, patient feels very down and depressed today.   - Medications reconciled, Dr. Brad Tomas prescribes Mirtazepine 15 mg QD and Parnate 10 mg 11 tablets daily.  - Per psych recommendations patient restarted on 60 mg parnate, mirtazepine being held.   - On prior admission patient was discharged on 60 mg parnate but ignored medical advice and restarted her home dose of 110 mg.  - Concern for thrombocytopenia and SIADH being medication-induced as above Hx of depression, patient feels very down and depressed today.   - Medications reconciled, Dr. Brad Tomas prescribes Mirtazepine 15 mg QD and Parnate 10 mg 11 tablets daily.  - On prior admission patient was discharged on 60 mg parnate but ignored medical advice and restarted her home dose of 110 mg.  - Concern for thrombocytopenia and SIADH being medication-induced as above  - Per psych recommendations patient  on 60 mg parnate and Mirtazapine 15 mg po qhs. Lorazepam 1mg po q6h prn.  - No SI noted, low threshold to reinitiate enhance supervision, d/c 1:1 currently   -Psych following closely # Moderate depression  Hx of depression, patient feels very down and depressed today.   - Medications reconciled, Dr. Brad Tomas prescribes Mirtazepine 15 mg QD and Parnate 10 mg 11 tablets daily.  - On prior admission patient was discharged on 60 mg parnate but ignored medical advice and restarted her home dose of 110 mg.  - Concern for thrombocytopenia and SIADH being medication-induced as above  - Per psych recommendations patient  on 60 mg parnate and Mirtazapine 15 mg po qhs. Lorazepam 1mg po q6h prn.  - No SI noted, low threshold to reinitiate enhance supervision, d/c 1:1 currently   -Psych following closely

## 2021-07-02 NOTE — PROGRESS NOTE ADULT - PROBLEM SELECTOR PLAN 8
Unknown chronicity or etiology of anemia, however patient with hematuria on UA following procedure yesterday.  - Maintain active T/S, transfuse to goal > 7.0   - iron panel labs in normal range suggestive of anemia of chronic disease, not RIYA.

## 2021-07-02 NOTE — DIETITIAN INITIAL EVALUATION ADULT. - PROBLEM SELECTOR PLAN 6
Unknown chronicity or etiology of anemia, however patient with hematuria on UA following procedure yesterday.  - Maintain active T/S, transfuse to goal > 7.0   - f/u iron panel in AM

## 2021-07-02 NOTE — DIETITIAN INITIAL EVALUATION ADULT. - OTHER INFO
Ms. Thompson is a 72 y/o Female with PMHx HTN, depression and urethral stent placed on 7/28 due to ureteral colic/stone obstruction, presented to ED with AMS, expressive aphasia. Admitted for severe hyponatremia and positive urinalysis, UTI. Pt with R sided hydronephrosis.    Pt seen lying in bed on assessment. Reports good appetite and good PO intake. Pt reported eating a turkey sandwich yesterday but has not had breakfast yet as she states that she had just ordered it. Ms. Thompson is a 72 y/o Female with PMHx HTN, depression and urethral stent placed on 7/28 due to ureteral colic/stone obstruction, presented to ED with AMS, expressive aphasia. Admitted for severe hyponatremia and positive urinalysis, UTI. Pt with R sided hydronephrosis. Planned CTAP and possible PCN if images shows post renal etiology to worsening kidney function. On a 1:1 psych eval today 7/2, deemed to not have active SI.     Pt seen lying in bed on assessment. This AM, pt was on a mechanical soft 1000 ml fluid restricted diet per speech and swallow eval and recs 7/1. Reports good appetite and good PO intake. Pt reported eating a turkey sandwich yesterday but has not had breakfast yet as she states that she had just ordered it. Pt now switched to NPO. No reported discomfort, denied n/v/d/c. Pain: No verbal indicators. GI: WDL. Skin integrity: Rogerio 16. Will continue to follow per RD protocol.

## 2021-07-03 DIAGNOSIS — G93.41 METABOLIC ENCEPHALOPATHY: ICD-10-CM

## 2021-07-03 DIAGNOSIS — N17.0 ACUTE KIDNEY FAILURE WITH TUBULAR NECROSIS: ICD-10-CM

## 2021-07-03 LAB
ALBUMIN SERPL ELPH-MCNC: 3.4 G/DL — SIGNIFICANT CHANGE UP (ref 3.3–5)
ALP SERPL-CCNC: 74 U/L — SIGNIFICANT CHANGE UP (ref 40–120)
ALT FLD-CCNC: 12 U/L — SIGNIFICANT CHANGE UP (ref 10–45)
ANION GAP SERPL CALC-SCNC: 13 MMOL/L — SIGNIFICANT CHANGE UP (ref 5–17)
ANION GAP SERPL CALC-SCNC: 16 MMOL/L — SIGNIFICANT CHANGE UP (ref 5–17)
ANION GAP SERPL CALC-SCNC: 16 MMOL/L — SIGNIFICANT CHANGE UP (ref 5–17)
ANISOCYTOSIS BLD QL: SLIGHT — SIGNIFICANT CHANGE UP
AST SERPL-CCNC: 29 U/L — SIGNIFICANT CHANGE UP (ref 10–40)
BASOPHILS # BLD AUTO: 0 K/UL — SIGNIFICANT CHANGE UP (ref 0–0.2)
BASOPHILS # BLD AUTO: 0.02 K/UL — SIGNIFICANT CHANGE UP (ref 0–0.2)
BASOPHILS NFR BLD AUTO: 0 % — SIGNIFICANT CHANGE UP (ref 0–2)
BASOPHILS NFR BLD AUTO: 0.3 % — SIGNIFICANT CHANGE UP (ref 0–2)
BILIRUB SERPL-MCNC: 0.2 MG/DL — SIGNIFICANT CHANGE UP (ref 0.2–1.2)
BLD GP AB SCN SERPL QL: NEGATIVE — SIGNIFICANT CHANGE UP
BUN SERPL-MCNC: 31 MG/DL — HIGH (ref 7–23)
BUN SERPL-MCNC: 32 MG/DL — HIGH (ref 7–23)
BUN SERPL-MCNC: 37 MG/DL — HIGH (ref 7–23)
CALCIUM SERPL-MCNC: 7.9 MG/DL — LOW (ref 8.4–10.5)
CALCIUM SERPL-MCNC: 8.2 MG/DL — LOW (ref 8.4–10.5)
CALCIUM SERPL-MCNC: 8.2 MG/DL — LOW (ref 8.4–10.5)
CHLORIDE SERPL-SCNC: 93 MMOL/L — LOW (ref 96–108)
CHLORIDE SERPL-SCNC: 95 MMOL/L — LOW (ref 96–108)
CHLORIDE SERPL-SCNC: 95 MMOL/L — LOW (ref 96–108)
CO2 SERPL-SCNC: 17 MMOL/L — LOW (ref 22–31)
CO2 SERPL-SCNC: 20 MMOL/L — LOW (ref 22–31)
CO2 SERPL-SCNC: 23 MMOL/L — SIGNIFICANT CHANGE UP (ref 22–31)
CREAT SERPL-MCNC: 3.69 MG/DL — HIGH (ref 0.5–1.3)
CREAT SERPL-MCNC: 3.86 MG/DL — HIGH (ref 0.5–1.3)
CREAT SERPL-MCNC: 3.93 MG/DL — HIGH (ref 0.5–1.3)
EOSINOPHIL # BLD AUTO: 0.17 K/UL — SIGNIFICANT CHANGE UP (ref 0–0.5)
EOSINOPHIL # BLD AUTO: 0.2 K/UL — SIGNIFICANT CHANGE UP (ref 0–0.5)
EOSINOPHIL NFR BLD AUTO: 2.4 % — SIGNIFICANT CHANGE UP (ref 0–6)
EOSINOPHIL NFR BLD AUTO: 2.6 % — SIGNIFICANT CHANGE UP (ref 0–6)
GIANT PLATELETS BLD QL SMEAR: PRESENT — SIGNIFICANT CHANGE UP
GLUCOSE SERPL-MCNC: 198 MG/DL — HIGH (ref 70–99)
GLUCOSE SERPL-MCNC: 82 MG/DL — SIGNIFICANT CHANGE UP (ref 70–99)
GLUCOSE SERPL-MCNC: 85 MG/DL — SIGNIFICANT CHANGE UP (ref 70–99)
HCT VFR BLD CALC: 26.9 % — LOW (ref 34.5–45)
HCT VFR BLD CALC: 27.3 % — LOW (ref 34.5–45)
HGB BLD-MCNC: 8.7 G/DL — LOW (ref 11.5–15.5)
HGB BLD-MCNC: 8.9 G/DL — LOW (ref 11.5–15.5)
HYPOCHROMIA BLD QL: SLIGHT — SIGNIFICANT CHANGE UP
IMM GRANULOCYTES NFR BLD AUTO: 1.3 % — SIGNIFICANT CHANGE UP (ref 0–1.5)
LYMPHOCYTES # BLD AUTO: 0.32 K/UL — LOW (ref 1–3.3)
LYMPHOCYTES # BLD AUTO: 0.66 K/UL — LOW (ref 1–3.3)
LYMPHOCYTES # BLD AUTO: 4.6 % — LOW (ref 13–44)
LYMPHOCYTES # BLD AUTO: 8.7 % — LOW (ref 13–44)
MACROCYTES BLD QL: SLIGHT — SIGNIFICANT CHANGE UP
MAGNESIUM SERPL-MCNC: 1.9 MG/DL — SIGNIFICANT CHANGE UP (ref 1.6–2.6)
MANUAL SMEAR VERIFICATION: SIGNIFICANT CHANGE UP
MCHC RBC-ENTMCNC: 29.6 PG — SIGNIFICANT CHANGE UP (ref 27–34)
MCHC RBC-ENTMCNC: 30 PG — SIGNIFICANT CHANGE UP (ref 27–34)
MCHC RBC-ENTMCNC: 32.3 GM/DL — SIGNIFICANT CHANGE UP (ref 32–36)
MCHC RBC-ENTMCNC: 32.6 GM/DL — SIGNIFICANT CHANGE UP (ref 32–36)
MCV RBC AUTO: 91.5 FL — SIGNIFICANT CHANGE UP (ref 80–100)
MCV RBC AUTO: 91.9 FL — SIGNIFICANT CHANGE UP (ref 80–100)
MONOCYTES # BLD AUTO: 0.39 K/UL — SIGNIFICANT CHANGE UP (ref 0–0.9)
MONOCYTES # BLD AUTO: 0.76 K/UL — SIGNIFICANT CHANGE UP (ref 0–0.9)
MONOCYTES NFR BLD AUTO: 11 % — SIGNIFICANT CHANGE UP (ref 2–14)
MONOCYTES NFR BLD AUTO: 5.2 % — SIGNIFICANT CHANGE UP (ref 2–14)
NEUTROPHILS # BLD AUTO: 5.58 K/UL — SIGNIFICANT CHANGE UP (ref 1.8–7.4)
NEUTROPHILS # BLD AUTO: 6.32 K/UL — SIGNIFICANT CHANGE UP (ref 1.8–7.4)
NEUTROPHILS NFR BLD AUTO: 80.4 % — HIGH (ref 43–77)
NEUTROPHILS NFR BLD AUTO: 83.5 % — HIGH (ref 43–77)
NRBC # BLD: 0 /100 WBCS — SIGNIFICANT CHANGE UP (ref 0–0)
OSMOLALITY UR: 328 MOSM/KG — SIGNIFICANT CHANGE UP (ref 300–900)
OSMOLALITY UR: 377 MOSM/KG — SIGNIFICANT CHANGE UP (ref 300–900)
OVALOCYTES BLD QL SMEAR: SLIGHT — SIGNIFICANT CHANGE UP
PHOSPHATE SERPL-MCNC: 3.7 MG/DL — SIGNIFICANT CHANGE UP (ref 2.5–4.5)
PLAT MORPH BLD: ABNORMAL
PLATELET # BLD AUTO: 101 K/UL — LOW (ref 150–400)
PLATELET # BLD AUTO: 105 K/UL — LOW (ref 150–400)
POIKILOCYTOSIS BLD QL AUTO: SIGNIFICANT CHANGE UP
POLYCHROMASIA BLD QL SMEAR: SLIGHT — SIGNIFICANT CHANGE UP
POTASSIUM SERPL-MCNC: 3.3 MMOL/L — LOW (ref 3.5–5.3)
POTASSIUM SERPL-MCNC: 3.8 MMOL/L — SIGNIFICANT CHANGE UP (ref 3.5–5.3)
POTASSIUM SERPL-MCNC: 4.1 MMOL/L — SIGNIFICANT CHANGE UP (ref 3.5–5.3)
POTASSIUM SERPL-SCNC: 3.3 MMOL/L — LOW (ref 3.5–5.3)
POTASSIUM SERPL-SCNC: 3.8 MMOL/L — SIGNIFICANT CHANGE UP (ref 3.5–5.3)
POTASSIUM SERPL-SCNC: 4.1 MMOL/L — SIGNIFICANT CHANGE UP (ref 3.5–5.3)
PROT SERPL-MCNC: 6.6 G/DL — SIGNIFICANT CHANGE UP (ref 6–8.3)
RBC # BLD: 2.94 M/UL — LOW (ref 3.8–5.2)
RBC # BLD: 2.97 M/UL — LOW (ref 3.8–5.2)
RBC # FLD: 14.5 % — SIGNIFICANT CHANGE UP (ref 10.3–14.5)
RBC # FLD: 14.5 % — SIGNIFICANT CHANGE UP (ref 10.3–14.5)
RBC BLD AUTO: ABNORMAL
RH IG SCN BLD-IMP: POSITIVE — SIGNIFICANT CHANGE UP
SODIUM SERPL-SCNC: 128 MMOL/L — LOW (ref 135–145)
SODIUM SERPL-SCNC: 129 MMOL/L — LOW (ref 135–145)
SODIUM SERPL-SCNC: 131 MMOL/L — LOW (ref 135–145)
SODIUM UR-SCNC: 99 MMOL/L — SIGNIFICANT CHANGE UP
WBC # BLD: 6.94 K/UL — SIGNIFICANT CHANGE UP (ref 3.8–10.5)
WBC # BLD: 7.57 K/UL — SIGNIFICANT CHANGE UP (ref 3.8–10.5)
WBC # FLD AUTO: 6.94 K/UL — SIGNIFICANT CHANGE UP (ref 3.8–10.5)
WBC # FLD AUTO: 7.57 K/UL — SIGNIFICANT CHANGE UP (ref 3.8–10.5)

## 2021-07-03 PROCEDURE — 99233 SBSQ HOSP IP/OBS HIGH 50: CPT | Mod: GC

## 2021-07-03 PROCEDURE — 99232 SBSQ HOSP IP/OBS MODERATE 35: CPT

## 2021-07-03 PROCEDURE — 99233 SBSQ HOSP IP/OBS HIGH 50: CPT

## 2021-07-03 RX ORDER — POTASSIUM CHLORIDE 20 MEQ
40 PACKET (EA) ORAL ONCE
Refills: 0 | Status: COMPLETED | OUTPATIENT
Start: 2021-07-03 | End: 2021-07-03

## 2021-07-03 RX ORDER — UREA 15 G
15 POWDER IN PACKET (EA) ORAL ONCE
Refills: 0 | Status: COMPLETED | OUTPATIENT
Start: 2021-07-03 | End: 2021-07-03

## 2021-07-03 RX ORDER — UREA 15 G
15 POWDER IN PACKET (EA) ORAL DAILY
Refills: 0 | Status: DISCONTINUED | OUTPATIENT
Start: 2021-07-03 | End: 2021-07-05

## 2021-07-03 RX ORDER — IRON SUCROSE 20 MG/ML
200 INJECTION, SOLUTION INTRAVENOUS EVERY 24 HOURS
Refills: 0 | Status: COMPLETED | OUTPATIENT
Start: 2021-07-03 | End: 2021-07-07

## 2021-07-03 RX ORDER — SODIUM CHLORIDE 5 G/100ML
500 INJECTION, SOLUTION INTRAVENOUS
Refills: 0 | Status: DISCONTINUED | OUTPATIENT
Start: 2021-07-03 | End: 2021-07-04

## 2021-07-03 RX ADMIN — Medication 40 MILLIEQUIVALENT(S): at 08:28

## 2021-07-03 RX ADMIN — Medication 650 MILLIGRAM(S): at 18:50

## 2021-07-03 RX ADMIN — SODIUM CHLORIDE 30 MILLILITER(S): 5 INJECTION, SOLUTION INTRAVENOUS at 01:38

## 2021-07-03 RX ADMIN — CEFTRIAXONE 100 MILLIGRAM(S): 500 INJECTION, POWDER, FOR SOLUTION INTRAMUSCULAR; INTRAVENOUS at 20:42

## 2021-07-03 RX ADMIN — Medication 1 TABLET(S): at 11:57

## 2021-07-03 RX ADMIN — IRON SUCROSE 110 MILLIGRAM(S): 20 INJECTION, SOLUTION INTRAVENOUS at 18:48

## 2021-07-03 RX ADMIN — TRANYLCYPROMINE SULFATE 60 MILLIGRAM(S): 10 TABLET, FILM COATED ORAL at 09:36

## 2021-07-03 RX ADMIN — Medication 650 MILLIGRAM(S): at 06:56

## 2021-07-03 RX ADMIN — Medication 1 MILLIGRAM(S): at 02:11

## 2021-07-03 RX ADMIN — Medication 15 GRAM(S): at 18:49

## 2021-07-03 NOTE — PROGRESS NOTE ADULT - ASSESSMENT
70 y/o Female with HTN, chronic thrombocytopenia, osteoporosis, depression, chronic right hydronephrosis (obstructing stone at UPJ) and new L obstructing nephrolithiasis with gross hematuria s/p lithotripsy and L urethral stent placed on 6/28 c/b AMS with hospital admission due to symptomatic hyponatremia and SHIKHA    Assessment:    #SHIKHA - possible etiologies include ureterospasm/vasospasm due to recent stent placement in L ureter in the setting of solitary kidney vs ATN however no documentation of intraop hypotension. Concern remains for possible obstructive component given the increase in hydronephrosis on L noted on imaging yesterday however she remains non oliguric and today her Cr platuead which is reassuring. f/u repeat BMP in afternoon to ensure Cr continues to trend down. If Cr trending up, needs stat re-eval by urology, may need PCN  - needs nuclear medicine scan to evaluate how much her R kidney function is contributing given her chronic R hydro w obstructing UPJ stone.     #Acute on Chronic Hyponatremia in a euvolemic state, chronic SIADH - responding to 3% hypertonic 180ml yesterday. Up to 129. Recheck BMP, urine Na, urine Cr, urine Osm in afternoon to ensure not trending down and start Na tabs 1g TID. Cont fluid restriction but encourage PO food intake.     #Anemia stabilized    #Hypocalcemia - likely secondary hyperparathyroidism, improving with Calcium supplementation 1250mg daily       70 y/o Female with HTN, chronic thrombocytopenia, osteoporosis, depression, chronic right hydronephrosis (obstructing stone at UPJ) and new L obstructing nephrolithiasis with gross hematuria s/p lithotripsy and L urethral stent placed on 6/28 c/b AMS with hospital admission due to symptomatic hyponatremia and SHIKHA    Assessment:    #SHIKHA - possible etiologies include ureterospasm/vasospasm due to recent stent placement in L ureter in the setting of solitary kidney vs ATN however no documentation of intraop hypotension. Concern remains for possible obstructive component given the increase in hydronephrosis on L noted on imaging yesterday however she remains non oliguric and today her Cr platuead which is reassuring. f/u repeat BMP in afternoon to ensure Cr continues to trend down. If Cr trending up, needs stat re-eval by urology, may need PCN  - needs nuclear medicine scan to evaluate how much her R kidney function is contributing given her chronic R hydro w obstructing UPJ stone.     #Acute on Chronic Hyponatremia in a euvolemic state, chronic SIADH - responding to 3% hypertonic 180ml yesterday. Up to 129. Recheck BMP, urine Na, urine Cr, urine Osm in afternoon to ensure not trending down and start Ure-Na 15g daily. Cont fluid restriction but encourage PO food intake.     #Anemia stabilized    #Hypocalcemia - likely secondary hyperparathyroidism, improving with Calcium supplementation 1250mg daily

## 2021-07-03 NOTE — PROGRESS NOTE ADULT - SUBJECTIVE AND OBJECTIVE BOX
BEVERLY FINE, 71y, Female  MRN-[id number]  Patient is a 71y old  Female who presents with a chief complaint of Hyponatremia, Altered Mental Status (02 Jul 2021 12:00)      OVERNIGHT EVENTS: Sodium 123 @ 18:49 and 124 @ 23:08. Restarted 3% hypertonic saline overnight. AM Sodium 129    SUBJECTIVE: Patient doing well this morning, A&O x3. She denies any acute complaints. She denies any SI or thoughts of self-harm.     12 Point ROS Negative unless noted otherwise above.  -------------------------------------------------------------------------------  VITAL SIGNS:  Vital Signs Last 24 Hrs  T(C): 36.6 (03 Jul 2021 06:42), Max: 36.6 (02 Jul 2021 13:20)  T(F): 97.9 (03 Jul 2021 06:42), Max: 97.9 (03 Jul 2021 02:13)  HR: 74 (03 Jul 2021 06:42) (70 - 82)  BP: 147/72 (03 Jul 2021 06:42) (147/72 - 167/73)  BP(mean): --  RR: 17 (03 Jul 2021 06:42) (17 - 20)  SpO2: 97% (03 Jul 2021 06:42) (97% - 100%)  I&O's Summary    02 Jul 2021 07:01  -  03 Jul 2021 07:00  --------------------------------------------------------  IN: 0 mL / OUT: 2175 mL / NET: -2175 mL    PHYSICAL EXAM:    General: NAD  HEENT: EOMI, PERRLA, anicteric sclera; moist mucosal membranes.  Neck: supple, trachea midline  Cardiovascular: RRR, +S1/S2; NO M/R/G  Respiratory: CTA B/L; no W/R/R  Gastrointestinal: soft, NT/ND; +BSx4  Extremities: ecchymoses noted on b/l forearms, neck and b/l thighs 2/2 thrombocytopenia   Vascular: 2+ radial  Neurological: AAOx3; no focal deficits  Urological: Jordan in place w/hematuria s/p ureteral stent     ALLERGIES:  Allergies    No Known Allergies    Intolerances    Frazier (Unknown)      MEDICATIONS:  MEDICATIONS  (STANDING):  calcium carbonate   1250 mG (OsCal) 1 Tablet(s) Oral daily  cefTRIAXone   IVPB 1000 milliGRAM(s) IV Intermittent every 24 hours  sodium bicarbonate 650 milliGRAM(s) Oral every 12 hours  sodium chloride 3%. 500 milliLiter(s) (30 mL/Hr) IV Continuous <Continuous>  sodium chloride 3%. 500 milliLiter(s) (30 mL/Hr) IV Continuous <Continuous>  tranylcypromine 60 milliGRAM(s) Oral every 24 hours    MEDICATIONS  (PRN):  acetaminophen   Tablet .. 650 milliGRAM(s) Oral every 6 hours PRN Mild Pain (1 - 3)  LORazepam   Injectable 1 milliGRAM(s) IntraMuscular every 6 hours PRN Agitation    -------------------------------------------------------------------------------  LABS:                        8.7    6.94  )-----------( 101      ( 03 Jul 2021 06:06 )             26.9     07-03    129<L>  |  93<L>  |  31<H>  ----------------------------<  82  3.3<L>   |  20<L>  |  3.86<H>    Ca    8.2<L>      03 Jul 2021 06:06  Phos  4.4     07-02  Mg     2.4     07-02    CAPILLARY BLOOD GLUCOSE  COVID-19 PCR: Negative (29 Jun 2021 22:15)  RADIOLOGY & ADDITIONAL TESTS: Reviewed. BEVERLY FINE, 71y, Female  [id number]  Patient is a 71y old  Female who presents with a chief complaint of Hyponatremia, Altered Mental Status (02 Jul 2021 12:00)      OVERNIGHT EVENTS: Sodium 123 @ 18:49 and 124 @ 23:08. Restarted 3% hypertonic saline overnight. AM Sodium 129    SUBJECTIVE: Patient examined at the bedside. Patient doing well this morning, A&O x3. She denies any acute complaints. She denies any SI or thoughts of self-harm.     12 Point ROS Negative unless noted otherwise above.  -------------------------------------------------------------------------------  VITAL SIGNS:  Vital Signs Last 24 Hrs  T(C): 36.6 (03 Jul 2021 06:42), Max: 36.6 (02 Jul 2021 13:20)  T(F): 97.9 (03 Jul 2021 06:42), Max: 97.9 (03 Jul 2021 02:13)  HR: 74 (03 Jul 2021 06:42) (70 - 82)  BP: 147/72 (03 Jul 2021 06:42) (147/72 - 167/73)  BP(mean): --  RR: 17 (03 Jul 2021 06:42) (17 - 20)  SpO2: 97% (03 Jul 2021 06:42) (97% - 100%)  I&O's Summary    02 Jul 2021 07:01  -  03 Jul 2021 07:00  --------------------------------------------------------  IN: 0 mL / OUT: 2175 mL / NET: -2175 mL    PHYSICAL EXAM:    General: NAD  HEENT: EOMI, PERRLA, anicteric sclera; moist mucosal membranes.  Neck: supple, trachea midline  Cardiovascular: RRR, +S1/S2; NO M/R/G  Respiratory: CTA B/L; no W/R/R  Gastrointestinal: soft, NT/ND; +BSx4  Extremities: ecchymoses noted on b/l forearms, neck and b/l thighs 2/2 thrombocytopenia   Vascular: 2+ radial  Neurological: AAOx3; no focal deficits  Urological: Jordan in place w/hematuria s/p ureteral stent     ALLERGIES:  Allergies    No Known Allergies    Intolerances    Frazier (Unknown)      MEDICATIONS:  MEDICATIONS  (STANDING):  calcium carbonate   1250 mG (OsCal) 1 Tablet(s) Oral daily  cefTRIAXone   IVPB 1000 milliGRAM(s) IV Intermittent every 24 hours  sodium bicarbonate 650 milliGRAM(s) Oral every 12 hours  sodium chloride 3%. 500 milliLiter(s) (30 mL/Hr) IV Continuous <Continuous>  sodium chloride 3%. 500 milliLiter(s) (30 mL/Hr) IV Continuous <Continuous>  tranylcypromine 60 milliGRAM(s) Oral every 24 hours    MEDICATIONS  (PRN):  acetaminophen   Tablet .. 650 milliGRAM(s) Oral every 6 hours PRN Mild Pain (1 - 3)  LORazepam   Injectable 1 milliGRAM(s) IntraMuscular every 6 hours PRN Agitation    -------------------------------------------------------------------------------  LABS:                        8.7    6.94  )-----------( 101      ( 03 Jul 2021 06:06 )             26.9     07-03    129<L>  |  93<L>  |  31<H>  ----------------------------<  82  3.3<L>   |  20<L>  |  3.86<H>    Ca    8.2<L>      03 Jul 2021 06:06  Phos  4.4     07-02  Mg     2.4     07-02    CAPILLARY BLOOD GLUCOSE  COVID-19 PCR: Negative (29 Jun 2021 22:15)  RADIOLOGY & ADDITIONAL TESTS: Reviewed.

## 2021-07-03 NOTE — PROGRESS NOTE ADULT - PROBLEM SELECTOR PLAN 3
Baseline Cr from 2019 ~0.9-1.0 per chart review  - Steadily increasing Cr of unknown etiology --> 3.52 07/01 @ 11pm; 3.88 07/02; Cr elevation persisted for >72 hrs, treating as ATN  - Euvolemic on exam with only slight hyperosmolar urine with increased urine Na  - Differential remains wide; Renal consider nuclear scan for persisting ATN with hyponatremia  -STAT CT A/P performed 07/02 per nephrology and urology to r/o post-renal etiology -- severe right hydronephrosis with 2 mm UPJ stone, moderate L hydronephrosis stable, L uretal double-J stent in place and stable. No interval change in bilateral hydronephrosis.   -Patient to undergo nuclear medicine renal scan -- f/u results Patient with positive UA, in the setting of recent procedure, suspicion for infection.   - Given CTX 1g in ED.  - U Cx and blood cx negative  - Continue with CTX 1g IV, advance to 2g if abscess or bacteremic Patient with positive UA, in the setting of recent procedure, suspicion for infection.   - Given CTX 1g in ED.  - U Cx and blood cx negative  - Continue with CTX 1g IV, advance to 2g if abscess or bacteremic  - Per Urology:No acute urologic intervention at this time, will continue to follow. Can follow up with Dr. Ramirez within 1 week of discharge. Will likely need Jordan on DC.

## 2021-07-03 NOTE — PROGRESS NOTE ADULT - ATTENDING COMMENTS
Sodium has remained stable, mental status appears to be improving; pending possible procedure with Urology

## 2021-07-03 NOTE — PROGRESS NOTE ADULT - PROBLEM SELECTOR PLAN 1
- Patient was on 2% hypertonic saline at 60 cc/hr on 6/30 which was d/c per renal recommendation after Na increased 8 in 24 hours.  - Now on 1L fluid restriction.   - Started on sodium bicarbonate 650 mg oral BID.  - Q4hr BMP, U osm and serum osm to check values  - SIADH (medication or pain induced) or Psychogenic polydipsia as cause  - Renal consulted follow up recommendations  -07/02 AM Crit Value - Na 120; per nephrology recs: 3% NaCl 30 cc/hr x 3 hr restarted. Recheck BMP q4hr and administer D5W if overcorrection occurs.   -07/02 2pm Na - 122, 7pm 123; 3% NaCl administered overnight -- 07/03 6am 129   -monitor serum and urine Osm routinely - Patient was on 2% hypertonic saline at 60 cc/hr on 6/30 which was d/c per renal recommendation after Na increased 8 in 24 hours.  - Now on 1L fluid restriction.   - Started on sodium bicarbonate 650 mg oral BID.  - Q4hr BMP, U osm and serum osm to check values  - SIADH (medication or pain induced) or Psychogenic polydipsia as cause  - Renal consulted follow up recommendations  -07/02 AM Crit Value - Na 120; per nephrology recs: 3% NaCl 30 cc/hr x 3 hr restarted. Recheck BMP q4hr and administer D5W if overcorrection occurs.   -07/02 2pm Na - 122, 7pm 123; 3% NaCl administered overnight -- 07/03 6am 129   -monitor serum and urine Osm routinely  -Per Urology: - No acute urologic intervention at this time, will continue to follow. Can follow up with Dr. Ramirez within 1 week of discharge

## 2021-07-03 NOTE — PROGRESS NOTE ADULT - PROBLEM SELECTOR PLAN 8
Unknown chronicity or etiology of anemia, however patient with hematuria on UA following procedure yesterday.  - Maintain active T/S, transfuse to goal > 7.0   - iron panel labs in normal range suggestive of anemia of chronic disease, not RIYA. Unknown chronicity or etiology of anemia, however patient with hematuria on UA following procedure yesterday.  - Maintain active T/S, transfuse to goal > 7.0   - iron panel labs in normal range suggestive of anemia of chronic disease, not RIYA.      #Hypocalcemia  -per nephorlogy: likely secondary hyperparathyroidism, improving with Calcium supplementation 1250mg daily

## 2021-07-03 NOTE — PROGRESS NOTE ADULT - SUBJECTIVE AND OBJECTIVE BOX
Seen and examined bedside, feels well  Denies n/v  NAD  Unlabored breathing  sntnd  Jordan with clear red urine, without clots  Cr 3.69  Suspect IV induced nephropathy given >1cc/kg/hr UOP at this time

## 2021-07-03 NOTE — PROGRESS NOTE ADULT - PROBLEM SELECTOR PLAN 4
Hx of thrombocytopenia according to patient.  - Platelets 96 on this admission  - Platelets 89 on prior admission in 2019   - Per heme onc recommendations patient's home medication of mirtazapine should be held, as it could be causing the low plts.   - f/u Heme/onc recommendations -- avoid platelet suppressing medications, check ROSALVA, if procedures planned or bleeding continues, would consider platelet transfusion as patient's platelets are likely dysfunctional at present time - discussed with nephrology Dr. Winter and renal team -- Both in agreement.

## 2021-07-03 NOTE — PROGRESS NOTE ADULT - ASSESSMENT
71 year old female with a history of chronic thrombocytopenia (over 10 years), osteoporosis, depression, hypertension, , ureteral stent placement on 6/28 for ureteral colic/stone obstruction brought by EMS to ED with altered mental status, expressive aphasia, found to be hyponatremic. Evaluation negative for stroke or hemorrhage. Patient states she has been thrombocytopenic for years and has been evaluated in the past by Dr. Miki Billy at [facility] and was not given a diagnosis according to patient.. Patient's platelet counts over the past three years have ranged between 85,000 and 105,000. On 6/17/21, platelets were 103,000, hemoglobin 12.0, WBC 4.35by her PCP. She admits to a distant past history of excessive alcohol intake. Outpatient medications included Tamsulosin, Bactrim, Tranylcypromine. Today, hemoglobin holding at 8.7, platelet count stable at 101,000.     Thrombocytopenia -  chronic, possibly autoimmune , with superimposed acute thrombocytopenia due to  blood loss, recent antibiotics and other medications which may cause thrombocytopenia. Possible low grade myelodysplastic syndrome. Not due to splenic sequestration as spleen size normal on CT. Today's count 101,000 still within patient's chronic baseline. However, platelets may be dysfunctional due to present renal failure and elevated creatinine.  Anemia - acute, due to  blood loss, catheter/ureteral calculus. Holding at  8.7 today. Hemoglobin was normal at 12.0 as outpatient on 6/17/21. Doubt hemolysis. Haptoglobin 78. Iron panel and ferritin normal. TSH normal.  Ecchymoses - likely trauma induced during period of mental status changes, thrombocytopenia, qualitative platelet disorder in presence of renal failure and possible medication induced qualitative platelet disorder. INR/PTT normal.  Electrolyte imbalance-Hyponatremia - Treatment and close monitoring in progress by medical and renal teams.    Plan- Monitor CBC, trend platelets. If possible, avoid platelet suppressing medications. Check, B12, folate and supplement as needed. Also check ROSALVA, If bleeding continues and/or invasive procedures planned, would consider platelet transfusion as patient's platelets are likely dysfunctional at the present time. Discussed with Dr. Winter and  renal team.

## 2021-07-03 NOTE — PROGRESS NOTE ADULT - PROBLEM SELECTOR PLAN 6
- Urology recommendations stent removal next week, no intervention at this time.   - Patient has chronic right UPJ obstruction  - U/S showed no stones at this time  - Hematuria noted in Jordan, however lightening in appearance  -nephrology and urology following

## 2021-07-03 NOTE — PROGRESS NOTE ADULT - PROBLEM SELECTOR PLAN 5
Metabolic encephalopathy 2/2 hyponatremia   RESOLVED. Reported baseline of A&Ox3.   -07/02 -- Patient having hallucinations overnight, otherwise A&Ox3

## 2021-07-03 NOTE — PROGRESS NOTE ADULT - PROBLEM SELECTOR PLAN 9
# Moderate depression  Hx of depression, patient feels very down and depressed today.   - Medications reconciled, Dr. Brad Tomas prescribes Mirtazepine 15 mg QD and Parnate 10 mg 11 tablets daily.  - On prior admission patient was discharged on 60 mg parnate but ignored medical advice and restarted her home dose of 110 mg.  - Concern for thrombocytopenia and SIADH being medication-induced as above  - Per psych recommendations patient  on 60 mg parnate and Mirtazapine 15 mg po qhs. Lorazepam 1mg po q6h prn.  - No SI noted, low threshold to reinitiate enhance supervision, d/c 1:1 currently   -Psych following closely

## 2021-07-03 NOTE — PROGRESS NOTE ADULT - SUBJECTIVE AND OBJECTIVE BOX
Patient is a 71y Female seen and evaluated at bedside. Her mentation is improved this morning. Still with tangential thoughts but oriented x3 and states she feels "much better" and is hungry. Denies flank pain or abdominal pain.      Meds:    acetaminophen   Tablet .. 650 every 6 hours PRN  calcium carbonate   1250 mG (OsCal) 1 daily  cefTRIAXone   IVPB 1000 every 24 hours  dextrose 5%. 500 <Continuous>  LORazepam   Injectable 1 every 6 hours PRN  sodium bicarbonate 650 every 12 hours  tranylcypromine 60 every 24 hours    T(F): , Max: 98  HR: 68  BP: 121/75  RR: 18  SpO2: 100%     Review of Systems:  CONSTITUTIONAL: No fever or chills, no fatigue and tiredness   RESPIRATORY: No shortness of breath, cough, hemoptysis  CARDIOVASCULAR: No chest pain or shortness of breath  GASTROINTESTINAL: No abdominal or flank pain, No nausea or vomiting, No diarrhea  GENITOURINARY: No dysuria or urinary burning, ocampo in place, hematuria   NEUROLOGICAL: No headaches or blurred vision  SKIN: No skin rashes   MUSCULOSKELETAL: No arthralgia, No leg edema, No muscle pain      PHYSICAL EXAM:  GENERAL: well-developed, well nourished, alert, no acute distress at present.   NECK: supple, No JVD  CHEST/LUNG: Clear to auscultation bilaterally  HEART: normal S1S2, RRR  ABDOMEN: Soft, non distended, no tenderness in abdomen or flanks.   EXTREMITIES: No clubbing, cyanosis, or edema   NEUROLOGY: AAO x3, no focal neurological deficit  Skin: scattered healing ecchymoses and petechia on chest wall and extremities        LABS:           reviewed in detail

## 2021-07-03 NOTE — PROGRESS NOTE ADULT - ASSESSMENT
71F with PMHx HTN, platelet disorder, depression, ?hyponatremia, and gross hematuria s/p cystoscopy, L ureteroscopy laser lithotripsy and ureteral stent placement on 6/28. Intra-operative urine cultures were negative. Pt called answering service 6/29 reporting urinary retention w/ gross hematuria. On presentation to ED, pt with AMS (AOx2), stroke coke called with negative work up and imaging. Pt was in urinary retention with placement of ocampo catheter by . Noted to be hyponatremic to 113 with pre-operative value of 136. UA grossly positive. CTAP notable for known severe right and L ureteral stent in correct position. AOx3. No CVAT. Ocampo draining with improvement in gross hematuria, does not require CBI. Hgb decrease this AM, seems unlikely that this is 2/2 gross heme given urinary color. Worsening SHIKHA also seems unlikely to be post-renal. Pt with longstanding non-functioning R kidney, severely hydronephrotic, and good urinary output, most likely all originating from L kidney, in addition to a recently placed ureteral stent which is noted to be in correct positioning with recent imaging.    Recommendations:  - No need for PCN at this time, can dc NPO  - Continue to monitor urinary output, color, and trend creatinine  - Ureteral stent placed on 6/28 in correct position, will be removed in office next week  - Will likely require ocampo catheter at discharge  - Nephrology on board, appreciate recs  - No acute urologic intervention at this time, will continue to follow  - Can follow up with Dr. Ramirez within 1 week of discharge  - Discussed with  attending

## 2021-07-03 NOTE — PROGRESS NOTE ADULT - SUBJECTIVE AND OBJECTIVE BOX
The patient was seen and examined.      71 year old female with a history of chronic thrombocytopenia (over 10 years), osteoporosis, depression, hypertension, , ureteral stent placement on 6/28 for ureteral colic/stone obstruction brought by EMS to ED with altered mental status, expressive aphasia, found to be hyponatremic. Evaluation negative for stroke or hemorrhage. Patient states she has been thrombocytopenic for years and has been evaluated in the past by Dr. Miki Billy at [facility] and was not given a diagnosis according to patient.. Patient's platelet counts over the past three years have ranged between 85,000 and 105,000. On 6/17/21, platelets were 103,000, hemoglobin 12.0, WBC 4.35by her PCP. She admits to a distant past history of excessive alcohol intake. Outpatient medications included Tamsulosin, Bactrim, Tranylcypromine. Today, hemoglobin holding at 8.7, platelet count stable at 101,000.       Allergies    No Known Allergies    Intolerances    Frazier (Unknown)      Medications:  MEDICATIONS  (STANDING):  calcium carbonate   1250 mG (OsCal) 1 Tablet(s) Oral daily  cefTRIAXone   IVPB 1000 milliGRAM(s) IV Intermittent every 24 hours  sodium bicarbonate 650 milliGRAM(s) Oral every 12 hours  sodium chloride 3%. 500 milliLiter(s) (30 mL/Hr) IV Continuous <Continuous>  sodium chloride 3%. 500 milliLiter(s) (30 mL/Hr) IV Continuous <Continuous>  tranylcypromine 60 milliGRAM(s) Oral every 24 hours    MEDICATIONS  (PRN):  acetaminophen   Tablet .. 650 milliGRAM(s) Oral every 6 hours PRN Mild Pain (1 - 3)  LORazepam   Injectable 1 milliGRAM(s) IntraMuscular every 6 hours PRN Agitation          Interval History:    The patient denies chest pain or SOB.  No nausea/vomiting/fevers/chills/night sweats.  Has fatigue, no headaches/dizziness.  Appetite is improving.  No abdominal pain/diarrhea/constipation.  No melena or hematochezia.    Still has hematuria and Jordan catheter  Has history of easy bruising/ no bleeding.  No gingival bleeding or epistaxis.  No leg pain or leg swelling.    ROS is otherwise negative.    PHYSICAL EXAM:    T(F): 97.9 (07-03-21 @ 06:42), Max: 97.9 (07-03-21 @ 02:13)  HR: 74 (07-03-21 @ 06:42) (70 - 82)  BP: 147/72 (07-03-21 @ 06:42) (147/72 - 167/73)  RR: 17 (07-03-21 @ 06:42) (17 - 20)  SpO2: 97% (07-03-21 @ 06:42) (97% - 100%)  Wt(kg): --    Daily     Daily     Gen: well developed, well nourished, comfortable  HEENT: normocephalic/atraumatic, has conjunctival pallor, no scleral icterus, no oral thrush/mucosal bleeding/mucositis  Neck: supple, no masses, no JVD  Cardiovascular: RR, nl S1S2, no murmurs/rubs/gallops  Respiratory: clear air entry b/l  Gastrointestinal: BS+, soft, NT/ND, no masses, no splenomegaly, no hepatomegaly, no evidence for ascites  Extremities: no clubbing/cyanosis, no edema, no calf tenderness  Neurological: no focal deficits  Skin: no rash on visible skin, has excessive ecchymoses on extremities, slowly fading, no new petechiae  Lymph Nodes:  no significant peripheral adenopathy   Musculoskeletal:  full ROM  Psychiatric:  mood stable        Labs:                          8.7    6.94  )-----------( 101      ( 03 Jul 2021 06:06 )             26.9     CBC Full  -  ( 03 Jul 2021 06:06 )  WBC Count : 6.94 K/uL  RBC Count : 2.94 M/uL  Hemoglobin : 8.7 g/dL  Hematocrit : 26.9 %  Platelet Count - Automated : 101 K/uL  Mean Cell Volume : 91.5 fl  Mean Cell Hemoglobin : 29.6 pg  Mean Cell Hemoglobin Concentration : 32.3 gm/dL  Auto Neutrophil # : 5.58 K/uL  Auto Lymphocyte # : 0.32 K/uL  Auto Monocyte # : 0.76 K/uL  Auto Eosinophil # : 0.17 K/uL  Auto Basophil # : 0.02 K/uL  Auto Neutrophil % : 80.4 %  Auto Lymphocyte % : 4.6 %  Auto Monocyte % : 11.0 %  Auto Eosinophil % : 2.4 %  Auto Basophil % : 0.3 %        07-03    129<L>  |  93<L>  |  31<H>  ----------------------------<  82  3.3<L>   |  20<L>  |  3.86<H>    Ca    8.2<L>      03 Jul 2021 06:06  Phos  4.4     07-02  Mg     2.4     07-02            Other Labs:    Cultures:    Pathology:    Imaging Studies:

## 2021-07-04 LAB
ALBUMIN SERPL ELPH-MCNC: 3.4 G/DL — SIGNIFICANT CHANGE UP (ref 3.3–5)
ALP SERPL-CCNC: 71 U/L — SIGNIFICANT CHANGE UP (ref 40–120)
ALT FLD-CCNC: 13 U/L — SIGNIFICANT CHANGE UP (ref 10–45)
ANION GAP SERPL CALC-SCNC: 11 MMOL/L — SIGNIFICANT CHANGE UP (ref 5–17)
ANION GAP SERPL CALC-SCNC: 12 MMOL/L — SIGNIFICANT CHANGE UP (ref 5–17)
ANISOCYTOSIS BLD QL: SLIGHT — SIGNIFICANT CHANGE UP
AST SERPL-CCNC: 24 U/L — SIGNIFICANT CHANGE UP (ref 10–40)
BASOPHILS # BLD AUTO: 0.07 K/UL — SIGNIFICANT CHANGE UP (ref 0–0.2)
BASOPHILS NFR BLD AUTO: 0.9 % — SIGNIFICANT CHANGE UP (ref 0–2)
BILIRUB SERPL-MCNC: 0.2 MG/DL — SIGNIFICANT CHANGE UP (ref 0.2–1.2)
BUN SERPL-MCNC: 36 MG/DL — HIGH (ref 7–23)
BUN SERPL-MCNC: 59 MG/DL — HIGH (ref 7–23)
BURR CELLS BLD QL SMEAR: PRESENT — SIGNIFICANT CHANGE UP
CALCIUM SERPL-MCNC: 7.7 MG/DL — LOW (ref 8.4–10.5)
CALCIUM SERPL-MCNC: 8.5 MG/DL — SIGNIFICANT CHANGE UP (ref 8.4–10.5)
CHLORIDE SERPL-SCNC: 96 MMOL/L — SIGNIFICANT CHANGE UP (ref 96–108)
CHLORIDE SERPL-SCNC: 96 MMOL/L — SIGNIFICANT CHANGE UP (ref 96–108)
CO2 SERPL-SCNC: 22 MMOL/L — SIGNIFICANT CHANGE UP (ref 22–31)
CO2 SERPL-SCNC: 23 MMOL/L — SIGNIFICANT CHANGE UP (ref 22–31)
CREAT ?TM UR-MCNC: 69 MG/DL — SIGNIFICANT CHANGE UP
CREAT SERPL-MCNC: 2.72 MG/DL — HIGH (ref 0.5–1.3)
CREAT SERPL-MCNC: 3.28 MG/DL — HIGH (ref 0.5–1.3)
EOSINOPHIL # BLD AUTO: 0.14 K/UL — SIGNIFICANT CHANGE UP (ref 0–0.5)
EOSINOPHIL NFR BLD AUTO: 1.8 % — SIGNIFICANT CHANGE UP (ref 0–6)
GIANT PLATELETS BLD QL SMEAR: PRESENT — SIGNIFICANT CHANGE UP
GLUCOSE SERPL-MCNC: 113 MG/DL — HIGH (ref 70–99)
GLUCOSE SERPL-MCNC: 141 MG/DL — HIGH (ref 70–99)
HCT VFR BLD CALC: 25.2 % — LOW (ref 34.5–45)
HGB BLD-MCNC: 8.2 G/DL — LOW (ref 11.5–15.5)
IRON SATN MFR SERPL: 240 UG/DL — HIGH (ref 30–160)
LYMPHOCYTES # BLD AUTO: 0.33 K/UL — LOW (ref 1–3.3)
LYMPHOCYTES # BLD AUTO: 4.4 % — LOW (ref 13–44)
MAGNESIUM SERPL-MCNC: 1.7 MG/DL — SIGNIFICANT CHANGE UP (ref 1.6–2.6)
MANUAL SMEAR VERIFICATION: SIGNIFICANT CHANGE UP
MCHC RBC-ENTMCNC: 29.6 PG — SIGNIFICANT CHANGE UP (ref 27–34)
MCHC RBC-ENTMCNC: 32.5 GM/DL — SIGNIFICANT CHANGE UP (ref 32–36)
MCV RBC AUTO: 91 FL — SIGNIFICANT CHANGE UP (ref 80–100)
MICROCYTES BLD QL: SLIGHT — SIGNIFICANT CHANGE UP
MONOCYTES # BLD AUTO: 0.54 K/UL — SIGNIFICANT CHANGE UP (ref 0–0.9)
MONOCYTES NFR BLD AUTO: 7.1 % — SIGNIFICANT CHANGE UP (ref 2–14)
MYELOCYTES NFR BLD: 0.9 % — HIGH (ref 0–0)
NEUTROPHILS # BLD AUTO: 6.4 K/UL — SIGNIFICANT CHANGE UP (ref 1.8–7.4)
NEUTROPHILS NFR BLD AUTO: 84.9 % — HIGH (ref 43–77)
OSMOLALITY UR: 443 MOSM/KG — SIGNIFICANT CHANGE UP (ref 300–900)
OVALOCYTES BLD QL SMEAR: SLIGHT — SIGNIFICANT CHANGE UP
PHOSPHATE SERPL-MCNC: 2.7 MG/DL — SIGNIFICANT CHANGE UP (ref 2.5–4.5)
PLAT MORPH BLD: ABNORMAL
PLATELET # BLD AUTO: 129 K/UL — LOW (ref 150–400)
POIKILOCYTOSIS BLD QL AUTO: SLIGHT — SIGNIFICANT CHANGE UP
POLYCHROMASIA BLD QL SMEAR: SLIGHT — SIGNIFICANT CHANGE UP
POTASSIUM SERPL-MCNC: 3.9 MMOL/L — SIGNIFICANT CHANGE UP (ref 3.5–5.3)
POTASSIUM SERPL-MCNC: 3.9 MMOL/L — SIGNIFICANT CHANGE UP (ref 3.5–5.3)
POTASSIUM SERPL-SCNC: 3.9 MMOL/L — SIGNIFICANT CHANGE UP (ref 3.5–5.3)
POTASSIUM SERPL-SCNC: 3.9 MMOL/L — SIGNIFICANT CHANGE UP (ref 3.5–5.3)
PROT SERPL-MCNC: 6.7 G/DL — SIGNIFICANT CHANGE UP (ref 6–8.3)
RBC # BLD: 2.77 M/UL — LOW (ref 3.8–5.2)
RBC # FLD: 14.6 % — HIGH (ref 10.3–14.5)
RBC BLD AUTO: ABNORMAL
SCHISTOCYTES BLD QL AUTO: SLIGHT — SIGNIFICANT CHANGE UP
SODIUM SERPL-SCNC: 130 MMOL/L — LOW (ref 135–145)
SODIUM SERPL-SCNC: 130 MMOL/L — LOW (ref 135–145)
SODIUM UR-SCNC: 92 MMOL/L — SIGNIFICANT CHANGE UP
UUN UR-MCNC: 440 MG/DL — SIGNIFICANT CHANGE UP
WBC # BLD: 7.54 K/UL — SIGNIFICANT CHANGE UP (ref 3.8–10.5)
WBC # FLD AUTO: 7.54 K/UL — SIGNIFICANT CHANGE UP (ref 3.8–10.5)

## 2021-07-04 PROCEDURE — 99233 SBSQ HOSP IP/OBS HIGH 50: CPT | Mod: GC

## 2021-07-04 PROCEDURE — 78707 K FLOW/FUNCT IMAGE W/O DRUG: CPT | Mod: 26

## 2021-07-04 PROCEDURE — 99232 SBSQ HOSP IP/OBS MODERATE 35: CPT

## 2021-07-04 PROCEDURE — 99233 SBSQ HOSP IP/OBS HIGH 50: CPT

## 2021-07-04 RX ADMIN — TRANYLCYPROMINE SULFATE 60 MILLIGRAM(S): 10 TABLET, FILM COATED ORAL at 05:29

## 2021-07-04 RX ADMIN — Medication 650 MILLIGRAM(S): at 05:29

## 2021-07-04 RX ADMIN — IRON SUCROSE 110 MILLIGRAM(S): 20 INJECTION, SOLUTION INTRAVENOUS at 17:41

## 2021-07-04 RX ADMIN — CEFTRIAXONE 100 MILLIGRAM(S): 500 INJECTION, POWDER, FOR SOLUTION INTRAMUSCULAR; INTRAVENOUS at 21:58

## 2021-07-04 RX ADMIN — Medication 650 MILLIGRAM(S): at 17:41

## 2021-07-04 RX ADMIN — Medication 15 GRAM(S): at 12:24

## 2021-07-04 RX ADMIN — Medication 1 TABLET(S): at 12:24

## 2021-07-04 NOTE — PROGRESS NOTE ADULT - PROBLEM SELECTOR PROBLEM 2
DATE OF VISIT:  08/16/2017    SUBJECTIVE:  The patient complains of urinary frequency and burning while urinating as well as some suprapubic pain for the last 2 to 3 days, no fevers or chills.  No shortness of breath.       She had 2 flare-ups of UTI in the last 6 weeks; had been treated with Keflex and Bactrim, however, the symptoms came back about 2 weeks after completion of Bactrim.     She had chemotherapy last week.    OBJECTIVE:    Examination shows minimal suprapubic tenderness, no rebound tenderness.     CARDIAC:  Regular heart sounds.  No systolic murmurs.     LUNGS:  Clear.     Mobility preserved.     No lymphadenopathy.    IMPRESSION:    1. UTI, doxycycline prescribed.  2. UA and culture gathered.  It may change the choice of antibiotics after results come back.  3. Multiple myeloma, 24-hour urine for protein came back with just minimal increased free light chains consistent with partial response to therapy, which is maintained.   The patient will be seen by Dr. Pandey at [facility] who may adjust her management if needed.     Recent echocardiogram showed preserved ejection fraction, however, some aortic insufficiency and left ventricular hypertrophy for which I advised the patient to see cardiologist.     She will return for treatment in 2 weeks.        __________________________  Huong Demarco MD  [id number]      D:  08/16/2017 10:48:55  T:  08/16/2017 10:59:11   /elie   Job:  [id number]          ATN (acute tubular necrosis)

## 2021-07-04 NOTE — PROGRESS NOTE ADULT - PROBLEM SELECTOR PLAN 6
DATE OF SERVICE:  11/20/2017    PROCEDURE:  Biventricular ICD placement with DFT testing.    INDICATIONS FOR PROCEDURE:  The patient presented with monomorphic VT and has   a nonischemic cardiomyopathy with a moderately reduced ejection fraction in   wide QRS complex.    PROCEDURE IN DETAIL:  After obtaining informed consent, the patient was   brought to the cardiac catheterization laboratory in a fasted state.  He was   prepped and draped in the usual sterile fashion.  He received IV antibiotic   prior to any incision.  He was given conscious sedation with midazolam and   fentanyl.  We began by making a 5 cm incision in the left deltopectoral area   and then I dissected down to the prepectoral fascia.  On the prepectoral   fascia, I used micropuncture x2 to access the medial left axillary vein, 3   wires were placed on the IVC.  These 3 wires were used for 3 sheaths.  First   sheath was used for right ventricular lead.  This was a [employer] ICD   lead, serial #[id number].  This was screwed in near the RV apex where there were R   waves of 21 millivolts, a threshold of 1.3 volts at 0.5 milliseconds, and a   pacing impedance of 581 ohms.  Second sheath was used for a wire for right   atrial lead.  This was a [employer] lead, serial #[id number].  This was   placed in the right atrial appendage where there were P-waves of 3.6, a   threshold of 0.7 volts at 0.4 milliseconds, and a pacing impedance of 600   ohms.  Finally, the last wire was used for a guiding sheath.  I used this   guiding sheath to access the coronary sinus.  I was able to access it with   just the glidewire and the sheath, and then I removed the wire and placed the   balloon and performed occlusive venogram and identified a nice lateral branch.    This lateral branch was able to accommodate the LV lead, which was the   [employer] lead, serial #[id number].  This lead was placed in the lateral   branch in the area where I had a threshold of 1.5  - Urology recommendations stent removal next week, no intervention at this time.   - Patient has chronic right UPJ obstruction  - U/S showed no stones at this time  - Hematuria noted in Jordan, however lightening in appearance  -nephrology and urology following volts at 0.4 milliseconds,   impedance of 850 and R-wave of 5 millivolts.  With good sensing and pacing   parameters on all the leads, the sheaths were all split and the leads were   sutured _____ down to the prepectoral fascia.  Pocket was made and rinsed with   copious amount of antibiotic solution.  The 3 leads were then connected to a   [employer] ICD generator, serial #[id number].  This generator was placed in   the pocket with leads underneath.  While closing the pocket, we did perform   DFT testing.  With our DFT testing, we induced with a T-wave shock at 15   Joules and we failed to rescue at 31 Joules with reverse polarity.  We did   successfully rescue at 41 Joules.  We then waited 5 minutes and then used   standard polarity and shocking the T-wave with 31 and did not induce showing   that the upper limits of vulnerability gets right at 31, so we will program   shocks with max across the board otherwise programmed MADIT-RIT.    CONCLUSION:  Successful biventricular ICD implantation with DFT testing at   implant.    DISCUSSION:  Given that the DFTs were slightly high, I would like to get the   patient off of amiodarone now that we can maximize beta blocker and have his   device placed.       ____________________________________     MD MARIUSZ William / MELISSA    DD:  12/04/2017 06:58:49  DT:  12/04/2017 12:38:57    D#:  [id number]  Job#:  [id number]

## 2021-07-04 NOTE — PROGRESS NOTE ADULT - SUBJECTIVE AND OBJECTIVE BOX
WILLIAMS overnight  Feels well  Denies f/c/n/v  Good UOP  NAD  Unlabored breathing  sntnd  Ocampo with similar quality UOP as yesterday- clear red urine, without clots  Cr downtrending to 3.28  c/w ocampo  No need for urologic intervention at this time    Vital Signs Last 24 Hrs  T(C): 37.2 (04 Jul 2021 04:45), Max: 37.2 (04 Jul 2021 04:45)  T(F): 98.9 (04 Jul 2021 04:45), Max: 98.9 (04 Jul 2021 04:45)  HR: 72 (04 Jul 2021 04:45) (71 - 74)  BP: 134/78 (04 Jul 2021 04:45) (134/78 - 147/80)  BP(mean): --  RR: 17 (04 Jul 2021 04:45) (17 - 18)  SpO2: 99% (04 Jul 2021 04:45) (98% - 99%)                          8.2    7.54  )-----------( 129      ( 04 Jul 2021 07:55 )             25.2   04 Jul 2021 07:55    130    |  96     |  36     ----------------------------<  113    3.9     |  22     |  3.28     Ca    7.7        04 Jul 2021 07:55  Phos  2.7       04 Jul 2021 07:55  Mg     1.7       04 Jul 2021 07:55    TPro  6.7    /  Alb  3.4    /  TBili  0.2    /  DBili  x      /  AST  24     /  ALT  13     /  AlkPhos  71     04 Jul 2021 07:55

## 2021-07-04 NOTE — PROGRESS NOTE ADULT - PROBLEM SELECTOR PLAN 4
resolving  Hx of thrombocytopenia according to patient.  - Platelets 96 on this admission  - Platelets 89 on prior admission in 2019   - Per heme onc recommendations patient's home medication of mirtazapine should be held, as it could be causing the low plts.   - f/u Heme/onc recommendations -- avoid platelet suppressing medications, check ROSALVA, if procedures planned or bleeding continues, would consider platelet transfusion as patient's platelets are likely dysfunctional at present time - discussed with nephrology Dr. Winter and renal team -- Both in agreement.

## 2021-07-04 NOTE — PROGRESS NOTE ADULT - SUBJECTIVE AND OBJECTIVE BOX
The patient was seen and examined.      71 year old female with a history of chronic thrombocytopenia (over 10 years), osteoporosis, depression, hypertension, , ureteral stent placement on 6/28 for ureteral colic/stone obstruction brought by EMS to ED with altered mental status, expressive aphasia, found to be hyponatremic. Evaluation negative for stroke or hemorrhage. Patient states she has been thrombocytopenic for years and has been evaluated in the past by Dr. Miki Billy at [facility] and was not given a diagnosis according to patient.. Patient's platelet counts over the past three years have ranged between 85,000 and 105,000. On 6/17/21, platelets were 103,000, hemoglobin 12.0, WBC 4.35by her PCP. She admits to a distant past history of excessive alcohol intake.  Today, hemoglobin holding at 8.2, platelet count stable at 129,000. Still with hematuria. Sodium impr      .         Allergies    No Known Allergies    Intolerances    Frazier (Unknown)      Medications:  MEDICATIONS  (STANDING):  calcium carbonate   1250 mG (OsCal) 1 Tablet(s) Oral daily  cefTRIAXone   IVPB 1000 milliGRAM(s) IV Intermittent every 24 hours  iron sucrose IVPB 200 milliGRAM(s) IV Intermittent every 24 hours  sodium bicarbonate 650 milliGRAM(s) Oral every 12 hours  tranylcypromine 60 milliGRAM(s) Oral every 24 hours  urea Oral Powder 15 Gram(s) Oral daily    MEDICATIONS  (PRN):  acetaminophen   Tablet .. 650 milliGRAM(s) Oral every 6 hours PRN Mild Pain (1 - 3)  LORazepam   Injectable 1 milliGRAM(s) IntraMuscular every 6 hours PRN Agitation          Interval History:    The patient denies chest pain or SOB.  No nausea/vomiting/fevers/chills/night sweats.  Has fatigue,no  headaches/dizziness.  Appetite is improving. Feeling better.  No abdominal pain/diarrhea/constipation. Bloating relieved after BM. No melena or hematochezia.    No dysuria/ still with  hematuria.  istory of easy bruising/ not bleeding.  No gingival bleeding or epistaxis.  No leg pain or leg swelling.    ROS is otherwise negative.    PHYSICAL EXAM:    T(F): 98.9 (07-04-21 @ 04:45), Max: 98.9 (07-04-21 @ 04:45)  HR: 72 (07-04-21 @ 04:45) (71 - 74)  BP: 134/78 (07-04-21 @ 04:45) (134/78 - 147/80)  RR: 17 (07-04-21 @ 04:45) (17 - 18)  SpO2: 99% (07-04-21 @ 04:45) (98% - 99%)  Wt(kg): --    Daily     Daily     Gen: well developed, well nourished, comfortable and speaking appropriately.  HEENT: normocephalic/atraumatic, has conjunctival pallor, no scleral icterus, no oral thrush/mucosal bleeding/mucositis  Neck: supple, no masses, no JVD  Cardiovascular: RR, nl S1S2, no murmurs/rubs/gallops  Respiratory: clear air entry b/l  Gastrointestinal: BS+, soft, NT/ND, no masses, no splenomegaly, no hepatomegaly, no evidence for ascites  Extremities: no clubbing/cyanosis, no edema, no calf tenderness  Neurological: no focal deficits  Skin: no rash on visible skin, excessive ecchymoses on extremities and petechiae fading  Lymph Nodes:  no significant peripheral adenopathy   Musculoskeletal:  full ROM  Psychiatric:  mood stable        Labs:                          8.2    7.54  )-----------( 129      ( 04 Jul 2021 07:55 )             25.2     CBC Full  -  ( 04 Jul 2021 07:55 )  WBC Count : 7.54 K/uL  RBC Count : 2.77 M/uL  Hemoglobin : 8.2 g/dL  Hematocrit : 25.2 %  Platelet Count - Automated : 129 K/uL  Mean Cell Volume : 91.0 fl  Mean Cell Hemoglobin : 29.6 pg  Mean Cell Hemoglobin Concentration : 32.5 gm/dL  Auto Neutrophil # : 6.40 K/uL  Auto Lymphocyte # : 0.33 K/uL  Auto Monocyte # : 0.54 K/uL  Auto Eosinophil # : 0.14 K/uL  Auto Basophil # : 0.07 K/uL  Auto Neutrophil % : 84.9 %  Auto Lymphocyte % : 4.4 %  Auto Monocyte % : 7.1 %  Auto Eosinophil % : 1.8 %  Auto Basophil % : 0.9 %        07-04    130<L>  |  96  |  36<H>  ----------------------------<  113<H>  3.9   |  22  |  3.28<H>    Ca    7.7<L>      04 Jul 2021 07:55  Phos  2.7     07-04  Mg     1.7     07-04    TPro  6.7  /  Alb  3.4  /  TBili  0.2  /  DBili  x   /  AST  24  /  ALT  13  /  AlkPhos  71  07-04          Other Labs:    Cultures:    Pathology:    Imaging Studies:

## 2021-07-04 NOTE — PROGRESS NOTE ADULT - ASSESSMENT
71 year old female with a history of chronic thrombocytopenia (over 10 years), osteoporosis, depression, hypertension, , ureteral stent placement on 6/28 for ureteral colic/stone obstruction brought by EMS to ED with altered mental status, expressive aphasia, found to be hyponatremic. Evaluation negative for stroke or hemorrhage. Patient states she has been thrombocytopenic for years and has been evaluated in the past by Dr. Miki Billy at [facility] and was not given a diagnosis according to patient.. Patient's platelet counts over the past three years have ranged between 85,000 and 105,000. On 6/17/21, platelets were 103,000, hemoglobin 12.0, WBC 4.35by her PCP. She admits to a distant past history of excessive alcohol intake.  Today, hemoglobin holding at 8.2, platelet count stable at 129,000. Still with hematuria. Sodium improved.    Thrombocytopenia -  chronic, possibly autoimmune , with superimposed acute thrombocytopenia due to  blood loss, recent antibiotics and other medications which may cause thrombocytopenia. Possible low grade myelodysplastic syndrome. Not due to splenic sequestration as spleen size normal on CT. Today's count 129,000 still within patient's chronic baseline. However, platelets may be dysfunctional due to present renal failure and elevated creatinine.  Anemia - acute, due to ongoing  blood loss, catheter/ureteral calculus. Holding at  8.2 today. Hemoglobin was normal at 12.0 as outpatient on 6/17/21. Doubt hemolysis. Haptoglobin 78. Iron panel and ferritin normal. TSH normal.  Ecchymoses - likely trauma induced during period of mental status changes, thrombocytopenia, qualitative platelet disorder in presence of renal failure and possible medication induced qualitative platelet disorder. INR/PTT normal.  Electrolyte imbalance-Hyponatremia - Treatment and close monitoring in progress by medical and renal teams. Improved at 130 today.     Plan- Monitor CBC, trend platelets. If possible, avoid platelet suppressing medications. Check, B12, folate and supplement as needed. Also check ROSALVA, If bleeding continues and/or invasive procedures planned, would consider platelet transfusion as patient's platelets are likely dysfunctional at the present time. By numbers, relatively stable hematologically.

## 2021-07-04 NOTE — PROGRESS NOTE ADULT - PROBLEM SELECTOR PLAN 3
Patient with positive UA, in the setting of recent procedure, suspicion for infection.   - Given CTX 1g in ED.  - U Cx and blood cx negative  - Continue with CTX 1g IV, advance to 2g if abscess or bacteremic  - Per Urology:No acute urologic intervention at this time, will continue to follow. Can follow up with Dr. Ramirez within 1 week of discharge. Will likely need Jordan on DC.

## 2021-07-04 NOTE — PROGRESS NOTE ADULT - PROBLEM SELECTOR PLAN 1
- Resolving, latest Na is 130  - Now on 1L fluid restriction.   - Started on sodium bicarbonate 650 mg oral BID.  - Q4hr BMP, U osm and serum osm to check values  - SIADH (medication or pain induced) or Psychogenic polydipsia as cause  - Renal consulted follow up recommendations  -Per Urology: - No acute urologic intervention at this time, will continue to follow. Can follow up with Dr. Ramirez within 1 week of discharge

## 2021-07-04 NOTE — PROGRESS NOTE ADULT - SUBJECTIVE AND OBJECTIVE BOX
Patient is a 71y Female seen and evaluated at bedside. Hyponatraemia improved. Increase Urea to 15g BID. Creat downtrending, good UrOutpt.    Meds:    acetaminophen   Tablet .. 650 every 6 hours PRN  calcium carbonate   1250 mG (OsCal) 1 daily  cefTRIAXone   IVPB 1000 every 24 hours  iron sucrose IVPB 200 every 24 hours  LORazepam   Injectable 1 every 6 hours PRN  sodium bicarbonate 650 every 12 hours  tranylcypromine 60 every 24 hours  urea Oral Powder 15 daily      T(C): , Max: 37.2 (07-04-21 @ 04:45)  T(F): , Max: 98.9 (07-04-21 @ 04:45)  HR: 72 (07-04-21 @ 04:45)  BP: 134/78 (07-04-21 @ 04:45)  BP(mean): --  RR: 17 (07-04-21 @ 04:45)  SpO2: 99% (07-04-21 @ 04:45)  Wt(kg): --    07-03 @ 07:01  -  07-04 @ 07:00  --------------------------------------------------------  IN: 0 mL / OUT: 1100 mL / NET: -1100 mL    Review of Systems:  RESPIRATORY: No shortness of breath  CARDIOVASCULAR: No chest pain  MUSCULOSKELETAL: No leg oedema    PHYSICAL EXAM:  GENERAL: well-developed, well nourished, alert, no acute distress at present on RA  CHEST/LUNG: Clear to auscultation bilaterally  HEART: normal S1S2, RRR  ABDOMEN: Soft, Nontender, non distended  : Jordan, gross haematuria  EXTREMITIES: No oedema       LABS:                        8.2    7.54  )-----------( 129      ( 04 Jul 2021 07:55 )             25.2     07-04    130<L>  |  96  |  36<H>  ----------------------------<  113<H>  3.9   |  22  |  3.28<H>    Ca    7.7<L>      04 Jul 2021 07:55  Phos  2.7     07-04  Mg     1.7     07-04    TPro  6.7  /  Alb  3.4  /  TBili  0.2  /  DBili  x   /  AST  24  /  ALT  13  /  AlkPhos  71  07-04          Sodium, Random Urine: 92 mmol/L (07-04 @ 10:01)  Osmolality, Random Urine: 443 mosm/kg (07-04 @ 10:01)  Creatinine, Random Urine: 69 mg/dL (07-04 @ 10:01)  Osmolality, Random Urine: 377 mosm/kg (07-03 @ 10:52)  Sodium, Random Urine: 99 mmol/L (07-03 @ 10:52)  Osmolality, Random Urine: 328 mosm/kg (07-03 @ 00:18)  Sodium, Random Urine: 92 mmol/L (07-02 @ 23:18)  Creatinine, Random Urine: 36 mg/dL (07-02 @ 16:20)  Protein/Creatinine Ratio Calculation: 2.7 Ratio (07-02 @ 16:20)  Calcium, Random Urine: <1.0 mg/dL (07-02 @ 12:12)        RADIOLOGY & ADDITIONAL STUDIES:

## 2021-07-04 NOTE — PROGRESS NOTE ADULT - ASSESSMENT
72 y/o Female with HTN, chronic thrombocytopenia, osteoporosis, depression, chronic right hydronephrosis (obstructing stone at [facility]) and new L obstructing nephrolithiasis with gross hematuria s/p lithotripsy and L urethral stent placed on 6/28 c/b AMS with hospital admission due to symptomatic hyponatremia and SHIKHA    Assessment:    #SHIKHA - possible etiologies include ureterospasm/vasospasm due to recent stent placement in L ureter in the setting of solitary kidney vs ATN however   One episode intraop hypotension noted  Creat improving, like iATN as aetiology for SHIKHA  Pt remains non oliguric with improving creat, so obstruction lower on differential; urology on board  - needs nuclear medicine scan to evaluate how much her R kidney function is contributing given her chronic R hydro w obstructing [facility] stone.     #Acute on Chronic Hyponatremia in a euvolemic state, chronic SIADH   S/p 3% hypertonic   Na responding to Ure-Na and fluid restriction of 1L- continue  Increase Ure-Na to 15g BID  BID BMP    #Anemia stabilized    #Hypocalcemia - likely secondary hyperparathyroidism, improving with Calcium supplementation 1250mg daily

## 2021-07-04 NOTE — PROGRESS NOTE ADULT - SUBJECTIVE AND OBJECTIVE BOX
BEVERLY FINE, 71y, Female  MRN-[id number]  Patient is a 71y old  Female who presents with a chief complaint of Hyponatremia, Altered Mental Status (02 Jul 2021 12:00)      OVERNIGHT EVENTS: None    SUBJECTIVE: Patient examined at the bedside. Patient doing well this morning, A&O x3. She denies any acute complaints. She denies any SI or thoughts of self-harm.     12 Point ROS Negative unless noted otherwise above.  -------------------------------------------------------------------------------  Vital Signs Last 24 Hrs  T(C): 37.2 (04 Jul 2021 04:45), Max: 37.2 (04 Jul 2021 04:45)  T(F): 98.9 (04 Jul 2021 04:45), Max: 98.9 (04 Jul 2021 04:45)  HR: 72 (04 Jul 2021 04:45) (71 - 74)  BP: 134/78 (04 Jul 2021 04:45) (134/78 - 147/80)  BP(mean): --  RR: 17 (04 Jul 2021 04:45) (17 - 18)  SpO2: 99% (04 Jul 2021 04:45) (98% - 99%)    PHYSICAL EXAM:    General: NAD  HEENT: EOMI, PERRLA, anicteric sclera; moist mucosal membranes.  Neck: supple, trachea midline  Cardiovascular: RRR, +S1/S2; NO M/R/G  Respiratory: CTA B/L; no W/R/R  Gastrointestinal: soft, NT/ND; +BSx4  Extremities: ecchymoses noted on b/l forearms, neck and b/l thighs 2/2 thrombocytopenia   Vascular: 2+ radial  Neurological: AAOx3; no focal deficits  Urological: Jordan in place w/hematuria s/p ureteral stent     ALLERGIES:  Allergies    No Known Allergies    Intolerances    Frazier (Unknown)    MEDICATIONS  (STANDING):  calcium carbonate   1250 mG (OsCal) 1 Tablet(s) Oral daily  cefTRIAXone   IVPB 1000 milliGRAM(s) IV Intermittent every 24 hours  iron sucrose IVPB 200 milliGRAM(s) IV Intermittent every 24 hours  sodium bicarbonate 650 milliGRAM(s) Oral every 12 hours  tranylcypromine 60 milliGRAM(s) Oral every 24 hours  urea Oral Powder 15 Gram(s) Oral daily    MEDICATIONS  (PRN):  acetaminophen   Tablet .. 650 milliGRAM(s) Oral every 6 hours PRN Mild Pain (1 - 3)  LORazepam   Injectable 1 milliGRAM(s) IntraMuscular every 6 hours PRN Agitation      -------------------------------------------------------------------------------  LABS:                                   8.2    7.54  )-----------( 129      ( 04 Jul 2021 07:55 )             25.2   07-04    130<L>  |  96  |  36<H>  ----------------------------<  113<H>  3.9   |  22  |  3.28<H>    Ca    7.7<L>      04 Jul 2021 07:55  Phos  2.7     07-04  Mg     1.7     07-04    TPro  6.7  /  Alb  3.4  /  TBili  0.2  /  DBili  x   /  AST  24  /  ALT  13  /  AlkPhos  71  07-04      CAPILLARY BLOOD GLUCOSE  COVID-19 PCR: Negative (29 Jun 2021 22:15)  RADIOLOGY & ADDITIONAL TESTS: Reviewed.

## 2021-07-04 NOTE — PROGRESS NOTE ADULT - PROBLEM SELECTOR PLAN 2
Appears to be resolving  Baseline Cr from 2019 ~0.9-1.0 per chart review  - Steadily increasing Cr of unknown etiology --> 3.52 07/01 @ 11pm; 3.88 07/02; Cr elevation persisted for >72 hrs, treating as ATN  - Euvolemic on exam with only slight hyperosmolar urine with increased urine Na  - Differential remains wide; Renal consider nuclear scan for persisting ATN with hyponatremia  -STAT CT A/P performed 07/02 per nephrology and urology to r/o post-renal etiology -- severe right hydronephrosis with 2 mm [facility] stone, moderate L hydronephrosis stable, L uretal double-J stent in place and stable. No interval change in bilateral hydronephrosis.   -Per Nephrology 07/03- conern remains for possible obstructive component given increase in hydronephrosis on L seen during imaging. Patient's Cr plateaud which is reassuring -- follow up PM BMP, if Cr continues to trend up, consider PCN with urology re-eval   -Per Nephorlogy, patient given 15 g Urea one time dose, 200 IV Iron mg qd x 5 days (start date 07/03). Repeat BMP and Mg at 6pm  -Nuclear med scan needed   -Patient to undergo nuclear medicine renal scan -- f/u results

## 2021-07-04 NOTE — PROGRESS NOTE ADULT - ATTENDING COMMENTS
ATN non oliguric and improving, unclear etiology. NM scan shows SYMMETRIC function in kidneys despite chronic R hydro from obstructing stone, f/u urology recs  SIADH w improving hyponatremia on Ure-Na

## 2021-07-04 NOTE — PROGRESS NOTE ADULT - PROBLEM SELECTOR PLAN 8
Unknown chronicity or etiology of anemia, however patient with hematuria on UA following procedure yesterday.  - Maintain active T/S, transfuse to goal > 7.0   - iron panel labs in normal range suggestive of anemia of chronic disease, not RIYA.      #Hypocalcemia  -per nephorlogy: likely secondary hyperparathyroidism, improving with Calcium supplementation 1250mg daily

## 2021-07-05 LAB
ALBUMIN SERPL ELPH-MCNC: 3.3 G/DL — SIGNIFICANT CHANGE UP (ref 3.3–5)
ALP SERPL-CCNC: 67 U/L — SIGNIFICANT CHANGE UP (ref 40–120)
ALT FLD-CCNC: 12 U/L — SIGNIFICANT CHANGE UP (ref 10–45)
ANION GAP SERPL CALC-SCNC: 11 MMOL/L — SIGNIFICANT CHANGE UP (ref 5–17)
ANION GAP SERPL CALC-SCNC: 12 MMOL/L — SIGNIFICANT CHANGE UP (ref 5–17)
ANION GAP SERPL CALC-SCNC: 13 MMOL/L — SIGNIFICANT CHANGE UP (ref 5–17)
ANION GAP SERPL CALC-SCNC: 9 MMOL/L — SIGNIFICANT CHANGE UP (ref 5–17)
ANISOCYTOSIS BLD QL: SLIGHT — SIGNIFICANT CHANGE UP
AST SERPL-CCNC: 21 U/L — SIGNIFICANT CHANGE UP (ref 10–40)
AUTO DIFF PNL BLD: NEGATIVE — SIGNIFICANT CHANGE UP
BASOPHILS # BLD AUTO: 0.26 K/UL — HIGH (ref 0–0.2)
BASOPHILS NFR BLD AUTO: 3.6 % — HIGH (ref 0–2)
BILIRUB SERPL-MCNC: <0.2 MG/DL — SIGNIFICANT CHANGE UP (ref 0.2–1.2)
BUN SERPL-MCNC: 51 MG/DL — HIGH (ref 7–23)
BUN SERPL-MCNC: 53 MG/DL — HIGH (ref 7–23)
BUN SERPL-MCNC: 60 MG/DL — HIGH (ref 7–23)
BUN SERPL-MCNC: 76 MG/DL — HIGH (ref 7–23)
BURR CELLS BLD QL SMEAR: PRESENT — SIGNIFICANT CHANGE UP
C-ANCA SER-ACNC: NEGATIVE — SIGNIFICANT CHANGE UP
CALCIUM SERPL-MCNC: 7.8 MG/DL — LOW (ref 8.4–10.5)
CALCIUM SERPL-MCNC: 8.2 MG/DL — LOW (ref 8.4–10.5)
CALCIUM SERPL-MCNC: 8.6 MG/DL — SIGNIFICANT CHANGE UP (ref 8.4–10.5)
CALCIUM SERPL-MCNC: 8.7 MG/DL — SIGNIFICANT CHANGE UP (ref 8.4–10.5)
CHLORIDE SERPL-SCNC: 100 MMOL/L — SIGNIFICANT CHANGE UP (ref 96–108)
CHLORIDE SERPL-SCNC: 100 MMOL/L — SIGNIFICANT CHANGE UP (ref 96–108)
CHLORIDE SERPL-SCNC: 101 MMOL/L — SIGNIFICANT CHANGE UP (ref 96–108)
CHLORIDE SERPL-SCNC: 98 MMOL/L — SIGNIFICANT CHANGE UP (ref 96–108)
CO2 SERPL-SCNC: 24 MMOL/L — SIGNIFICANT CHANGE UP (ref 22–31)
CO2 SERPL-SCNC: 24 MMOL/L — SIGNIFICANT CHANGE UP (ref 22–31)
CO2 SERPL-SCNC: 26 MMOL/L — SIGNIFICANT CHANGE UP (ref 22–31)
CO2 SERPL-SCNC: 27 MMOL/L — SIGNIFICANT CHANGE UP (ref 22–31)
CREAT SERPL-MCNC: 1.89 MG/DL — HIGH (ref 0.5–1.3)
CREAT SERPL-MCNC: 1.99 MG/DL — HIGH (ref 0.5–1.3)
CREAT SERPL-MCNC: 2.19 MG/DL — HIGH (ref 0.5–1.3)
CREAT SERPL-MCNC: 2.22 MG/DL — HIGH (ref 0.5–1.3)
CULTURE RESULTS: SIGNIFICANT CHANGE UP
CULTURE RESULTS: SIGNIFICANT CHANGE UP
EOSINOPHIL # BLD AUTO: 0.38 K/UL — SIGNIFICANT CHANGE UP (ref 0–0.5)
EOSINOPHIL NFR BLD AUTO: 5.3 % — SIGNIFICANT CHANGE UP (ref 0–6)
GIANT PLATELETS BLD QL SMEAR: PRESENT — SIGNIFICANT CHANGE UP
GLUCOSE SERPL-MCNC: 108 MG/DL — HIGH (ref 70–99)
GLUCOSE SERPL-MCNC: 135 MG/DL — HIGH (ref 70–99)
GLUCOSE SERPL-MCNC: 180 MG/DL — HIGH (ref 70–99)
GLUCOSE SERPL-MCNC: 96 MG/DL — SIGNIFICANT CHANGE UP (ref 70–99)
HCT VFR BLD CALC: 26 % — LOW (ref 34.5–45)
HGB BLD-MCNC: 8.3 G/DL — LOW (ref 11.5–15.5)
LYMPHOCYTES # BLD AUTO: 1.14 K/UL — SIGNIFICANT CHANGE UP (ref 1–3.3)
LYMPHOCYTES # BLD AUTO: 15.9 % — SIGNIFICANT CHANGE UP (ref 13–44)
MAGNESIUM SERPL-MCNC: 1.5 MG/DL — LOW (ref 1.6–2.6)
MANUAL SMEAR VERIFICATION: SIGNIFICANT CHANGE UP
MCHC RBC-ENTMCNC: 29.1 PG — SIGNIFICANT CHANGE UP (ref 27–34)
MCHC RBC-ENTMCNC: 31.9 GM/DL — LOW (ref 32–36)
MCV RBC AUTO: 91.2 FL — SIGNIFICANT CHANGE UP (ref 80–100)
METAMYELOCYTES # FLD: 0.9 % — HIGH (ref 0–0)
MICROCYTES BLD QL: SLIGHT — SIGNIFICANT CHANGE UP
MONOCYTES # BLD AUTO: 0.76 K/UL — SIGNIFICANT CHANGE UP (ref 0–0.9)
MONOCYTES NFR BLD AUTO: 10.6 % — SIGNIFICANT CHANGE UP (ref 2–14)
MPO AB + PR3 PNL SER: SIGNIFICANT CHANGE UP
MYELOCYTES NFR BLD: 3.5 % — HIGH (ref 0–0)
NEUTROPHILS # BLD AUTO: 4.33 K/UL — SIGNIFICANT CHANGE UP (ref 1.8–7.4)
NEUTROPHILS NFR BLD AUTO: 59.3 % — SIGNIFICANT CHANGE UP (ref 43–77)
NEUTS BAND # BLD: 0.9 % — SIGNIFICANT CHANGE UP (ref 0–8)
OVALOCYTES BLD QL SMEAR: SLIGHT — SIGNIFICANT CHANGE UP
P-ANCA SER-ACNC: NEGATIVE — SIGNIFICANT CHANGE UP
PLAT MORPH BLD: ABNORMAL
PLATELET # BLD AUTO: 194 K/UL — SIGNIFICANT CHANGE UP (ref 150–400)
POIKILOCYTOSIS BLD QL AUTO: SLIGHT — SIGNIFICANT CHANGE UP
POLYCHROMASIA BLD QL SMEAR: SLIGHT — SIGNIFICANT CHANGE UP
POTASSIUM SERPL-MCNC: 3.3 MMOL/L — LOW (ref 3.5–5.3)
POTASSIUM SERPL-MCNC: 4.3 MMOL/L — SIGNIFICANT CHANGE UP (ref 3.5–5.3)
POTASSIUM SERPL-MCNC: 4.7 MMOL/L — SIGNIFICANT CHANGE UP (ref 3.5–5.3)
POTASSIUM SERPL-MCNC: 4.8 MMOL/L — SIGNIFICANT CHANGE UP (ref 3.5–5.3)
POTASSIUM SERPL-SCNC: 3.3 MMOL/L — LOW (ref 3.5–5.3)
POTASSIUM SERPL-SCNC: 4.3 MMOL/L — SIGNIFICANT CHANGE UP (ref 3.5–5.3)
POTASSIUM SERPL-SCNC: 4.7 MMOL/L — SIGNIFICANT CHANGE UP (ref 3.5–5.3)
POTASSIUM SERPL-SCNC: 4.8 MMOL/L — SIGNIFICANT CHANGE UP (ref 3.5–5.3)
PROT SERPL-MCNC: 6.7 G/DL — SIGNIFICANT CHANGE UP (ref 6–8.3)
RBC # BLD: 2.85 M/UL — LOW (ref 3.8–5.2)
RBC # FLD: 14.9 % — HIGH (ref 10.3–14.5)
RBC BLD AUTO: ABNORMAL
SCHISTOCYTES BLD QL AUTO: SLIGHT — SIGNIFICANT CHANGE UP
SODIUM SERPL-SCNC: 134 MMOL/L — LOW (ref 135–145)
SODIUM SERPL-SCNC: 137 MMOL/L — SIGNIFICANT CHANGE UP (ref 135–145)
SPECIMEN SOURCE: SIGNIFICANT CHANGE UP
SPECIMEN SOURCE: SIGNIFICANT CHANGE UP
WBC # BLD: 7.2 K/UL — SIGNIFICANT CHANGE UP (ref 3.8–10.5)
WBC # FLD AUTO: 7.2 K/UL — SIGNIFICANT CHANGE UP (ref 3.8–10.5)

## 2021-07-05 PROCEDURE — 99233 SBSQ HOSP IP/OBS HIGH 50: CPT | Mod: GC

## 2021-07-05 PROCEDURE — 99232 SBSQ HOSP IP/OBS MODERATE 35: CPT

## 2021-07-05 PROCEDURE — 99231 SBSQ HOSP IP/OBS SF/LOW 25: CPT

## 2021-07-05 PROCEDURE — 99233 SBSQ HOSP IP/OBS HIGH 50: CPT

## 2021-07-05 RX ORDER — POTASSIUM CHLORIDE 20 MEQ
40 PACKET (EA) ORAL ONCE
Refills: 0 | Status: COMPLETED | OUTPATIENT
Start: 2021-07-05 | End: 2021-07-05

## 2021-07-05 RX ORDER — UREA 15 G
15 POWDER IN PACKET (EA) ORAL
Refills: 0 | Status: DISCONTINUED | OUTPATIENT
Start: 2021-07-05 | End: 2021-07-08

## 2021-07-05 RX ADMIN — Medication 15 GRAM(S): at 11:43

## 2021-07-05 RX ADMIN — Medication 1 MILLIGRAM(S): at 09:53

## 2021-07-05 RX ADMIN — Medication 1 TABLET(S): at 11:43

## 2021-07-05 RX ADMIN — Medication 650 MILLIGRAM(S): at 18:16

## 2021-07-05 RX ADMIN — IRON SUCROSE 110 MILLIGRAM(S): 20 INJECTION, SOLUTION INTRAVENOUS at 18:16

## 2021-07-05 RX ADMIN — CEFTRIAXONE 100 MILLIGRAM(S): 500 INJECTION, POWDER, FOR SOLUTION INTRAMUSCULAR; INTRAVENOUS at 21:24

## 2021-07-05 RX ADMIN — TRANYLCYPROMINE SULFATE 60 MILLIGRAM(S): 10 TABLET, FILM COATED ORAL at 06:09

## 2021-07-05 RX ADMIN — Medication 15 GRAM(S): at 18:15

## 2021-07-05 RX ADMIN — Medication 40 MILLIEQUIVALENT(S): at 09:51

## 2021-07-05 RX ADMIN — Medication 650 MILLIGRAM(S): at 06:08

## 2021-07-05 NOTE — PROGRESS NOTE ADULT - ASSESSMENT
72 y/o Female with HTN, chronic thrombocytopenia, osteoporosis, depression, chronic right hydronephrosis (obstructing stone at [facility]) and new L obstructing nephrolithiasis with gross hematuria s/p lithotripsy and L urethral stent placed on 6/28 c/b AMS with hospital admission due to symptomatic hyponatremia and SHIKHA    Assessment:    #SHIKHA - possible etiologies include ureterospasm/vasospasm due to recent stent placement in L ureter in the setting of solitary kidney vs ATN however   One episode intraop hypotension noted  Creat improving, like iATN as aetiology for SHIKHA  Pt remains non oliguric with improving creat, so obstruction lower on differential; urology on board  - nuclear medicine scan showed equal function for L and R kidneys and noted that the scan pattern was consistent with ATN. Left kidney larger than the right, proposed to be due to recent left ureteral obstruction.      #Acute on Chronic Hyponatremia in a euvolemic state, chronic SIADH   S/p 3% hypertonic   Na responding to Ure-Na and fluid restriction of 1L- continue  Increase Ure-Na to 15g BID  BID BMP    #Hypokalemia - potential etiologies include hypomagnesemia and SHIKHA   - Give potassium 40meq     #Anemia stabilized    #Hypocalcemia - likely secondary hyperparathyroidism, improving with Calcium supplementation 1250mg daily     72 y/o Female with HTN, chronic thrombocytopenia, osteoporosis, depression, chronic right hydronephrosis (obstructing stone at [facility]) and new L obstructing nephrolithiasis with gross hematuria s/p lithotripsy and L urethral stent placed on 6/28 c/b AMS with hospital admission due to symptomatic hyponatremia and SHIKHA    Assessment:    #SHIKHA - possible etiologies include ureterospasm/vasospasm due to recent stent placement in L ureter in the setting of solitary kidney vs ATN however   One episode intraop hypotension noted  Creat improving, like iATN as aetiology for SHIKHA  Pt remains non oliguric with improving creat, so obstruction lower on differential; urology on board  - nuclear medicine scan showed equal function for L and R kidneys and noted that the scan pattern was consistent with ATN. Left kidney larger than the right, proposed to be due to recent left ureteral obstruction.      #Acute on Chronic Hyponatremia in a euvolemic state, chronic SIADH   S/p 3% hypertonic   Na responding to Ure-Na and fluid restriction of 1L- continue  Increase Ure-Na to 15g BID  BID GERMAN Toth DO  PGY-1 Internal Medicine     #Hypokalemia - potential etiologies include hypomagnesemia and SHIKHA   - Give potassium 40meq     #Anemia stabilized    #Hypocalcemia - likely secondary hyperparathyroidism, improving with Calcium supplementation 1250mg daily     70 y/o Female with HTN, chronic thrombocytopenia, osteoporosis, depression, chronic right hydronephrosis (obstructing stone at [facility]) and new L obstructing nephrolithiasis with gross hematuria s/p lithotripsy and L urethral stent placed on 6/28 c/b AMS with hospital admission due to symptomatic hyponatremia and SHIKHA    Assessment:    #SHIKHA - possible etiologies include ureterospasm/vasospasm due to recent stent placement in L ureter in the setting of solitary kidney vs ATN however   One episode intraop hypotension noted  Creat improving, like iATN as aetiology for SHIKHA  Pt remains non oliguric with improving creat, so obstruction lower on differential; urology on board  - nuclear medicine scan showed equal function for L and R kidneys and noted that the scan pattern was consistent with ATN. Left kidney larger than the right, proposed to be due to recent left ureteral obstruction.      #Acute on Chronic Hyponatremia in a euvolemic state, chronic SIADH   S/p 3% hypertonic   Na responding to Ure-Na and fluid restriction of 1L- continue  Increase Ure-Na to 15g BID  BID BMP      #Hypokalemia - potential etiologies include hypomagnesemia and SHIKHA   - Give potassium 40meq     #Anemia stabilized    #Hypocalcemia - likely secondary hyperparathyroidism, improving with Calcium supplementation 1250mg daily      Eitan Toth DO  PGY-1 Internal Medicine    70 y/o Female with HTN, chronic thrombocytopenia, osteoporosis, depression, chronic right hydronephrosis (obstructing stone at [facility]) and new L obstructing nephrolithiasis with gross hematuria s/p lithotripsy and L urethral stent placed on 6/28 c/b AMS with hospital admission due to symptomatic hyponatremia and SHIKHA    Assessment:    #SHIKHA - possible etiologies include ureterospasm/vasospasm due to recent stent placement in L ureter in the setting of solitary kidney vs ATN however   One episode intraop hypotension noted  Creat improving, like iATN as aetiology for SHIKHA  Pt remains non oliguric with improving creat, so obstruction lower on differential; urology on board  - nuclear medicine scan showed equal function for L and R kidneys and noted that the scan pattern was consistent with ATN. Left kidney larger than the right, proposed to be due to recent left ureteral obstruction.      #Acute on Chronic Hyponatremia in a euvolemic state, chronic SIADH   S/p 3% hypertonic   Na responding to Ure-Na and fluid restriction of 1L- continue  continue ure-na 15g BID  BID BMP      #Hypokalemia - potential etiologies include hypomagnesemia and SHIKHA   - Give potassium 40meq     #Anemia stabilized    #Hypocalcemia - likely secondary hyperparathyroidism, improving with Calcium supplementation 1250mg daily      Eitan Toth DO  PGY-1 Internal Medicine    72 y/o Female with HTN, chronic thrombocytopenia, osteoporosis, depression, chronic right hydronephrosis (obstructing stone at [facility]) and new L obstructing nephrolithiasis with gross hematuria s/p lithotripsy and L urethral stent placed on 6/28 c/b AMS with hospital admission due to symptomatic hyponatremia and SHIKHA    Assessment:    #Ischemic ATN - non oliguric, improving  - nuclear medicine scan showed equal function for L and R kidneys and noted that the scan pattern was consistent with ATN, CT with severe R hydro chronic with [facility] obstructing stone - d/w urology resident who will d/w attending whether intervention is warranted. Both kidneys working and contributing equally to eGFR so favour intervening on the R  hydro    #Acute on Chronic Hyponatremia in a euvolemic state, chronic SIADH   Na responding to Ure-Na and fluid restriction of 1L- continue  continue ure-na 15g BID    #Hypokalemia - potential etiologies include hypomagnesemia and SHIKHA   - Give potassium 40meq     #Anemia stabilized    #Hypocalcemia - likely secondary hyperparathyroidism, improving with Calcium supplementation 1250mg daily      Eitan Toth DO  PGY-1 Internal Medicine

## 2021-07-05 NOTE — PROGRESS NOTE ADULT - PROBLEM SELECTOR PLAN 1
- Resolving, latest Na is 134  - Now on 1L fluid restriction.   - Started on sodium bicarbonate 650 mg oral BID.  - Q4hr BMP, U osm and serum osm to check values  - SIADH (medication or pain induced) or Psychogenic polydipsia as cause  - Renal consulted follow up recommendations  -Per Urology: - No acute urologic intervention at this time, will continue to follow. Can follow up with Dr. Ramirez within 1 week of discharge - Resolving, latest Na is 134 s/p 3% hypertonic saline   - was on 1L fluid restriction, changed to 1.5L fluid restriction as of 7/5  - Started on sodium bicarbonate 650 mg oral BID.  - Q4hr BMP, U osm and serum osm to check values  - etiology likely to be SIADH (medication or pain induced or chronic) vs. Psychogenic polydipsia as cause  - Renal following closely   -continue ure-na 15g BID and BMP BID  -Per Urology: - No acute urologic intervention at this time, will continue to follow. Can follow up with Dr. Ramirez within 1 week of discharge

## 2021-07-05 NOTE — PROGRESS NOTE BEHAVIORAL HEALTH - RISK ASSESSMENT
Patient appears more affectively dysregulated today on evaluation and endorsing suicidality with risk factors of recent medical delirium/confusion, impulsivity and h/o pSA. Endorses passive SI at this time and no plan and recommend 1:1 and CL to reassess tomorrow.

## 2021-07-05 NOTE — PROGRESS NOTE ADULT - ASSESSMENT
71 year old female with a history of chronic thrombocytopenia (over 10 years), osteoporosis, depression, hypertension, , ureteral stent placement on 6/28 for ureteral colic/stone obstruction brought by EMS to ED with altered mental status, expressive aphasia, found to be hyponatremic. Evaluation negative for stroke or hemorrhage. Patient states she has been thrombocytopenic for years and has been evaluated in the past by Dr. Miki Billy at [facility] and was not given a diagnosis according to patient.. Patient's platelet counts over the past three years have ranged between 85,000 and 105,000. On 6/17/21, platelets were 103,000, hemoglobin 12.0, WBC 4.35by her PCP. She admits to a distant past history of excessive alcohol intake.  Today, hemoglobin holding at 8.3, platelet count with reactive increase to 194,000. Still with hematuria. Sodium improved.    Thrombocytopenia -  chronic, possibly autoimmune , with superimposed acute thrombocytopenia due to  blood loss, recent antibiotics and other medications which may cause thrombocytopenia. Possible low grade myelodysplastic syndrome. Not due to splenic sequestration as spleen size normal on CT. Today's count 194,000 above patient's chronic baseline. However, platelets may be dysfunctional due to present renal failure and elevated creatinine.  Anemia - acute, due to ongoing  blood loss, catheter/ureteral calculus. Holding at  8.3 today. Hemoglobin was normal at 12.0 as outpatient on 6/17/21. Doubt hemolysis. Haptoglobin 78. Iron panel and ferritin normal. TSH normal.  Ecchymoses - likely trauma induced during period of mental status changes, thrombocytopenia, qualitative platelet disorder in presence of renal failure and possible medication induced qualitative platelet disorder. INR/PTT normal. Improving.  Electrolyte imbalance-Hyponatremia - Treatment and close monitoring in progress by medical and renal teams. Improved at 134 today.     Plan- Monitor CBC, trend platelets. If possible, avoid platelet suppressing medications. Check, B12, folate and supplement as needed. If invasive procedures planned, would consider platelet transfusion as patient's platelets are likely dysfunctional at the present time. By numbers, relatively stable hematologically.

## 2021-07-05 NOTE — PROGRESS NOTE ADULT - SUBJECTIVE AND OBJECTIVE BOX
BEVERLY FINE, 71y, Female  MRN-[id number]  Patient is a 71y old  Female who presents with a chief complaint of Hyponatremia, Altered Mental Status (04 Jul 2021 12:27)      OVERNIGHT EVENTS: NAEO    SUBJECTIVE: Patient seen and examined at bedside. She is irritated about the fluid restriction placed on her 2/2 hyponatremia. She is also angry about her psychiatric medicine dosing. No complaints this morning. Denies abdominal pain, urinary pain, headache, n/v/d, dizziness.     12 Point ROS Negative unless noted otherwise above.  -----------------------------------------------------------------------------  VITAL SIGNS:  Vital Signs Last 24 Hrs  T(C): 36.8 (05 Jul 2021 05:14), Max: 36.8 (05 Jul 2021 05:14)  T(F): 98.3 (05 Jul 2021 05:14), Max: 98.3 (05 Jul 2021 05:14)  HR: 72 (05 Jul 2021 05:14) (72 - 76)  BP: 122/71 (05 Jul 2021 05:14) (122/71 - 149/79)  BP(mean): --  RR: 16 (05 Jul 2021 05:14) (16 - 18)  SpO2: 100% (05 Jul 2021 05:14) (97% - 100%)  I&O's Summary    04 Jul 2021 07:01  -  05 Jul 2021 07:00  --------------------------------------------------------  IN: 0 mL / OUT: 850 mL / NET: -850 mL        PHYSICAL EXAM:    General: NAD, well developed  HEENT: NC/AT; EOMI, PERRLA, anicteric sclera; moist mucosal membranes.  Neck: supple, trachea midline  Cardiovascular: RRR, +S1/S2; NO M/R/G  Respiratory: CTA B/L; no W/R/R  Gastrointestinal: soft, NT/ND; +BSx4  Extremities: WWP; no edema or cyanosis  Vascular: 2+ radial, DP/PT pulses B/L  Neurological: AAOx3; no focal deficits    ALLERGIES:  Allergies    No Known Allergies    Intolerances    Frazier (Unknown)      MEDICATIONS:  MEDICATIONS  (STANDING):  calcium carbonate   1250 mG (OsCal) 1 Tablet(s) Oral daily  cefTRIAXone   IVPB 1000 milliGRAM(s) IV Intermittent every 24 hours  iron sucrose IVPB 200 milliGRAM(s) IV Intermittent every 24 hours  potassium chloride    Tablet ER 40 milliEquivalent(s) Oral once  sodium bicarbonate 650 milliGRAM(s) Oral every 12 hours  tranylcypromine 60 milliGRAM(s) Oral every 24 hours  urea Oral Powder 15 Gram(s) Oral daily    MEDICATIONS  (PRN):  acetaminophen   Tablet .. 650 milliGRAM(s) Oral every 6 hours PRN Mild Pain (1 - 3)  LORazepam   Injectable 1 milliGRAM(s) IntraMuscular every 6 hours PRN Agitation      -------------------------------------------------------------------------------  LABS:                        8.3    7.20  )-----------( 194      ( 05 Jul 2021 06:11 )             26.0     07-05    134<L>  |  98  |  51<H>  ----------------------------<  135<H>  3.3<L>   |  24  |  2.22<H>    Ca    7.8<L>      05 Jul 2021 06:11  Phos  2.7     07-04  Mg     1.5     07-05    TPro  6.7  /  Alb  3.3  /  TBili  <0.2  /  DBili  x   /  AST  21  /  ALT  12  /  AlkPhos  67  07-05    LIVER FUNCTIONS - ( 05 Jul 2021 06:11 )  Alb: 3.3 g/dL / Pro: 6.7 g/dL / ALK PHOS: 67 U/L / ALT: 12 U/L / AST: 21 U/L / GGT: x               CAPILLARY BLOOD GLUCOSE          COVID-19 PCR: Negative (29 Jun 2021 22:15)      RADIOLOGY & ADDITIONAL TESTS: Reviewed.       BEVERLY FINE, 71y, Female  MRN-[id number]  Patient is a 71y old  Female who presents with a chief complaint of Hyponatremia, Altered Mental Status (04 Jul 2021 12:27)      OVERNIGHT EVENTS: NAEO     SUBJECTIVE: Patient seen and examined at bedside. Pt irritated about the fluid restriction placed 2/2 hyponatremia. She is also angry about her psychiatric medicine dosing and became very emotional that her dose of Parnate is not being increased. She states that it is contributing to her depression.  Denies abdominal pain, urinary pain, headache, n/v/d, dizziness.     12 Point ROS Negative unless noted otherwise above.  -----------------------------------------------------------------------------  VITAL SIGNS:  Vital Signs Last 24 Hrs  T(C): 36.8 (05 Jul 2021 05:14), Max: 36.8 (05 Jul 2021 05:14)  T(F): 98.3 (05 Jul 2021 05:14), Max: 98.3 (05 Jul 2021 05:14)  HR: 72 (05 Jul 2021 05:14) (72 - 76)  BP: 122/71 (05 Jul 2021 05:14) (122/71 - 149/79)  BP(mean): --  RR: 16 (05 Jul 2021 05:14) (16 - 18)  SpO2: 100% (05 Jul 2021 05:14) (97% - 100%)  I&O's Summary    04 Jul 2021 07:01  -  05 Jul 2021 07:00  --------------------------------------------------------  IN: 0 mL / OUT: 850 mL / NET: -850 mL        PHYSICAL EXAM:    General: NAD, well developed  HEENT: NC/AT; EOMI, PERRLA, anicteric sclera; moist mucosal membranes.  Neck: supple, trachea midline  Cardiovascular: RRR, +S1/S2; NO M/R/G  Respiratory: CTA B/L; no W/R/R  Gastrointestinal: soft, NT/ND; +BSx4  Extremities: WWP; no edema or cyanosis  Vascular: 2+ radial, DP/PT pulses B/L  Neurological: AAOx3; no focal deficits    ALLERGIES:  Allergies    No Known Allergies    Intolerances    Frazier (Unknown)      MEDICATIONS:  MEDICATIONS  (STANDING):  calcium carbonate   1250 mG (OsCal) 1 Tablet(s) Oral daily  cefTRIAXone   IVPB 1000 milliGRAM(s) IV Intermittent every 24 hours  iron sucrose IVPB 200 milliGRAM(s) IV Intermittent every 24 hours  potassium chloride    Tablet ER 40 milliEquivalent(s) Oral once  sodium bicarbonate 650 milliGRAM(s) Oral every 12 hours  tranylcypromine 60 milliGRAM(s) Oral every 24 hours  urea Oral Powder 15 Gram(s) Oral daily    MEDICATIONS  (PRN):  acetaminophen   Tablet .. 650 milliGRAM(s) Oral every 6 hours PRN Mild Pain (1 - 3)  LORazepam   Injectable 1 milliGRAM(s) IntraMuscular every 6 hours PRN Agitation      -------------------------------------------------------------------------------  LABS:                        8.3    7.20  )-----------( 194      ( 05 Jul 2021 06:11 )             26.0     07-05    134<L>  |  98  |  51<H>  ----------------------------<  135<H>  3.3<L>   |  24  |  2.22<H>    Ca    7.8<L>      05 Jul 2021 06:11  Phos  2.7     07-04  Mg     1.5     07-05    TPro  6.7  /  Alb  3.3  /  TBili  <0.2  /  DBili  x   /  AST  21  /  ALT  12  /  AlkPhos  67  07-05    LIVER FUNCTIONS - ( 05 Jul 2021 06:11 )  Alb: 3.3 g/dL / Pro: 6.7 g/dL / ALK PHOS: 67 U/L / ALT: 12 U/L / AST: 21 U/L / GGT: x                   COVID-19 PCR: Negative (29 Jun 2021 22:15)      RADIOLOGY & ADDITIONAL TESTS: Reviewed.

## 2021-07-05 NOTE — PROGRESS NOTE ADULT - SUBJECTIVE AND OBJECTIVE BOX
The patient was seen and examined.      71 year old female with a history of chronic thrombocytopenia (over 10 years), osteoporosis, depression, hypertension, , ureteral stent placement on 6/28 for ureteral colic/stone obstruction brought by EMS to ED with altered mental status, expressive aphasia, found to be hyponatremic. Evaluation negative for stroke or hemorrhage. Patient states she has been thrombocytopenic for years and has been evaluated in the past by Dr. Miki Billy at [facility] and was not given a diagnosis according to patient.. Patient's platelet counts over the past three years have ranged between 85,000 and 105,000. On 6/17/21, platelets were 103,000, hemoglobin 12.0, WBC 4.35by her PCP. She admits to a distant past history of excessive alcohol intake.  Today, hemoglobin holding at 8.3, platelet count with reactive increase to 194,000. Still with hematuria. Sodium improved.         Allergies    No Known Allergies    Intolerances    Frazier (Unknown)      Medications:  MEDICATIONS  (STANDING):  calcium carbonate   1250 mG (OsCal) 1 Tablet(s) Oral daily  cefTRIAXone   IVPB 1000 milliGRAM(s) IV Intermittent every 24 hours  iron sucrose IVPB 200 milliGRAM(s) IV Intermittent every 24 hours  sodium bicarbonate 650 milliGRAM(s) Oral every 12 hours  tranylcypromine 60 milliGRAM(s) Oral every 24 hours  urea Oral Powder 15 Gram(s) Oral daily    MEDICATIONS  (PRN):  acetaminophen   Tablet .. 650 milliGRAM(s) Oral every 6 hours PRN Mild Pain (1 - 3)  LORazepam   Injectable 1 milliGRAM(s) IntraMuscular every 6 hours PRN Agitation          Interval History:    The patient denies chest pain or SOB.  No nausea/vomiting/fevers/chills/night sweats.  Has fatigue, no headaches/dizziness.  Appetite is stable . Still with one to one observation.  No abdominal pain/diarrhea/constipation.  No melena or hematochezia.    No dysuria/ still with hematuria.  No history of easy bruising/bleeding.  No gingival bleeding or epistaxis.  No leg pain or leg swelling.    ROS is otherwise negative.    PHYSICAL EXAM:    T(F): 98.3 (07-05-21 @ 05:14), Max: 98.3 (07-05-21 @ 05:14)  HR: 72 (07-05-21 @ 05:14) (72 - 73)  BP: 122/71 (07-05-21 @ 05:14) (122/71 - 149/79)  RR: 16 (07-05-21 @ 05:14) (16 - 18)  SpO2: 100% (07-05-21 @ 05:14) (97% - 100%)  Wt(kg): --    Daily     Daily     Gen: well developed, well nourished, comfortable  HEENT: normocephalic/atraumatic, has conjunctival pallor, no scleral icterus, no oral thrush/mucosal bleeding/mucositis  Neck: supple, no masses, no JVD  Cardiovascular: RR, nl S1S2, no murmurs/rubs/gallops  Respiratory: clear air entry b/l  Gastrointestinal: BS+, soft, NT/ND, no masses, no splenomegaly, no hepatomegaly, no evidence for ascites  Extremities: no clubbing/cyanosis, no edema, no calf tenderness  Neurological: no focal deficits  Skin: no rash on visible skin, has fading ecchymoses, no petechiae  Lymph Nodes:  no significant peripheral adenopathy   Musculoskeletal:  full ROM  Psychiatric:  mood stable        Labs:                          8.3    7.20  )-----------( 194      ( 05 Jul 2021 06:11 )             26.0     CBC Full  -  ( 05 Jul 2021 06:11 )  WBC Count : 7.20 K/uL  RBC Count : 2.85 M/uL  Hemoglobin : 8.3 g/dL  Hematocrit : 26.0 %  Platelet Count - Automated : 194 K/uL  Mean Cell Volume : 91.2 fl  Mean Cell Hemoglobin : 29.1 pg  Mean Cell Hemoglobin Concentration : 31.9 gm/dL  Auto Neutrophil # : 4.33 K/uL  Auto Lymphocyte # : 1.14 K/uL  Auto Monocyte # : 0.76 K/uL  Auto Eosinophil # : 0.38 K/uL  Auto Basophil # : 0.26 K/uL  Auto Neutrophil % : 59.3 %  Auto Lymphocyte % : 15.9 %  Auto Monocyte % : 10.6 %  Auto Eosinophil % : 5.3 %  Auto Basophil % : 3.6 %        07-05    134<L>  |  98  |  51<H>  ----------------------------<  135<H>  3.3<L>   |  24  |  2.22<H>    Ca    7.8<L>      05 Jul 2021 06:11  Phos  2.7     07-04  Mg     1.5     07-05    TPro  6.7  /  Alb  3.3  /  TBili  <0.2  /  DBili  x   /  AST  21  /  ALT  12  /  AlkPhos  67  07-05          Other Labs:    Cultures:    Pathology:    Imaging Studies:

## 2021-07-05 NOTE — PROGRESS NOTE ADULT - ATTENDING COMMENTS
bilateral obstructive nephropathy with severe hydro and UPJ obstruction on R with equal function in both kidneys on NM scan - awaiting urology resident and attending evaluation for possible intervention. SHIKHA due to ATN of unclear etiology, continues to improve, non oliguric.   SIADH well controlled on 1L fluid restriction and UreNa

## 2021-07-05 NOTE — PROGRESS NOTE ADULT - SUBJECTIVE AND OBJECTIVE BOX
Patient is a 71y Female seen and evaluated at bedside. She reports no acute events/changes overnight. She wants to be able to drink more fluid and was educated on the importance of fluid restriction at this time. She also wants psych to increase her medication dosages.       Meds:    acetaminophen   Tablet .. 650 every 6 hours PRN  calcium carbonate   1250 mG (OsCal) 1 daily  cefTRIAXone   IVPB 1000 every 24 hours  iron sucrose IVPB 200 every 24 hours  LORazepam   Injectable 1 every 6 hours PRN  potassium chloride    Tablet ER 40 once  sodium bicarbonate 650 every 12 hours  tranylcypromine 60 every 24 hours  urea Oral Powder 15 daily      T(C): , Max: 36.8 (07-05-21 @ 05:14)  T(F): , Max: 98.3 (07-05-21 @ 05:14)  HR: 72 (07-05-21 @ 05:14)  BP: 122/71 (07-05-21 @ 05:14)  BP(mean): --  RR: 16 (07-05-21 @ 05:14)  SpO2: 100% (07-05-21 @ 05:14)  Wt(kg): --    07-04 @ 07:01  -  07-05 @ 07:00  --------------------------------------------------------  IN: 0 mL / OUT: 850 mL / NET: -850 mL          Review of Systems:  CONSTITUTIONAL: No fever or chills, No fatigue or tiredness, + mild weakness overall   RESPIRATORY: No shortness of breath, cough, hemoptysis  CARDIOVASCULAR: No chest pain or shortness of breath  GASTROINTESTINAL: No abdominal or flank pain, No nausea or vomiting, No diarrhea  GENITOURINARY: No dysuria or urinary burning, No difficulty passing urine, No hematuria  NEUROLOGICAL: No headaches or blurred vision  SKIN: No skin rashes   MUSCULOSKELETAL: No arthralgia, No leg edema, No muscle pain      PHYSICAL EXAM:  GENERAL: well-developed, well nourished, alert, no acute distress at present  NECK: supple, No JVD  CHEST/LUNG: Clear to auscultation bilaterally  HEART: normal S1S2, RRR  ABDOMEN: Soft, Nontender, +BS, No flank tenderness bilateral  EXTREMITIES: No clubbing, cyanosis, or edema, scattered bruising   NEUROLOGY: AAO x3, no focal neurological deficit        LABS:                        8.3    7.20  )-----------( 194      ( 05 Jul 2021 06:11 )             26.0     07-05    134<L>  |  98  |  51<H>  ----------------------------<  135<H>  3.3<L>   |  24  |  2.22<H>    Ca    7.8<L>      05 Jul 2021 06:11  Phos  2.7     07-04  Mg     1.5     07-05    TPro  6.7  /  Alb  3.3  /  TBili  <0.2  /  DBili  x   /  AST  21  /  ALT  12  /  AlkPhos  67  07-05          Sodium, Random Urine: 92 mmol/L (07-04 @ 10:01)  Osmolality, Random Urine: 443 mosm/kg (07-04 @ 10:01)  Creatinine, Random Urine: 69 mg/dL (07-04 @ 10:01)  Osmolality, Random Urine: 377 mosm/kg (07-03 @ 10:52)  Sodium, Random Urine: 99 mmol/L (07-03 @ 10:52)        RADIOLOGY & ADDITIONAL STUDIES:

## 2021-07-05 NOTE — PROGRESS NOTE BEHAVIORAL HEALTH - SUMMARY
71 year-old woman with PPHx of depression, pSA, PMH of HTN,  urethral stent placed on 6/28 due to ureteral colic presents to [facility] ED on 6/29 with altered mental status. 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 Psychiatry was consulted for recommendations re pt's home medications. Patient on 1:1 for suicidal statements (no plan) in context of demanding higher dosing of MAOI. Na normalizing but patient requiring ongoing fluid restriction. Reportedly normal Na levels (138) on MAOI dosing (Parnate 110mg qd and mirtazapine 15mg qhs) in outpatient setting as per outpt psychiatrist.     Imp: Unclear etiology of hyponatremia and recent medical delirium resolving +/- compounded by MAOI wd sx; depression stable on outpatient regimen and pt has outpatient psychiatrist she can follow with but appears for dysregulated today, endorsing suicidality, refusing to participate further in interview without additional Parnate dosing; characterological traits w presentation and may be provocative suicidal statements around Parnate dosing but also has h/o pSA and has difficulty engaging in interview today    Plan:  -c/w 1:1 for safety for pt endorsing suicidality   -reassess if hyponatremia further stabilized on reassessment tomorrow and discuss current med regimen/outpt plan versus inpatient admission if +SI although would likely not benefit greatly from inpt admission and much of current presentation likely related to characterological traits +/- resolving delirium +/- MAOI wd  -prns for anxiety/agitation lorazepam 1mg po q6h prn ([id number])  -CL to follow

## 2021-07-05 NOTE — PROGRESS NOTE ADULT - PROBLEM SELECTOR PLAN 2
Appears to be resolving  Baseline Cr from 2019 ~0.9-1.0 per chart review  - Steadily increasing Cr of unknown etiology --> 3.52 07/01 @ 11pm; 3.88 07/02; Cr elevation persisted for >72 hrs, treating as ATN  - Euvolemic on exam with only slight hyperosmolar urine with increased urine Na  - Differential remains wide; Renal consider nuclear scan for persisting ATN with hyponatremia  -STAT CT A/P performed 07/02 per nephrology and urology to r/o post-renal etiology -- severe right hydronephrosis with 2 mm UPJ stone, moderate L hydronephrosis stable, L uretal double-J stent in place and stable. No interval change in bilateral hydronephrosis.   -Per Nephrology 07/03- concern remains for possible obstructive component given increase in hydronephrosis on L seen during imaging. Patient's Cr plateaud which is reassuring -- follow up PM BMP, if Cr continues to trend up, consider PCN with urology re-eval   -Per Nephrology, patient given 15 g Urea one time dose, 200 IV Iron mg qd x 5 days (start date 07/03). Repeat BMP and Mg at 6pm  -Nuclear med scan needed   -Patient to undergo nuclear medicine renal scan -- Approximately symmetric renal function. The pattern is typical of acute tubular necrosis. The asymmetry of the size of the two kidneys appears to be due to prior obstruction of the left ureter. Appears to be resolving  Baseline Cr from 2019 ~0.9-1.0 per chart review  - Steadily increasing Cr of unknown etiology --> 3.52 07/01 @ 11pm; 3.88 07/02; Cr elevation persisted for >72 hrs, treating as ATN  - Euvolemic on exam with only slight hyperosmolar urine with increased urine Na  - Differential remains wide; Renal consider nuclear scan for persisting ATN with hyponatremia  -STAT CT A/P performed 07/02 per nephrology and urology to r/o post-renal etiology -- severe right hydronephrosis with 2 mm UPJ stone, moderate L hydronephrosis stable, L uretal double-J stent in place and stable. No interval change in bilateral hydronephrosis.   -Per Nephrology 07/03- concern remains for possible obstructive component given increase in hydronephrosis on L seen during imaging. Patient's Cr plateaud which is reassuring -- follow up PM BMP, if Cr continues to trend up, consider PCN with urology re-eval   -Per Nephrology, patient given 15 g Urea one time dose, 200 IV Iron mg qd x 5 days (start date 07/03). Repeat BMP and Mg at 6pm  -Nuc Med Scan 7/4-- Approximately symmetric renal function. The pattern is typical of acute tubular necrosis. The asymmetry of the size of the two kidneys appears to be due to prior obstruction of the left ureter.  -Urology following -- Cr downtrending, continue to monitor, no Urological intervention needed as of 7/4

## 2021-07-05 NOTE — PROGRESS NOTE ADULT - SUBJECTIVE AND OBJECTIVE BOX
WILLIAMS overnight  Feels well  Denies f/c/n/v  Good UOP  NAD  Unlabored breathing  sntnd  Ocampo with clear red urine, without clots  Cr downtrending to 2.22  c/w ocamop  No need for urologic intervention at this time      Vital Signs Last 24 Hrs  T(C): 36.8 (05 Jul 2021 05:14), Max: 36.8 (05 Jul 2021 05:14)  T(F): 98.3 (05 Jul 2021 05:14), Max: 98.3 (05 Jul 2021 05:14)  HR: 72 (05 Jul 2021 05:14) (72 - 76)  BP: 122/71 (05 Jul 2021 05:14) (122/71 - 149/79)  BP(mean): --  RR: 16 (05 Jul 2021 05:14) (16 - 18)  SpO2: 100% (05 Jul 2021 05:14) (97% - 100%)                          8.3    7.20  )-----------( 194      ( 05 Jul 2021 06:11 )             26.0   05 Jul 2021 06:11    134    |  98     |  51     ----------------------------<  135    3.3     |  24     |  2.22     Ca    7.8        05 Jul 2021 06:11  Phos  2.7       04 Jul 2021 07:55  Mg     1.5       05 Jul 2021 06:11    TPro  6.7    /  Alb  3.3    /  TBili  <0.2   /  DBili  x      /  AST  21     /  ALT  12     /  AlkPhos  67     05 Jul 2021 06:11

## 2021-07-05 NOTE — PROGRESS NOTE BEHAVIORAL HEALTH - NSBHFUPSUICINTERVALFT_PSY_A_CORE
Patient making suicidal statements with no plan unless Parnate dose increased. On 1:1. More affectively dysregulated and yelling on evaluation today and unable to contract to safety and demanding change in medication dosing because she is "miserable" and "will kill herself" if she is not on previous Parnate dosing.

## 2021-07-06 ENCOUNTER — TRANSCRIPTION ENCOUNTER (OUTPATIENT)
Age: 71
End: 2021-07-06

## 2021-07-06 LAB
ANA TITR SER: NEGATIVE — SIGNIFICANT CHANGE UP
ANION GAP SERPL CALC-SCNC: 14 MMOL/L — SIGNIFICANT CHANGE UP (ref 5–17)
ANION GAP SERPL CALC-SCNC: 9 MMOL/L — SIGNIFICANT CHANGE UP (ref 5–17)
BUN SERPL-MCNC: 78 MG/DL — HIGH (ref 7–23)
BUN SERPL-MCNC: 81 MG/DL — HIGH (ref 7–23)
C3 SERPL-MCNC: 90 MG/DL — SIGNIFICANT CHANGE UP (ref 81–157)
C4 SERPL-MCNC: 10 MG/DL — LOW (ref 13–39)
CALCIUM SERPL-MCNC: 9.1 MG/DL — SIGNIFICANT CHANGE UP (ref 8.4–10.5)
CALCIUM SERPL-MCNC: 9.1 MG/DL — SIGNIFICANT CHANGE UP (ref 8.4–10.5)
CHLORIDE SERPL-SCNC: 95 MMOL/L — LOW (ref 96–108)
CHLORIDE SERPL-SCNC: 98 MMOL/L — SIGNIFICANT CHANGE UP (ref 96–108)
CO2 SERPL-SCNC: 24 MMOL/L — SIGNIFICANT CHANGE UP (ref 22–31)
CO2 SERPL-SCNC: 28 MMOL/L — SIGNIFICANT CHANGE UP (ref 22–31)
CREAT SERPL-MCNC: 1.65 MG/DL — HIGH (ref 0.5–1.3)
CREAT SERPL-MCNC: 1.71 MG/DL — HIGH (ref 0.5–1.3)
GLUCOSE SERPL-MCNC: 100 MG/DL — HIGH (ref 70–99)
GLUCOSE SERPL-MCNC: 94 MG/DL — SIGNIFICANT CHANGE UP (ref 70–99)
HCT VFR BLD CALC: 25.7 % — LOW (ref 34.5–45)
HGB BLD-MCNC: 8.4 G/DL — LOW (ref 11.5–15.5)
MCHC RBC-ENTMCNC: 30.2 PG — SIGNIFICANT CHANGE UP (ref 27–34)
MCHC RBC-ENTMCNC: 32.7 GM/DL — SIGNIFICANT CHANGE UP (ref 32–36)
MCV RBC AUTO: 92.4 FL — SIGNIFICANT CHANGE UP (ref 80–100)
MYELOPEROXIDASE AB SER-ACNC: <5 UNITS — SIGNIFICANT CHANGE UP
MYELOPEROXIDASE CELLS FLD QL: NEGATIVE — SIGNIFICANT CHANGE UP
NRBC # BLD: 0 /100 WBCS — SIGNIFICANT CHANGE UP (ref 0–0)
OSMOLALITY UR: 473 MOSM/KG — SIGNIFICANT CHANGE UP (ref 300–900)
PLATELET # BLD AUTO: 312 K/UL — SIGNIFICANT CHANGE UP (ref 150–400)
POTASSIUM SERPL-MCNC: 4.4 MMOL/L — SIGNIFICANT CHANGE UP (ref 3.5–5.3)
POTASSIUM SERPL-MCNC: 4.7 MMOL/L — SIGNIFICANT CHANGE UP (ref 3.5–5.3)
POTASSIUM SERPL-SCNC: 4.4 MMOL/L — SIGNIFICANT CHANGE UP (ref 3.5–5.3)
POTASSIUM SERPL-SCNC: 4.7 MMOL/L — SIGNIFICANT CHANGE UP (ref 3.5–5.3)
RBC # BLD: 2.78 M/UL — LOW (ref 3.8–5.2)
RBC # FLD: 14.7 % — HIGH (ref 10.3–14.5)
SODIUM SERPL-SCNC: 133 MMOL/L — LOW (ref 135–145)
SODIUM SERPL-SCNC: 135 MMOL/L — SIGNIFICANT CHANGE UP (ref 135–145)
SODIUM UR-SCNC: 27 MMOL/L — SIGNIFICANT CHANGE UP
WBC # BLD: 6.85 K/UL — SIGNIFICANT CHANGE UP (ref 3.8–10.5)
WBC # FLD AUTO: 6.85 K/UL — SIGNIFICANT CHANGE UP (ref 3.8–10.5)

## 2021-07-06 PROCEDURE — 99232 SBSQ HOSP IP/OBS MODERATE 35: CPT

## 2021-07-06 PROCEDURE — 99233 SBSQ HOSP IP/OBS HIGH 50: CPT

## 2021-07-06 PROCEDURE — 99233 SBSQ HOSP IP/OBS HIGH 50: CPT | Mod: GC

## 2021-07-06 RX ADMIN — Medication 650 MILLIGRAM(S): at 05:21

## 2021-07-06 RX ADMIN — TRANYLCYPROMINE SULFATE 60 MILLIGRAM(S): 10 TABLET, FILM COATED ORAL at 07:00

## 2021-07-06 RX ADMIN — IRON SUCROSE 110 MILLIGRAM(S): 20 INJECTION, SOLUTION INTRAVENOUS at 17:02

## 2021-07-06 RX ADMIN — Medication 15 GRAM(S): at 05:21

## 2021-07-06 RX ADMIN — Medication 15 GRAM(S): at 17:01

## 2021-07-06 RX ADMIN — Medication 650 MILLIGRAM(S): at 17:02

## 2021-07-06 RX ADMIN — Medication 1 TABLET(S): at 10:54

## 2021-07-06 NOTE — PROGRESS NOTE ADULT - PROBLEM SELECTOR PLAN 8
Unknown chronicity or etiology of anemia, however patient with hematuria on UA following procedure yesterday.  - Maintain active T/S, transfuse to goal > 7.0   - iron panel labs in normal range suggestive of anemia of chronic disease, not RIYA.      #Hypocalcemia  -per nephorlogy: likely secondary hyperparathyroidism, improving with Calcium supplementation 1250mg daily Unknown chronicity or etiology of anemia, however patient with hematuria on UA following procedure yesterday.  - Maintain active T/S, transfuse to goal > 7.0   - iron panel labs in normal range suggestive of anemia of chronic disease, not RIYA.  -Per Heme/Onc: Hemoglobin was normal at 12.0 as outpatient on 6/17/21. Doubt hemolysis. Haptoglobin 78. Iron panel and ferritin normal. TSH normal. By the numbers, relatively stable hematologically.      #Hypocalcemia  -per nephrology: likely secondary hyperparathyroidism, improving with Calcium supplementation 1250mg daily

## 2021-07-06 NOTE — HISTORY OF PRESENT ILLNESS
[FreeTextEntry1] : Pt is a 70 year old female  with recent three episodes of gross hematuria. She is POD1 s/p left ureteroscopy 2021 presenting for catheter removal. \par \par Recent History:\par Initially presented with complaints of intermittent episodes of gross hematuria. She reported episodes occurred , 5/15, and  with no associated pelvic pain, back pain, fever, or chills. She also stated that microscopic hematuria has been noted on recent urinalysis (records show 23 /HPF RBC). She stated that she had an episode of gross hematuria many years ago in setting of UTI, but reported last UTI was 15-20 years ago. No hx stones. \par \par Of note, pt was admitted to the hospital last year (2020) s/p fall. She stated that she injured her foot and had a subdural hematoma at the time. She was later discharged to a rehab center, but then fell out of wheelchair and broke hip. While in the hospital, she had a CT scan which demonstrated "severe right hydronephrosis and dilation of proximal ureter unchanged." She stated that this has been noted repeatedly over last ten years. She is unsure if it was ever evaluated. \par \par She presented 2021 for cystoscopy evaluation. Cystoscopy was unremarkable. We reviewed CTabd/pelvis scan 2021 which demonstrated moderate to severe right sided hydronephrosis to level of the UPJ and mild left-sided hydronephrosis. Also demonstrated b/l stones including tiny calcified stone noted just proximal to UPJ on right side and 0.4 x 0.3 x 0.7 cm stone present at the proximal left ureter at L4 level. She has longstanding hx of severe right hydronephrosis secondary to presumed long-standing UPJ obstruction.  \par \par UCx 2021: negative\par Ucx and Cytology 2021: negative \par \par Sexually active\par PMH: blood platelet disorder, insufficient bone regeneration, depression \par SH: right femur (), sigmoid colon resection () \par FH: colon cancer (mother), pancreatic cancer (father), Parkinson's (father), depression (sister), unsure if sister had kidney or gallbladder stones\par Social History:  former smoker (1ppd x 40 years quit 7), no EtOH, (former heavy)

## 2021-07-06 NOTE — PROGRESS NOTE BEHAVIORAL HEALTH - NSBHFUPINTERVALCCFT_PSY_A_CORE
"I am all out of sorts and I need more parnate! I can't live like this!" "I am all out of sorts and I need more parnate!"

## 2021-07-06 NOTE — ASSESSMENT
[FreeTextEntry1] : Pt is a 70 year old female  with recent three episodes of gross hematuria. She is POD1 s/p left ureteroscopy 2021. Catheter successfully removed but was complicated by gross hematuria. She remained in the office to drink water and void until gross hematuria resolved. Encouraged her to continue drinking plenty of fluids at home. She will return next week for stent removal. Will draw blood for CBC today. Pt will call for today's blood work in 2-3 days.\par \par Patient expressed understanding. \par \par PVR #1: 132 cc\par PVR #2: 0 cc \par (done to rule out incomplete bladder emptying)

## 2021-07-06 NOTE — DISCHARGE NOTE NURSING/CASE MANAGEMENT/SOCIAL WORK - PATIENT PORTAL LINK FT
You can access the FollowMyHealth Patient Portal offered by [facility] by registering at the following website: [email or web address]. By joining [employer]s FollowMyHealth portal, you will also be able to view your health information using other applications (apps) compatible with our system.

## 2021-07-06 NOTE — PROGRESS NOTE ADULT - ASSESSMENT
71 year old female with a history of chronic thrombocytopenia (over 10 years), osteoporosis, depression, hypertension, , ureteral stent placement on 6/28 for ureteral colic/stone obstruction brought by EMS to ED with altered mental status, expressive aphasia, found to be hyponatremic. Evaluation negative for stroke or hemorrhage. Patient states she has been thrombocytopenic for years and has been evaluated in the past by Dr. Miki Billy at [facility] and was not given a diagnosis according to patient.. Patient's platelet counts over the past three years have ranged between 85,000 and 105,000. On 6/17/21, platelets were 103,000, hemoglobin 12.0, WBC 4.35by her PCP. She admits to a distant past history of excessive alcohol intake.  Hemoglobin holding at 8.3, platelet count with reactive increase to 194,000. Still with hematuria but seems clearer. Sodium improved. Today's labs pending.    Thrombocytopenia -  chronic, possibly autoimmune , with superimposed acute thrombocytopenia due to  blood loss, recent antibiotics and other medications which may cause thrombocytopenia. Possible low grade myelodysplastic syndrome. Not due to splenic sequestration as spleen size normal on CT. On 7/5 platelet count 194,000 above patient's chronic baseline. However, platelets may be dysfunctional due to present renal failure and elevated creatinine. Both improving.  Anemia - acute, due to ongoing  blood loss, catheter/ureteral calculus. Holding at  8.3 today. Hemoglobin was normal at 12.0 as outpatient on 6/17/21. Doubt hemolysis. Haptoglobin 78. Iron panel and ferritin normal. TSH normal.  Ecchymoses - likely trauma induced during period of mental status changes, thrombocytopenia, qualitative platelet disorder in presence of renal failure and possible medication induced qualitative platelet disorder. INR/PTT normal. Improving.  Electrolyte imbalance-Hyponatremia - Treatment and close monitoring in progress by medical and renal teams. Improved at 134 today. Creatinine improved at 1.89.    Plan- Monitor CBC, trend platelets. If possible, avoid platelet suppressing medications. Check, B12, folate and supplement as needed. By the numbers, relatively stable hematologically.

## 2021-07-06 NOTE — PROGRESS NOTE ADULT - ASSESSMENT
72 y/o Female with HTN, chronic thrombocytopenia, osteoporosis, depression, chronic right hydronephrosis (obstructing stone at UPJ) and new L obstructing nephrolithiasis with gross hematuria s/p lithotripsy and L urethral stent placed on 6/28 c/b AMS with hospital admission due to symptomatic hyponatremia and SHIKHA    Assessment:    #Ischemic ATN - non oliguric, improving  - nuclear medicine scan showed equal function for L and R kidneys and noted that the scan pattern was consistent with ATN, CT with severe R hydro chronic with UPJ obstructing stone - d/w urology resident who will d/w attending whether intervention is warranted. Both kidneys working and contributing equally to eGFR so favour intervening on the R  hydro    #Acute on Chronic Hyponatremia in a euvolemic state, chronic SIADH   continue fluid restriction to 1.5 L  consider repeat Marlon and Uosm to evaluate ADH response status     #Hypokalemia - potential etiologies include hypomagnesemia and SHIKHA- resolved   - s/p 40mEq KCl     #Anemia stabilized    #Hypocalcemia - likely secondary hyperparathyroidism, improving with Calcium supplementation 1250mg daily      Eitan Toth DO  PGY-1 Internal Medicine

## 2021-07-06 NOTE — PROGRESS NOTE ADULT - PROBLEM SELECTOR PLAN 2
Appears to be resolving  Baseline Cr from 2019 ~0.9-1.0 per chart review  - Steadily increasing Cr of unknown etiology --> 3.52 07/01 @ 11pm; 3.88 07/02; Cr elevation persisted for >72 hrs, treating as ATN  - Euvolemic on exam with only slight hyperosmolar urine with increased urine Na  - Differential remains wide; Renal consider nuclear scan for persisting ATN with hyponatremia  -STAT CT A/P performed 07/02 per nephrology and urology to r/o post-renal etiology -- severe right hydronephrosis with 2 mm UPJ stone, moderate L hydronephrosis stable, L uretal double-J stent in place and stable. No interval change in bilateral hydronephrosis.   -Per Nephrology 07/03- concern remains for possible obstructive component given increase in hydronephrosis on L seen during imaging. Patient's Cr plateaud which is reassuring -- follow up PM BMP, if Cr continues to trend up, consider PCN with urology re-eval   -Per Nephrology, patient given 15 g Urea one time dose, 200 IV Iron mg qd x 5 days (start date 07/03). Repeat BMP and Mg at 6pm  -Nuc Med Scan 7/4-- Approximately symmetric renal function. The pattern is typical of acute tubular necrosis. The asymmetry of the size of the two kidneys appears to be due to prior obstruction of the left ureter.  -Urology following -- Cr downtrending, continue to monitor, no Urological intervention needed as of 7/4

## 2021-07-06 NOTE — PROGRESS NOTE ADULT - SUBJECTIVE AND OBJECTIVE BOX
The patient was seen and examined.      71 year old female with a history of chronic thrombocytopenia (over 10 years), osteoporosis, depression, hypertension, , ureteral stent placement on 6/28 for ureteral colic/stone obstruction brought by EMS to ED with altered mental status, expressive aphasia, found to be hyponatremic. Evaluation negative for stroke or hemorrhage. Patient states she has been thrombocytopenic for years and has been evaluated in the past by Dr. Miki Billy at [facility] and was not given a diagnosis according to patient.. Patient's platelet counts over the past three years have ranged between 85,000 and 105,000. On 6/17/21, platelets were 103,000, hemoglobin 12.0, WBC 4.35by her PCP. She admits to a distant past history of excessive alcohol intake.  Hemoglobin holding at 8.3, platelet count with reactive increase to 194,000. Still with hematuria but less today. Sodium improved. Today's labs pending.          .         Allergies    No Known Allergies    Intolerances    Frazier (Unknown)      Medications:  MEDICATIONS  (STANDING):  calcium carbonate   1250 mG (OsCal) 1 Tablet(s) Oral daily  iron sucrose IVPB 200 milliGRAM(s) IV Intermittent every 24 hours  sodium bicarbonate 650 milliGRAM(s) Oral every 12 hours  tranylcypromine 60 milliGRAM(s) Oral every 24 hours  urea Oral Powder 15 Gram(s) Oral two times a day    MEDICATIONS  (PRN):  acetaminophen   Tablet .. 650 milliGRAM(s) Oral every 6 hours PRN Mild Pain (1 - 3)  LORazepam   Injectable 1 milliGRAM(s) IntraMuscular every 6 hours PRN Agitation          Interval History:    The patient denies chest pain or SOB.  No nausea/vomiting/fevers/chills/night sweats.  Has fatigue, headaches/dizziness.  Appetite is stable without weight loss.  No abdominal pain/diarrhea/constipation.  No melena or hematochezia.    No dysuria/hematuria lighter today.  History of easy bruising/ not bleeding.  No gingival bleeding or epistaxis.  No leg pain or leg swelling.    ROS is otherwise negative.    PHYSICAL EXAM:    T(F): 98 (07-06-21 @ 06:11), Max: 98 (07-05-21 @ 20:32)  HR: 75 (07-06-21 @ 06:11) (75 - 77)  BP: 134/65 (07-06-21 @ 06:11) (117/67 - 134/65)  RR: 17 (07-06-21 @ 06:11) (17 - 17)  SpO2: 100% (07-06-21 @ 06:11) (98% - 100%)  Wt(kg): --    Daily     Daily     Gen: well developed, well nourished, comfortable  HEENT: normocephalic/atraumatic, no conjunctival pallor, no scleral icterus, no oral thrush/mucosal bleeding/mucositis  Neck: supple, no masses, no JVD  Cardiovascular: RR, nl S1S2, no murmurs/rubs/gallops  Respiratory: clear air entry b/l  Gastrointestinal: BS+, soft, NT/ND, no masses, no splenomegaly, no hepatomegaly, no evidence for ascites  Extremities: no clubbing/cyanosis, no edema, no calf tenderness  Neurological: no focal deficits  Skin: no rash on visible skin, ecchymoses fading, no petechiae  Lymph Nodes:  no significant peripheral adenopathy   Musculoskeletal:  full ROM  Psychiatric:  mood stable        Labs:                          8.3    7.20  )-----------( 194      ( 05 Jul 2021 06:11 )             26.0     CBC Full  -  ( 05 Jul 2021 06:11 )  WBC Count : 7.20 K/uL  RBC Count : 2.85 M/uL  Hemoglobin : 8.3 g/dL  Hematocrit : 26.0 %  Platelet Count - Automated : 194 K/uL  Mean Cell Volume : 91.2 fl  Mean Cell Hemoglobin : 29.1 pg  Mean Cell Hemoglobin Concentration : 31.9 gm/dL  Auto Neutrophil # : 4.33 K/uL  Auto Lymphocyte # : 1.14 K/uL  Auto Monocyte # : 0.76 K/uL  Auto Eosinophil # : 0.38 K/uL  Auto Basophil # : 0.26 K/uL  Auto Neutrophil % : 59.3 %  Auto Lymphocyte % : 15.9 %  Auto Monocyte % : 10.6 %  Auto Eosinophil % : 5.3 %  Auto Basophil % : 3.6 %        07-05    137  |  100  |  76<H>  ----------------------------<  96  4.8   |  26  |  1.89<H>    Ca    8.6      05 Jul 2021 22:36  Mg     1.5     07-05    TPro  6.7  /  Alb  3.3  /  TBili  <0.2  /  DBili  x   /  AST  21  /  ALT  12  /  AlkPhos  67  07-05          Other Labs:    Cultures:    Pathology:    Imaging Studies:

## 2021-07-06 NOTE — PROGRESS NOTE ADULT - PROBLEM SELECTOR PLAN 4
resolving  Hx of thrombocytopenia according to patient.  - Platelets 96 on this admission  - Platelets 89 on prior admission in 2019   - Per heme onc recommendations patient's home medication of mirtazapine should be held, as it could be causing the low plts.   - f/u Heme/onc recommendations -- avoid platelet suppressing medications, check ROSALVA, if procedures planned or bleeding continues, would consider platelet transfusion as patient's platelets are likely dysfunctional at present time - discussed with nephrology Dr. Winter and renal team -- Both in agreement. resolving  Hx of thrombocytopenia according to patient.  - Platelets 96 on this admission  - Platelets 89 on prior admission in 2019   - Per heme onc recommendations patient's home medication of mirtazapine should be held, as it could be causing the low plts.   -Per Heme/Onc: avoid platelet suppressing medications, check ROSALVA, if procedures planned or bleeding continues, would consider platelet transfusion as patient's platelets are likely dysfunctional at present time - discussed with nephrology Dr. Winter and renal team -- Both in agreement.  -On 7/5 platelet count 194,000 above patient's chronic baseline. However, platelets may be dysfunctional due to present renal failure and elevated creatinine. Both improving. By the numbers, relatively stable hematologically.

## 2021-07-06 NOTE — PROGRESS NOTE ADULT - ASSESSMENT
Ms. Thompson is a 72 y/o Female with PMHx HTN, depression and urethral stent placed on 7/28 due to kidney stones admitted for severe hyponatremia and UTI. Course complicated by persistent hyponatremia with perplexing etiology.  Ms. Thompson is a 72 y/o Female with PMHx HTN, depression and ureteral stent placed on 6/28 due to kidney stones admitted for severe hyponatremia and UTI. Course complicated by persistent hyponatremia with perplexing etiology.

## 2021-07-06 NOTE — PROGRESS NOTE ADULT - ATTENDING COMMENTS
Na down slightly today, BUN rising disproportionately as her Cr is continuing to improve, Hb has been stable. Check BMP again in am along with repeat urine Na and urine Osm - ?volume depletion  d/c Nabicarb - acidosis improved now with improving ATN

## 2021-07-06 NOTE — PROGRESS NOTE BEHAVIORAL HEALTH - SUMMARY
71 year-old woman with PPHx of depression, pSA, PMH of HTN,  urethral stent placed on 6/28 due to ureteral colic presents to [facility] ED on 6/29 with altered mental status. 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 Psychiatry was consulted for recommendations re pt's home medications. Patient on 1:1 for suicidal statements (no plan) in context of demanding higher dosing of MAOI. Na normalizing but patient requiring ongoing fluid restriction. Reportedly normal Na levels (138) on MAOI dosing (Parnate 110mg qd and mirtazapine 15mg qhs) in outpatient setting as per outpt psychiatrist.     Imp: Unclear etiology of hyponatremia and recent medical delirium resolving +/- compounded by MAOI wd sx; depression stable on outpatient regimen and pt has outpatient psychiatrist she can follow with but appears for dysregulated today, endorsing suicidality, refusing to participate further in interview without additional Parnate dosing; characterological traits w presentation and may be provocative suicidal statements around Parnate dosing but also has h/o pSA and has difficulty engaging in interview today    Plan:  -c/w 1:1 for safety for pt endorsing suicidality   -reassess if hyponatremia further stabilized on reassessment tomorrow and discuss current med regimen/outpt plan versus inpatient admission if +SI although would likely not benefit greatly from inpt admission and much of current presentation likely related to characterological traits +/- resolving delirium +/- MAOI wd  -prns for anxiety/agitation lorazepam 1mg po q6h prn ([id number])  -CL to follow 71 year-old woman with PPHx of depression, pSA, PMH of HTN,  urethral stent placed on 6/28 due to ureteral colic presents to [facility] ED on 6/29 with altered mental status. 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 Psychiatry was consulted for recommendations re pt's home medications. Patient on 1:1 for suicidal statements (no plan) in context of demanding higher dosing of MAOI. Na normalizing but patient requiring ongoing fluid restriction. Reportedly normal Na levels (138) on MAOI dosing (Parnate 110mg qd and mirtazapine 15mg qhs) in outpatient setting as per outpt psychiatrist.     Plan:  -c/w 1:1 for safety for pt endorsing suicidality             - Suicidality also may have been voiced in part to convey frustration with care; pt is currently future-oriented and tx seeking, hoping to receiving an increased dose of Parnate and expresses gratitude for care in hospital.   -prns for anxiety/agitation lorazepam 1mg po q6h prn ([id number])    #Depression  - stable on outpatient regimen and pt has outpatient psychiatrist she can follow with but appears for dysregulated today  - will need med mgmt outpatient with Psychiatrist Dr. Daniel Smith - ideally taper of parnate    -CL to follow 71 year-old woman with PPHx of depression, pSA, PMH of HTN,  urethral stent placed on 6/28 due to ureteral colic presents to [facility] ED on 6/29 with altered mental status. 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 Psychiatry was consulted for recommendations re pt's home medications. Patient on 1:1 for suicidal statements (no plan) in context of demanding higher dosing of MAOI. Na normalizing but patient requiring ongoing fluid restriction. Reportedly normal Na levels (138) on MAOI dosing (Parnate 110mg qd and mirtazapine 15mg qhs) in outpatient setting as per outpt psychiatrist.     Plan:  - can increase Parnate from 60mg to 70mg daily     - this dosing rec is above MDD but pt reports she will immediately increase to her home dose of 110mg upon leaving the hospital - patient has been taking 110mg for > 1yr  -No active SI does not need 1:1 as pt is denies SI            - Suicidality may have been voiced in part to convey frustration with care ; pt is currently future-oriented and tx seeking, hoping to receiving an increased dose of Parnate  -prns for anxiety/agitation lorazepam 1mg po q6h prn ([id number])    #Depression  - stable on outpatient regimen and pt has outpatient psychiatrist she can follow with but appears for dysregulated today  - will need med mgmt outpatient with Psychiatrist Dr. Daniel Smith - ideally taper of parnate      #Cluster B personality  - pt frequently uses provacative statements and mood swings   - would recommend psychotherapy with outpatient psychiatrist   -CL to follow 71 year-old woman with PPHx of depression, pSA, PMH of HTN,  urethral stent placed on 6/28 due to ureteral colic presents to [facility] ED on 6/29 with altered mental status. 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 Psychiatry was consulted for recommendations re pt's home medications. Patient on 1:1 for suicidal statements (no plan) in context of demanding higher dosing of MAOI. Na normalizing but patient requiring ongoing fluid restriction. Reportedly normal Na levels (138) on MAOI dosing (Parnate 110mg qd and mirtazapine 15mg qhs) in outpatient setting as per outpt psychiatrist.     Plan:  - can increase Parnate from 60mg to 70mg daily     - this dosing rec is above MDD but pt reports she will immediately increase to her home dose of 110mg upon leaving the hospital - patient has been taking 110mg for > 1yr  -No active SI does not need 1:1 as pt is denies SI            - Suicidality may have been voiced in part to convey frustration with care ; pt is currently future-oriented and tx seeking, hoping to receiving an increased dose of Parnate  -prns for anxiety/agitation lorazepam 1mg po q6h prn ([id number])    #Depression  - stable on outpatient regimen and pt has outpatient psychiatrist she can follow with but appears less dysregulated today  - will need med mgmt outpatient with Psychiatrist Dr. Daniel Smith - ideally taper of parnate      #Cluster B personality  - pt frequently uses provacative statements and mood swings   - would recommend psychotherapy with outpatient psychiatrist   -CL to follow

## 2021-07-06 NOTE — PROGRESS NOTE ADULT - SUBJECTIVE AND OBJECTIVE BOX
BEVERLY FINE, 71y, Female  MRN-[id number]  Patient is a 71y old  Female who presents with a chief complaint of Hyponatremia, Altered Mental Status (06 Jul 2021 09:49)      OVERNIGHT EVENTS: NAEO     SUBJECTIVE: Patient examined at bedside. She is continuing to state that not having her full dose of parnate makes her unstable and then she states she "does not want to live." She denies any thoughts of self harm or harming others. She denies any abdominal pain, urinary pain, chest pain, n/v/d, difficulty breathing and SOB. She states that she feels well but would like to talk to psychiatry about her Parnate dosing. No other complaints noted.     12 Point ROS Negative unless noted otherwise above.  -------------------------------------------------------------------------------  VITAL SIGNS:  Vital Signs Last 24 Hrs  T(C): 36.7 (06 Jul 2021 06:11), Max: 36.7 (05 Jul 2021 20:32)  T(F): 98 (06 Jul 2021 06:11), Max: 98 (05 Jul 2021 20:32)  HR: 75 (06 Jul 2021 06:11) (75 - 77)  BP: 134/65 (06 Jul 2021 06:11) (117/67 - 134/65)  BP(mean): --  RR: 17 (06 Jul 2021 06:11) (17 - 17)  SpO2: 100% (06 Jul 2021 06:11) (98% - 100%)  I&O's Summary    05 Jul 2021 07:01  -  06 Jul 2021 07:00  --------------------------------------------------------  IN: 600 mL / OUT: 1700 mL / NET: -1100 mL    06 Jul 2021 07:01  -  06 Jul 2021 11:49  --------------------------------------------------------  IN: 420 mL / OUT: 0 mL / NET: 420 mL    PHYSICAL EXAM:    General: NAD, interactive  HEENT:  EOMI, PERRLA, anicteric sclera; moist mucosal membranes.  Neck: supple, trachea midline  Cardiovascular: RRR, +S1/S2; NO M/R/G  Respiratory: CTA B/L; no W/R/R  Gastrointestinal: soft, NT/ND; +BSx4  Extremities: WWP; no edema or cyanosis  Vascular: 2+ radial, DP/PT pulses B/L  Neurological: AAOx3; no focal deficits  : Urinary Jordan in place    ALLERGIES:  Allergies    No Known Allergies    Intolerances    Frazier (Unknown)      MEDICATIONS:  MEDICATIONS  (STANDING):  calcium carbonate   1250 mG (OsCal) 1 Tablet(s) Oral daily  iron sucrose IVPB 200 milliGRAM(s) IV Intermittent every 24 hours  sodium bicarbonate 650 milliGRAM(s) Oral every 12 hours  tranylcypromine 60 milliGRAM(s) Oral every 24 hours  urea Oral Powder 15 Gram(s) Oral two times a day    MEDICATIONS  (PRN):  acetaminophen   Tablet .. 650 milliGRAM(s) Oral every 6 hours PRN Mild Pain (1 - 3)  LORazepam   Injectable 1 milliGRAM(s) IntraMuscular every 6 hours PRN Agitation      -------------------------------------------------------------------------------  LABS:                        8.4    6.85  )-----------( 312      ( 06 Jul 2021 10:54 )             25.7     07-06    x   |  x   |  x   ----------------------------<  94  4.7   |  24  |  x     Ca    9.1      06 Jul 2021 10:54  Mg     1.5     07-05    TPro  6.7  /  Alb  3.3  /  TBili  <0.2  /  DBili  x   /  AST  21  /  ALT  12  /  AlkPhos  67  07-05    LIVER FUNCTIONS - ( 05 Jul 2021 06:11 )  Alb: 3.3 g/dL / Pro: 6.7 g/dL / ALK PHOS: 67 U/L / ALT: 12 U/L / AST: 21 U/L / GGT: x           CAPILLARY BLOOD GLUCOSE    COVID-19 PCR: Negative (29 Jun 2021 22:15)    RADIOLOGY & ADDITIONAL TESTS: Reviewed.   Patient is a 71y old  Female who presents with a chief complaint of Hyponatremia, Altered Mental Status (06 Jul 2021 09:49)    OVERNIGHT EVENTS: NAEO     SUBJECTIVE: Patient examined at bedside. She is continuing to state that not having her full dose of parnate makes her unstable and then she states she "does not want to live." She denies any thoughts of self harm or harming others. She denies any abdominal pain, urinary pain, chest pain, n/v/d, difficulty breathing and SOB. She states that she feels well but would like to talk to psychiatry about her Parnate dosing. No other complaints noted.     12 Point ROS Negative unless noted otherwise above.  -------------------------------------------------------------------------------  VITAL SIGNS:  Vital Signs Last 24 Hrs  T(C): 36.7 (06 Jul 2021 06:11), Max: 36.7 (05 Jul 2021 20:32)  T(F): 98 (06 Jul 2021 06:11), Max: 98 (05 Jul 2021 20:32)  HR: 75 (06 Jul 2021 06:11) (75 - 77)  BP: 134/65 (06 Jul 2021 06:11) (117/67 - 134/65)  BP(mean): --  RR: 17 (06 Jul 2021 06:11) (17 - 17)  SpO2: 100% (06 Jul 2021 06:11) (98% - 100%)  I&O's Summary    05 Jul 2021 07:01  -  06 Jul 2021 07:00  --------------------------------------------------------  IN: 600 mL / OUT: 1700 mL / NET: -1100 mL    06 Jul 2021 07:01  -  06 Jul 2021 11:49  --------------------------------------------------------  IN: 420 mL / OUT: 0 mL / NET: 420 mL    PHYSICAL EXAM:    General: NAD, interactive  HEENT:  EOMI, PERRLA, anicteric sclera; moist mucosal membranes.  Neck: supple, trachea midline  Cardiovascular: RRR, +S1/S2; NO M/R/G  Respiratory: CTA B/L; no W/R/R  Gastrointestinal: soft, NT/ND; +BSx4  Extremities: WWP; no edema or cyanosis, ecchymosis noted on b/l upper and lower extremities.   Vascular: 2+ radial, DP/PT pulses B/L  Neurological: AAOx3; no focal deficits  : Urinary Jordan in place, hematuria noted but no blood clots     ALLERGIES:  Allergies    No Known Allergies    Intolerances    Frazier (Unknown)      MEDICATIONS:  MEDICATIONS  (STANDING):  calcium carbonate   1250 mG (OsCal) 1 Tablet(s) Oral daily  iron sucrose IVPB 200 milliGRAM(s) IV Intermittent every 24 hours  sodium bicarbonate 650 milliGRAM(s) Oral every 12 hours  tranylcypromine 60 milliGRAM(s) Oral every 24 hours  urea Oral Powder 15 Gram(s) Oral two times a day    MEDICATIONS  (PRN):  acetaminophen   Tablet .. 650 milliGRAM(s) Oral every 6 hours PRN Mild Pain (1 - 3)  LORazepam   Injectable 1 milliGRAM(s) IntraMuscular every 6 hours PRN Agitation      -------------------------------------------------------------------------------  LABS:                        8.4    6.85  )-----------( 312      ( 06 Jul 2021 10:54 )             25.7     07-06    x   |  x   |  x   ----------------------------<  94  4.7   |  24  |  x     Ca    9.1      06 Jul 2021 10:54  Mg     1.5     07-05    TPro  6.7  /  Alb  3.3  /  TBili  <0.2  /  DBili  x   /  AST  21  /  ALT  12  /  AlkPhos  67  07-05    LIVER FUNCTIONS - ( 05 Jul 2021 06:11 )  Alb: 3.3 g/dL / Pro: 6.7 g/dL / ALK PHOS: 67 U/L / ALT: 12 U/L / AST: 21 U/L / GGT: x           CAPILLARY BLOOD GLUCOSE    COVID-19 PCR: Negative (29 Jun 2021 22:15)    RADIOLOGY & ADDITIONAL TESTS: Reviewed.

## 2021-07-06 NOTE — PROGRESS NOTE BEHAVIORAL HEALTH - NSBHFUPSUICINTERVALFT_PSY_A_CORE
Patient making suicidal statements with no plan unless Parnate dose increased. On 1:1. More affectively dysregulated and yelling on evaluation today and unable to contract to safety and demanding change in medication dosing because she is "miserable" and "will kill herself" if she is not on previous Parnate dosing. Patient making suicidal statements with no plan unless Parnate dose increased. On 1:1. More affectively dysregulated and yelling on evaluation today and unable to contract to safety and demanding change in medication dosing because she is "can't live like this" if she does not get more parnate.

## 2021-07-06 NOTE — PROGRESS NOTE ADULT - SUBJECTIVE AND OBJECTIVE BOX
Patient is a 71y Female seen and evaluated at bedside. She reports no acute events/changes overnight. She is still requesting that psych increase the dose of her parnate.       Meds:    acetaminophen   Tablet .. 650 every 6 hours PRN  calcium carbonate   1250 mG (OsCal) 1 daily  iron sucrose IVPB 200 every 24 hours  LORazepam   Injectable 1 every 6 hours PRN  sodium bicarbonate 650 every 12 hours  tranylcypromine 60 every 24 hours  urea Oral Powder 15 two times a day      T(C): , Max: 36.7 (07-05-21 @ 20:32)  T(F): , Max: 98 (07-05-21 @ 20:32)  HR: 75 (07-06-21 @ 06:11)  BP: 134/65 (07-06-21 @ 06:11)  BP(mean): --  RR: 17 (07-06-21 @ 06:11)  SpO2: 100% (07-06-21 @ 06:11)  Wt(kg): --    07-05 @ 07:01  -  07-06 @ 07:00  --------------------------------------------------------  IN: 600 mL / OUT: 1700 mL / NET: -1100 mL    07-06 @ 07:01  -  07-06 @ 08:49  --------------------------------------------------------  IN: 240 mL / OUT: 0 mL / NET: 240 mL          Review of Systems:  CONSTITUTIONAL: No fever or chills, No fatigue or tiredness  RESPIRATORY: No shortness of breath, cough, hemoptysis  CARDIOVASCULAR: No chest pain or shortness of breath  GASTROINTESTINAL: No abdominal or flank pain, No nausea or vomiting, No diarrhea  GENITOURINARY: No dysuria or urinary burning, No difficulty passing urine, No hematuria  NEUROLOGICAL: No headaches or blurred vision  SKIN: No skin rashes, scattered bruising   MUSCULOSKELETAL: No arthralgia, No leg edema, No muscle pain      PHYSICAL EXAM:  GENERAL: well-developed, well nourished, alert, no acute distress at present  NECK: supple, No JVD  CHEST/LUNG: Clear to auscultation bilaterally  HEART: normal S1S2, RRR  ABDOMEN: Soft, Nontender, +BS, No flank tenderness bilateral  EXTREMITIES: No clubbing, cyanosis, or edema. scattered bruising   NEUROLOGY: AAO x3, no focal neurological deficit      LABS:                        8.3    7.20  )-----------( 194      ( 05 Jul 2021 06:11 )             26.0     07-05    137  |  100  |  76<H>  ----------------------------<  96  4.8   |  26  |  1.89<H>    Ca    8.6      05 Jul 2021 22:36  Mg     1.5     07-05    TPro  6.7  /  Alb  3.3  /  TBili  <0.2  /  DBili  x   /  AST  21  /  ALT  12  /  AlkPhos  67  07-05          Sodium, Random Urine: 92 mmol/L (07-04 @ 10:01)  Osmolality, Random Urine: 443 mosm/kg (07-04 @ 10:01)  Creatinine, Random Urine: 69 mg/dL (07-04 @ 10:01)        RADIOLOGY & ADDITIONAL STUDIES:

## 2021-07-06 NOTE — PROGRESS NOTE ADULT - ATTENDING COMMENTS
71 F with Depression, chronic thrombocytopenia, chronic right hydronephrosis (obstructing stone at [facility]) and recent left ureteral stent placement on June 28th, for nephrolithiasis, presented with   # Metabolic encephalopathy - improved with improvement in hyponatremia   # Hyponatremia - [ ] repeat BMP with Marlon, Uosm in AM  # Ischemic ATN -  [ ] f/u with urology re: whether intervention warranted given nuclear medicine scan findings of equal function for Left and right kidneys   # Moderate depression -[ ] Increase parnate dose form 60mg to 70mg per psych recs 71 F with Depression, chronic thrombocytopenia, chronic right hydronephrosis (obstructing stone at [facility]) and recent left ureteral stent placement on June 28th, for nephrolithiasis, presented with   # Metabolic encephalopathy - improved with improvement in hyponatremia   # Hyponatremia - [ ] repeat BMP with Marlon, Uosm in AM  # Ischemic ATN -  [ ] f/u with urology re: whether intervention warranted given nuclear medicine scan findings of equal function for Left and right kidneys

## 2021-07-06 NOTE — PROGRESS NOTE BEHAVIORAL HEALTH - RISK ASSESSMENT
Patient appears more affectively dysregulated today on evaluation and endorsing suicidality with risk factors of recent medical delirium/confusion, impulsivity and h/o pSA. Endorses passive SI at this time and no plan and recommend 1:1 and CL to reassess tomorrow. Patient appears more affectively dysregulated today on evaluation and endorsing suicidality with risk factors of recent medical delirium/confusion, impulsivity and history of past suicidal attempts. Endorses passive SI at this time and no plan and recommend 1:1 and CL to reassess tomorrow. 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   Pt does have Suicidal risk factors such as recent medical delirium/confusion, impulsivity and history of planned suicide attempt.  CL to follow.

## 2021-07-06 NOTE — PROGRESS NOTE ADULT - PROBLEM SELECTOR PLAN 9
# Moderate depression  Hx of depression, patient feels very down and depressed today.   - Medications reconciled, Dr. Brad Tomas prescribes Mirtazepine 15 mg QD and Parnate 10 mg 11 tablets daily.  - On prior admission patient was discharged on 60 mg parnate but ignored medical advice and restarted her home dose of 110 mg.  - Concern for thrombocytopenia and SIADH being medication-induced as above  - Per psych recommendations patient  on 60 mg parnate and Mirtazapine 15 mg po qhs. Lorazepam 1mg po q6h prn.  - No SI noted, low threshold to reinitiate enhance supervision, d/c 1:1 currently   -Psych following closely # Moderate depression  Hx of depression, patient feels very down and depressed today.   - Medications reconciled, Dr. Brad Tomas prescribes Mirtazepine 15 mg QD and Parnate 10 mg 11 tablets daily.  - On prior admission patient was discharged on 60 mg parnate but ignored medical advice and restarted her home dose of 110 mg.  - Concern for thrombocytopenia and SIADH being medication-induced as above  - Per psych recommendations patient  on 60 mg parnate and Mirtazapine 15 mg po qhs. Lorazepam 1mg po q6h prn.  - No SI noted, low threshold to reinitiate enhance supervision, d/c 1:1 currently   -7/6 Psych Recs: Increase Parnate from 60 mg to 70 mg qd. This dosing rec is >MDD but pt reports she will immediately increase to her home dose of 110mg upon leaving the hospital. She has been taking 110mg daily for >1 year.; SI likely to represent frustration in care as pt is future-oriented and tx seeking. Pt will need management outpatient with Psychopharmacologist Dr. Daniel Smith - ideally to taper Parnate.  -no psychiatric contraindications to discharge, patient can follow up with her outpatient psychiatrist

## 2021-07-06 NOTE — PROGRESS NOTE ADULT - PROBLEM SELECTOR PLAN 1
- Resolving, latest Na is 134 s/p 3% hypertonic saline   - was on 1L fluid restriction, changed to 1.5L fluid restriction as of 7/5  - Started on sodium bicarbonate 650 mg oral BID.  - Q4hr BMP, U osm and serum osm to check values  - etiology likely to be SIADH (medication or pain induced or chronic) vs. Psychogenic polydipsia as cause  - Renal following closely   -continue ure-na 15g BID and BMP BID  -Per Urology: - No acute urologic intervention at this time, will continue to follow. Can follow up with Dr. Ramirez within 1 week of discharge - Resolving, latest Na is 134 s/p 3% hypertonic saline   - was on 1L fluid restriction, changed to 1.5L fluid restriction as of 7/5  - Started on sodium bicarbonate 650 mg oral BID.  - Q4hr BMP, U osm and serum osm to check values  - etiology likely to be SIADH (medication or pain induced or chronic) vs. Psychogenic polydipsia as cause  - Renal following closely   -continue ure-na 15g BID and BMP BID  -f/u repeat Marlon and Uosm to evaluate ADH response status   -Per Urology: - No acute urologic intervention at this time, will continue to follow. Can follow up with Dr. Ramirez within 1 week of discharge

## 2021-07-07 ENCOUNTER — TRANSCRIPTION ENCOUNTER (OUTPATIENT)
Age: 71
End: 2021-07-07

## 2021-07-07 ENCOUNTER — APPOINTMENT (OUTPATIENT)
Dept: UROLOGY | Facility: CLINIC | Age: 71
End: 2021-07-07

## 2021-07-07 LAB
ALBUMIN SERPL ELPH-MCNC: 3.7 G/DL — SIGNIFICANT CHANGE UP (ref 3.3–5)
ALP SERPL-CCNC: 75 U/L — SIGNIFICANT CHANGE UP (ref 40–120)
ALT FLD-CCNC: 14 U/L — SIGNIFICANT CHANGE UP (ref 10–45)
ANION GAP SERPL CALC-SCNC: 12 MMOL/L — SIGNIFICANT CHANGE UP (ref 5–17)
ANION GAP SERPL CALC-SCNC: 8 MMOL/L — SIGNIFICANT CHANGE UP (ref 5–17)
AST SERPL-CCNC: 21 U/L — SIGNIFICANT CHANGE UP (ref 10–40)
BASOPHILS # BLD AUTO: 0.04 K/UL — SIGNIFICANT CHANGE UP (ref 0–0.2)
BASOPHILS NFR BLD AUTO: 0.5 % — SIGNIFICANT CHANGE UP (ref 0–2)
BILIRUB SERPL-MCNC: 0.2 MG/DL — SIGNIFICANT CHANGE UP (ref 0.2–1.2)
BUN SERPL-MCNC: 64 MG/DL — HIGH (ref 7–23)
BUN SERPL-MCNC: 72 MG/DL — HIGH (ref 7–23)
CALCIUM SERPL-MCNC: 9.1 MG/DL — SIGNIFICANT CHANGE UP (ref 8.4–10.5)
CALCIUM SERPL-MCNC: 9.4 MG/DL — SIGNIFICANT CHANGE UP (ref 8.4–10.5)
CHLORIDE SERPL-SCNC: 96 MMOL/L — SIGNIFICANT CHANGE UP (ref 96–108)
CHLORIDE SERPL-SCNC: 98 MMOL/L — SIGNIFICANT CHANGE UP (ref 96–108)
CO2 SERPL-SCNC: 26 MMOL/L — SIGNIFICANT CHANGE UP (ref 22–31)
CO2 SERPL-SCNC: 30 MMOL/L — SIGNIFICANT CHANGE UP (ref 22–31)
CREAT SERPL-MCNC: 1.7 MG/DL — HIGH (ref 0.5–1.3)
CREAT SERPL-MCNC: 1.97 MG/DL — HIGH (ref 0.5–1.3)
EOSINOPHIL # BLD AUTO: 0.53 K/UL — HIGH (ref 0–0.5)
EOSINOPHIL NFR BLD AUTO: 7 % — HIGH (ref 0–6)
FOLATE SERPL-MCNC: >20 NG/ML — SIGNIFICANT CHANGE UP
GLUCOSE SERPL-MCNC: 121 MG/DL — HIGH (ref 70–99)
GLUCOSE SERPL-MCNC: 91 MG/DL — SIGNIFICANT CHANGE UP (ref 70–99)
HCT VFR BLD CALC: 26.7 % — LOW (ref 34.5–45)
HCYS SERPL-MCNC: 13.9 UMOL/L — SIGNIFICANT CHANGE UP
HGB BLD-MCNC: 8.5 G/DL — LOW (ref 11.5–15.5)
IMM GRANULOCYTES NFR BLD AUTO: 7.4 % — HIGH (ref 0–1.5)
LYMPHOCYTES # BLD AUTO: 1.44 K/UL — SIGNIFICANT CHANGE UP (ref 1–3.3)
LYMPHOCYTES # BLD AUTO: 19.1 % — SIGNIFICANT CHANGE UP (ref 13–44)
MAGNESIUM SERPL-MCNC: 1.4 MG/DL — LOW (ref 1.6–2.6)
MCHC RBC-ENTMCNC: 29.7 PG — SIGNIFICANT CHANGE UP (ref 27–34)
MCHC RBC-ENTMCNC: 31.8 GM/DL — LOW (ref 32–36)
MCV RBC AUTO: 93.4 FL — SIGNIFICANT CHANGE UP (ref 80–100)
MONOCYTES # BLD AUTO: 0.74 K/UL — SIGNIFICANT CHANGE UP (ref 0–0.9)
MONOCYTES NFR BLD AUTO: 9.8 % — SIGNIFICANT CHANGE UP (ref 2–14)
NEUTROPHILS # BLD AUTO: 4.23 K/UL — SIGNIFICANT CHANGE UP (ref 1.8–7.4)
NEUTROPHILS NFR BLD AUTO: 56.2 % — SIGNIFICANT CHANGE UP (ref 43–77)
NRBC # BLD: 0 /100 WBCS — SIGNIFICANT CHANGE UP (ref 0–0)
PHOSPHATE SERPL-MCNC: 3.6 MG/DL — SIGNIFICANT CHANGE UP (ref 2.5–4.5)
PLATELET # BLD AUTO: 369 K/UL — SIGNIFICANT CHANGE UP (ref 150–400)
POTASSIUM SERPL-MCNC: 4.3 MMOL/L — SIGNIFICANT CHANGE UP (ref 3.5–5.3)
POTASSIUM SERPL-MCNC: 4.6 MMOL/L — SIGNIFICANT CHANGE UP (ref 3.5–5.3)
POTASSIUM SERPL-SCNC: 4.3 MMOL/L — SIGNIFICANT CHANGE UP (ref 3.5–5.3)
POTASSIUM SERPL-SCNC: 4.6 MMOL/L — SIGNIFICANT CHANGE UP (ref 3.5–5.3)
PROT SERPL-MCNC: 7 G/DL — SIGNIFICANT CHANGE UP (ref 6–8.3)
RBC # BLD: 2.86 M/UL — LOW (ref 3.8–5.2)
RBC # FLD: 14.7 % — HIGH (ref 10.3–14.5)
SODIUM SERPL-SCNC: 134 MMOL/L — LOW (ref 135–145)
SODIUM SERPL-SCNC: 136 MMOL/L — SIGNIFICANT CHANGE UP (ref 135–145)
VIT B12 SERPL-MCNC: 1636 PG/ML — HIGH (ref 232–1245)
WBC # BLD: 7.54 K/UL — SIGNIFICANT CHANGE UP (ref 3.8–10.5)
WBC # FLD AUTO: 7.54 K/UL — SIGNIFICANT CHANGE UP (ref 3.8–10.5)

## 2021-07-07 PROCEDURE — 99233 SBSQ HOSP IP/OBS HIGH 50: CPT | Mod: GC

## 2021-07-07 PROCEDURE — 99232 SBSQ HOSP IP/OBS MODERATE 35: CPT

## 2021-07-07 PROCEDURE — 99233 SBSQ HOSP IP/OBS HIGH 50: CPT

## 2021-07-07 RX ORDER — TRANYLCYPROMINE SULFATE 10 MG/1
70 TABLET, FILM COATED ORAL EVERY 24 HOURS
Refills: 0 | Status: DISCONTINUED | OUTPATIENT
Start: 2021-07-08 | End: 2021-07-08

## 2021-07-07 RX ORDER — ACETAMINOPHEN 500 MG
650 TABLET ORAL ONCE
Refills: 0 | Status: COMPLETED | OUTPATIENT
Start: 2021-07-07 | End: 2021-07-07

## 2021-07-07 RX ORDER — MAGNESIUM SULFATE 500 MG/ML
4 VIAL (ML) INJECTION ONCE
Refills: 0 | Status: COMPLETED | OUTPATIENT
Start: 2021-07-07 | End: 2021-07-07

## 2021-07-07 RX ADMIN — TRANYLCYPROMINE SULFATE 60 MILLIGRAM(S): 10 TABLET, FILM COATED ORAL at 07:05

## 2021-07-07 RX ADMIN — Medication 650 MILLIGRAM(S): at 23:05

## 2021-07-07 RX ADMIN — Medication 100 GRAM(S): at 10:11

## 2021-07-07 RX ADMIN — Medication 15 GRAM(S): at 06:53

## 2021-07-07 RX ADMIN — Medication 1 TABLET(S): at 12:24

## 2021-07-07 RX ADMIN — Medication 15 GRAM(S): at 17:41

## 2021-07-07 RX ADMIN — Medication 650 MILLIGRAM(S): at 08:57

## 2021-07-07 RX ADMIN — IRON SUCROSE 110 MILLIGRAM(S): 20 INJECTION, SOLUTION INTRAVENOUS at 17:42

## 2021-07-07 RX ADMIN — Medication 650 MILLIGRAM(S): at 06:53

## 2021-07-07 RX ADMIN — Medication 650 MILLIGRAM(S): at 22:05

## 2021-07-07 NOTE — PROGRESS NOTE ADULT - ASSESSMENT
71 year old female with a history of chronic thrombocytopenia (over 10 years), osteoporosis, depression, hypertension, , ureteral stent placement on 6/28 for ureteral colic/stone obstruction brought by EMS to ED with altered mental status, expressive aphasia, found to be hyponatremic. Evaluation negative for stroke or hemorrhage. Patient states she has been thrombocytopenic for years and has been evaluated in the past by Dr. Miki Billy at [facility] and was not given a diagnosis according to patient.. Patient's platelet counts over the past three years have ranged between 85,000 and 105,000. On 6/17/21, platelets were 103,000, hemoglobin 12.0, WBC 4.35by her PCP. She admits to a distant past history of excessive alcohol intake.  Hemoglobin holding at 8.5, platelet count with reactive increase to 369,000. Still with slight hematuria but much clearer. Sodium improved.     Thrombocytopenia -  chronic, possibly autoimmune , with superimposed acute thrombocytopenia due to  blood loss, recent antibiotics and other medications which may cause thrombocytopenia. Possible low grade myelodysplastic syndrome. Not due to splenic sequestration as spleen size normal on CT. On 7/7 platelet count 369,000 above patient's chronic baseline, likely reactive. However, platelets may be dysfunctional due to present renal failure and elevated creatinine. Both improving.  Anemia - acute, due to ongoing  blood loss, catheter/ureteral calculus. Holding at  8.5 today. Hemoglobin was normal at 12.0 as outpatient on 6/17/21. Doubt hemolysis. Haptoglobin 78. Iron panel and ferritin normal. TSH normal.  Ecchymoses - likely trauma induced during period of mental status changes, thrombocytopenia, qualitative platelet disorder in presence of renal failure and possible medication induced qualitative platelet disorder. INR/PTT normal. Improving.  Electrolyte imbalance-Hyponatremia - Treatment and close monitoring in progress by medical and renal teams. Improved at 136 today. Creatinine improved at 1.7.    Plan- Monitor CBC, trend platelets. If possible, avoid platelet suppressing medications. Check, B12, folate and supplement as needed. By the numbers, relatively stable hematologically. DVT precautions with SCDs now that platelet count has increased

## 2021-07-07 NOTE — DISCHARGE NOTE PROVIDER - CARE PROVIDER_API CALL
Marlene Winter)  Nephrology  [address]  [place], [place] [address]  Phone: [phone]  Fax: [phone]  Follow Up Time:    Marlene Winter)  Nephrology  [address] [place] [address]  Phone: [phone]  Fax: [phone]  Follow Up Time:     Katie Ramirez.  UROLOGY  [address] [address]  Phone: [phone]  Fax: (   )    -  Scheduled Appointment: 07/21/2021 02:20 PM   Marlene Winter)  Nephrology  [address], [address] [place] [address]  Phone: [phone]  Fax: [phone]  Follow Up Time:     Katie Ramirez.  UROLOGY  [address] [address]  Phone: [phone]  Fax: (   )    -  Scheduled Appointment: 07/21/2021 02:20 PM    Brad Smith  [address] [place] [address]  Phone: [phone]  Fax: (   )    -  Scheduled Appointment: 07/20/2021 11:30 AM    Foster Gray)  Internal Medicine  [address], [place] [address]  Phone: [phone]  Fax: [phone]  Scheduled Appointment: 07/15/2021 01:30 PM   Marlene Winter)  Nephrology  [address] [place] [address]  Phone: [phone]  Fax: [phone]  Follow Up Time:     Foster Gray)  Internal Medicine  [address], [place] [address]  Phone: [phone]  Fax: [phone]  Scheduled Appointment: 07/15/2021 01:30 PM    Katie Ramirez  UROLOGY  [address] [address]  Phone: [phone]  Fax: (   )    -  Scheduled Appointment: 07/21/2021 02:20 PM    Brad Smith  [address] [address]  Phone: [phone]  Fax: (   )    -  Scheduled Appointment: 07/20/2021 11:30 AM

## 2021-07-07 NOTE — DISCHARGE NOTE PROVIDER - PROVIDER TOKENS
PROVIDER:[TOKEN:[[id number]]] PROVIDER:[TOKEN:[[id number]]],FREE:[LAST:[Ashley],FIRST:[Katie MORAN],PHONE:[phone]:[(   )    -],ADDRESS:[UROLOGY  [address] [place] [address]],SCHEDULEDAPPT:[07/21/2021],SCHEDULEDAPPTTIME:[02:20 PM]] PROVIDER:[TOKEN:[[id number]]],FREE:[LAST:[Ashley],FIRST:[Katie MORAN],PHONE:[phone]],FAX:[(   )    -],ADDRESS:[UROLOGY  [address] [place] [address]],SCHEDULEDAPPT:[07/21/2021],SCHEDULEDAPPTTIME:[02:20 PM]],FREE:[LAST:[Sarah],FIRST:[Brad],PHONE:[phone]],FAX:[(   )    -],ADDRESS:[[address] [place] [address]],SCHEDULEDAPPT:[07/20/2021],SCHEDULEDAPPTTIME:[11:30 AM]],PROVIDER:[TOKEN:[[id number]],SCHEDULEDAPPT:[07/15/2021],SCHEDULEDAPPTTIME:[01:30 PM]] PROVIDER:[TOKEN:[[id number]]],PROVIDER:[TOKEN:[[id number]],SCHEDULEDAPPT:[07/15/2021],SCHEDULEDAPPTTIME:[01:30 PM]],FREE:[LAST:[Ashley],FIRST:[Katie MORAN],PHONE:[phone]],FAX:[(   )    -],ADDRESS:[[place] [address]],SCHEDULEDAPPT:[07/21/2021],SCHEDULEDAPPTTIME:[02:20 PM]],FREE:[LAST:[Sarah],FIRST:[Brad],PHONE:[phone]:[(   )    -],ADDRESS:[[address] [place] [address]],SCHEDULEDAPPT:[07/20/2021],SCHEDULEDAPPTTIME:[11:30 AM]]

## 2021-07-07 NOTE — PROGRESS NOTE ADULT - SUBJECTIVE AND OBJECTIVE BOX
The patient was seen and examined.      71 year old female with a history of chronic thrombocytopenia (over 10 years), osteoporosis, depression, hypertension, , ureteral stent placement on 6/28 for ureteral colic/stone obstruction brought by EMS to ED with altered mental status, expressive aphasia, found to be hyponatremic. Evaluation negative for stroke or hemorrhage. Patient states she has been thrombocytopenic for years and has been evaluated in the past by Dr. Miki Billy at [facility] [place] [facility] and was not given a diagnosis according to patient.. Patient's platelet counts over the past three years have ranged between 85,000 and 105,000. On 6/17/21, platelets were 103,000, hemoglobin 12.0, WBC 4.35by her PCP. She admits to a distant past history of excessive alcohol intake.  Hemoglobin holding at 8.5, platelet count with reactive increase to 369,000. Still with slight hematuria but much clearer. Sodium improved.         Allergies    No Known Allergies    Intolerances    Frazier (Unknown)      Medications:  MEDICATIONS  (STANDING):  calcium carbonate   1250 mG (OsCal) 1 Tablet(s) Oral daily  iron sucrose IVPB 200 milliGRAM(s) IV Intermittent every 24 hours  magnesium sulfate  IVPB 4 Gram(s) IV Intermittent once  sodium bicarbonate 650 milliGRAM(s) Oral every 12 hours  tranylcypromine 60 milliGRAM(s) Oral every 24 hours  urea Oral Powder 15 Gram(s) Oral two times a day    MEDICATIONS  (PRN):  acetaminophen   Tablet .. 650 milliGRAM(s) Oral every 6 hours PRN Mild Pain (1 - 3)  LORazepam   Injectable 1 milliGRAM(s) IntraMuscular every 6 hours PRN Agitation          Interval History:    The patient denies chest pain or SOB.  No nausea/vomiting/fevers/chills/night sweats.  No fatigue, headaches/dizziness.  Appetite is stable without weight loss.  No abdominal pain/diarrhea/constipation.  No melena or hematochezia.    No dysuria/hematuria.  No history of easy bruising/bleeding.  No gingival bleeding or epistaxis.  No leg pain or leg swelling.  C/O pain on upper extremities at prior IV sites    ROS is otherwise negative.    PHYSICAL EXAM:    T(F): 98.5 (07-07-21 @ 04:55), Max: 98.5 (07-07-21 @ 04:55)  HR: 72 (07-07-21 @ 04:55) (71 - 86)  BP: 128/77 (07-07-21 @ 04:55) (115/70 - 131/73)  RR: 17 (07-07-21 @ 04:55) (16 - 18)  SpO2: 98% (07-07-21 @ 04:55) (97% - 98%)  Wt(kg): --    Daily     Daily     Gen: well developed, well nourished, comfortable  HEENT: normocephalic/atraumatic, has conjunctival pallor, no scleral icterus, no oral thrush/mucosal bleeding/mucositis  Neck: supple, no masses, no JVD  Cardiovascular: RR, nl S1S2, no murmurs/rubs/gallops  Respiratory: clear air entry b/l  Gastrointestinal: BS+, soft, NT/ND, no masses, no splenomegaly, no hepatomegaly, no evidence for ascites  Extremities: no clubbing/cyanosis, no edema, no calf tenderness  Neurological: no focal deficits  Skin: no rash on visible skin, no new ecchymoses or petechiae, prior ecchymoses slowly fading   Lymph Nodes:  no significant peripheral adenopathy   Musculoskeletal:  full ROM  Psychiatric:  mood stable        Labs:                          8.5    7.54  )-----------( 369      ( 07 Jul 2021 07:07 )             26.7     CBC Full  -  ( 07 Jul 2021 07:07 )  WBC Count : 7.54 K/uL  RBC Count : 2.86 M/uL  Hemoglobin : 8.5 g/dL  Hematocrit : 26.7 %  Platelet Count - Automated : 369 K/uL  Mean Cell Volume : 93.4 fl  Mean Cell Hemoglobin : 29.7 pg  Mean Cell Hemoglobin Concentration : 31.8 gm/dL  Auto Neutrophil # : 4.23 K/uL  Auto Lymphocyte # : 1.44 K/uL  Auto Monocyte # : 0.74 K/uL  Auto Eosinophil # : 0.53 K/uL  Auto Basophil # : 0.04 K/uL  Auto Neutrophil % : 56.2 %  Auto Lymphocyte % : 19.1 %  Auto Monocyte % : 9.8 %  Auto Eosinophil % : 7.0 %  Auto Basophil % : 0.5 %        07-07    136  |  98  |  64<H>  ----------------------------<  91  4.3   |  26  |  1.70<H>    Ca    9.1      07 Jul 2021 07:07  Phos  3.6     07-07  Mg     1.4     07-07    TPro  7.0  /  Alb  3.7  /  TBili  0.2  /  DBili  x   /  AST  21  /  ALT  14  /  AlkPhos  75  07-07          Other Labs:    Cultures:    Pathology:    Imaging Studies:

## 2021-07-07 NOTE — DISCHARGE NOTE PROVIDER - CARE PROVIDERS DIRECT ADDRESSES
[email or web address],DirectAddress_Unknown ,[email or web address],DirectAddress_Unknown,DirectAddress_Unknown,DirectAddress_Unknown

## 2021-07-07 NOTE — DISCHARGE NOTE PROVIDER - NSDCMRMEDTOKEN_GEN_ALL_CORE_FT
Bactrim  mg-160 mg oral tablet: 1 tab(s) orally 2 times a day MDD:2  LORazepam 0.5 mg oral tablet: 1 tab(s) orally once a day (at bedtime)  NIFEdipine 30 mg oral tablet, extended release: 1 tab(s) orally every 24 hours  Outpatient Physical Therapy: Please treat 3-5x per week     ICD 10:  R53.1  oxycodone-acetaminophen 5 mg-325 mg oral tablet: 1 tab(s) orally every 6 hours, As Needed -for severe pain MDD:4   Pyridium 100 mg oral tablet: 1 tab(s) orally every 8 hours, As Needed Dysuria MDD:3   sodium chloride 1 g oral tablet: 1 tab(s) orally 3 times a day  tamsulosin 0.4 mg oral capsule: 1 cap(s) orally once a day MDD:1  tranylcypromine 10 mg oral tablet: 6 tab(s) orally every 24 hours   Bactrim  mg-160 mg oral tablet: 1 tab(s) orally 2 times a day MDD:2  LORazepam 0.5 mg oral tablet: 1 tab(s) orally once a day (at bedtime)  NIFEdipine 30 mg oral tablet, extended release: 1 tab(s) orally every 24 hours  Outpatient Physical Therapy: Please treat 3-5x per week     ICD 10:  R53.1  Pyridium 100 mg oral tablet: 1 tab(s) orally every 8 hours, As Needed Dysuria MDD:3   sodium chloride 1 g oral tablet: 1 tab(s) orally 3 times a day  tamsulosin 0.4 mg oral capsule: 1 cap(s) orally once a day MDD:1  tranylcypromine 10 mg oral tablet: 6 tab(s) orally every 24 hours  urea 15 g oral powder for reconstitution: 1 packet(s) orally 2 times a day   NIFEdipine 30 mg oral tablet, extended release: 1 tab(s) orally every 24 hours  tranylcypromine 10 mg oral tablet: 6 tab(s) orally every 24 hours  urea 15 g oral powder for reconstitution: 1 packet(s) orally 2 times a day

## 2021-07-07 NOTE — PROGRESS NOTE ADULT - ATTENDING COMMENTS
needs outpt  f/u, and f/u with me in 6 weeks, will order BMP/Mg and urine studies for next week for her to get done to ensure Na stable on UreNa and fluid restriction and that her renal fx is continuing to improve.

## 2021-07-07 NOTE — PROGRESS NOTE BEHAVIORAL HEALTH - SUMMARY
71 year-old woman with PPHx of depression, pSA, PMH of HTN,  urethral stent placed on 6/28 due to ureteral colic presents to [facility] ED on 6/29 with altered mental status. 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 Psychiatry was consulted for recommendations re pt's home medications. Patient on 1:1 for suicidal statements (no plan) in context of demanding higher dosing of MAOI. Na normalizing but patient requiring ongoing fluid restriction. Reportedly normal Na levels (138) on MAOI dosing (Parnate 110mg qd and mirtazapine 15mg qhs) in outpatient setting as per outpt psychiatrist.     Plan:  - can increase Parnate from 60mg to 70mg daily     - this dosing rec is above MDD but pt reports she will immediately increase to her home dose of 110mg upon leaving the hospital - patient has been taking 110mg for > 1yr  -No active SI does not need 1:1 as pt is denies SI            -prns for anxiety/agitation lorazepam 1mg po q6h prn ([id number])    #Depression  - stable on outpatient regimen and pt has outpatient psychiatrist she can follow with but appears less dysregulated today  - will need med mgmt outpatient with Psychiatrist Dr. Daniel Smith - ideally taper of parnate      #Cluster B personality  - pt frequently uses provacative statements and mood swings   - would recommend psychotherapy with outpatient psychiatrist   -CL to follow

## 2021-07-07 NOTE — PROGRESS NOTE ADULT - PROBLEM SELECTOR PLAN 1
Acute on Chronic Hyponatremia in Euvolemic state, Chronic SIADH     - Resolving, latest Na is 137   - was on 1L fluid restriction, changed to 1.5L fluid restriction as of 7/5  -continue ure-na 15g BID, BMP BID, Uosm, Marlon  -Per Urology: - No acute urologic intervention at this time, will continue to follow. Can follow up with Dr. Ramirez within 1 week of discharge  -Per Nephrology: Discontinue Bicarb and Ca+ supplementation Acute on Chronic Hyponatremia in Euvolemic state, Chronic SIADH     - Resolving, latest Na is 137 s/p 3% Hypertonic saline given during admission   - was on 1L fluid restriction, changed to 1.5L fluid restriction as of 7/5  -continue ure-na 15g BID, BMP BID, Uosm, Marlon  -Per Urology: - No acute urologic intervention at this time, will continue to follow. Can follow up with Dr. Ramirez within 1 week of discharge  -Per Nephrology: Discontinue Bicarb and Ca+ supplementation

## 2021-07-07 NOTE — PROGRESS NOTE ADULT - PROBLEM SELECTOR PLAN 3
Patient with positive UA, in the setting of recent procedure, suspicion for infection.   - Given CTX 1g in ED.  - U Cx and blood cx negative  - Continue with CTX 1g IV, advance to 2g if abscess or bacteremic  - Per Urology:No acute urologic intervention at this time, will continue to follow. Can follow up with Dr. Ramirez within 1 week of discharge. Will likely need Jordan on DC. Patient with positive UA, in the setting of recent procedure, suspicion for infection.   - Given CTX 1g in ED.  - U Cx and blood cx negative  - Continue with CTX 1g IV, advance to 2g if abscess or bacteremic  - Per Urology: No acute urologic intervention at this time, will continue to follow. Can follow up with Dr. Ramirez within 1 week of discharge. Will likely need Jordan on DC.

## 2021-07-07 NOTE — PROGRESS NOTE ADULT - TIME BILLING
71 year old female with history of chronic thrombocytopenia ,hypertension, depression recent stent placement for ureteral stone admitted with mental status changes found to have profound hyponatremia , thrombocytopenia, anemia, blood loss. Plan as stated above.
71 year old female with history of chronic thrombocytopenia ,hypertension, depression recent stent placement for ureteral stone admitted with mental status changes found to have profound hyponatremia , thrombocytopenia, anemia, blood loss. Plan as stated. Discussed with resident team on 6/30..
71 year old female with history of chronic thrombocytopenia ,hypertension, depression recent stent placement for ureteral stone admitted with mental status changes found to have profound hyponatremia , thrombocytopenia, anemia, blood loss. All parameters improving. Plan as stated above.
71 year old female with history of chronic thrombocytopenia ,hypertension, depression recent stent placement for ureteral stone admitted with mental status changes found to have profound hyponatremia , thrombocytopenia, anemia, blood loss. Plan as stated above. Discussed with renal team on 7/2.
Hyponatremia management, UTI

## 2021-07-07 NOTE — DISCHARGE NOTE PROVIDER - HOSPITAL COURSE
Hospital Course    71 year old F with a past medical history of hypertension, thrombocytopenia, osteoporosis, depression and urethral stent placed on 6/28 due to ureteral colic presents to [facility] ED on 6/29 with altered mental status. EMS reported abdominal/back pain. On admission stroke code was called, CT head and CT angio head and neck were negative for pathology. Due to retention and history of stent, ocampo catheter was put in place and flushed by urology.  Pt was admitted to medical telemetry floor for treatment of severe hyponatremia with altered mental status. 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 Ocampo remained bloody throughout telemetry stay. Pt was started on 2% NaCl to increase sodium which trended up to 120 by 8 pm (lowest value of 111 at 1:47 am - correction of 9 within 24 hours.) 2% NaCl discontinued due to over correcting and patient switched to fluid restriction. Psychiatry was consulted for medication recommendations regarding restarting pt's home antidepressants. Pt has an extensive history of depression controlled by MAO-i parnate 110 mg qd and mirtazapine 15 mg qd. Per psych recs patient continued on parnate 60 mg. Mirtazepine is being held due to thrombocytopenia per heme onc rec. Patient started on sodium bicarbonate 650 mg BID on 7/1 to continue sodium correction. Q4 bmps, urine osm and serum osm's to be continued until patient at 130 per renal recommendations.  Heme-onc consulted for patients thrombocytopenia, noting that it could be likely due to Mirtazipine; however, rheum panel ordered to rule out rheumatologic etiology.   Hospital Course    71 year old F with a past medical history of hypertension, thrombocytopenia, osteoporosis, depression and urethral stent placed on 6/28 due to ureteral colic presents to [facility] ED on 6/29 with altered mental status. EMS reported abdominal/back pain. On admission stroke code was called, CT head and CT angio head and neck were negative for pathology. Due to retention and history of stent, ocampo catheter was put in place and flushed by urology.  Pt was admitted to medical telemetry floor for treatment of severe hyponatremia with altered mental status. Urology consult recommends no intervention at this time and prior urethral stent placed will be removed next week outpatient, reports chronic R hydronephrosis due to UPJ obstruction. Ocampo remained bloody throughout telemetry stay. Pt was started on 2% NaCl to increase sodium which trended up to 120 by 8 pm (lowest value of 111 at 1:47 am on 06/30/21 - correction of 9 within 24 hours.) 2% NaCl discontinued due to over correcting and patient switched to fluid restriction. Psychiatry was consulted for medication recommendations regarding restarting pt's home antidepressants. Pt has an extensive history of depression controlled by MAO-i parnate 110 mg qd and mirtazapine 15 mg qd. Per psych recs patient continued on parnate 60 mg. Mirtazepine was initially  held due to thrombocytopenia per heme onc rec. Patient started on sodium bicarbonate 650 mg BID on 7/1 to continue sodium correction. Q4 bmps, urine osm and serum osm's to be continued until patient at 130 per renal recommendations.  Heme-onc consulted for patients thrombocytopenia, noting that it could be likely due to Mirtazipine; however, rheum panel ordered to rule out rheumatologic etiology. ROSALVA was negative. Patient was transferred [facility] to [facility] on 07/01/21. Overnight on 7/2, patient was complaining of suicidal ideation stating that she wanted to die and if the hospital could perform physician assisted suicide. Patient was placed on a 1:1. Patient was also stating she was having dreams/visual hallucinations where she saw cabinets full of flowers all around her room. Per psych evaluation on 7/2, colateral was gained from patient's outpatient psychiatrist Dr. Brad Smith. Psych closely followed patient throughout entire stay,   Hospital Course    71 year old F with a past medical history of hypertension, thrombocytopenia, osteoporosis, depression and urethral stent placed on 6/28 due to ureteral colic presents to [facility] ED on 6/29 with altered mental status. EMS reported abdominal/back pain. On admission stroke code was called, CT head and CT angio head and neck were negative for pathology. Due to retention and history of stent, ocampo catheter was put in place and flushed by urology.  Pt was admitted to medical telemetry floor for treatment of severe hyponatremia with altered mental status. Urology consult recommends no intervention at this time and prior urethral stent placed will be removed next week outpatient, reports chronic R hydronephrosis due to UPJ obstruction. Ocampo remained bloody throughout telemetry stay. Pt was started on 2% NaCl to increase sodium which trended up to 120 by 8 pm (lowest value of 111 at 1:47 am on 06/30/21 - correction of 9 within 24 hours.) 2% NaCl discontinued due to over correcting and patient switched to fluid restriction. Psychiatry was consulted for medication recommendations regarding restarting pt's home antidepressants. Pt has an extensive history of depression controlled by MAO-i parnate 110 mg qd and mirtazapine 15 mg qd. Per psych recs patient continued on parnate 60 mg. Mirtazepine was initially  held due to thrombocytopenia per heme onc rec. Patient started on sodium bicarbonate 650 mg BID on 7/1 to continue sodium correction. Q4 bmps, urine osm and serum osm's to be continued until patient at 130 per renal recommendations.  Heme-onc consulted for patients thrombocytopenia and anemia, noting that it could be likely due to Mirtazipine; however, rheum panel ordered to rule out rheumatologic etiology. ROSALVA was negative. Heme-onc continued to follow patient throughout stay. Patient was transferred [facility] to [facility] on 07/01/21. Overnight on 7/2, patient was complaining of suicidal ideation stating that she wanted to die and if the hospital could perform physician assisted suicide. Patient was placed on a 1:1. Patient was also stating she was having dreams/visual hallucinations where she saw cabinets full of flowers all around her room. Per psych evaluation on 7/2, colateral was gained from patient's outpatient psychiatrist Dr. Brad Smith and confirmed patients outpatient regimen of Parnate 110 mg and Mirtazapine 15mg. Psych closely followed patient throughout entire stay. Throughout patient's stay, her Cr was slowly uptrending from baseline  /on 6/30 to 4.22 on 7/2/21. Patient was seen by urology and non-contrast CT A/P was done to assess kidney/blader to r/o post-op renal etiology for her worsening kidney function. Patient was kept NPO as plan was to place PCN tube if CT A/P confirmed post-renal etiology. Patient received 3% NaCl 30 cc/hr x3 hour on 7/2 for hyponatremia which patient responded to as Na was up to 129 on AM labs 7/3/21. CT A/P from 7/2 showed no interval change in bilateral hydronephrosis and Left ureteral double-J stent in place and stable. Therefore, no PCN tube needed to be placed.  7/4 NM scan shows approximately symmetric renal function. The pattern is typical of acute tubular necrosis. The asymmetry of the size of the two kidneys appears to be due to prior obstruction of the left ureter. Urology and Nephrology discussed and determined there was no need to intervene on the R kidney. Per psych recommendations, her Parnate dose was increased to 70mg on 7/8/21 despite 60mg being maximum daily dose for parnate. Throughout entire hospital stay, patient was verbally requesting an increase in her dose of Parnate as she felt like "she didn't want to live" without it being raised. Throughout admission, patient was placed on 1L fluid restriction, which was lifted to 1.5 L restriction on 7/5/21. Patient received calcium and bicarbonate supplementation throughout the admission as ordered by Nephrology, however it was discontinued per Nephrology on 7/721. Patient will continue Urea 15mg PO BID daily and upon discharge, will be discussed further on outpatient appointment f/u with Dr. Winter. Patient's sodium was hovering between 130-135 from 7/4-7/8. On AM labs for 7/8, Na was 134.       Plan by Systems      Hospital Course    71 year old F with a past medical history of hypertension, thrombocytopenia, osteoporosis, depression and urethral stent placed on 6/28 due to ureteral colic presents to [facility] ED on 6/29 with altered mental status. EMS reported abdominal/back pain. On admission stroke code was called, CT head and CT angio head and neck were negative for pathology. Due to retention and history of stent, ocampo catheter was put in place and flushed by urology.  Pt was admitted to medical telemetry floor for treatment of severe hyponatremia with altered mental status. Urology consult recommends no intervention at this time and prior urethral stent placed will be removed next week outpatient, reports chronic R hydronephrosis due to UPJ obstruction. Ocampo remained bloody throughout telemetry stay. Pt was started on 2% NaCl to increase sodium which trended up to 120 by 8 pm (lowest value of 111 at 1:47 am on 06/30/21 - correction of 9 within 24 hours.) 2% NaCl discontinued due to over correcting and patient switched to fluid restriction. Psychiatry was consulted for medication recommendations regarding restarting pt's home antidepressants. Pt has an extensive history of depression controlled by MAO-i parnate 110 mg qd and mirtazapine 15 mg qd. Per psych recs patient continued on parnate 60 mg. Mirtazepine was initially  held due to thrombocytopenia per heme onc rec. Patient started on sodium bicarbonate 650 mg BID on 7/1 to continue sodium correction. Q4 bmps, urine osm and serum osm's to be continued until patient at 130 per renal recommendations.  Heme-onc consulted for patients thrombocytopenia and anemia, noting that it could be likely due to Mirtazipine; however, rheum panel ordered to rule out rheumatologic etiology. ROSALVA was negative. Heme-onc continued to follow patient throughout stay. Patient was transferred from [facility] to [facility] on 07/01/21. Overnight on 7/2, patient was complaining of suicidal ideation stating that she wanted to die and if the hospital could perform physician assisted suicide. Patient was placed on a 1:1. Patient was also stating she was having dreams/visual hallucinations where she saw cabinets full of flowers all around her room. Per psych evaluation on 7/2, collateral was gained from patient's outpatient psychiatrist Dr. Brad Smith and confirmed patients outpatient regimen of Parnate 110 mg and Mirtazapine 15mg. Psych closely followed patient throughout entire stay. Throughout patient's stay, her Cr was slowly uptrending from baseline  /on 6/30 to 4.22 on 7/2/21. Patient was seen by urology and non-contrast CT A/P was done to assess kidney/blader to r/o post-op renal etiology for her worsening kidney function. Patient was kept NPO as plan was to place PCN tube if CT A/P confirmed post-renal etiology. Patient received 3% NaCl 30 cc/hr x3 hour on 7/2 for hyponatremia which patient responded to as Na was up to 129 on AM labs 7/3/21. CT A/P from 7/2 showed no interval change in bilateral hydronephrosis and Left ureteral double-J stent in place and stable. Therefore, no PCN tube needed to be placed.  7/4 NM scan shows approximately symmetric renal function with the pattern being typical of acute tubular necrosis. The asymmetry of the size of the two kidneys appears to be due to prior obstruction of the left ureter. Urology and Nephrology discussed and determined there was no need to intervene on the R kidney inpatient and that it would be discussed further outpatient. Per psych recommendations, her Parnate dose was increased to 70mg on 7/8/21 despite 60mg being maximum daily dose for parnate. Throughout entire hospital stay, patient was verbally requesting an increase in her dose of Parnate as she felt like "she didn't want to live" without it being raised. Throughout admission, patient was placed on 1L fluid restriction, which was lifted to 1.5 L restriction on 7/5/21. Patient received calcium and bicarbonate supplementation throughout the admission as ordered by Nephrology, however it was discontinued per Nephrology on 7/721. Patient will continue Urea 15mg PO BID daily and upon discharge, will be discussed further on outpatient appointment f/u with Dr. Winter. Patient's sodium was hovering between 130-135 from 7/4-7/8. On PM labs for 7/8, Na was 135 which is within normal range. on PM 7/8 Labs, Cr was 1.52 which was rising. Nephrology stated       #Hyponatremia.  Plan: Acute on Chronic Hyponatremia in Euvolemic state, Chronic SIADH     - Resolving, latest Na is 137 s/p 3% Hypertonic saline given during admission   - was on 1L fluid restriction, changed to 1.5L fluid restriction as of 7/5  -continue ure-na 15g BID, BMP BID, Uosm, Marlon  -Per Urology: - No acute urologic intervention at this time, will continue to follow. Can follow up with Dr. Ramirez within 1 week of discharge  -Per Nephrology: Discontinue Bicarb and Ca+ supplementation.     # ATN (acute tubular necrosis).  Plan: Ischemic ATN - non oliguric, resolving  Baseline Cr from 2019 ~0.9-1.0 per chart review    - Steadily increasing Cr of unknown etiology --> 3.52 07/01 @ 11pm; 3.88 07/02; Cr elevation persisted for >72 hrs, treating as ATN  -STAT CT A/P performed 07/02 per nephrology and urology to r/o post-renal etiology -- severe right hydronephrosis with 2 mm UPJ stone, moderate L hydronephrosis stable, L uretal double-J stent in place and stable. No interval change in bilateral hydronephrosis.   -Nuc Med Scan 7/4-- Approximately symmetric renal function. The pattern is typical of acute tubular necrosis. The asymmetry of the size of the two kidneys appears to be due to prior obstruction of the left ureter.  -Urology and Nephrology consulting on whether or not to intervene on R kidney as NM scan displays symmetric renal function. Previously assumed R kidney as non-functional -- awaiting recs.   -Urology following -- Cr downtrending, continue to monitor, no Urological intervention needed as of 7/4  -Per nephrology: DC Bicarb and Ca++ supplementation as of 7/7/21.     # UTI (urinary tract infection).  Plan: Patient with positive UA, in the setting of recent procedure, suspicion for infection.   - Given CTX 1g in ED.  - U Cx and blood cx negative  - Continue with CTX 1g IV, advance to 2g if abscess or bacteremic  -Repeat UA and UCx on 7/6 and 7/7 due to elevated temperature -- UA negative both times, no leukocyte esterase or nitrites present   - Per Urology: No acute urologic intervention at this time, will continue to follow. Can follow up with Dr. Ramirez within 1 week of discharge. Will likely need Ocampo on DC.     # Thrombocytopenia.  Plan: resolving  Hx of thrombocytopenia according to patient.  - Platelets 96 on this admission  - Platelets 89 on prior admission in 2019   - Per heme onc recommendations patient's home medication of mirtazapine should be held, as it could be causing the low plts -- Mirtazapine   -On 7/5 platelet count 194,000 above patient's chronic baseline. However, platelets may be dysfunctional due to present renal failure and elevated creatinine. Both improving. By the numbers, relatively stable hematologically.   -Per Heme/Onc: avoid platelet suppressing medications, check ROSALVA, if procedures planned or bleeding continues, would consider platelet transfusion as patient's platelets are likely dysfunctional at present time - discussed with nephrology Dr. Winter and renal team -- Both in agreement.    #Metabolic encephalopathy.  Plan: Metabolic encephalopathy 2/2 hyponatremia   RESOLVED. Reported baseline of A&Ox3.   -07/02 -- Patient having hallucinations overnight, otherwise A&Ox3.     #Hematuria, unspecified type. Plan: - Urology recommendations stent removal next week, no intervention at this time.   - Patient has chronic right UPJ obstruction  - U/S showed no stones at this time  - Hematuria noted in Ocampo, however lightening in appearance  -nephrology and urology following.    #HTN (hypertension).  Plan: Patient with a history of hypertension, controlled by nifedipine   - Holding off on antihypertensives for now  - Restart nifedipine once hyponatremia resolved  -Patient normotensive during admission.     #Anemia.  Plan: Unknown chronicity or etiology of anemia, however patient with hematuria on UA following procedure yesterday.  - Maintain active T/S, transfuse to goal > 7.0   - iron panel labs in normal range suggestive of anemia of chronic disease, not RIYA.  -Per Heme/Onc: Hemoglobin was normal at 12.0 as outpatient on 6/17/21. Doubt hemolysis. Haptoglobin 78. Iron panel and ferritin normal. TSH normal. By the numbers, relatively stable hematologically.      #Hypocalcemia  -per nephrology: likely secondary hyperparathyroidism, improving with Calcium supplementation 1250mg daily  -7/7: D/C Calcium supplementation.     #Depression.  Plan: # Moderate depression  Hx of depression, patient feels very down and depressed today.   - Medications reconciled, Dr. Brad Tomas prescribes Mirtazepine 15 mg QD and Parnate 10 mg 11 tablets daily.  - On prior admission patient was discharged on 60 mg parnate but ignored medical advice and restarted her home dose of 110 mg.  - Concern for thrombocytopenia and SIADH being medication-induced as above  - Per psych recommendations patient  on 60 mg parnate and Mirtazapine 15 mg po qhs. Lorazepam 1mg po q6h prn.  - No SI noted, low threshold to reinitiate enhance supervision, d/c 1:1 currently   -7/6 Psych Recs: Increase Parnate from 60 mg to 70 mg qd. This dosing rec is >MDD but pt reports she will immediately increase to her home dose of 110mg upon leaving the hospital. She has been taking 110mg daily for >1 year.; SI likely to represent frustration in care as pt is future-oriented and tx seeking. Pt will need management outpatient with Psychopharmacologist Dr. Daniel Smith - ideally to taper Parnate.  -no psychiatric contraindications to discharge, patient can follow up with her outpatient psychiatrist  -Patient received 60mg on 7/7 -- will be increased to 70 mg for 7/8 AM Dose.     #Cluster B personality  - pt frequently uses provacative statements and mood swings   - would recommend psychotherapy with outpatient psychiatrist  Dr. Smith        Hospital Course    71 year old F with a past medical history of hypertension, thrombocytopenia, osteoporosis, depression and urethral stent placed on 6/28 due to ureteral colic presents to [facility] ED on 6/29 with altered mental status. EMS reported abdominal/back pain. On admission stroke code was called, CT head and CT angio head and neck were negative for pathology. Due to retention and history of stent, ocampo catheter was put in place and flushed by urology.  Pt was admitted to medical telemetry floor for treatment of severe hyponatremia with altered mental status. Urology consult recommends no intervention at this time and prior urethral stent placed will be removed next week outpatient, reports chronic R hydronephrosis due to UPJ obstruction. Ocampo remained bloody throughout telemetry stay. Pt was started on 2% NaCl to increase sodium which trended up to 120 by 8 pm (lowest value of 111 at 1:47 am on 06/30/21 - correction of 9 within 24 hours.) 2% NaCl discontinued due to over correcting and patient switched to fluid restriction. Psychiatry was consulted for medication recommendations regarding restarting pt's home antidepressants. Pt has an extensive history of depression controlled by MAO-i parnate 110 mg qd and mirtazapine 15 mg qd. Per psych recs patient continued on parnate 60 mg. Mirtazepine was initially  held due to thrombocytopenia per heme onc rec. Patient started on sodium bicarbonate 650 mg BID on 7/1 to continue sodium correction. Q4 bmps, urine osm and serum osm's to be continued until patient at 130 per renal recommendations.  Heme-onc consulted for patients thrombocytopenia and anemia, noting that it could be likely due to Mirtazipine; however, rheum panel ordered to rule out rheumatologic etiology. ROSAVLA was negative. Heme-onc continued to follow patient throughout stay. Patient was transferred [facility] to [facility] on 07/01/21. Overnight on 7/2, patient was complaining of suicidal ideation stating that she wanted to die and if the hospital could perform physician assisted suicide. Patient was placed on a 1:1. Patient was also stating she was having dreams/visual hallucinations where she saw cabinets full of flowers all around her room. Per psych evaluation on 7/2, collateral was gained from patient's outpatient psychiatrist Dr. Brad Smith and confirmed patients outpatient regimen of Parnate 110 mg and Mirtazapine 15mg. Psych closely followed patient throughout entire stay. Throughout patient's stay, her Cr was slowly uptrending from baseline  /on 6/30 to 4.22 on 7/2/21. Patient was seen by urology and non-contrast CT A/P was done to assess kidney/blader to r/o post-op renal etiology for her worsening kidney function. Patient was kept NPO as plan was to place PCN tube if CT A/P confirmed post-renal etiology. Patient received 3% NaCl 30 cc/hr x3 hour on 7/2 for hyponatremia which patient responded to as Na was up to 129 on AM labs 7/3/21. CT A/P from 7/2 showed no interval change in bilateral hydronephrosis and Left ureteral double-J stent in place and stable. Therefore, no PCN tube needed to be placed.  7/4 NM scan shows approximately symmetric renal function with the pattern being typical of acute tubular necrosis. The asymmetry of the size of the two kidneys appears to be due to prior obstruction of the left ureter. Urology and Nephrology discussed and determined there was no need to intervene on the R kidney inpatient and that it would be discussed further outpatient. Per psych recommendations, her Parnate dose was increased to 70mg on 7/8/21 despite 60mg being maximum daily dose for parnate. Throughout entire hospital stay, patient was verbally requesting an increase in her dose of Parnate as she felt like "she didn't want to live" without it being raised. Throughout admission, patient was placed on 1L fluid restriction, which was lifted to 1.5 L restriction on 7/5/21. Patient received calcium and bicarbonate supplementation throughout the admission as ordered by Nephrology, however it was discontinued per Nephrology on 7/721. Patient will continue Urea 15mg PO BID daily and upon discharge, will be discussed further on outpatient appointment f/u with Dr. Winter. Patient's sodium was hovering between 130-135 from 7/4-7/8. On PM labs for 7/8, Na was 135 which is within normal range. on PM 7/8 Labs, Cr was 1.52 which was overall decreased compared to the complete hospital course. Patient was cleared from Nephrology for discharge with follow up outpatient.     #Hyponatremia.  Plan: Acute on Chronic Hyponatremia in Euvolemic state, Chronic SIADH     - Resolving, latest Na is 137 s/p 3% Hypertonic saline given during admission   - was on 1L fluid restriction, changed to 1.5L fluid restriction as of 7/5  -continue ure-na 15g   -Per Urology: - No acute urologic intervention at this time, will continue to follow. Can follow up with Dr. Ramirez within 1 week of discharge  -Per Nephrology: Discontinue Bicarb and Ca+ supplementation.     # ATN (acute tubular necrosis).  Plan: Ischemic ATN - non oliguric, resolving  Baseline Cr from 2019 ~0.9-1.0 per chart review    - Steadily increasing Cr of unknown etiology --> 3.52 07/01 @ 11pm; 3.88 07/02; Cr elevation persisted for >72 hrs, treating as ATN  -STAT CT A/P performed 07/02 per nephrology and urology to r/o post-renal etiology -- severe right hydronephrosis with 2 mm UPJ stone, moderate L hydronephrosis stable, L uretal double-J stent in place and stable. No interval change in bilateral hydronephrosis.   -Nuc Med Scan 7/4-- Approximately symmetric renal function. The pattern is typical of acute tubular necrosis. The asymmetry of the size of the two kidneys appears to be due to prior obstruction of the left ureter. Patient will f/u with Nephrology and Urology to determine if there will be further intervention on R kidney as it has symmetric function compared to L (previously believed to be non-functional) -- please follow up outpatient   -Urology following -- Cr downtrending, continue to monitor, no Urological intervention needed as of 7/4. No post-obstructive etiology contributing to rising Cr.   -Per nephrology: DC Bicarb and Ca++ supplementation as of 7/7/21.   -F/u with Dr. Winter. Nephrology     # UTI (urinary tract infection).  Plan: Patient with positive UA, in the setting of recent procedure, suspicion for infection.   - Given CTX 1g in ED.  - U Cx and blood cx negative  - Continue with CTX 1g IV, advance to 2g if abscess or bacteremic  -Repeat UA and UCx on 7/6 and 7/7 due to elevated temperature -- UA negative both times, no leukocyte esterase or nitrites present   -TOV attempted on 7/8 -- if patient urinates, she can be DC without ocampo. If she does not urinate on her own, she will be DC with ocampo and will f/u with Dr. Ramirez OP.   - Per Urology: No acute urologic intervention at this time, will continue to follow. Can follow up with Dr. Ramirez.    # Thrombocytopenia.  Plan: resolving  Hx of thrombocytopenia according to patient.  - Platelets 96 on this admission  - Platelets 89 on prior admission in 2019   - Per heme onc recommendations patient's home medication of mirtazapine should be held, as it could be causing the low plts -- Mirtazapine   -On 7/5 platelet count 194,000 above patient's chronic baseline. However, platelets may be dysfunctional due to present renal failure and elevated creatinine. Both improving. By the numbers, relatively stable hematologically.   -Per Heme/Onc: avoid platelet suppressing medications, check ROSALVA, if procedures planned or bleeding continues, would consider platelet transfusion as patient's platelets are likely dysfunctional at present time - discussed with nephrology Dr. Winter and renal team -- Both in agreement.  - Please follow up outpatient with PCP regarding these findings.     #Metabolic encephalopathy.  Plan: Metabolic encephalopathy 2/2 hyponatremia   RESOLVED. Reported baseline of A&Ox3.   -07/02 -- Patient having hallucinations overnight, otherwise A&Ox3.     #Hematuria, unspecified type. Plan: - Urology recommendations stent removal next week, no intervention at this time.   - Patient has chronic right UPJ obstruction  - U/S showed no stones at this time  - RESOLVED as of 7/8/21     #HTN (hypertension).  Plan: Patient with a history of hypertension, controlled by nifedipine   - Holding off on antihypertensives for now  - Restart nifedipine once hyponatremia resolved  -Patient normotensive during admission.     #Anemia.  Plan: Unknown chronicity or etiology of anemia, however patient with hematuria on UA following procedure yesterday.  - Maintain active T/S, transfuse to goal > 7.0   - iron panel labs in normal range suggestive of anemia of chronic disease, not RIYA.  -Per Heme/Onc: Hemoglobin was normal at 12.0 as outpatient on 6/17/21. Doubt hemolysis. Haptoglobin 78. Iron panel and ferritin normal. TSH normal. By the numbers, relatively stable hematologically.   -F/u with PCP regarding chronic anemia and thrombocytopenia     #Hypocalcemia  -per nephrology: likely secondary hyperparathyroidism, improved with Calcium supplementation 1250mg daily  -7/7: D/C Calcium supplementation.     #Depression.  Plan: # Moderate depression  Hx of depression, patient feels very down and depressed today.   - Medications reconciled, Dr. Brad Tomas prescribes Mirtazepine 15 mg QD and Parnate 10 mg 11 tablets daily.  - On prior admission patient was discharged on 60 mg parnate but ignored medical advice and restarted her home dose of 110 mg.  - Concern for thrombocytopenia and SIADH being medication-induced as above  - Per psych recommendations patient  on 60 mg parnate and Mirtazapine 15 mg po qhs. Lorazepam 1mg po q6h prn.  - No SI noted, low threshold to reinitiate enhance supervision, d/c 1:1 currently   -7/6 Psych Recs: Increase Parnate from 60 mg to 70 mg qd. This dosing rec is >MDD but pt reports she will immediately increase to her home dose of 110mg upon leaving the hospital. She has been taking 110mg daily for >1 year.; SI likely to represent frustration in care as pt is future-oriented and tx seeking. Pt will need management outpatient with Psychopharmacologist Dr. Daniel Smith - ideally to taper Parnate.  -no psychiatric contraindications to discharge, patient can follow up with her outpatient psychiatrist   -Patient received 60mg on 7/7 -- will be increased to 70 mg for 7/8 AM Dose.     #Cluster B personality  - pt frequently uses provacative statements and mood swings   - would recommend psychotherapy with outpatient psychiatrist  Dr. Smith        71 year old F with a past medical history of hypertension, thrombocytopenia, osteoporosis, depression and urethral stent placed on 6/28 due to ureteral colic presents to [facility] ED on 6/29 with altered mental status. EMS reported abdominal/back pain. On admission stroke code was called, CT head and CT angio head and neck were negative for pathology. Due to retention and history of stent, ocampo catheter was put in place and flushed by urology.  Pt was admitted to medical telemetry floor for treatment of severe hyponatremia with altered mental status. Urology consult recommends no intervention at this time and prior urethral stent placed will be removed next week outpatient, reports chronic R hydronephrosis due to UPJ obstruction. Ocampo remained bloody throughout telemetry stay. Pt was started on 2% NaCl to increase sodium which trended up to 120 by 8 pm (lowest value of 111 at 1:47 am on 06/30/21 - correction of 9 within 24 hours.) 2% NaCl discontinued due to over correcting and patient switched to fluid restriction. Psychiatry was consulted for medication recommendations regarding restarting pt's home antidepressants. Pt has an extensive history of depression controlled by MAO-i parnate 110 mg qd and mirtazapine 15 mg qd. Per psych recs patient continued on parnate 60 mg. Mirtazepine was initially  held due to thrombocytopenia per heme onc rec. Patient started on sodium bicarbonate 650 mg BID on 7/1 to continue sodium correction. Q4 bmps, urine osm and serum osm's to be continued until patient at 130 per renal recommendations.  Heme-onc consulted for patients thrombocytopenia and anemia, noting that it could be likely due to Mirtazipine; however, rheum panel ordered to rule out rheumatologic etiology. ROSALVA was negative. Heme-onc continued to follow patient throughout stay. Patient was transferred [facility] on 07/01/21. Overnight on 7/2, patient was complaining of suicidal ideation stating that she wanted to die and if the hospital could perform physician assisted suicide. Patient was placed on a 1:1. Patient was also stating she was having dreams/visual hallucinations where she saw cabinets full of flowers all around her room. Per psych evaluation on 7/2, collateral was gained from patient's outpatient psychiatrist Dr. Brad Smith and confirmed patients outpatient regimen of Parnate 110 mg and Mirtazapine 15mg. Psych closely followed patient throughout entire stay. Throughout patient's stay, her Cr was slowly uptrending from baseline  /on 6/30 to 4.22 on 7/2/21. Patient was seen by urology and non-contrast CT A/P was done to assess kidney/blader to r/o post-op renal etiology for her worsening kidney function. Patient was kept NPO as plan was to place PCN tube if CT A/P confirmed post-renal etiology. Patient received 3% NaCl 30 cc/hr x3 hour on 7/2 for hyponatremia which patient responded to as Na was up to 129 on AM labs 7/3/21. CT A/P from 7/2 showed no interval change in bilateral hydronephrosis and Left ureteral double-J stent in place and stable. Therefore, no PCN tube needed to be placed.  7/4 NM scan shows approximately symmetric renal function with the pattern being typical of acute tubular necrosis. The asymmetry of the size of the two kidneys appears to be due to prior obstruction of the left ureter. Urology and Nephrology discussed and determined there was no need to intervene on the R kidney inpatient and that it would be discussed further outpatient. Per psych recommendations, her Parnate dose was increased to 70mg on 7/8/21 despite 60mg being maximum daily dose for parnate. Throughout entire hospital stay, patient was verbally requesting an increase in her dose of Parnate as she felt like "she didn't want to live" without it being raised. Throughout admission, patient was placed on 1L fluid restriction, which was lifted to 1.5 L restriction on 7/5/21. Patient received calcium and bicarbonate supplementation throughout the admission as ordered by Nephrology, however it was discontinued per Nephrology on 7/721. Patient will continue Urea 15mg PO BID daily and upon discharge, will be discussed further on outpatient appointment f/u with Dr. Winter. Patient's sodium was hovering between 130-135 from 7/4-7/8. On PM labs for 7/8, Na was 135 which is within normal range. on PM 7/8 Labs, Cr was 1.52 which was overall decreased compared to the complete hospital course. Patient was cleared from Nephrology for discharge with follow up outpatient. Patient will follow up outpatient with PCP for thrombocytopenia and anemia. Patient will be seen by Urology and Nephrology for intervenson on her R kidney pending resolution  of ATN. Patient passed TOV on 7/8/21 and will be DC'ed without ocampo. Patient to follow up with Psychiatry outpatient, patient states she will likely "go back to her home dose as soon as she leaves the hospital." PLEASE Follow up closely!     #Hyponatremia.  Plan: Acute on Chronic Hyponatremia in Euvolemic state, Chronic SIADH     - RESOLVED, latest Na is 137 s/p 3% Hypertonic saline given during admission   - was on 1L fluid restriction, changed to 1.5L fluid restriction as of 7/5  -continue ure-na 15g BID  -Per Urology: - No acute urologic intervention at this time, will continue to follow. Can follow up with Dr. Ramirez within 1 week of discharge  -Per Nephrology: Discontinue Bicarb and Ca+ supplementation.     # ATN (acute tubular necrosis).  Plan: Ischemic ATN - non oliguric, resolving  Baseline Cr from 2019 ~0.9-1.0 per chart review    - Steadily increasing Cr of unknown etiology --> 3.52 07/01 @ 11pm; 3.88 07/02; Cr elevation persisted for >72 hrs, treating as ATN  -STAT CT A/P performed 07/02 per nephrology and urology to r/o post-renal etiology -- severe right hydronephrosis with 2 mm UPJ stone, moderate L hydronephrosis stable, L uretal double-J stent in place and stable. No interval change in bilateral hydronephrosis.   -Nuc Med Scan 7/4-- Approximately symmetric renal function. The pattern is typical of acute tubular necrosis. The asymmetry of the size of the two kidneys appears to be due to prior obstruction of the left ureter. Patient will f/u with Nephrology and Urology to determine if there will be further intervention on R kidney as it has symmetric function compared to L (previously believed to be non-functional) -- please follow up outpatient   -Urology following -- Cr downtrending, continue to monitor, no Urological intervention needed as of 7/4. No post-obstructive etiology contributing to rising Cr.   -Per nephrology: DC Bicarb and Ca++ supplementation as of 7/7/21.   -F/u with Dr. Winter. Nephrology     # UTI (urinary tract infection).  Plan: Patient with positive UA, in the setting of recent procedure, suspicion for infection.   - Given CTX 1g in ED.  - U Cx and blood cx negative  - Continue with CTX 1g IV, advance to 2g if abscess or bacteremic  -Repeat UA and UCx on 7/6 and 7/7 due to elevated temperature -- UA negative both times, no leukocyte esterase or nitrites present   -TOV attempted on 7/8 -- if patient urinates, she can be DC without ocampo. If she does not urinate on her own, she will be DC with ocampo and will f/u with Dr. Ramirez OP.   - Per Urology: No acute urologic intervention at this time, will continue to follow. Can follow up with Dr. Ramirez.    # Thrombocytopenia.  Plan: resolving  Hx of thrombocytopenia according to patient.  - Platelets 96 on this admission  - Platelets 89 on prior admission in 2019   - Per heme onc recommendations patient's home medication of mirtazapine should be held, as it could be causing the low plts -- Mirtazapine   -On 7/5 platelet count 194,000 above patient's chronic baseline. However, platelets may be dysfunctional due to present renal failure and elevated creatinine. Both improving. By the numbers, relatively stable hematologically.   -Per Heme/Onc: avoid platelet suppressing medications, check ROSALVA, if procedures planned or bleeding continues, would consider platelet transfusion as patient's platelets are likely dysfunctional at present time - discussed with nephrology Dr. Winter and renal team -- Both in agreement.  - Please follow up outpatient with PCP regarding these findings.     #Metabolic encephalopathy.  Plan: Metabolic encephalopathy 2/2 hyponatremia   RESOLVED. Reported baseline of A&Ox3.   -07/02 -- Patient having hallucinations overnight, otherwise A&Ox3.     #Hematuria, unspecified type. Plan: - Urology recommendations stent removal next week, no intervention at this time.   - Patient has chronic right UPJ obstruction  - RESOLVED as of 7/8/21     #HTN (hypertension).  Plan: Patient with a history of hypertension, controlled by nifedipine   - Holding off on antihypertensives for now  - Restart nifedipine once hyponatremia resolved  -Patient normotensive during admission.     #Anemia.  Plan: Unknown chronicity or etiology of anemia, however patient with hematuria on UA following procedure yesterday.  - Maintain active T/S, transfuse to goal > 7.0   - iron panel labs in normal range suggestive of anemia of chronic disease, not RIYA.  -Per Heme/Onc: Hemoglobin was normal at 12.0 as outpatient on 6/17/21. Doubt hemolysis. Haptoglobin 78. Iron panel and ferritin normal. TSH normal. By the numbers, relatively stable hematologically.   -F/u with PCP regarding chronic anemia and thrombocytopenia     #Hypocalcemia  -per nephrology: likely secondary hyperparathyroidism, improved with Calcium supplementation 1250mg daily  -7/7: D/C Calcium supplementation.     #Depression.  Plan: # Moderate depression  Hx of depression, patient feels very down and depressed today.   - Medications reconciled, Dr. Brad Tomas prescribes Mirtazepine 15 mg QD and Parnate 10 mg 11 tablets daily.  - On prior admission patient was discharged on 60 mg parnate but ignored medical advice and restarted her home dose of 110 mg.  - Concern for thrombocytopenia and SIADH being medication-induced as above  - Per psych recommendations patient  on 60 mg parnate and Mirtazapine 15 mg po qhs. Lorazepam 1mg po q6h prn.  - No SI noted, low threshold to reinitiate enhance supervision, d/c 1:1 currently   -7/6 Psych Recs: Increase Parnate from 60 mg to 70 mg qd. This dosing rec is >MDD but pt reports she will immediately increase to her home dose of 110mg upon leaving the hospital. She has been taking 110mg daily for >1 year.; SI likely to represent frustration in care as pt is future-oriented and tx seeking. Pt will need management outpatient with Psychopharmacologist Dr. Daniel Smith - ideally to taper Parnate.  -no psychiatric contraindications to discharge, patient can follow up with her outpatient psychiatrist   -Patient received 60mg on 7/7 -- will be increased to 70 mg for 7/8 AM Dose.  --> F/u OP to determine correct dosing     #Cluster B personality  - pt frequently uses provacative statements and mood swings  -- would recommend psychotherapy with outpatient psychiatrist  Dr. Smith     NEW MEDS: Urea 15mg PO BID  F/U APPTS: Urology with Dr. Ramirez, Nephrology with Dr. Winter, PCP with Dr. Foster Gray, Psychiatry with Dr. Smith 71 year old F with a past medical history of hypertension, thrombocytopenia, osteoporosis, depression and urethral stent placed on 6/28 due to ureteral colic presents to [facility] ED on 6/29 with altered mental status. EMS reported abdominal/back pain. On admission stroke code was called, CT head and CT angio head and neck were negative for pathology. Due to retention and history of stent, ocampo catheter was put in place and flushed by urology.  Pt was admitted to medical telemetry floor for treatment of severe hyponatremia with altered mental status. Urology consult recommends no intervention at this time and prior urethral stent placed will be removed next week outpatient, reports chronic R hydronephrosis due to UPJ obstruction. Ocampo remained bloody throughout telemetry stay. Pt was started on 2% NaCl to increase sodium which trended up to 120 by 8 pm (lowest value of 111 at 1:47 am on 06/30/21 - correction of 9 within 24 hours.) 2% NaCl discontinued due to over correcting and patient switched to fluid restriction. Psychiatry was consulted for medication recommendations regarding restarting pt's home antidepressants. Pt has an extensive history of depression controlled by MAO-i parnate 110 mg qd and mirtazapine 15 mg qd. Per psych recs patient continued on parnate 60 mg. Mirtazepine was initially  held due to thrombocytopenia per heme onc rec. Patient started on sodium bicarbonate 650 mg BID on 7/1 to continue sodium correction. Q4 bmps, urine osm and serum osm's to be continued until patient at 130 per renal recommendations.  Heme-onc consulted for patients thrombocytopenia and anemia, noting that it could be likely due to Mirtazipine; however, rheum panel ordered to rule out rheumatologic etiology. ROSALVA was negative. Heme-onc continued to follow patient throughout stay. Patient was transferred [facility] on 07[facility]01/21. Overnight on 7/2, patient was complaining of suicidal ideation stating that she wanted to die and if the hospital could perform physician assisted suicide. Patient was placed on a 1:1. Patient was also stating she was having dreams/visual hallucinations where she saw cabinets full of flowers all around her room. Per psych evaluation on 7/2, collateral was gained from patient's outpatient psychiatrist Dr. Brad Smith and confirmed patients outpatient regimen of Parnate 110 mg and Mirtazapine 15mg. Psych closely followed patient throughout entire stay. Throughout patient's stay, her Cr was slowly uptrending from baseline  /on 6/30 to 4.22 on 7/2/21. Patient was seen by urology and non-contrast CT A/P was done to assess kidney/blader to r/o post-op renal etiology for her worsening kidney function. Patient was kept NPO as plan was to place PCN tube if CT A/P confirmed post-renal etiology. Patient received 3% NaCl 30 cc/hr x3 hour on 7/2 for hyponatremia which patient responded to as Na was up to 129 on AM labs 7/3/21. CT A/P from 7/2 showed no interval change in bilateral hydronephrosis and Left ureteral double-J stent in place and stable. Therefore, no PCN tube needed to be placed.  7/4 NM scan shows approximately symmetric renal function with the pattern being typical of acute tubular necrosis. The asymmetry of the size of the two kidneys appears to be due to prior obstruction of the left ureter. Urology and Nephrology discussed and determined there was no need to intervene on the R kidney inpatient and that it would be discussed further outpatient. Per psych recommendations, her Parnate dose was increased to 70mg on 7/8/21 despite 60mg being maximum daily dose for parnate. Throughout entire hospital stay, patient was verbally requesting an increase in her dose of Parnate as she felt like "she didn't want to live" without it being raised. Throughout admission, patient was placed on 1L fluid restriction, which was lifted to 1.5 L restriction on 7/5/21. Patient received calcium and bicarbonate supplementation throughout the admission as ordered by Nephrology, however it was discontinued per Nephrology on 7/721. Patient will continue Urea 15mg PO BID daily and upon discharge, will be discussed further on outpatient appointment f/u with Dr. Winter. Patient's sodium was hovering between 130-135 from 7/4-7/8. On PM labs for 7/8, Na was 135 which is within normal range. on PM 7/8 Labs, Cr was 1.52 which was overall decreased compared to the complete hospital course. Patient was cleared from Nephrology for discharge with follow up outpatient. Patient will follow up outpatient with PCP for thrombocytopenia and anemia. Patient will be seen by Urology and Nephrology for intervenson on her R kidney pending resolution  of ATN. Patient passed TOV on 7/8/21 and will be DC'ed without ocampo. Patient to follow up with Psychiatry outpatient, patient states she will likely "go back to her home dose as soon as she leaves the hospital." PLEASE Follow up closely!     #Hyponatremia.  Plan: Acute on Chronic Hyponatremia in Euvolemic state, Chronic SIADH   - RESOLVED, latest Na is 137 s/p 3% Hypertonic saline given during admission   - was on 1L fluid restriction, changed to 1.5L fluid restriction as of 7/5; -continue ure-na 15g BID  -Per Urology: - No acute urologic intervention at this time, will continue to follow. Can follow up with Dr. Ramirez within 1 week of discharge; -Per Nephrology: Discontinue Bicarb and Ca+ supplementation.     # ATN (acute tubular necrosis).  Plan: Ischemic ATN - non oliguric, resolving  Baseline Cr from 2019 ~0.9-1.0 per chart review-;   peaked ~4.2 on 7/2; Cr elevation persisted for >72 hrs, treating as ATN  -STAT CT A/P performed 07/02 per nephrology and urology to r/o post-renal etiology -- severe right hydronephrosis with 2 mm UPJ stone, moderate L hydronephrosis stable, L uretal double-J stent in place and stable. No interval change in bilateral hydronephrosis.   -Nuc Med Scan 7/4-- Approximately symmetric renal function. The pattern is typical of acute tubular necrosis. The asymmetry of the size of the two kidneys appears to be due to prior obstruction of the left ureter. Patient will f/u with Nephrology and Urology to determine if there will be further intervention on R kidney as it has symmetric function compared to L (previously believed to be non-functional) -- please follow up outpatient   -Urology following -- Cr downtrending, continue to monitor, no Urological intervention needed as of 7/4. No post-obstructive etiology contributing to rising Cr.   -Per nephrology: DC Bicarb and Ca++ supplementation as of 7/7/21. ; -F/u with Dr. Winter. Nephrology     # UTI (urinary tract infection).  Plan: Patient with positive UA, in the setting of recent procedure, suspicion for infection.   - Given CTX 1g in ED. U Cx and blood cx negative  -Repeat UA and UCx on 7/6 and 7/7 due to elevated temperature -- UA negative both times, no leukocyte esterase or nitrites present   -TOV attempted on 7/8 --- passed. D/millicent without ocampo. - Per Urology: No acute urologic intervention at this time, will continue to follow. Can follow up with Dr. Ramirez.    # Thrombocytopenia.  Plan: resolving  Hx of thrombocytopenia according to patient.  - Platelets 96 on this admission; - Platelets 89 on prior admission in 2019   - Per heme onc recommendations patient's home medication of mirtazapine should be held, as it could be causing the low plts -- Mirtazapine   -On 7/5 platelet count 194,000 above patient's chronic baseline. However, platelets may be dysfunctional due to present renal failure and elevated creatinine. Both improving. By the numbers, relatively stable hematologically.   -Per Heme/Onc: avoid platelet suppressing medications, check ROSALVA, if procedures planned or bleeding continues, would consider platelet transfusion as patient's platelets are likely dysfunctional at present time - discussed with nephrology Dr. Winter and renal team -- Both in agreement.; Please follow up outpatient with PCP regarding these findings.     #Metabolic encephalopathy.  Plan: Metabolic encephalopathy 2/2 hyponatremia   RESOLVED. Reported baseline of A&Ox3. -On 07/02 Patient had episode of hallucinations overnight, otherwise A&Ox3.     #Hematuria, unspecified type. Plan: - Urology recommendations stent removal next week, no intervention at this time.   - Patient has chronic right UPJ obstruction  - RESOLVED as of 7/8/21     #HTN (hypertension).  Plan: Patient with a history of hypertension, controlled by nifedipine   - Holding off on antihypertensives for now  - Restart nifedipine once hyponatremia resolved  -Patient normotensive during admission.     #Anemia.  Plan: Unknown chronicity or etiology of anemia, however patient with hematuria on UA following procedure yesterday.  - Maintain active T/S, transfuse to goal > 7.0   - iron panel labs in normal range suggestive of anemia of chronic disease, not RIYA.  -Per Heme/Onc: Hemoglobin was normal at 12.0 as outpatient on 6/17/21. Doubt hemolysis. Haptoglobin 78. Iron panel and ferritin normal. TSH normal. By the numbers, relatively stable hematologically.   -F/u with PCP regarding chronic anemia and thrombocytopenia     #Hypocalcemia  -per nephrology: likely secondary hyperparathyroidism, improved with Calcium supplementation 1250mg daily; -7/7: D/C Calcium supplementation.     #Depression.  Plan: # Moderate depression  - Medications reconciled, Dr. Brad Tmoas prescribes Mirtazepine 15 mg QD and Parnate 10 mg 11 tablets daily.  - On prior admission patient was discharged on 60 mg parnate but ignored medical advice and restarted her home dose of 110 mg.  - Concern for thrombocytopenia and SIADH being medication-induced as above  - Per psych recommendations patient  on 60 mg parnate and Mirtazapine 15 mg po qhs. Lorazepam 1mg po q6h prn.  - No SI noted, low threshold to reinitiate enhance supervision, d/c 1:1 currently   -7/6 Psych Recs: Increase Parnate from 60 mg to 70 mg qd. This dosing rec is >MDD but pt reports she will immediately increase to her home dose of 110mg upon leaving the hospital. She has been taking 110mg daily for >1 year.; SI likely to represent frustration in care as pt is future-oriented and tx seeking. Pt will need management outpatient with Psychopharmacologist Dr. Daniel Smith - ideally to taper Parnate.  -no psychiatric contraindications to discharge, patient can follow up with her outpatient psychiatrist  ;Patient received 60mg on 7/7 -- will be increased to 70 mg for 7/8 AM Dose.  --> F/u OP to determine correct dosing     #Cluster B personality  - pt frequently uses provacative statements and mood swings  -- would recommend psychotherapy with outpatient psychiatrist  Dr. Smith     NEW MEDS: Urea 15mg PO BID  F/U APPTS: Urology with Dr. Ramirez, Nephrology with Dr. Winter, PCP with Dr. Foster Gray, Psychiatry with Dr. Smith

## 2021-07-07 NOTE — PROGRESS NOTE BEHAVIORAL HEALTH - RISK ASSESSMENT
Patient appears affectively improved today on evaluation and denies SI. Pt has not made provacative states of suicide in >24hrs.  Pt does have Suicidal risk factors such as recent medical delirium/confusion, impulsivity and history of planned suicide attempt.  CL to follow.

## 2021-07-07 NOTE — PROGRESS NOTE ADULT - PROBLEM SELECTOR PLAN 9
# Moderate depression  Hx of depression, patient feels very down and depressed today.   - Medications reconciled, Dr. Brad Tomas prescribes Mirtazepine 15 mg QD and Parnate 10 mg 11 tablets daily.  - On prior admission patient was discharged on 60 mg parnate but ignored medical advice and restarted her home dose of 110 mg.  - Concern for thrombocytopenia and SIADH being medication-induced as above  - Per psych recommendations patient  on 60 mg parnate and Mirtazapine 15 mg po qhs. Lorazepam 1mg po q6h prn.  - No SI noted, low threshold to reinitiate enhance supervision, d/c 1:1 currently   -7/6 Psych Recs: Increase Parnate from 60 mg to 70 mg qd. This dosing rec is >MDD but pt reports she will immediately increase to her home dose of 110mg upon leaving the hospital. She has been taking 110mg daily for >1 year.; SI likely to represent frustration in care as pt is future-oriented and tx seeking. Pt will need management outpatient with Psychopharmacologist Dr. Daneil Smith - ideally to taper Parnate.  -no psychiatric contraindications to discharge, patient can follow up with her outpatient psychiatrist # Moderate depression  Hx of depression, patient feels very down and depressed today.   - Medications reconciled, Dr. Brad Tomas prescribes Mirtazepine 15 mg QD and Parnate 10 mg 11 tablets daily.  - On prior admission patient was discharged on 60 mg parnate but ignored medical advice and restarted her home dose of 110 mg.  - Concern for thrombocytopenia and SIADH being medication-induced as above  - Per psych recommendations patient  on 60 mg parnate and Mirtazapine 15 mg po qhs. Lorazepam 1mg po q6h prn.  - No SI noted, low threshold to reinitiate enhance supervision, d/c 1:1 currently   -7/6 Psych Recs: Increase Parnate from 60 mg to 70 mg qd. This dosing rec is >MDD but pt reports she will immediately increase to her home dose of 110mg upon leaving the hospital. She has been taking 110mg daily for >1 year.; SI likely to represent frustration in care as pt is future-oriented and tx seeking. Pt will need management outpatient with Psychopharmacologist Dr. Daniel Smith - ideally to taper Parnate.  -no psychiatric contraindications to discharge, patient can follow up with her outpatient psychiatrist  -Patient received 60mg on 7/7 -- will be increased to 70 mg for 7/8 AM Dose

## 2021-07-07 NOTE — DISCHARGE NOTE PROVIDER - NPI NUMBER (FOR SYSADMIN USE ONLY) :
[[id number]] [[id number]],[[id number]] [[id number]],[UNKNOWN],[UNKNOWN],[[id number]] [[id number]],[[id number]],[UNKNOWN],[UNKNOWN]

## 2021-07-07 NOTE — PROGRESS NOTE ADULT - SUBJECTIVE AND OBJECTIVE BOX
Patient is a 71y Female seen and evaluated at bedside. She reports no acute events/changes overnight. She is ready to go home and has arranged for a friend to pick her up from the hospital once she is discharged.       Meds:    acetaminophen   Tablet .. 650 every 6 hours PRN  calcium carbonate   1250 mG (OsCal) 1 daily  iron sucrose IVPB 200 every 24 hours  LORazepam   Injectable 1 every 6 hours PRN  magnesium sulfate  IVPB 4 once  sodium bicarbonate 650 every 12 hours  tranylcypromine 60 every 24 hours  urea Oral Powder 15 two times a day      T(C): , Max: 36.9 (07-07-21 @ 04:55)  T(F): , Max: 98.5 (07-07-21 @ 04:55)  HR: 72 (07-07-21 @ 04:55)  BP: 128/77 (07-07-21 @ 04:55)  BP(mean): --  RR: 17 (07-07-21 @ 04:55)  SpO2: 98% (07-07-21 @ 04:55)  Wt(kg): --    07-06 @ 07:01  -  07-07 @ 07:00  --------------------------------------------------------  IN: 1020 mL / OUT: 2100 mL / NET: -1080 mL          Review of Systems:  CONSTITUTIONAL: No fever or chills, No fatigue or tiredness  RESPIRATORY: No shortness of breath, cough, hemoptysis  CARDIOVASCULAR: No chest pain or shortness of breath  GASTROINTESTINAL: No abdominal or flank pain, No nausea or vomiting, No diarrhea  GENITOURINARY: No dysuria or urinary burning, No difficulty passing urine, No hematuria  NEUROLOGICAL: No headaches or blurred vision  SKIN: No skin rashes, scattered bruising   MUSCULOSKELETAL: No arthralgia, No leg edema, No muscle pain      PHYSICAL EXAM:  GENERAL: well-developed, well nourished, alert, no acute distress at present  NECK: supple, No JVD  CHEST/LUNG: Clear to auscultation bilaterally  HEART: normal S1S2, RRR  ABDOMEN: Soft, Nontender, +BS, No flank tenderness bilateral  EXTREMITIES: No clubbing, cyanosis, or edema   NEUROLOGY: AAO x3, no focal neurological deficit      LABS:                        8.5    7.54  )-----------( 369      ( 07 Jul 2021 07:07 )             26.7     07-07    136  |  98  |  64<H>  ----------------------------<  91  4.3   |  26  |  1.70<H>    Ca    9.1      07 Jul 2021 07:07  Phos  3.6     07-07  Mg     1.4     07-07    TPro  7.0  /  Alb  3.7  /  TBili  0.2  /  DBili  x   /  AST  21  /  ALT  14  /  AlkPhos  75  07-07          Osmolality, Random Urine: 473 mosm/kg (07-06 @ 20:01)  Sodium, Random Urine: 27 mmol/L (07-06 @ 20:01)        RADIOLOGY & ADDITIONAL STUDIES:

## 2021-07-07 NOTE — PROGRESS NOTE ADULT - SUBJECTIVE AND OBJECTIVE BOX
BEVERLY FINE, 71y, Female  MRN-[id number]  Patient is a 71y old  Female who presents with a chief complaint of Hyponatremia, Altered Mental Status (07 Jul 2021 10:32)      OVERNIGHT EVENTS: Patient's L sided IV was withdrawn due to inflammation and infiltration. Patient was given a hot pack this AM and Tylenol 650mg for pain.     SUBJECTIVE: Patient examined at bedside and doing well. Patient complains of L forearm pain at site of infiltrated IV. Denies increased thirst, headaches, confusion, nausea/vomiting/diarrhea, abdominal pain, burning at ocampo site. Patient stated she was happy that psychiatry will be giving her 70mg of Parnate rather than the 60mg that she has been getting inpatient.     12 Point ROS Negative unless noted otherwise above.  -------------------------------------------------------------------------------  VITAL SIGNS:  Vital Signs Last 24 Hrs  T(C): 36.5 (07 Jul 2021 11:20), Max: 36.9 (07 Jul 2021 04:55)  T(F): 97.7 (07 Jul 2021 11:20), Max: 98.5 (07 Jul 2021 04:55)  HR: 73 (07 Jul 2021 11:20) (71 - 73)  BP: 115/64 (07 Jul 2021 11:20) (115/64 - 131/73)  BP(mean): 81 (07 Jul 2021 11:20) (81 - 81)  RR: 17 (07 Jul 2021 11:20) (17 - 18)  SpO2: 98% (07 Jul 2021 11:20) (98% - 98%)  I&O's Summary    06 Jul 2021 07:01  -  07 Jul 2021 07:00  --------------------------------------------------------  IN: 1020 mL / OUT: 2100 mL / NET: -1080 mL        PHYSICAL EXAM:    General: NAD, thin appearing   HEENT: NC/AT; EOMI, PERRLA, anicteric sclera; moist mucosal membranes.  Neck: supple, trachea midline  Cardiovascular: RRR, +S1/S2; NO M/R/G  Respiratory: CTA B/L; no W/R/R  Gastrointestinal: soft, NT/ND; +BSx4  Extremities: no edema or cyanosis; bilateral ecchymoses noted on UE and LE   Vascular: 2+ radial, DP/PT pulses B/L  Neurological: AAOx3  : Ocampo in place, draining clear urine (no hematuria/clots)     ALLERGIES:  Allergies    No Known Allergies    Intolerances    Frazier (Unknown)      MEDICATIONS:  MEDICATIONS  (STANDING):  iron sucrose IVPB 200 milliGRAM(s) IV Intermittent every 24 hours  tranylcypromine 60 milliGRAM(s) Oral every 24 hours  urea Oral Powder 15 Gram(s) Oral two times a day    MEDICATIONS  (PRN):  acetaminophen   Tablet .. 650 milliGRAM(s) Oral every 6 hours PRN Mild Pain (1 - 3)  LORazepam   Injectable 1 milliGRAM(s) IntraMuscular every 6 hours PRN Agitation      -------------------------------------------------------------------------------  LABS:                        8.5    7.54  )-----------( 369      ( 07 Jul 2021 07:07 )             26.7     07-07    136  |  98  |  64<H>  ----------------------------<  91  4.3   |  26  |  1.70<H>    Ca    9.1      07 Jul 2021 07:07  Phos  3.6     07-07  Mg     1.4     07-07    TPro  7.0  /  Alb  3.7  /  TBili  0.2  /  DBili  x   /  AST  21  /  ALT  14  /  AlkPhos  75  07-07    LIVER FUNCTIONS - ( 07 Jul 2021 07:07 )  Alb: 3.7 g/dL / Pro: 7.0 g/dL / ALK PHOS: 75 U/L / ALT: 14 U/L / AST: 21 U/L / GGT: x             CAPILLARY BLOOD GLUCOSE        COVID-19 PCR: Negative (29 Jun 2021 22:15)      RADIOLOGY & ADDITIONAL TESTS: Reviewed.

## 2021-07-07 NOTE — PROGRESS NOTE ADULT - PROBLEM SELECTOR PLAN 2
Appears to be resolving  Baseline Cr from 2019 ~0.9-1.0 per chart review  - Steadily increasing Cr of unknown etiology --> 3.52 07/01 @ 11pm; 3.88 07/02; Cr elevation persisted for >72 hrs, treating as ATN  - Euvolemic on exam with only slight hyperosmolar urine with increased urine Na  - Differential remains wide; Renal consider nuclear scan for persisting ATN with hyponatremia  -STAT CT A/P performed 07/02 per nephrology and urology to r/o post-renal etiology -- severe right hydronephrosis with 2 mm UPJ stone, moderate L hydronephrosis stable, L uretal double-J stent in place and stable. No interval change in bilateral hydronephrosis.   -Per Nephrology 07/03- concern remains for possible obstructive component given increase in hydronephrosis on L seen during imaging. Patient's Cr plateaud which is reassuring -- follow up PM BMP, if Cr continues to trend up, consider PCN with urology re-eval   -Per Nephrology, patient given 15 g Urea one time dose, 200 IV Iron mg qd x 5 days (start date 07/03). Repeat BMP and Mg at 6pm  -Nuc Med Scan 7/4-- Approximately symmetric renal function. The pattern is typical of acute tubular necrosis. The asymmetry of the size of the two kidneys appears to be due to prior obstruction of the left ureter.  -Urology following -- Cr downtrending, continue to monitor, no Urological intervention needed as of 7/4 Ischemic ATN - non oliguric, resolving    Baseline Cr from 2019 ~0.9-1.0 per chart review  - Steadily increasing Cr of unknown etiology --> 3.52 07/01 @ 11pm; 3.88 07/02; Cr elevation persisted for >72 hrs, treating as ATN  -STAT CT A/P performed 07/02 per nephrology and urology to r/o post-renal etiology -- severe right hydronephrosis with 2 mm UPJ stone, moderate L hydronephrosis stable, L uretal double-J stent in place and stable. No interval change in bilateral hydronephrosis.   -Nuc Med Scan 7/4-- Approximately symmetric renal function. The pattern is typical of acute tubular necrosis. The asymmetry of the size of the two kidneys appears to be due to prior obstruction of the left ureter.  -Urology and Nephrology consulting on whether or not to intervene on R kidney as NM scan displays symmetric renal function. Previously assumed R kidney as non-functional -- awaiting recs.   -Urology following -- Cr downtrending, continue to monitor, no Urological intervention needed as of 7/4  -Per nephrology: DC Bicarb and Ca++ supplementation as of 7/7/21

## 2021-07-07 NOTE — PROGRESS NOTE ADULT - PROBLEM SELECTOR PLAN 7
Patient with a history of hypertension, controlled by nifedipine   - Holding off on antihypertensives for now  - Restart nifedipine once hyponatremia resolved Patient with a history of hypertension, controlled by nifedipine   - Holding off on antihypertensives for now  - Restart nifedipine once hyponatremia resolved  -Patient normotensive during admission

## 2021-07-07 NOTE — PROGRESS NOTE ADULT - PROBLEM SELECTOR PLAN 8
Unknown chronicity or etiology of anemia, however patient with hematuria on UA following procedure yesterday.  - Maintain active T/S, transfuse to goal > 7.0   - iron panel labs in normal range suggestive of anemia of chronic disease, not RIYA.  -Per Heme/Onc: Hemoglobin was normal at 12.0 as outpatient on 6/17/21. Doubt hemolysis. Haptoglobin 78. Iron panel and ferritin normal. TSH normal. By the numbers, relatively stable hematologically.      #Hypocalcemia  -per nephrology: likely secondary hyperparathyroidism, improving with Calcium supplementation 1250mg daily Unknown chronicity or etiology of anemia, however patient with hematuria on UA following procedure yesterday.  - Maintain active T/S, transfuse to goal > 7.0   - iron panel labs in normal range suggestive of anemia of chronic disease, not RIYA.  -Per Heme/Onc: Hemoglobin was normal at 12.0 as outpatient on 6/17/21. Doubt hemolysis. Haptoglobin 78. Iron panel and ferritin normal. TSH normal. By the numbers, relatively stable hematologically.      #Hypocalcemia  -per nephrology: likely secondary hyperparathyroidism, improving with Calcium supplementation 1250mg daily  -7/7: D/C Calcium supplementation

## 2021-07-07 NOTE — PROGRESS NOTE ADULT - PROBLEM SELECTOR PLAN 4
resolving  Hx of thrombocytopenia according to patient.  - Platelets 96 on this admission  - Platelets 89 on prior admission in 2019   - Per heme onc recommendations patient's home medication of mirtazapine should be held, as it could be causing the low plts.   -Per Heme/Onc: avoid platelet suppressing medications, check ROSALVA, if procedures planned or bleeding continues, would consider platelet transfusion as patient's platelets are likely dysfunctional at present time - discussed with nephrology Dr. Winter and renal team -- Both in agreement.  -On 7/5 platelet count 194,000 above patient's chronic baseline. However, platelets may be dysfunctional due to present renal failure and elevated creatinine. Both improving. By the numbers, relatively stable hematologically.

## 2021-07-07 NOTE — PROGRESS NOTE ADULT - PROBLEM SELECTOR PLAN 10
F: Fluid restrictions  E: replete as needed, monitor Na and Ca  N: mechanical soft   DVT PPX: SCDs (holding lovenox in setting of thrombocytopenia and hematuria)     Dispo: Pending Na levels and resolution of infection

## 2021-07-07 NOTE — PROGRESS NOTE ADULT - ASSESSMENT
70 y/o Female with HTN, chronic thrombocytopenia, osteoporosis, depression, chronic right hydronephrosis (obstructing stone at UPJ) and new L obstructing nephrolithiasis with gross hematuria s/p lithotripsy and L urethral stent placed on 6/28 c/b AMS with hospital admission due to symptomatic hyponatremia and SHIKHA    Assessment:    #Ischemic ATN - non oliguric, improving  - nuclear medicine scan showed equal function for L and R kidneys and noted that the scan pattern was consistent with ATN, CT with severe R hydro chronic with UPJ obstructing stone - d/w urology resident who will d/w attending whether intervention is warranted. Both kidneys working and contributing equally to eGFR so favour intervening on the R  hydro    #Acute on Chronic Hyponatremia in a euvolemic state, chronic SIADH   Na responding to Ure-Na and fluid restriction of 1.5L- continue  consider stopping ure-na 15g BID, Marlon and Uosm suggest volume depletion    #Hypokalemia - potential etiologies include hypomagnesemia and SHIKHA - resolved     #Hypomagnesemia - potentially due to diuresis/urinary loss due to Ure-Na  - replete magnesium with 4g Mg     #Anemia stabilized    #Hypocalcemia - likely secondary hyperparathyroidism, improving with Calcium supplementation 1250mg daily - resolved   - consider stopping calcium supplementation following normalization of Mg     Eitan Toth,   PGY-1 Internal Medicine    72 y/o Female with HTN, chronic thrombocytopenia, osteoporosis, depression, chronic right hydronephrosis (obstructing stone at UPJ) and new L obstructing nephrolithiasis with gross hematuria s/p lithotripsy and L urethral stent placed on 6/28 c/b AMS with hospital admission due to symptomatic hyponatremia and SHIKHA    Assessment:    #Ischemic ATN - non oliguric, improving  - nuclear medicine scan showed equal function for L and R kidneys and noted that the scan pattern was consistent with ATN, CT with severe R hydro chronic with UPJ obstructing stone - d/w urology resident who will d/w attending whether intervention is warranted. Both kidneys working and contributing equally to eGFR so favour intervening on the R  hydro  - discontinue bicarb treatment     #Acute on Chronic Hyponatremia in a euvolemic state, chronic SIADH   Na responding to Ure-Na and fluid restriction of 1.5L- continue  consider stopping ure-na 15g BID, Marlon and Uosm suggest volume depletion    #Hypokalemia - potential etiologies include hypomagnesemia and SHIKHA - resolved     #Hypomagnesemia - potentially due to diuresis/urinary loss due to Ure-Na  - s/p 4g Mg     #Anemia stabilized    #Hypocalcemia - likely secondary hyperparathyroidism, improving with Calcium supplementation 1250mg daily - resolved   - discontinue calcium supplementation     Eitan Toth DO  PGY-1 Internal Medicine    70 y/o Female with HTN, chronic thrombocytopenia, osteoporosis, depression, chronic right hydronephrosis (obstructing stone at UPJ) and new L obstructing nephrolithiasis with gross hematuria s/p lithotripsy and L urethral stent placed on 6/28 c/b AMS with hospital admission due to symptomatic hyponatremia and SHIKHA    Assessment:    #Ischemic ATN - non oliguric, improving  - nuclear medicine scan showed equal function for L and R kidneys and noted that the scan pattern was consistent with ATN, CT with severe R hydro chronic with UPJ obstructing stone - d/w urology resident who will d/w attending whether intervention is warranted. Both kidneys working and contributing equally to eGFR so favour intervening on the R  hydro. Needs outpt f/u with Dr Ramirez  - discontinue bicarb treatment     #Acute on Chronic Hyponatremia in a euvolemic state, chronic SIADH   Na responding to Ure-Na and fluid restriction of 1.5L- continue    #Hypokalemia - potential etiologies include hypomagnesemia and SHIKHA - resolved     #Anemia stabilized    #Hypocalcemia - likely secondary hyperparathyroidism, improving with Calcium supplementation 1250mg daily - resolved   - discontinue calcium supplementation     Eitan Toth DO  PGY-1 Internal Medicine

## 2021-07-07 NOTE — PROGRESS NOTE BEHAVIORAL HEALTH - NSBHFUPINTERVALHXFT_PSY_A_CORE
Patient affect improved after being told psychiatry approved increasing parnate to 70mg yesterday. She believes that the recommended increase dose helped her and that she does not have any SI. She reports that she wants to leave the hospital tomorrow because that is when her friend is available to pick her up and that she would like one more night to get more sleep. She denies SI or a plan at this time. Patient A&Ox3. Patient previously on higher MAOI dosing per outpatient psychiatrist with Na+ 138 lst visit. Currently on dose 60mg qd w Na at 136 7/7 and medical team continuing fluid restriction for hyponatremia.

## 2021-07-07 NOTE — PROGRESS NOTE ADULT - PROBLEM SELECTOR PROBLEM 1
Hyponatremia
normal...

## 2021-07-07 NOTE — PROGRESS NOTE ADULT - ASSESSMENT
Ms. Thompson is a 70 y/o Female with PMHx HTN, depression and ureteral stent placed on 6/28 due to kidney stones admitted for severe hyponatremia and UTI. Course complicated by persistent hyponatremia with perplexing etiology.

## 2021-07-07 NOTE — DISCHARGE NOTE PROVIDER - NSDCCPCAREPLAN_GEN_ALL_CORE_FT
PRINCIPAL DISCHARGE DIAGNOSIS  Diagnosis: Hyponatremia  Assessment and Plan of Treatment: When you first came into the hospital, your sodium levels were very low. Symptoms of hyponatremia (low sodium) incluude nausea, headache, confusion and fatigue. When you first came into the hospital, you were placed in a telemetry unit to monitor your vitals while we corrected your sodium levels with hypertonic saline. Once your hyponatremia resolved to a stable level, you were transferred to regional medical floors where your sodium was still monitored. Your Na was corrected and has risen up to 134. You are placed on a 1.5 liter fluid restriction daily. Please follow up outpatient with Dr. Winter, Nephrology for further workup.      SECONDARY DISCHARGE DIAGNOSES  Diagnosis: Hematuria, unspecified type  Assessment and Plan of Treatment: You had an urethral stent placed via Urology due to obstruction. After the stent was placed, you were on a Jordan and you were noted to have blood in your urine due to the procedure. Your urine has since cleared up and you no longer have blood in your urine.    Diagnosis: Altered mental status, unspecified altered mental status type  Assessment and Plan of Treatment: When you came into the hospital, you were altered likely due to the very low sodium levels. Your mental status has since resolved since your sodium has been replenished appropriately.

## 2021-07-07 NOTE — DISCHARGE NOTE PROVIDER - NSDCFUADDAPPT_GEN_ALL_CORE_FT
(1) Please note that the office of your Nephrology Provider, Dr. Marlene Winter will contact you from phone [phone] to schedule an appointment shortly following your hospital discharge.    Appointment was facilitated by Ms. LD Choudhury, Referral Coordinator.   (1) Please note that the office of your Nephrology Provider, Dr. Marlene Winter will contact you from phone [phone] to schedule an appointment shortly following your hospital discharge.    Appointment was facilitated by Ms. LD Choudhury, Referral Coordinator.    Please bring your Insurance card, Photo ID and Discharge paperwork to the following appointment:    (2) Please follow up with your Urology Provider, Dr. Katie Ramirez at [address] [place] [address] on 07/21/2021 at 2:20pm.    Appointment was scheduled by Ms. LD Choudhury, Referral Coordinator.   (1) Please note that the office of your Nephrology Provider, Dr. Marlene Winter will contact you from phone [phone] to schedule an appointment shortly following your hospital discharge.    Appointment was facilitated by Ms. LD Choudhury, Referral Coordinator.    Please bring your Insurance card, Photo ID and Discharge paperwork to the following appointment:    (2) Please follow up with your Urology Provider, Dr. Katie Ramirez at [address] [place] [address] on 07/21/2021 at 2:20pm.    Appointment was scheduled by Ms. LD Choudhury, Referral Coordinator.    (3) Please follow up with your Internal Medicine Provider Dr. Foster rGay at [address] [place] [address] on 07/15/21 at 1:30pm.      (4) Please follow up with your Psychiatrist Provider Dr. Brad Smith via Televisit (office will call you to confirm mode of televisit) on 07/20/21 at 11:30 am.

## 2021-07-07 NOTE — DISCHARGE NOTE PROVIDER - NSDCFUSCHEDAPPT_GEN_ALL_CORE_FT
BEVERLY FINE ; 07/07/2021 ; [facility] [address] BEVERLY FINE ; 07/21/2021 ; [facility] [address]

## 2021-07-07 NOTE — PROGRESS NOTE ADULT - ATTENDING COMMENTS
ATN improving.   Hematuria improving --- urine less dark today ---> clear and pink   Right kidney unlikely to require intervention (stenting), given that obstruction is at level of UPJ, but will defer to outpatient urology f/u.   [ ] Get patient follow up appt with her outpatient psychiatrist who knows her well.

## 2021-07-08 VITALS
SYSTOLIC BLOOD PRESSURE: 133 MMHG | HEART RATE: 74 BPM | DIASTOLIC BLOOD PRESSURE: 78 MMHG | OXYGEN SATURATION: 98 % | TEMPERATURE: 98 F | RESPIRATION RATE: 18 BRPM

## 2021-07-08 LAB
ALBUMIN SERPL ELPH-MCNC: 3.1 G/DL — LOW (ref 3.3–5)
ALBUMIN SERPL ELPH-MCNC: 3.3 G/DL — SIGNIFICANT CHANGE UP (ref 3.3–5)
ALP SERPL-CCNC: 72 U/L — SIGNIFICANT CHANGE UP (ref 40–120)
ALP SERPL-CCNC: 77 U/L — SIGNIFICANT CHANGE UP (ref 40–120)
ALT FLD-CCNC: 16 U/L — SIGNIFICANT CHANGE UP (ref 10–45)
ALT FLD-CCNC: 16 U/L — SIGNIFICANT CHANGE UP (ref 10–45)
ANION GAP SERPL CALC-SCNC: 12 MMOL/L — SIGNIFICANT CHANGE UP (ref 5–17)
ANION GAP SERPL CALC-SCNC: 12 MMOL/L — SIGNIFICANT CHANGE UP (ref 5–17)
ANISOCYTOSIS BLD QL: SLIGHT — SIGNIFICANT CHANGE UP
AST SERPL-CCNC: 21 U/L — SIGNIFICANT CHANGE UP (ref 10–40)
AST SERPL-CCNC: 22 U/L — SIGNIFICANT CHANGE UP (ref 10–40)
BASO STIPL BLD QL SMEAR: PRESENT — SIGNIFICANT CHANGE UP
BASOPHILS # BLD AUTO: 0.15 K/UL — SIGNIFICANT CHANGE UP (ref 0–0.2)
BASOPHILS NFR BLD AUTO: 1.8 % — SIGNIFICANT CHANGE UP (ref 0–2)
BILIRUB SERPL-MCNC: <0.2 MG/DL — SIGNIFICANT CHANGE UP (ref 0.2–1.2)
BILIRUB SERPL-MCNC: <0.2 MG/DL — SIGNIFICANT CHANGE UP (ref 0.2–1.2)
BUN SERPL-MCNC: 76 MG/DL — HIGH (ref 7–23)
BUN SERPL-MCNC: 82 MG/DL — HIGH (ref 7–23)
CALCIUM SERPL-MCNC: 8.6 MG/DL — SIGNIFICANT CHANGE UP (ref 8.4–10.5)
CALCIUM SERPL-MCNC: 9.2 MG/DL — SIGNIFICANT CHANGE UP (ref 8.4–10.5)
CHLORIDE SERPL-SCNC: 96 MMOL/L — SIGNIFICANT CHANGE UP (ref 96–108)
CHLORIDE SERPL-SCNC: 97 MMOL/L — SIGNIFICANT CHANGE UP (ref 96–108)
CO2 SERPL-SCNC: 26 MMOL/L — SIGNIFICANT CHANGE UP (ref 22–31)
CO2 SERPL-SCNC: 26 MMOL/L — SIGNIFICANT CHANGE UP (ref 22–31)
COLLECT DURATION TIME UR: 24 HR — SIGNIFICANT CHANGE UP
CREAT SERPL-MCNC: 1.49 MG/DL — HIGH (ref 0.5–1.3)
CREAT SERPL-MCNC: 1.52 MG/DL — HIGH (ref 0.5–1.3)
DACRYOCYTES BLD QL SMEAR: SLIGHT — SIGNIFICANT CHANGE UP
EOSINOPHIL # BLD AUTO: 0.45 K/UL — SIGNIFICANT CHANGE UP (ref 0–0.5)
EOSINOPHIL NFR BLD AUTO: 5.4 % — SIGNIFICANT CHANGE UP (ref 0–6)
GIANT PLATELETS BLD QL SMEAR: PRESENT — SIGNIFICANT CHANGE UP
GLUCOSE SERPL-MCNC: 131 MG/DL — HIGH (ref 70–99)
GLUCOSE SERPL-MCNC: 86 MG/DL — SIGNIFICANT CHANGE UP (ref 70–99)
HCT VFR BLD CALC: 24.5 % — LOW (ref 34.5–45)
HCT VFR BLD CALC: 25.3 % — LOW (ref 34.5–45)
HGB BLD-MCNC: 7.9 G/DL — LOW (ref 11.5–15.5)
HGB BLD-MCNC: 8.1 G/DL — LOW (ref 11.5–15.5)
HYPOCHROMIA BLD QL: SLIGHT — SIGNIFICANT CHANGE UP
LYMPHOCYTES # BLD AUTO: 1.49 K/UL — SIGNIFICANT CHANGE UP (ref 1–3.3)
LYMPHOCYTES # BLD AUTO: 18 % — SIGNIFICANT CHANGE UP (ref 13–44)
MACROCYTES BLD QL: SLIGHT — SIGNIFICANT CHANGE UP
MAGNESIUM SERPL-MCNC: 2.1 MG/DL — SIGNIFICANT CHANGE UP (ref 1.6–2.6)
MANUAL SMEAR VERIFICATION: SIGNIFICANT CHANGE UP
MCHC RBC-ENTMCNC: 29.8 PG — SIGNIFICANT CHANGE UP (ref 27–34)
MCHC RBC-ENTMCNC: 30 PG — SIGNIFICANT CHANGE UP (ref 27–34)
MCHC RBC-ENTMCNC: 32 GM/DL — SIGNIFICANT CHANGE UP (ref 32–36)
MCHC RBC-ENTMCNC: 32.2 GM/DL — SIGNIFICANT CHANGE UP (ref 32–36)
MCV RBC AUTO: 92.5 FL — SIGNIFICANT CHANGE UP (ref 80–100)
MCV RBC AUTO: 93.7 FL — SIGNIFICANT CHANGE UP (ref 80–100)
METAMYELOCYTES # FLD: 0.9 % — HIGH (ref 0–0)
MONOCYTES # BLD AUTO: 0.37 K/UL — SIGNIFICANT CHANGE UP (ref 0–0.9)
MONOCYTES NFR BLD AUTO: 4.5 % — SIGNIFICANT CHANGE UP (ref 2–14)
MYELOCYTES NFR BLD: 3.6 % — HIGH (ref 0–0)
NEUTROPHILS # BLD AUTO: 5.45 K/UL — SIGNIFICANT CHANGE UP (ref 1.8–7.4)
NEUTROPHILS NFR BLD AUTO: 64.9 % — SIGNIFICANT CHANGE UP (ref 43–77)
NEUTS BAND # BLD: 0.9 % — SIGNIFICANT CHANGE UP (ref 0–8)
NRBC # BLD: 0 /100 WBCS — SIGNIFICANT CHANGE UP (ref 0–0)
OSMOLALITY 24H UR: 464 MOSM/KG — SIGNIFICANT CHANGE UP (ref 50–1200)
OSMOLALITY UR: 464 MOSM/KG — SIGNIFICANT CHANGE UP (ref 50–1200)
OVALOCYTES BLD QL SMEAR: SLIGHT — SIGNIFICANT CHANGE UP
PLAT MORPH BLD: ABNORMAL
PLATELET # BLD AUTO: 367 K/UL — SIGNIFICANT CHANGE UP (ref 150–400)
PLATELET # BLD AUTO: 383 K/UL — SIGNIFICANT CHANGE UP (ref 150–400)
POIKILOCYTOSIS BLD QL AUTO: SLIGHT — SIGNIFICANT CHANGE UP
POTASSIUM SERPL-MCNC: 3.9 MMOL/L — SIGNIFICANT CHANGE UP (ref 3.5–5.3)
POTASSIUM SERPL-MCNC: 4 MMOL/L — SIGNIFICANT CHANGE UP (ref 3.5–5.3)
POTASSIUM SERPL-SCNC: 3.9 MMOL/L — SIGNIFICANT CHANGE UP (ref 3.5–5.3)
POTASSIUM SERPL-SCNC: 4 MMOL/L — SIGNIFICANT CHANGE UP (ref 3.5–5.3)
PROT SERPL-MCNC: 6.6 G/DL — SIGNIFICANT CHANGE UP (ref 6–8.3)
PROT SERPL-MCNC: 6.7 G/DL — SIGNIFICANT CHANGE UP (ref 6–8.3)
RBC # BLD: 2.65 M/UL — LOW (ref 3.8–5.2)
RBC # BLD: 2.7 M/UL — LOW (ref 3.8–5.2)
RBC # FLD: 14.6 % — HIGH (ref 10.3–14.5)
RBC # FLD: 14.6 % — HIGH (ref 10.3–14.5)
RBC BLD AUTO: ABNORMAL
SMUDGE CELLS # BLD: PRESENT — SIGNIFICANT CHANGE UP
SODIUM ?TM UR-SCNC: 58 MMOL/24HR — SIGNIFICANT CHANGE UP (ref 40–220)
SODIUM SERPL-SCNC: 134 MMOL/L — LOW (ref 135–145)
SODIUM SERPL-SCNC: 135 MMOL/L — SIGNIFICANT CHANGE UP (ref 135–145)
TOTAL VOLUME - 24 HOUR: 1025 ML — SIGNIFICANT CHANGE UP
URINE CREATININE CALCULATION: 0.3 G/24 H — LOW (ref 0.8–1.8)
WBC # BLD: 7.52 K/UL — SIGNIFICANT CHANGE UP (ref 3.8–10.5)
WBC # BLD: 8.28 K/UL — SIGNIFICANT CHANGE UP (ref 3.8–10.5)
WBC # FLD AUTO: 7.52 K/UL — SIGNIFICANT CHANGE UP (ref 3.8–10.5)
WBC # FLD AUTO: 8.28 K/UL — SIGNIFICANT CHANGE UP (ref 3.8–10.5)

## 2021-07-08 PROCEDURE — 70450 CT HEAD/BRAIN W/O DYE: CPT

## 2021-07-08 PROCEDURE — 82607 VITAMIN B-12: CPT

## 2021-07-08 PROCEDURE — 36415 COLL VENOUS BLD VENIPUNCTURE: CPT

## 2021-07-08 PROCEDURE — 86160 COMPLEMENT ANTIGEN: CPT

## 2021-07-08 PROCEDURE — 93005 ELECTROCARDIOGRAM TRACING: CPT

## 2021-07-08 PROCEDURE — 83540 ASSAY OF IRON: CPT

## 2021-07-08 PROCEDURE — 82962 GLUCOSE BLOOD TEST: CPT

## 2021-07-08 PROCEDURE — 36430 TRANSFUSION BLD/BLD COMPNT: CPT

## 2021-07-08 PROCEDURE — 0042T: CPT

## 2021-07-08 PROCEDURE — 83921 ORGANIC ACID SINGLE QUANT: CPT

## 2021-07-08 PROCEDURE — 83930 ASSAY OF BLOOD OSMOLALITY: CPT

## 2021-07-08 PROCEDURE — 83516 IMMUNOASSAY NONANTIBODY: CPT

## 2021-07-08 PROCEDURE — 84550 ASSAY OF BLOOD/URIC ACID: CPT

## 2021-07-08 PROCEDURE — 82728 ASSAY OF FERRITIN: CPT

## 2021-07-08 PROCEDURE — 82140 ASSAY OF AMMONIA: CPT

## 2021-07-08 PROCEDURE — 81001 URINALYSIS AUTO W/SCOPE: CPT

## 2021-07-08 PROCEDURE — 87635 SARS-COV-2 COVID-19 AMP PRB: CPT

## 2021-07-08 PROCEDURE — 83735 ASSAY OF MAGNESIUM: CPT

## 2021-07-08 PROCEDURE — 96374 THER/PROPH/DIAG INJ IV PUSH: CPT

## 2021-07-08 PROCEDURE — 86769 SARS-COV-2 COVID-19 ANTIBODY: CPT

## 2021-07-08 PROCEDURE — 83935 ASSAY OF URINE OSMOLALITY: CPT

## 2021-07-08 PROCEDURE — A9562: CPT

## 2021-07-08 PROCEDURE — 86431 RHEUMATOID FACTOR QUANT: CPT

## 2021-07-08 PROCEDURE — 84300 ASSAY OF URINE SODIUM: CPT

## 2021-07-08 PROCEDURE — 76000 FLUOROSCOPY <1 HR PHYS/QHP: CPT

## 2021-07-08 PROCEDURE — 78707 K FLOW/FUNCT IMAGE W/O DRUG: CPT

## 2021-07-08 PROCEDURE — 86900 BLOOD TYPING SEROLOGIC ABO: CPT

## 2021-07-08 PROCEDURE — 82306 VITAMIN D 25 HYDROXY: CPT

## 2021-07-08 PROCEDURE — 85730 THROMBOPLASTIN TIME PARTIAL: CPT

## 2021-07-08 PROCEDURE — 83605 ASSAY OF LACTIC ACID: CPT

## 2021-07-08 PROCEDURE — 80048 BASIC METABOLIC PNL TOTAL CA: CPT

## 2021-07-08 PROCEDURE — 70496 CT ANGIOGRAPHY HEAD: CPT

## 2021-07-08 PROCEDURE — 82746 ASSAY OF FOLIC ACID SERUM: CPT

## 2021-07-08 PROCEDURE — 86803 HEPATITIS C AB TEST: CPT

## 2021-07-08 PROCEDURE — 82570 ASSAY OF URINE CREATININE: CPT

## 2021-07-08 PROCEDURE — 80307 DRUG TEST PRSMV CHEM ANLYZR: CPT

## 2021-07-08 PROCEDURE — 85610 PROTHROMBIN TIME: CPT

## 2021-07-08 PROCEDURE — 86850 RBC ANTIBODY SCREEN: CPT

## 2021-07-08 PROCEDURE — 84100 ASSAY OF PHOSPHORUS: CPT

## 2021-07-08 PROCEDURE — 83970 ASSAY OF PARATHORMONE: CPT

## 2021-07-08 PROCEDURE — 86036 ANCA SCREEN EACH ANTIBODY: CPT

## 2021-07-08 PROCEDURE — P9012: CPT

## 2021-07-08 PROCEDURE — 83010 ASSAY OF HAPTOGLOBIN QUANT: CPT

## 2021-07-08 PROCEDURE — 80053 COMPREHEN METABOLIC PANEL: CPT

## 2021-07-08 PROCEDURE — 86965 POOLING BLOOD PLATELETS: CPT

## 2021-07-08 PROCEDURE — 86901 BLOOD TYPING SEROLOGIC RH(D): CPT

## 2021-07-08 PROCEDURE — 85025 COMPLETE CBC W/AUTO DIFF WBC: CPT

## 2021-07-08 PROCEDURE — 76770 US EXAM ABDO BACK WALL COMP: CPT

## 2021-07-08 PROCEDURE — 71045 X-RAY EXAM CHEST 1 VIEW: CPT

## 2021-07-08 PROCEDURE — 99233 SBSQ HOSP IP/OBS HIGH 50: CPT

## 2021-07-08 PROCEDURE — 70498 CT ANGIOGRAPHY NECK: CPT

## 2021-07-08 PROCEDURE — 74177 CT ABD & PELVIS W/CONTRAST: CPT

## 2021-07-08 PROCEDURE — 82652 VIT D 1 25-DIHYDROXY: CPT

## 2021-07-08 PROCEDURE — C1889: CPT

## 2021-07-08 PROCEDURE — C1769: CPT

## 2021-07-08 PROCEDURE — 96375 TX/PRO/DX INJ NEW DRUG ADDON: CPT

## 2021-07-08 PROCEDURE — 84443 ASSAY THYROID STIM HORMONE: CPT

## 2021-07-08 PROCEDURE — 83615 LACTATE (LD) (LDH) ENZYME: CPT

## 2021-07-08 PROCEDURE — 92610 EVALUATE SWALLOWING FUNCTION: CPT

## 2021-07-08 PROCEDURE — 99291 CRITICAL CARE FIRST HOUR: CPT | Mod: 25

## 2021-07-08 PROCEDURE — 82340 ASSAY OF CALCIUM IN URINE: CPT

## 2021-07-08 PROCEDURE — 99239 HOSP IP/OBS DSCHRG MGMT >30: CPT

## 2021-07-08 PROCEDURE — 87086 URINE CULTURE/COLONY COUNT: CPT

## 2021-07-08 PROCEDURE — 83090 ASSAY OF HOMOCYSTEINE: CPT

## 2021-07-08 PROCEDURE — 74176 CT ABD & PELVIS W/O CONTRAST: CPT

## 2021-07-08 PROCEDURE — 84156 ASSAY OF PROTEIN URINE: CPT

## 2021-07-08 PROCEDURE — 87040 BLOOD CULTURE FOR BACTERIA: CPT

## 2021-07-08 PROCEDURE — C2617: CPT

## 2021-07-08 PROCEDURE — 83550 IRON BINDING TEST: CPT

## 2021-07-08 PROCEDURE — 87075 CULTR BACTERIA EXCEPT BLOOD: CPT

## 2021-07-08 PROCEDURE — 82310 ASSAY OF CALCIUM: CPT

## 2021-07-08 PROCEDURE — 84540 ASSAY OF URINE/UREA-N: CPT

## 2021-07-08 PROCEDURE — 80061 LIPID PANEL: CPT

## 2021-07-08 PROCEDURE — 86038 ANTINUCLEAR ANTIBODIES: CPT

## 2021-07-08 PROCEDURE — 85027 COMPLETE CBC AUTOMATED: CPT

## 2021-07-08 RX ORDER — UREA 15 G
1 POWDER IN PACKET (EA) ORAL
Qty: 0 | Refills: 0 | DISCHARGE
Start: 2021-07-08

## 2021-07-08 RX ORDER — UREA 15 G
1 POWDER IN PACKET (EA) ORAL
Qty: 60 | Refills: 0
Start: 2021-07-08 | End: 2021-08-06

## 2021-07-08 RX ORDER — NIFEDIPINE 30 MG/1
30 TABLET, EXTENDED RELEASE ORAL DAILY
Qty: 30 | Refills: 3 | Status: DISCONTINUED | COMMUNITY
Start: 2019-12-18 | End: 2021-07-08

## 2021-07-08 RX ADMIN — TRANYLCYPROMINE SULFATE 70 MILLIGRAM(S): 10 TABLET, FILM COATED ORAL at 08:01

## 2021-07-08 RX ADMIN — Medication 15 GRAM(S): at 17:08

## 2021-07-08 RX ADMIN — Medication 15 GRAM(S): at 08:01

## 2021-07-08 NOTE — PROGRESS NOTE BEHAVIORAL HEALTH - NSBHCONSFOLLOWNEEDS_PSY_A_CORE
no psychiatric contraindications to discharge
Patient needs further psychiatric safety assessment prior to discharge
no psychiatric contraindications to discharge

## 2021-07-08 NOTE — PROGRESS NOTE ADULT - REASON FOR ADMISSION
Hyponatremia, Altered Mental Status

## 2021-07-08 NOTE — PROGRESS NOTE BEHAVIORAL HEALTH - NSBHCONSULTOBS_PSY_A_CORE
Routine observation
Enhanced supervision
Routine observation
Routine observation
Enhanced supervision
Enhanced supervision

## 2021-07-08 NOTE — PROGRESS NOTE BEHAVIORAL HEALTH - NSBHCONSULTFOLLOWAFTERCARE_PSY_A_CORE FT
patient can follow up with her outpatient psychiatrist
Continued care with outpatient Psychiatrist Dr. Brad Smith
patient can follow up with her outpatient psychiatrist
Continued care with outpatient psychiatrist Dr. Daniel Smith
patient can follow up with her outpatient psychiatrist

## 2021-07-08 NOTE — PROGRESS NOTE BEHAVIORAL HEALTH - NSBHPTASSESSDT_PSY_A_CORE
02-Jul-2021 09:42
06-Jul-2021 09:39
08-Jul-2021 09:15
05-Jul-2021 10:45
01-Jul-2021 13:05
07-Jul-2021 10:00

## 2021-07-08 NOTE — PROGRESS NOTE ADULT - ATTENDING SUPERVISION STATEMENT
Fellow
Resident
Fellow
Resident

## 2021-07-08 NOTE — PROGRESS NOTE ADULT - SUBJECTIVE AND OBJECTIVE BOX
Patient is a 71y Female seen and evaluated at bedside. She reports no acute events/changes overnight. She does endorse slight pain and swelling of the left antebrachium 2/2 infiltration post IV removal. She is eating and drinking as well as using the restroom without difficulty.       Meds:    acetaminophen   Tablet .. 650 every 6 hours PRN  LORazepam   Injectable 1 every 6 hours PRN  tranylcypromine 70 every 24 hours  urea Oral Powder 15 two times a day      T(C): , Max: 36.6 (07-08-21 @ 06:11)  T(F): , Max: 97.8 (07-08-21 @ 06:11)  HR: 76 (07-08-21 @ 06:11)  BP: 121/74 (07-08-21 @ 06:11)  BP(mean): --  RR: 18 (07-08-21 @ 06:11)  SpO2: 98% (07-08-21 @ 06:11)  Wt(kg): --    07-07 @ 07:01  -  07-08 @ 07:00  --------------------------------------------------------  IN: 0 mL / OUT: 2650 mL / NET: -2650 mL          Review of Systems:  CONSTITUTIONAL: No fever or chills, No fatigue or tiredness  RESPIRATORY: No shortness of breath, cough, hemoptysis  CARDIOVASCULAR: No chest pain or shortness of breath  GASTROINTESTINAL: No abdominal or flank pain, No nausea or vomiting, No diarrhea  GENITOURINARY: No dysuria or urinary burning, No difficulty passing urine, mild improving hematuria   NEUROLOGICAL: No headaches or blurred vision  SKIN: No skin rashes, scattered bruising   MUSCULOSKELETAL: No arthralgia, No leg edema, pain to the left antebrachium       PHYSICAL EXAM:  GENERAL: well-developed, well nourished, alert, no acute distress at present  NECK: supple, No JVD  CHEST/LUNG: Clear to auscultation bilaterally  HEART: normal S1S2, RRR  ABDOMEN: Soft, Nontender, +BS, No flank tenderness bilateral  EXTREMITIES: No clubbing, cyanosis, or edema, scattered bruising with mild swelling of left antebrachium IV site   NEUROLOGY: AAO x3, no focal neurological deficit        LABS:                        7.9    7.52  )-----------( 367      ( 08 Jul 2021 08:00 )             24.5     07-08    134<L>  |  96  |  76<H>  ----------------------------<  86  4.0   |  26  |  1.49<H>    Ca    8.6      08 Jul 2021 08:00  Phos  3.6     07-07  Mg     2.1     07-08    TPro  6.6  /  Alb  3.1<L>  /  TBili  <0.2  /  DBili  x   /  AST  21  /  ALT  16  /  AlkPhos  72  07-08          Osmolality, Random Urine: 473 mosm/kg (07-06 @ 20:01)  Sodium, Random Urine: 27 mmol/L (07-06 @ 20:01)        RADIOLOGY & ADDITIONAL STUDIES:

## 2021-07-08 NOTE — PROGRESS NOTE BEHAVIORAL HEALTH - PRIMARY DX
Recurrent major depression in full remission

## 2021-07-08 NOTE — PROGRESS NOTE ADULT - ASSESSMENT
70 y/o Female with HTN, chronic thrombocytopenia, osteoporosis, depression, chronic right hydronephrosis (obstructing stone at UPJ) and new L obstructing nephrolithiasis with gross hematuria s/p lithotripsy and L urethral stent placed on 6/28 c/b AMS being managed by nephrology for acute on chronic hyponatremia 2/2 SIADH and ATN presumed to be 2/2 contrast nephropathy.     Assessment:    #Ischemic ATN - non oliguric, improving  - nuclear medicine scan showed equal function for L and R kidneys and noted that the scan pattern was consistent with ATN, CT with severe R hydro chronic with UPJ obstructing stone - d/w urology who will intervene once the patient is past this ATN. Needs outpt f/u with Dr Ramirez  - plan to perform bedside sonogram today due to spike in Cr  - repeat urine Na, Cr and Osm due to spike in Cr      #Acute on Chronic Hyponatremia in a euvolemic state, chronic SIADH   Na responding to Ure-Na and fluid restriction of 1.5L- continue    #Hypokalemia - potential etiologies include hypomagnesemia and SHIKHA - resolved     #Anemia stabilized    #Hypocalcemia - likely secondary hyperparathyroidism, improving with Calcium supplementation 1250mg daily - resolved     Eitan Toth DO  PGY-1 Internal Medicine    72 y/o Female with HTN, chronic thrombocytopenia, osteoporosis, depression, chronic right hydronephrosis (obstructing stone at UPJ) and new L obstructing nephrolithiasis with gross hematuria s/p lithotripsy and L urethral stent placed on 6/28 c/b AMS being managed by nephrology for acute on chronic hyponatremia 2/2 SIADH and ATN    Assessment:    #Ischemic ATN - non oliguric, improving  - nuclear medicine scan showed equal function for L and R kidneys and noted that the scan pattern was consistent with ATN, CT with severe R hydro chronic with UPJ obstructing stone - d/w urology who will intervene once she removes the L ureteral stent and pt has recovered from ATN. Needs outpt f/u with Dr Ramirez    #Acute on Chronic Hyponatremia in a euvolemic state, chronic SIADH   Na responding to Ure-Na and fluid restriction of 1.5L- continue    #Hypokalemia - potential etiologies include hypomagnesemia and SHIKHA - resolved     #Anemia stabilized    #Hypocalcemia - likely secondary hyperparathyroidism, improving with Calcium supplementation 1250mg daily - resolved     Eitan Toth DO  PGY-1 Internal Medicine

## 2021-07-08 NOTE — PROGRESS NOTE ADULT - NSICDXPILOT_GEN_ALL_CORE
[place]

## 2021-07-08 NOTE — PROGRESS NOTE BEHAVIORAL HEALTH - SUMMARY
71 year-old woman with PPHx of depression, pSA, PMH of HTN,  urethral stent placed on 6/28 due to ureteral colic presents to [facility] ED on 6/29 with altered mental status. 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 Psychiatry was consulted for recommendations re pt's home medications. Patient on 1:1 for suicidal statements (no plan) in context of demanding higher dosing of MAOI. Na normalizing but patient requiring ongoing fluid restriction. Reportedly normal Na levels (138) on MAOI dosing (Parnate 110mg qd and mirtazapine 15mg qhs) in outpatient setting as per outpt psychiatrist.     Plan:  - c/w Parnate 70mg daily     - this dosing rec is above MDD but pt reports she will immediately increase to her home dose of 110mg upon leaving the hospital - patient has been taking 110mg for > 1yr  -No active SI does not need 1:1 as pt is denies SI            -prns for anxiety/agitation lorazepam 1mg po q6h prn ([id number])    #Depression  - stable on outpatient regimen and pt has outpatient psychiatrist she can follow with but appears less dysregulated today  - will need med mgmt outpatient with Psychiatrist Dr. Daniel Smith - ideally taper of parnate    - please fax discharge note to Dr. Daniel Smith at [phone]    #Cluster B personality  - pt frequently uses provacative statements and mood swings   - would recommend psychotherapy with outpatient psychiatrist   -CL to follow

## 2021-07-08 NOTE — PROGRESS NOTE ADULT - PROVIDER SPECIALTY LIST ADULT
Heme/Onc
Nephrology
Urology
Urology
Heme/Onc
Nephrology
Nephrology
Urology
Heme/Onc
Nephrology
Heme/Onc
Internal Medicine
Hospitalist
Internal Medicine

## 2021-07-08 NOTE — PROGRESS NOTE ADULT - ATTENDING COMMENTS
ATN improving, non oliguric. R kidney w chronic hydro and obstructing stone but appears stable, will need outpt f/u with Dr Ramirez who may intervene on this in the future, plans to remove L ureteral stent and monitor for now.   Acute on chronic hyponatremia due to SIADH - well controlled on UreNa, to go home with this regimen as well as fluid restriction <1.5L and will f/u with me as outpt

## 2021-07-08 NOTE — PROGRESS NOTE BEHAVIORAL HEALTH - CASE SUMMARY
71 year old woman with a history of treatment resistant depression, many failed medication trials now maintained on MAOI with good effect, medical history including hypertension, thrombocytopenia, osteoporosis, and urethral stent placed on 6/28 due to ureteral colic, past hospital admission with hyponatremia, who presents with fluctuating mental status and irritability suggestive of delirium and MAOI withdrawal. Collateral history from outpt psychiatrist regarding normal serum sodium levels in April 2021 while on 110mg Parnate suggestive of a non-MAOI related cause of hyponatremia. Pt's periods of confusion, restlessness, visual hallucinations, and fleeting voiced SI overnight are consistent with delirium, without any persisting suicidal ideation or any intent, plan or attempts at self-harm. Suicidality also may have been voiced in part to convey frustration with care; pt is currently future-oriented and tx seeking, hoping to receiving an increased dose of Parnate and expresses gratitude for care in hospital. Pt not currently assessed to be at acutely elevated risk of harm to self. Recommend continuing current dose of Parnate (as is max daily dose) and restarting mirtazapine. Recommend enhanced supervision given confusional state; 1:1 for suicidality not indicated at this time. Will follow. Remainder per resident's note above
71 year-old woman with PPHx of depression, pSA, PMH of HTN,  urethral stent placed on 6/28 due to ureteral colic presents to [facility] ED on 6/29 with altered mental status. 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 Psychiatry was consulted for recommendations re pt's home medications. Unlikely that the MOAI contributed to the hyponatremia as the patient's Na was monitored and was stable  outpatient; however Parnate was recommended outside the FDA approved range. Patient presents with strong mental and physical dependence on the parnate and made several provocative &manipulative statements in order to have it increased (suggestive also of character pathology). She now presents calm, future oriented, denies SI. Patient is ambivalent about keeping the parnate at the max recommended dosage vs increasing back to 110mg.
71 year-old woman with PPHx of depression, pSA, PMH of HTN,  urethral stent placed on 6/28 due to ureteral colic presents to [facility] ED on 6/29 with altered mental status. 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 Psychiatry was consulted for recommendations re pt's home medications. Unlikely that the MOAI contributed to the hyponatremia as the patient's Na was monitored and was stable  outpatient; however Parnate was recommended outside the FDA approved range. Patient presents with strong mental and physical dependence on the parnate and made several provocative &manipulative statements in order to have it increased (suggestive also of character pathology). She now presents calm, future oriented, denies SI. Today patient is contemplating keeping the parnate at the max recommended dosage.
71 year-old woman with PPHx of depression, pSA, PMH of HTN,  urethral stent placed on 6/28 due to ureteral colic presents to [facility] ED on 6/29 with altered mental status. 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 Psychiatry was consulted for recommendations re pt's home medications. Unlikely that the MOAI contributed to the hyponatremia as the patient's Na was monitored and was stable  outpatient; however Parnate was recommended outside the FDA approved range. Patient presents with strong mental and physical dependence on the parnate and made several provocative &manipulative statements in order to have it increased (suggestive also of character pathology). She now presents calm, future oriented, denies SI. She verbalizes understanding of the risks of taking higher than FDA approved dosage of parnate and states that she plans to return to 110mg if her withdrawal sx don't improved (of note, patient appears at baseline today).

## 2021-07-08 NOTE — PROGRESS NOTE BEHAVIORAL HEALTH - NSBHFUPINTERVALHXFT_PSY_A_CORE
Patient affect improved after increasing parnate to 70mg. Pt was able to get sleep overnight but still experiencing forearm discomfort from site of previous IV. She states that the heat pack provides great relief. She is excited to leave the hospital and go to her favorite soup spot to get very healthy soup. 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 She doesn't have interest in making new friends as she "likes to be alone to read her New [place] Times and dance in her apartment." She denies SI or a plan at this time. Patient A&Ox3. Patient previously on higher MAOI dosing per outpatient psychiatrist with Na+ 138 lst visit. Currently on dose 70mg qd w Na at 134 7/8 and medical team continuing fluid restriction for hyponatremia.

## 2021-07-09 LAB
CREAT ?TM UR-MCNC: 37 MG/DL — SIGNIFICANT CHANGE UP
SODIUM UR-SCNC: 55 MMOL/L — SIGNIFICANT CHANGE UP

## 2021-07-10 ENCOUNTER — NON-APPOINTMENT (OUTPATIENT)
Age: 71
End: 2021-07-10

## 2021-07-11 LAB — METHYLMALONATE SERPL-SCNC: 220 NMOL/L — SIGNIFICANT CHANGE UP (ref 0–378)

## 2021-07-18 DIAGNOSIS — N13.0 HYDRONEPHROSIS WITH URETEROPELVIC JUNCTION OBSTRUCTION: ICD-10-CM

## 2021-07-18 DIAGNOSIS — R45.851 SUICIDAL IDEATIONS: ICD-10-CM

## 2021-07-18 DIAGNOSIS — E22.2 SYNDROME OF INAPPROPRIATE SECRETION OF ANTIDIURETIC HORMONE: ICD-10-CM

## 2021-07-18 DIAGNOSIS — N25.81 SECONDARY HYPERPARATHYROIDISM OF RENAL ORIGIN: ICD-10-CM

## 2021-07-18 DIAGNOSIS — Z79.2 LONG TERM (CURRENT) USE OF ANTIBIOTICS: ICD-10-CM

## 2021-07-18 DIAGNOSIS — N18.2 CHRONIC KIDNEY DISEASE, STAGE 2 (MILD): ICD-10-CM

## 2021-07-18 DIAGNOSIS — E83.51 HYPOCALCEMIA: ICD-10-CM

## 2021-07-18 DIAGNOSIS — R31.9 HEMATURIA, UNSPECIFIED: ICD-10-CM

## 2021-07-18 DIAGNOSIS — F60.89 OTHER SPECIFIC PERSONALITY DISORDERS: ICD-10-CM

## 2021-07-18 DIAGNOSIS — N17.0 ACUTE KIDNEY FAILURE WITH TUBULAR NECROSIS: ICD-10-CM

## 2021-07-18 DIAGNOSIS — Z96.0 PRESENCE OF UROGENITAL IMPLANTS: ICD-10-CM

## 2021-07-18 DIAGNOSIS — R41.82 ALTERED MENTAL STATUS, UNSPECIFIED: ICD-10-CM

## 2021-07-18 DIAGNOSIS — G93.41 METABOLIC ENCEPHALOPATHY: ICD-10-CM

## 2021-07-18 DIAGNOSIS — Z79.891 LONG TERM (CURRENT) USE OF OPIATE ANALGESIC: ICD-10-CM

## 2021-07-18 DIAGNOSIS — Z87.891 PERSONAL HISTORY OF NICOTINE DEPENDENCE: ICD-10-CM

## 2021-07-18 DIAGNOSIS — Z79.1 LONG TERM (CURRENT) USE OF NON-STEROIDAL ANTI-INFLAMMATORIES (NSAID): ICD-10-CM

## 2021-07-18 DIAGNOSIS — D69.6 THROMBOCYTOPENIA, UNSPECIFIED: ICD-10-CM

## 2021-07-18 DIAGNOSIS — M81.0 AGE-RELATED OSTEOPOROSIS WITHOUT CURRENT PATHOLOGICAL FRACTURE: ICD-10-CM

## 2021-07-18 DIAGNOSIS — I12.9 HYPERTENSIVE CHRONIC KIDNEY DISEASE WITH STAGE 1 THROUGH STAGE 4 CHRONIC KIDNEY DISEASE, OR UNSPECIFIED CHRONIC KIDNEY DISEASE: ICD-10-CM

## 2021-07-18 DIAGNOSIS — D64.9 ANEMIA, UNSPECIFIED: ICD-10-CM

## 2021-07-18 DIAGNOSIS — R44.3 HALLUCINATIONS, UNSPECIFIED: ICD-10-CM

## 2021-07-18 DIAGNOSIS — R47.01 APHASIA: ICD-10-CM

## 2021-07-18 DIAGNOSIS — N39.0 URINARY TRACT INFECTION, SITE NOT SPECIFIED: ICD-10-CM

## 2021-07-18 DIAGNOSIS — F32.9 MAJOR DEPRESSIVE DISORDER, SINGLE EPISODE, UNSPECIFIED: ICD-10-CM

## 2021-07-21 ENCOUNTER — APPOINTMENT (OUTPATIENT)
Dept: UROLOGY | Facility: CLINIC | Age: 71
End: 2021-07-21

## 2021-07-21 ENCOUNTER — APPOINTMENT (OUTPATIENT)
Dept: UROLOGY | Facility: CLINIC | Age: 71
End: 2021-07-21
Payer: MEDICARE

## 2021-07-21 VITALS — TEMPERATURE: 97.8 F | DIASTOLIC BLOOD PRESSURE: 80 MMHG | SYSTOLIC BLOOD PRESSURE: 139 MMHG | HEART RATE: 90 BPM

## 2021-07-21 PROCEDURE — 52310 CYSTOSCOPY AND TREATMENT: CPT

## 2021-07-21 PROCEDURE — 99214 OFFICE O/P EST MOD 30 MIN: CPT | Mod: 25

## 2021-07-21 NOTE — ASSESSMENT
[FreeTextEntry1] : I discussed the findings and options with Ms. BEVERLY THOMPSON in detail.\par \par Her post-operative course has been somewhat complicated, as she became hyponatremic and had multiple episodes of gross hematuria and urinary retention. She again has gross hematuria today. \par \par The stent was removed cystoscopically without incident, however, it was somewhat difficult to visualize due to the dark urine. A urine culture is pending. I have started Ms. Thompson empirically on a course of Macrobid while we wait for the results of today's culture. \par \par For her further follow-up, her renal scan identified symmetric kidney function, however, there is now right hydronephrosis which should be further evaluated. She may well need another procedure to address her right UPJ stone. \par \par I would like to see Ms. Thompson in the office in three weeks' time.

## 2021-07-21 NOTE — HISTORY OF PRESENT ILLNESS
[FreeTextEntry1] : Ms. BEVERLY THOMPSON comes in today for her urologic follow-up, including a scheduled cystoscopic stent removal. Ms. Thompson underwent a left ureteroscopy to address a 7mm proximal left ureteral stone. \par \par She is  1, para 0. \par \par Recent History:\par Initially presented with complaints of intermittent episodes of gross hematuria. She reported episodes occurred , 5/15, and  with no associated pelvic pain, back pain, fever, or chills. She also stated that microscopic hematuria has been noted on recent urinalysis (records show 23 /HPF RBC). She stated that she had an episode of gross hematuria many years ago in setting of UTI, but reported last UTI was 15-20 years ago. No hx stones. \par \par Of note, pt was admitted to the hospital last year (2020) s/p fall. She stated that she injured her foot and had a subdural hematoma at the time. She was later discharged to a rehab center, but then fell out of wheelchair and broke hip. While in the hospital, she had a CT scan which demonstrated "severe right hydronephrosis and dilation of proximal ureter unchanged." She stated that this has been noted repeatedly over last ten years. She is unsure if it was ever evaluated. \par \par She presented 2021 for cystoscopy evaluation. Cystoscopy was unremarkable. We reviewed CTabd/pelvis scan 2021 which demonstrated moderate to severe right sided hydronephrosis to level of the UPJ and mild left-sided hydronephrosis. Also demonstrated b/l stones including tiny calcified stone noted just proximal to UPJ on right side and 0.4 x 0.3 x 0.7 cm stone present at the proximal left ureter at L4 level. She has longstanding hx of severe right hydronephrosis secondary to presumed long-standing UPJ obstruction.  \par \par UCx 2021: negative\par Ucx and Cytology 2021: negative \par \par Sexually active\par PMH: blood platelet disorder, insufficient bone regeneration, depression \par SH: right femur (), sigmoid colon resection () \par FH: colon cancer (mother), pancreatic cancer (father), Parkinson's (father), depression (sister), unsure if sister had kidney or gallbladder stones\par Social History:  former smoker (1ppd x 40 years quit 7), no EtOH, (former heavy)

## 2021-07-21 NOTE — ADDENDUM
[FreeTextEntry1] : A portion of this note was written by [Mike Thrasher] on 07/21/2021 acting as a scribe for Dr. Ramirez. \par \par I have personally reviewed the chart and agree that the record accurately reflects my personal performance of the history, physical exam, assessment, and plan.

## 2021-07-26 LAB — BACTERIA UR CULT: ABNORMAL

## 2021-07-28 DIAGNOSIS — N39.0 URINARY TRACT INFECTION, SITE NOT SPECIFIED: ICD-10-CM

## 2021-07-30 ENCOUNTER — NON-APPOINTMENT (OUTPATIENT)
Age: 71
End: 2021-07-30

## 2021-07-30 LAB — BACTERIA UR CULT: NORMAL

## 2021-08-04 ENCOUNTER — LABORATORY RESULT (OUTPATIENT)
Age: 71
End: 2021-08-04

## 2021-08-04 ENCOUNTER — APPOINTMENT (OUTPATIENT)
Dept: HEMATOLOGY ONCOLOGY | Facility: CLINIC | Age: 71
End: 2021-08-04
Payer: MEDICARE

## 2021-08-04 VITALS
BODY MASS INDEX: 28.23 KG/M2 | TEMPERATURE: 97.1 F | WEIGHT: 122 LBS | HEART RATE: 90 BPM | OXYGEN SATURATION: 98 % | HEIGHT: 55 IN | DIASTOLIC BLOOD PRESSURE: 84 MMHG | SYSTOLIC BLOOD PRESSURE: 150 MMHG

## 2021-08-04 VITALS — TEMPERATURE: 96.9 F

## 2021-08-04 LAB
APTT BLD: 36.3 SEC
INR PPP: 0.97
PT BLD: 11.6 SEC
RBC # BLD: 3.2 M/UL
RETICS # AUTO: 3.1 %
RETICS AGGREG/RBC NFR: 98.6 K/UL

## 2021-08-04 PROCEDURE — 99215 OFFICE O/P EST HI 40 MIN: CPT | Mod: 25

## 2021-08-04 PROCEDURE — 36415 COLL VENOUS BLD VENIPUNCTURE: CPT

## 2021-08-04 RX ORDER — TERIPARATIDE 250 UG/ML
620 INJECTION, SOLUTION SUBCUTANEOUS DAILY
Qty: 1 | Refills: 2 | Status: DISCONTINUED | COMMUNITY
Start: 2021-04-12 | End: 2021-08-04

## 2021-08-04 RX ORDER — GLUCOSAMINE/MSM/CHONDROIT SULF 500-166.6
10 TABLET ORAL
Refills: 0 | Status: ACTIVE | COMMUNITY

## 2021-08-04 RX ORDER — DENOSUMAB 60 MG/ML
60 INJECTION SUBCUTANEOUS
Refills: 0 | Status: ACTIVE | COMMUNITY

## 2021-08-04 RX ORDER — NIFEDIPINE 30 MG/1
30 TABLET, EXTENDED RELEASE ORAL
Refills: 0 | Status: ACTIVE | COMMUNITY

## 2021-08-04 NOTE — REVIEW OF SYSTEMS
[Fatigue] : fatigue [Joint Pain] : joint pain [Joint Stiffness] : joint stiffness [Depression] : depression [Easy Bruising] : a tendency for easy bruising [Negative] : Allergic/Immunologic [Fever] : no fever [Chills] : no chills [Night Sweats] : no night sweats [Recent Change In Weight] : ~T no recent weight change [Nosebleeds] : no nosebleeds [Easy Bleeding] : no tendency for easy bleeding [FreeTextEntry4] : No gingival bleeding [FreeTextEntry7] : No blood in stool [FreeTextEntry8] : No blood in urine [FreeTextEntry9] : right thigh and hip pain, s/p fractures [de-identified] : Has bruises from "bumping into things"

## 2021-08-04 NOTE — HISTORY OF PRESENT ILLNESS
[de-identified] : Patient is a 71 year old female with a history of hyponatremia, sigmoid colon resection for a malignant polyp in 2015, thoracic aortic aneurysm, clinical depression, and chronic thrombocytopenia for over 10 years, referred for evaluation of thrombocytopenia. Patient had been seen in the past by hematologist Dr. Miki Billy at [facility], and patient states that an evaluation, which did not include bone marrow aspirate/biopsy, was non-diagnostic. Patient's baseline platelet count over the last several years has been between 85,000 and 105,000. Patient was seen in [facility] in early July 2021, at which time she was admitted for renal calculi, gross hematuria, and hyponatremia. During the hospital stay, the platelet count malik to as high as 369,000, likely reactive to the urologic disorder and acute bleeding. At the time of discharge from the hospital, the white blood count was 8.28, hemoglobin was 8.1, hematocrit 25.3, and platelet count was 383,000. Patient saw her PCP on July 15, at which time her hemoglobin was 8.5, hematocrit 27.5, and platelet count 231,000. Patient had her ureteral stent removed shortly after discharge. At the present time, she reports having clear urine and has recovered urologically. Patient bruises easily, and has several ecchymoses on her extremities which she attributes to trauma. She now complains of irritation from insect bites and chronic right hip discomfort (s/p right hip fracture in 6/2020). Denies bleeding from nose or mouth, hematuria, hematochezia, fever, sweats, chills or recent infections.

## 2021-08-04 NOTE — ASSESSMENT
[FreeTextEntry1] : Patient is a 71 year old female with a history of hyponatremia, sigmoid colon resection for a malignant polyp in 2015, thoracic aortic aneurysm, clinical depression, and chronic thrombocytopenia for over 10 years, referred for evaluation of thrombocytopenia.  Will evaluate at this time for multiple possible etiologies, including past viral infection, autoimmune disease, nutritional deficiency (iron, B12), or drug induced (Parnate).Myelodysplastic syndrome is also considered. Anemia is fairly recent and occurred after significant hematuria from effects and management  of renal calculus.Have ordered APTT, anticardiolipin antibodies, B2G1 antibodies, DRVTT, H. pylori, Hepatitis B and C, hexagonal phase lupus, PT/INR, ROSALVA, B12 and folate, CBC, ferritin, iron panel, platelet antibodies, reticulocyte count, and rheumatoid factor. Patient was advised to call office for results and further recommendations.
No

## 2021-08-04 NOTE — PHYSICAL EXAM
[Normal] : affect appropriate [de-identified] : RRR, S1, S2, murmur, without ectopy [de-identified] : Several  ecchymoses on upper and lower extremities

## 2021-08-04 NOTE — END OF VISIT
[FreeTextEntry3] : All medical record entries made by the Scribe were at my, Dr. Jasmin West, direction and personally dictated by me on 08/04/2021. I have reviewed the chart and agree that the record accurately reflects my personal performance of the history, physical exam, assessment and plan. I have also personally directed, reviewed, and agreed with the chart.

## 2021-08-04 NOTE — CONSULT LETTER
[Dear  ___] : Dear  [unfilled], [Consult Letter:] : I had the pleasure of evaluating your patient, [unfilled]. [( Thank you for referring [unfilled] for consultation for _____ )] : Thank you for referring [unfilled] for consultation for [unfilled] [Please see my note below.] : Please see my note below. [Consult Closing:] : Thank you very much for allowing me to participate in the care of this patient.  If you have any questions, please do not hesitate to contact me. [Sincerely,] : Sincerely, [FreeTextEntry3] : Jasmin West

## 2021-08-05 LAB
ANISOCYTOSIS BLD QL: SLIGHT
BASOPHILS # BLD AUTO: 0.1 K/UL
BASOPHILS # BLD AUTO: 0.1 K/UL
BASOPHILS NFR BLD AUTO: 2.6 %
BASOPHILS NFR BLD AUTO: 2.6 %
EOSINOPHIL # BLD AUTO: 0.17 K/UL
EOSINOPHIL # BLD AUTO: 0.17 K/UL
EOSINOPHIL NFR BLD AUTO: 4.3 %
EOSINOPHIL NFR BLD AUTO: 4.3 %
FERRITIN SERPL-MCNC: 253 NG/ML
FOLATE SERPL-MCNC: >20 NG/ML
GIANT PLATELETS BLD QL SMEAR: PRESENT
HCT VFR BLD CALC: 31.8 %
HGB BLD-MCNC: 9.7 G/DL
HYPOCHROMIA BLD QL SMEAR: SLIGHT
IRON SATN MFR SERPL: 14 %
IRON SERPL-MCNC: 42 UG/DL
LYMPHOCYTES # BLD AUTO: 0.92 K/UL
LYMPHOCYTES # BLD AUTO: 0.92 K/UL
LYMPHOCYTES NFR BLD AUTO: 23.5 %
LYMPHOCYTES NFR BLD AUTO: 23.5 %
MACROCYTES BLD QL SMEAR: SLIGHT
MAN DIFF?: NORMAL
MCHC RBC-ENTMCNC: 30.3 PG
MCHC RBC-ENTMCNC: 30.5 GM/DL
MCV RBC AUTO: 99.4 FL
MONOCYTES # BLD AUTO: 0.24 K/UL
MONOCYTES # BLD AUTO: 0.24 K/UL
MONOCYTES NFR BLD AUTO: 6.1 %
MONOCYTES NFR BLD AUTO: 6.1 %
MSMEAR: NORMAL
MYELOCYTES NFR BLD: 0.9 %
NEUTROPHILS # BLD AUTO: 2.44 K/UL
NEUTROPHILS # BLD AUTO: 2.44 K/UL
NEUTROPHILS NFR BLD AUTO: 61.7 %
NEUTROPHILS NFR BLD AUTO: 61.7 %
NEUTS BAND NFR BLD MANUAL: 0.9 %
OVALOCYTES BLD QL SMEAR: SLIGHT
PLAT MORPH BLD: ABNORMAL
PLATELET # BLD AUTO: 127 K/UL
POIKILOCYTOSIS BLD QL SMEAR: SLIGHT
POLYCHROMASIA BLD QL SMEAR: SLIGHT
RBC # BLD: 3.2 M/UL
RBC # FLD: 15 %
RBC BLD AUTO: ABNORMAL
RHEUMATOID FACT SER QL: 22 IU/ML
SPHEROCYTES BLD QL SMEAR: NORMAL
TIBC SERPL-MCNC: 296 UG/DL
UIBC SERPL-MCNC: 254 UG/DL
VIT B12 SERPL-MCNC: 1413 PG/ML
WBC # FLD AUTO: 3.9 K/UL

## 2021-08-06 LAB
ANA SER IF-ACNC: NEGATIVE
CARDIOLIPIN IGM SER-MCNC: 12.3 MPL
CARDIOLIPIN IGM SER-MCNC: <5 GPL
CONFIRM: 29.5 SEC
DEPRECATED CARDIOLIPIN IGA SER: <5 APL
DRVVT IMM 1:2 NP PPP: NORMAL
DRVVT SCREEN TO CONFIRM RATIO: 1 RATIO
HBV SURFACE AB SER QL: NONREACTIVE
HBV SURFACE AG SER QL: NONREACTIVE
HCV AB SER QL: NONREACTIVE
HCV S/CO RATIO: 0.11 S/CO
SCREEN DRVVT: 29.4 SEC

## 2021-08-08 LAB
B2 GLYCOPROT1 IGA SERPL IA-ACNC: <5 SAU
B2 GLYCOPROT1 IGG SER-ACNC: <5 SGU
B2 GLYCOPROT1 IGM SER-ACNC: <5 SMU
CARDIOLIPIN AB SER IA-ACNC: NEGATIVE

## 2021-08-09 LAB
GLYCOPROTEIN IV ANTIBODY: NEGATIVE
HLA CLASS 1 AB: NEGATIVE
IA/IIA AB: NORMAL
IB/IX AB: NEGATIVE
IIB/IIIA AB: POSITIVE

## 2021-08-10 ENCOUNTER — APPOINTMENT (OUTPATIENT)
Dept: ORTHOPEDIC SURGERY | Facility: CLINIC | Age: 71
End: 2021-08-10
Payer: MEDICARE

## 2021-08-10 VITALS — HEIGHT: 55 IN | RESPIRATION RATE: 16 BRPM | BODY MASS INDEX: 28.23 KG/M2 | WEIGHT: 122 LBS

## 2021-08-10 DIAGNOSIS — M70.61 TROCHANTERIC BURSITIS, RIGHT HIP: ICD-10-CM

## 2021-08-10 LAB
H PYLORI AB SER-ACNC: 11.2 UNITS
H PYLORI IGA SER-ACNC: 6.9 UNITS

## 2021-08-10 PROCEDURE — 99213 OFFICE O/P EST LOW 20 MIN: CPT

## 2021-08-10 PROCEDURE — 73502 X-RAY EXAM HIP UNI 2-3 VIEWS: CPT | Mod: RT

## 2021-08-11 ENCOUNTER — NON-APPOINTMENT (OUTPATIENT)
Age: 71
End: 2021-08-11

## 2021-08-12 ENCOUNTER — APPOINTMENT (OUTPATIENT)
Dept: UROLOGY | Facility: CLINIC | Age: 71
End: 2021-08-12
Payer: MEDICARE

## 2021-08-12 VITALS
DIASTOLIC BLOOD PRESSURE: 68 MMHG | OXYGEN SATURATION: 98 % | SYSTOLIC BLOOD PRESSURE: 110 MMHG | TEMPERATURE: 97.2 F | HEART RATE: 85 BPM

## 2021-08-12 LAB — APTT HEX PL PPP: NEGATIVE

## 2021-08-12 PROCEDURE — 99214 OFFICE O/P EST MOD 30 MIN: CPT

## 2021-08-12 NOTE — ASSESSMENT
[FreeTextEntry1] : I discussed the findings and options with Ms. BEVERLY FINE in detail.\par \par To further evaluate her kidney function, she should have a repeat NM Renal Scan with Lasix. I have provided the appropriate requisition. I would then like to see her afterwards. \par \par Case discussed with Dr. Mohan and Dr. Winter.

## 2021-08-12 NOTE — HISTORY OF PRESENT ILLNESS
[FreeTextEntry1] : Ms. BEVERLY THOMPSON comes in today for her urologic follow-up. Ms. Thompson underwent a left ureteroscopy to address a 7mm proximal left ureteral stone.  Her stent was removed 2 weeks ago and her hematuria has now completely resolved.  She presents today to discuss her right-side long-standing hydronephrosis and equal split function identified on a recent NM renal scan, (done without lasix in the setting of ATN).  \par \par She is  1, para 0. \par \par Recent History:\par Initially presented with complaints of intermittent episodes of gross hematuria. She reported episodes occurred , 5/15, and  with no associated pelvic pain, back pain, fever, or chills. She also stated that microscopic hematuria has been noted on recent urinalysis (records show 23 /HPF RBC). She stated that she had an episode of gross hematuria many years ago in setting of UTI, but reported last UTI was 15-20 years ago. No hx stones. \par \par Of note, pt was admitted to the hospital last year (2020) s/p fall. She stated that she injured her foot and had a subdural hematoma at the time. She was later discharged to a rehab center, but then fell out of wheelchair and broke hip. While in the hospital, she had a CT scan which demonstrated "severe right hydronephrosis and dilation of proximal ureter unchanged." She stated that this has been noted repeatedly over last ten years. She is unsure if it was ever evaluated. \par \par She presented 2021 for cystoscopy evaluation. Cystoscopy was unremarkable. We reviewed CTabd/pelvis scan 2021 which demonstrated moderate to severe right sided hydronephrosis to level of the UPJ and mild left-sided hydronephrosis. Also demonstrated b/l stones including tiny calcified stone noted just proximal to UPJ on right side and 0.4 x 0.3 x 0.7 cm stone present at the proximal left ureter at L4 level. She has longstanding hx of severe right hydronephrosis secondary to presumed long-standing UPJ obstruction.  \par \par UCx 2021: negative\par Ucx and Cytology 2021: negative \par \par Sexually active\par PMH: blood platelet disorder, insufficient bone regeneration, depression \par SH: right femur (), sigmoid colon resection (2015) \par FH: colon cancer (mother), pancreatic cancer (father), Parkinson's (father), depression (sister), unsure if sister had kidney or gallbladder stones\par Social History:  former smoker (1ppd x 40 years quit 7), no EtOH, (former heavy)

## 2021-08-12 NOTE — ADDENDUM
[FreeTextEntry1] : A portion of this note was written by [Mike Thrasher] on 08/12/2021 acting as a scribe for Dr. Ramirez.\par \par I have personally reviewed the chart and agree that the record accurately reflects my personal performance of the history, physical exam, assessment, and plan.

## 2021-08-15 PROBLEM — M70.61 GREATER TROCHANTERIC BURSITIS OF RIGHT HIP: Status: ACTIVE | Noted: 2020-12-28

## 2021-08-16 LAB
BACTERIA UR CULT: NORMAL
BILIRUB UR QL STRIP: NORMAL
CLARITY UR: CLEAR
COLLECTION METHOD: NORMAL
GLUCOSE UR-MCNC: NORMAL
HCG UR QL: 0.2 EU/DL
HGB UR QL STRIP.AUTO: NORMAL
KETONES UR-MCNC: NORMAL
LEUKOCYTE ESTERASE UR QL STRIP: NORMAL
NITRITE UR QL STRIP: NORMAL
PH UR STRIP: 6.5
PROT UR STRIP-MCNC: NORMAL
SP GR UR STRIP: 1.01

## 2021-08-26 ENCOUNTER — APPOINTMENT (OUTPATIENT)
Dept: NEPHROLOGY | Facility: CLINIC | Age: 71
End: 2021-08-26
Payer: MEDICARE

## 2021-08-26 PROCEDURE — 99214 OFFICE O/P EST MOD 30 MIN: CPT

## 2021-08-26 RX ORDER — NITROFURANTOIN (MONOHYDRATE/MACROCRYSTALS) 25; 75 MG/1; MG/1
100 CAPSULE ORAL
Qty: 10 | Refills: 0 | Status: DISCONTINUED | COMMUNITY
Start: 2021-07-21 | End: 2021-08-26

## 2021-08-29 NOTE — HISTORY OF PRESENT ILLNESS
[FreeTextEntry1] : 72yo with obstructive nephrolithiasis, chronic R hydronephrosis, chronic hyponatremia due to SIADH and at times superimposed polydipsia (in the setting of MAOi use) here for f/u: \par \par She's been feeling well. Following fluid restriction. UreNa is unpalatable to her and costs $300 monthly, would like to switch back to Na tabs. \par Going for lasix NM renal scan for evaluation of her chronic R hydro as inpatient NM scan showed equal functioning of both kidneys.\par No flank pain or urinary sx

## 2021-08-29 NOTE — ASSESSMENT
[FreeTextEntry1] : 70yo with obstructive nephrolithiasis, chronic R hydronephrosis, chronic hyponatremia due to SIADH and at times superimposed polydipsia (in the setting of MAOi use) here for f/u: \par \par s/p ATN on CKD II/III in the setting of L ureteral stone extraction and stenting Cr back down to 1.2 egfr 44-51. u/a  bland w/o proteinuria. Going for repeat NM scan, followed closely by Dr Ramirez\par Will send litholink next visit\par \par SIADH - Na 137 on 1/2 pack of 15g UreNa but doesn't like the taste and coasts $300 monthly. \par \par HTN well controlled\par \par Plan\par - restart 1gm BID NaCl tabs instead of UreNa, recheck labs before next visit in 3m, if Na normal can consider stopping Na tabs and stick to just fluid restriction

## 2021-08-29 NOTE — REVIEW OF SYSTEMS
[Anxiety] : anxiety [Depression] : depression [Negative] : Heme/Lymph [de-identified] : controlled

## 2021-08-29 NOTE — PHYSICAL EXAM
[General Appearance - Alert] : alert [General Appearance - In No Acute Distress] : in no acute distress [Extraocular Movements] : extraocular movements were intact [Jugular Venous Distention Increased] : there was no jugular-venous distention [Exaggerated Use Of Accessory Muscles For Inspiration] : no accessory muscle use [Heart Sounds] : normal S1 and S2 [Auscultation Breath Sounds / Voice Sounds] : lungs were clear to auscultation bilaterally [Edema] : there was no peripheral edema [No CVA Tenderness] : no ~M costovertebral angle tenderness [Involuntary Movements] : no involuntary movements were seen [] : no rash [No Focal Deficits] : no focal deficits [Oriented To Time, Place, And Person] : oriented to person, place, and time [Impaired Insight] : insight and judgment were intact

## 2021-09-22 ENCOUNTER — LABORATORY RESULT (OUTPATIENT)
Age: 71
End: 2021-09-22

## 2021-09-22 ENCOUNTER — NON-APPOINTMENT (OUTPATIENT)
Age: 71
End: 2021-09-22

## 2021-09-22 ENCOUNTER — APPOINTMENT (OUTPATIENT)
Dept: HEMATOLOGY ONCOLOGY | Facility: CLINIC | Age: 71
End: 2021-09-22
Payer: MEDICARE

## 2021-09-22 VITALS
DIASTOLIC BLOOD PRESSURE: 68 MMHG | WEIGHT: 124 LBS | BODY MASS INDEX: 26.03 KG/M2 | OXYGEN SATURATION: 97 % | SYSTOLIC BLOOD PRESSURE: 122 MMHG | HEART RATE: 82 BPM | HEIGHT: 58 IN | TEMPERATURE: 97.1 F

## 2021-09-22 LAB
APTT BLD: 36.3 SEC
INR PPP: 0.99
PT BLD: 11.9 SEC
RBC # BLD: 3.86 M/UL
RETICS # AUTO: 1.8 %
RETICS AGGREG/RBC NFR: 69.1 K/UL

## 2021-09-22 PROCEDURE — 99214 OFFICE O/P EST MOD 30 MIN: CPT | Mod: 25

## 2021-09-22 PROCEDURE — 36415 COLL VENOUS BLD VENIPUNCTURE: CPT

## 2021-09-22 NOTE — END OF VISIT
[FreeTextEntry3] : All medical record entries made by the Scribe were at my, Dr. Jasmin West, direction and personally dictated by me on 09/22/2021. I have reviewed the chart and agree that the record accurately reflects my personal performance of the history, physical exam, assessment and plan. I have also personally directed, reviewed, and agreed with the chart.

## 2021-09-22 NOTE — REASON FOR VISIT
[Follow-Up Visit] : a follow-up visit for [FreeTextEntry2] : anemia, thrombocytopenia, macrocytosis, bruising, elevated PTT

## 2021-09-22 NOTE — REVIEW OF SYSTEMS
[Fatigue] : fatigue [Joint Pain] : joint pain [Joint Stiffness] : joint stiffness [Easy Bruising] : a tendency for easy bruising [Negative] : Allergic/Immunologic [Depression] : depression [Fever] : no fever [Chills] : no chills [Night Sweats] : no night sweats [Recent Change In Weight] : ~T no recent weight change [Easy Bleeding] : no tendency for easy bleeding [FreeTextEntry7] : no blood in stool [FreeTextEntry8] : no blood in urine [FreeTextEntry9] : Right hip and thigh pain [de-identified] : bruising

## 2021-09-22 NOTE — PHYSICAL EXAM
[Normal] : affect appropriate [de-identified] : RRR, S1, S2, murmur, without ectopy [de-identified] : Several different sized ecchymoses on both upper and lower extremities

## 2021-09-22 NOTE — ASSESSMENT
[FreeTextEntry1] : Patient is a 71 year old female with a history of hyponatremia, sigmoid colon resection for a malignant polyp in 2015, thoracic aortic aneurysm, clinical depression, and chronic thrombocytopenia for over 10 years, who presents for follow-up of anemia and  thrombocytopenia. A recent evaluation was positive for IIb/IIIa platelet antibodies, weakly positive rheumatoid factor, and slightly elevated PTT. Hepatitis screen, lupus anticoagulant, and other phospholipid antibodies were negative. Patient's chronic thrombocytopenia is likely on an autoimmune basis.  A low grade myelodysplasia remains a possibility. Anemia is fairly recent and occurred after significant hematuria from effects and management of renal calculus. Hemoglobin has been  improving since patient was discharged in July. Have ordered APTT, CBC, CMP, ferritin, haptoglobin, iron panel, LDH, PTT/INR, reticulocyte count, and sed rate. Patient was advised to call office for results and next appointment date.

## 2021-09-22 NOTE — HISTORY OF PRESENT ILLNESS
[de-identified] : Patient is a 71 year old female with a history of hyponatremia, sigmoid colon resection for a malignant polyp in 2015, thoracic aortic aneurysm, clinical depression, and chronic thrombocytopenia for over 10 years, who presents for follow-up of anemia and thrombocytopenia. Patient had been seen in the past by hematologist Dr. Miki Billy at [facility], and patient states that an evaluation, which did not include bone marrow aspirate/biopsy, was non-diagnostic. Patient's baseline platelet count over the last several years has been between 85,000 and 105,000. Patient was seen in [facility] in early July 2021, at which time she was admitted for renal calculi, gross hematuria, and hyponatremia. At the time of discharge from the hospital, the white blood count was 8.28, hemoglobin was 8.1, hematocrit 25.3, and platelet count was 383,000, likely reactive to the urologic disorder and acute bleeding. Patient had her ureteral stent removed shortly after discharge, and has since recovered urologically. Blood results drawn by her nephrologist Dr. Winter on August 17 revealed the white blood count was 5.13, MCV 96.9, hemoglobin was improved at 10.6, and platelet count back at patient's baseline at 101,000. Comprehensive metabolic panel revealed sodium of 137, BUN 46. Earlier that month on August 4, a platelet antibody panel was positive for platelet antibody IIb/IIIa. B12 was 1413, folate normal. Iron panel was normal and ferritin was elevated, likely an acute phase reactant. Hepatitis B and C screening was negative. Lupus anticoagulant was negative by both DRVVT and hexagonal phase, and remaining phospholipid antibodies were also negative. Rheumatoid factor was weakly positive. PTT was slightly elevated. Patient recently saw her urologist Dr. Katie Ramirez and a nuclear renal scan was done, the results of which are pending. Patient bruises easily, with and without trauma, and has several ecchymoses on her extremities. She complains of some fatigue and chronic right hip discomfort (s/p right hip fracture in 6/2020), but otherwise feels generally well with no acute complaints. Denies bleeding from nose or mouth, hematuria, hematochezia, fever, sweats, chills or recent infections. Of note, patient saw her dentist yesterday for a routine visit, and reports no excessive bleeding.

## 2021-09-23 ENCOUNTER — NON-APPOINTMENT (OUTPATIENT)
Age: 71
End: 2021-09-23

## 2021-09-23 LAB
ALBUMIN SERPL ELPH-MCNC: 4.6 G/DL
ALP BLD-CCNC: 55 U/L
ALT SERPL-CCNC: 14 U/L
ANION GAP SERPL CALC-SCNC: 12 MMOL/L
AST SERPL-CCNC: 24 U/L
BASOPHILS # BLD AUTO: 0.11 K/UL
BASOPHILS # BLD AUTO: 0.11 K/UL
BASOPHILS NFR BLD AUTO: 2.6 %
BASOPHILS NFR BLD AUTO: 2.6 %
BILIRUB SERPL-MCNC: 0.2 MG/DL
BUN SERPL-MCNC: 28 MG/DL
CALCIUM SERPL-MCNC: 9.4 MG/DL
CHLORIDE SERPL-SCNC: 103 MMOL/L
CO2 SERPL-SCNC: 24 MMOL/L
CREAT SERPL-MCNC: 1.29 MG/DL
EOSINOPHIL # BLD AUTO: 0.18 K/UL
EOSINOPHIL # BLD AUTO: 0.18 K/UL
EOSINOPHIL NFR BLD AUTO: 4.4 %
EOSINOPHIL NFR BLD AUTO: 4.4 %
ERYTHROCYTE [SEDIMENTATION RATE] IN BLOOD BY WESTERGREN METHOD: 25 MM/HR
FERRITIN SERPL-MCNC: 130 NG/ML
GLUCOSE SERPL-MCNC: 94 MG/DL
HAPTOGLOB SERPL-MCNC: 53 MG/DL
HCT VFR BLD CALC: 36.8 %
HGB BLD-MCNC: 11.3 G/DL
HYPOCHROMIA BLD QL SMEAR: SLIGHT
IRON SATN MFR SERPL: 18 %
IRON SERPL-MCNC: 52 UG/DL
LDH SERPL-CCNC: 214 U/L
LYMPHOCYTES # BLD AUTO: 0.59 K/UL
LYMPHOCYTES # BLD AUTO: 0.59 K/UL
LYMPHOCYTES NFR BLD AUTO: 14.2 %
LYMPHOCYTES NFR BLD AUTO: 14.2 %
MAN DIFF?: NORMAL
MCHC RBC-ENTMCNC: 29.3 PG
MCHC RBC-ENTMCNC: 30.7 GM/DL
MCV RBC AUTO: 95.3 FL
METAMYELOCYTES # FLD: 0.9 %
MONOCYTES # BLD AUTO: 0.48 K/UL
MONOCYTES # BLD AUTO: 0.48 K/UL
MONOCYTES NFR BLD AUTO: 11.5 %
MONOCYTES NFR BLD AUTO: 11.5 %
MSMEAR: NORMAL
MYELOCYTES NFR BLD: 6.2 %
NEUTROPHILS # BLD AUTO: 2.49 K/UL
NEUTROPHILS # BLD AUTO: 2.49 K/UL
NEUTROPHILS NFR BLD AUTO: 57.5 %
NEUTROPHILS NFR BLD AUTO: 57.5 %
NEUTS BAND NFR BLD MANUAL: 2.7 %
OVALOCYTES BLD QL SMEAR: SLIGHT
PLAT MORPH BLD: ABNORMAL
PLATELET # BLD AUTO: 91 K/UL
POIKILOCYTOSIS BLD QL SMEAR: SLIGHT
POLYCHROMASIA BLD QL SMEAR: SLIGHT
POTASSIUM SERPL-SCNC: 4.3 MMOL/L
PROT SERPL-MCNC: 7.2 G/DL
RBC # BLD: 3.86 M/UL
RBC # FLD: 14.2 %
RBC BLD AUTO: ABNORMAL
SODIUM SERPL-SCNC: 140 MMOL/L
TIBC SERPL-MCNC: 293 UG/DL
UIBC SERPL-MCNC: 241 UG/DL
WBC # FLD AUTO: 4.14 K/UL

## 2021-09-29 ENCOUNTER — NON-APPOINTMENT (OUTPATIENT)
Age: 71
End: 2021-09-29

## 2021-10-04 NOTE — H&P ADULT - DOES THIS PATIENT HAVE A HISTORY OF OR HAS BEEN DX WITH HEART FAILURE?
Interval History: No significant events over night    Review of Systems  Unable to obtain a complete ROS due to level of consciousness.  Objective:     Vitals:  Temp: 98.5 °F (36.9 °C) (07/13/17 1100)  Pulse: 67 (07/13/17 1200)  Resp: 16 (07/13/17 1200)  BP: 129/70 (07/13/17 1200)  SpO2: 99 % (07/13/17 1200)    Temp:  [98.1 °F (36.7 °C)-99.8 °F (37.7 °C)] 98.5 °F (36.9 °C)  Pulse:  [67-96] 67  Resp:  [16] 16  SpO2:  [96 %-100 %] 99 %  BP: ()/(55-93) 129/70         Vent Mode: A/C  Oxygen Concentration (%):  [40] 40  Resp Rate Total:  [16 br/min] 16 br/min  Vt Set:  [550 mL] 550 mL  PEEP/CPAP:  [5 cmH20] 5 cmH20  Pressure Support:  [0 cmH20] 0 cmH20  Mean Airway Pressure:  [8.8 cmH20-9.3 cmH20] 9.1 cmH20    07/12 0701 - 07/13 0700  In: 4670 [I.V.:1800]  Out: 2810 [Urine:2810]    Physical Exam  GA: Unarousable, appears asleep    HEENT: No scleral icterus or JVD.   Pulmonary: Clear to auscultation A/L. No wheezing, crackles, or rhonchi, ventilator dependent  Cardiac: RRR S1 & S2 w/o rubs/murmurs/gallops.   Abdominal: Bowel sounds present x 4. No appreciable hepatosplenomegaly.  Skin: No jaundice, rashes, or visible lesions.  Neuro:  --GCS: E1 V1 M1  --Mental Status: ANASTASIIA   --Pupils 3mm, PERRL.   --LUE strength: 1/5  --RUE strength: 1/5  --LLE strength: 1/5  --RLE strength: 1/5    Medications:  Continuous Scheduled  acyclovir 5%  6x Daily   [START ON 7/14/2017] amlodipine 5 mg Daily   chlorhexidine 15 mL QID   diphenhydrAMINE 25 mg Q24H   enoxparin 40 mg Q24H   folic acid-vit B6-vit B12 2.5-25-2 mg 1 tablet Daily   Immune Globulin G (IGG)-PRO-IGA 10 % injection (Privigen) 400 mg/kg/day (Dosing Weight) Q24H   insulin detemir 10 Units Daily   levothyroxine 75 mcg Daily   lisinopril 40 mg Daily   pantoprazole 40 mg BID   piperacillin-tazobactam 4.5 g in dextrose 5 % 100 mL IVPB (ready to mix system) 4.5 g Q8H   polyethylene glycol 17 g Daily   senna-docusate 8.6-50 mg 2 tablet BID   sertraline 50 mg Daily   sodium  chloride 2 g Q6H   white petrolatum-mineral oil  Q8H   PRN  sodium chloride  Q24H PRN   sodium chloride  Q24H PRN   sodium chloride  Q24H PRN   sodium chloride  Q24H PRN   acetaminophen 325 mg Q4H PRN   acetaminophen 650 mg Q6H PRN   dextrose 50% 12.5 g PRN   fentaNYL 200 mcg On Call Procedure   fentaNYL 25 mcg Q2H PRN   glucagon (human recombinant) 1 mg PRN   heparin, porcine (PF) 1,000 Units PRN   hydrALAZINE 10 mg Q6H PRN   insulin aspart 1-10 Units Q4H PRN   labetalol 10 mg Q4H PRN   magnesium oxide 800 mg PRN   magnesium oxide 800 mg PRN   midazolam (PF) 6 mg On Call Procedure   ondansetron 4 mg Q8H PRN   potassium chloride 10% 40 mEq PRN   potassium chloride 10% 40 mEq PRN   potassium chloride 10% 60 mEq PRN   potassium, sodium phosphates 2 packet PRN   potassium, sodium phosphates 2 packet PRN   potassium, sodium phosphates 2 packet PRN   rocuronium 50 mg On Call Procedure     Today I personally reviewed pertinent medications, lines/drains/airways, lab results, notably:         no 18

## 2021-10-07 ENCOUNTER — LABORATORY RESULT (OUTPATIENT)
Age: 71
End: 2021-10-07

## 2021-10-08 ENCOUNTER — APPOINTMENT (OUTPATIENT)
Dept: UROLOGY | Facility: CLINIC | Age: 71
End: 2021-10-08
Payer: MEDICARE

## 2021-10-08 VITALS — TEMPERATURE: 98.2 F | DIASTOLIC BLOOD PRESSURE: 74 MMHG | SYSTOLIC BLOOD PRESSURE: 112 MMHG | HEART RATE: 67 BPM

## 2021-10-08 LAB
BILIRUB UR QL STRIP: NORMAL
CLARITY UR: CLEAR
COLLECTION METHOD: NORMAL
GLUCOSE UR-MCNC: NORMAL
HCG UR QL: 0.2 EU/DL
HGB UR QL STRIP.AUTO: NORMAL
KETONES UR-MCNC: NORMAL
LEUKOCYTE ESTERASE UR QL STRIP: NORMAL
NITRITE UR QL STRIP: NORMAL
PH UR STRIP: 5.5
PROT UR STRIP-MCNC: NORMAL
SP GR UR STRIP: 1.02

## 2021-10-08 PROCEDURE — 99214 OFFICE O/P EST MOD 30 MIN: CPT

## 2021-10-08 NOTE — PHYSICAL EXAM
[General Appearance - Well Developed] : well developed [Normal Appearance] : normal appearance [Abdomen Soft] : soft [Abdomen Tenderness] : non-tender [Costovertebral Angle Tenderness] : no ~M costovertebral angle tenderness [FreeTextEntry1] : Multiple bruises on patients arms [Heart Rate And Rhythm] : Heart rate and rhythm were normal [] : no respiratory distress [Oriented To Time, Place, And Person] : oriented to person, place, and time [Normal Station and Gait] : the gait and station were normal for the patient's age [No Focal Deficits] : no focal deficits

## 2021-10-08 NOTE — HISTORY OF PRESENT ILLNESS
[FreeTextEntry1] : 70 yo female referred by Dr. Ramirez for hx of right hydronephrosis.  The patient was first seen by Dr. Ramirez on June 2nd, 2021 for multiple episodes of gross hematuria in May 2021.  CTU workup revealed a moderate-severe right hydronephrosis as well as mild left hydronephrosis secondary to a 7 mm left proximal ureteral stone.  She eventually underwent left ureteroscopy with stent placement.    She was admitted to the hospital several days after her URS with gross hematuria and clot retention as well as renal failure.  She recovered from this and eventually underwent stent removal.  Her Cr is now 1.29 (eGFR = 42) as of 9/22/21.  She denies any recent episodes of hematuria or dysuria. She has no voiding complaints at this time. \par \par She had a renal scan performed on 9/8/21 which shows a right UPJ obstruction.  The split function is 53% left and 47% right.  After further inquiring about her right hydronephrosis, the patient states that she has known about this for many years.  She has been getting her pelvic US and mammography performed by Dr. Srivastava for many years, and the right sided hydronephrosis has been stable and present since at least 1999 (see uploaded report).  She has never had flank pain or recurrent pyelonephritis.  \par \par Of note, the patient has been seeing Dr. West for w/u of easy bruising with a long standing hx of thrombocytopenia and newly diagnosed IIb/IIa platelet antibodies.  She may be scheduled for bone marrow biopsy for further evaluation.

## 2021-10-08 NOTE — ASSESSMENT
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  She has never had symptoms from the right kidney.  Her recent intervention was on the left kidney for a stone.  Despite the chronic nature of the right hydronephrosis and evidence of obstruction on renal scan the kidney continues to function relatively well.  I discussed the NM renal scan findings with Dr. Hicks from [facility] NM department and he feels the renal scan study could have been performed with lasix administration after a longer period of time to more accurately reflect whether the kidney is obstructed or not, and recommends the study be repeated.  \par \par I had a conversation with the patient about treatment options of this chronic hydronephrosis.  We can continue to observe the kidney and get periodic renal scans to assess function, or we can consider reconstructive surgery of the the ureter, i.e. pyeloplasty.  This condition is likely congenital in nature, but is at least 20 years old and has been completely asymptomatic.  She is currently undergoing a hematologic evaluation from thrombocytopenia which has resulting his het to very easily bruise. Surgery, in this setting, would put her at a high risk for bleeding complications. She would like to hold off on any intervention at this time for her chronic hydronephrosis pending a complete evaluation by her hematologist.  We will plan to repeat a NM renal scan with lasix in a month at [facility].  She will let me know if she develops and flank pain, fevers, or chills.  \par \par Plan:\par 1. F/u with hematology as scheduled\par 2. NM renal scan in 1 month\par 3. F/u after # 2

## 2021-10-11 NOTE — HISTORY OF PRESENT ILLNESS
[facility] [address]     melanoma    Cancer ([address]     prostate, and colon    Diabetes [address] [address] [facility]     Brook Clifton MD at [address] [facility] N/A 5/27/2019    COLONOSCOPY WITH STENT performed by Park Helms MD at [address] [facility] N/A 11/30/2020    COLONOSCOPY WITH BIOPSY performed by Park Helms MD at [address] [place] [address] N/A 5/19/2021    FLEXIBLE SIGMOIDOSCOPY WITH TATTOO performed by Brook Clifton MD at [address] [facility] N/A 8/18/2021    COLONOSCOPY performed by Brook Clifton MD at [address] [place] [address] 9/29/2021    ILEOSTOMY REVERSAL performed by Brook Clifton MD at [facility]  HERNIA REPAIR Right 8/13/2019    REPAIR OF RIGHT INGUINAL HERNIA WITH MESH performed by Fernando Trent MD at [address] [facility] N/A 1/19/2021    INSERTION OF SUBCUTANEOUS VENOUS PORT performed by Fernando Trent MD at [address] [place] [address]  10/27/2009    SMALL INTESTINE SURGERY N/A 5/29/2019    BOWEL RESECTION SIGMOID COLECTOMY,  COLOSTOMY performed by Fernando Trent MD at [address] [facility] N/A 11/19/2019    REVERSAL OF PARIKH PROCEDURE, LYSIS OF ADHESIONS performed by Fernando Trent MD at [facility]     Current Medications:   Current Facility-Administered Medications: sodium chloride flush 0.9 % injection 5-40 mL, 5-40 mL, IntraVENous, 2 times per day  sodium chloride flush 0.9 % injection 5-40 mL, 5-40 mL, IntraVENous, PRN  0.9 % sodium chloride infusion, 25 mL, IntraVENous, PRN  enoxaparin (LOVENOX) injection 40 mg, 40 mg, SubCUTAneous, Daily  ondansetron (ZOFRAN-ODT) disintegrating tablet 4 mg, 4 mg, Oral, Q8H PRN **OR** ondansetron (ZOFRAN) injection 4 mg, 4 mg, IntraVENous, Q6H PRN  0.9 % sodium chloride infusion, , IntraVENous, Continuous  HYDROmorphone (DILAUDID) injection 0.5 mg, 0.5 mg, IntraVENous, Q3H PRN **OR** HYDROmorphone (DILAUDID) injection 1 mg, 1 mg, IntraVENous, Q3H PRN  insulin lispro (HUMALOG) injection vial 0-12 Units, 0-12 Units, SubCUTAneous, Q4H  insulin lispro (HUMALOG) injection vial 0-6 Units, 0-6 Units, SubCUTAneous, Nightly  glucose (GLUTOSE) 40 % oral gel 15 g, 15 g, Oral, PRN  dextrose 50 % IV solution, 12.5 g, IntraVENous, PRN  glucagon (rDNA) injection 1 mg, 1 mg, IntraMUSCular, PRN  dextrose 5 % solution, 100 mL/hr, IntraVENous, PRN  Allergies:  Patient has no known allergies. Social History:   TOBACCO:   reports that he quit smoking about 41 years ago. His smoking use included cigarettes. He has never used smokeless tobacco.  ETOH:   reports no history of alcohol use. DRUGS:   reports no history of drug use.   Family History:       Problem Relation Age of Onset    Cancer Mother        REVIEW OF SYSTEMS:    CONSTITUTIONAL:  negative for  fevers, chills and sweats  EYES:  negative  HEENT:  negative  RESPIRATORY:  negative  CARDIOVASCULAR:  negative  GASTROINTESTINAL:  positive for nausea and abdominal pain  GENITOURINARY:  negative  MUSCULOSKELETAL:  negative  NEUROLOGICAL:  negative  BEHAVIOR/PSYCH:  negative    PHYSICAL EXAM:    VITALS:  BP (!) 144/82   Pulse 84   Resp 19   SpO2 96%   CONSTITUTIONAL:  awake, alert, cooperative, no apparent distress, and appears stated age  EYES:  Lids and lashes normal, pupils equal, round and reactive to light, extra ocular muscles intact, sclera clear, conjunctiva normal  ENT:  Normocephalic, without obvious abnormality, atraumatic, sinuses nontender on palpation, external ears without lesions, oral pharynx with moist mucus membranes, tonsils without erythema or exudates, gums normal and good dentition. NECK:  Supple, symmetrical, trachea midline, no adenopathy, thyroid symmetric, not enlarged and no tenderness, skin normal  HEMATOLOGIC/LYMPHATICS:  no cervical lymphadenopathy  BACK:  Symmetric, no curvature, spinous processes are non-tender on palpation, paraspinous muscles are non-tender on palpation, no costal vertebral tenderness  LUNGS:  No increased work of breathing, good air exchange, clear to auscultation bilaterally, no crackles or wheezing  CARDIOVASCULAR:  Normal apical impulse, regular rate and rhythm, normal S1 and S2, no S3 or S4, and no murmur noted  ABDOMEN: Abdomen is soft, incisions are healing well. Tenderness in right side as well as left upper quadrant. No guarding present. MUSCULOSKELETAL:  There is no redness, warmth, or swelling of the joints. Full range of motion noted. Motor strength is 5 out of 5 all extremities bilaterally.   Tone is normal.  NEUROLOGIC: Awake alert and oriented  SKIN:  no bruising or bleeding  DATA:    CBC:   Lab Results   Component Value [FreeTextEntry1] : 70 year old woman with history of osteoporosis\par \par History of osteoporosis, currently treated with prolia, treated by PMD.\par Right hip fracture in June 2020, fell out of wheelchair while in rehab\par Missed dose in July , currently on Prolia\par Treated with actonel previously less than 5 years ago \par \par Patient with recent right ankle fracture, fell out bed\par Also hit head, had subdural hematoma, went to rehab for TBI\par Put in cast, Dr. Danielson following patient's fracture\par \par Dr. Gray at [facility], previously seen by Dr. Thorpe\par Last bone density was about two years ago\par \par Patient is able to walk small amounts, not everyday\par No history of nephrolithiasis\par Cancerous polyp s/p colectomy in 2015, no history of radiation treatment in past\par No thyroid disease or parathyroid disease\par Takes mVI\par B complex\par Max D 44298 once per week\par Mother with osteoporosis, maybe sister as well\par \par No family history of hip fracture\par Previous alcohol use, quit when admitted to hospital\par Previous tobacco, quit five years ago\par  

## 2021-10-18 ENCOUNTER — OUTPATIENT (OUTPATIENT)
Dept: OUTPATIENT SERVICES | Facility: HOSPITAL | Age: 71
LOS: 1 days | End: 2021-10-18
Payer: MEDICARE

## 2021-10-18 DIAGNOSIS — Z98.890 OTHER SPECIFIED POSTPROCEDURAL STATES: Chronic | ICD-10-CM

## 2021-10-18 PROCEDURE — 78708 K FLOW/FUNCT IMAGE W/DRUG: CPT

## 2021-10-18 PROCEDURE — 78708 K FLOW/FUNCT IMAGE W/DRUG: CPT | Mod: 26,MH

## 2021-10-18 PROCEDURE — A9562: CPT

## 2021-10-20 ENCOUNTER — NON-APPOINTMENT (OUTPATIENT)
Age: 71
End: 2021-10-20

## 2021-12-08 ENCOUNTER — APPOINTMENT (OUTPATIENT)
Dept: NEPHROLOGY | Facility: CLINIC | Age: 71
End: 2021-12-08
Payer: MEDICARE

## 2021-12-08 VITALS — SYSTOLIC BLOOD PRESSURE: 158 MMHG | HEART RATE: 75 BPM | OXYGEN SATURATION: 97 % | DIASTOLIC BLOOD PRESSURE: 75 MMHG

## 2021-12-08 DIAGNOSIS — N20.0 CALCULUS OF KIDNEY: ICD-10-CM

## 2021-12-08 PROCEDURE — 99215 OFFICE O/P EST HI 40 MIN: CPT

## 2021-12-08 RX ORDER — LORAZEPAM 2 MG/1
TABLET ORAL
Refills: 0 | Status: DISCONTINUED | COMMUNITY
End: 2021-12-08

## 2021-12-08 NOTE — PHYSICAL EXAM
[General Appearance - Alert] : alert [General Appearance - In No Acute Distress] : in no acute distress [Extraocular Movements] : extraocular movements were intact [Jugular Venous Distention Increased] : there was no jugular-venous distention [Exaggerated Use Of Accessory Muscles For Inspiration] : no accessory muscle use [Auscultation Breath Sounds / Voice Sounds] : lungs were clear to auscultation bilaterally [Heart Rate And Rhythm] : heart rate was normal and rhythm regular [Heart Sounds] : normal S1 and S2 [Heart Sounds Gallop] : no gallops [Murmurs] : no murmurs [Heart Sounds Pericardial Friction Rub] : no pericardial rub [Edema] : there was no peripheral edema [Abdomen Soft] : soft [Abdomen Tenderness] : non-tender [Abdomen Mass (___ Cm)] : no abdominal mass palpated [No CVA Tenderness] : no ~M costovertebral angle tenderness [Involuntary Movements] : no involuntary movements were seen [] : no rash [No Focal Deficits] : no focal deficits [Oriented To Time, Place, And Person] : oriented to person, place, and time [Impaired Insight] : insight and judgment were intact

## 2021-12-10 PROBLEM — N20.0 KIDNEY STONE ON LEFT SIDE: Status: ACTIVE | Noted: 2021-06-09

## 2021-12-10 RX ORDER — NORMAL SALT TABLETS 1 G/G
1 TABLET ORAL TWICE DAILY
Qty: 180 | Refills: 6 | Status: DISCONTINUED | COMMUNITY
Start: 2021-08-26 | End: 2021-12-10

## 2021-12-10 NOTE — HISTORY OF PRESENT ILLNESS
[FreeTextEntry1] : 72yo with obstructive nephrolithiasis, chronic R hydronephrosis, chronic hyponatremia due to SIADH (in the setting of MAOi use) and at times superimposed polydipsia here for f/u after hospitalization for ATN and acute hyponatremia s/p left ureteroscopy with stent placement for obstructing 7mm stone\par \par She's been feeling well. Following fluid restriction. Takes salt tabs 1g BID with fluid restriction to 1L. 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 No flank pain or urinary sx\par \par PCP: Dr. Foster Gray\par Uro: Dr. Eisenberg

## 2021-12-10 NOTE — ASSESSMENT
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 u/a  bland w/o proteinuria. S/p repeat NM scan, showing R kidney with 46% function, L 5 %. Cystatin C based eGFR 35ml/min noted to be significantly lower than creatinine based estimation, concern for worsening renal function. 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 \par Will send 24hr urine litholink collection next visit\par Advised to decrease coffee intake- d/t diuretic effect and oxalate as well as reduced her almond consumption\par \par SIADH - controlled\par Can dc salt tablets and continue fluid restriction to 1L daily\par \par HTN- BP elevated today in the office setting- feeling anxious today- initial measure 135/85\par \par RTC in 3 months

## 2021-12-15 ENCOUNTER — APPOINTMENT (OUTPATIENT)
Dept: HEMATOLOGY ONCOLOGY | Facility: CLINIC | Age: 71
End: 2021-12-15
Payer: MEDICARE

## 2021-12-15 VITALS
BODY MASS INDEX: 26.66 KG/M2 | OXYGEN SATURATION: 96 % | TEMPERATURE: 97.1 F | HEIGHT: 58 IN | HEART RATE: 82 BPM | SYSTOLIC BLOOD PRESSURE: 122 MMHG | WEIGHT: 127 LBS | DIASTOLIC BLOOD PRESSURE: 78 MMHG

## 2021-12-15 DIAGNOSIS — D62 ACUTE POSTHEMORRHAGIC ANEMIA: ICD-10-CM

## 2021-12-15 LAB
APTT BLD: 34.5 SEC
INR PPP: 0.92
PT BLD: 11.1 SEC
RBC # BLD: 4.03 M/UL
RETICS # AUTO: 2.2 %
RETICS AGGREG/RBC NFR: 86.6 K/UL

## 2021-12-15 PROCEDURE — 36415 COLL VENOUS BLD VENIPUNCTURE: CPT

## 2021-12-15 PROCEDURE — 99214 OFFICE O/P EST MOD 30 MIN: CPT | Mod: 25

## 2021-12-15 NOTE — HISTORY OF PRESENT ILLNESS
[de-identified] : Patient is a 71 year old female with a history of hyponatremia, sigmoid colon resection for a malignant polyp in 2015, thoracic aortic aneurysm, clinical depression, and chronic thrombocytopenia for over 10 years, who presents for follow-up of anemia and thrombocytopenia. Patient's baseline platelet count over the last several years has been between 85,000 and 105,000. She was seen in the past by hematologist Dr. Miki Billy at [facility], and states that an evaluation, which did not include bone marrow aspirate/biopsy, was non-diagnostic. Patient was admitted to [facility] in July 2021 with renal calculi, gross hematuria, and hyponatremia. At the time of discharge from the hospital, the hemoglobin was 8.1 and platelet count was 383,000, likely reactive to the urologic disorder and acute bleeding. Most recent blood results from December 1, ordered by her nephrologist Dr. Winter, revealed the hemoglobin was improved at 12.3, hematocrit was 40.6, and platelet count was 92,000. The white blood count and differential were normal. A basic metabolic panel was unremarkable. At her last visit in September, patient was evaluated for an elevated PTT. At that time, INR was normal at 0.99 and the PTT was again slightly elevated at 36.3 Appropriate factor assays revealed an increase factor VIII, normal factor IX normal factor XI, and normal factor XII activity. Recently, patient saw her urologists Dr. Katie Ramirez/Dr. Markell Eisenberg and a nuclear renal scan was done, which revealed a  right sided UPJ obstruction, though it was deemed no intervention would be necessary at this time. Patient bruises easily, with and without trauma, and has several ecchymoses on her extremities. She complains of some fatigue and chronic right hip discomfort (s/p right hip fracture in 6/2020), but otherwise feels generally well with no acute complaints. Denies bleeding from nose or mouth, hematuria, hematochezia, fever, sweats, chills or recent infections.

## 2021-12-15 NOTE — REASON FOR VISIT
[Follow-Up Visit] : a follow-up visit for [FreeTextEntry2] : Anemia, thrombocytopenia, coagulopathy, elevated PTT, easy bruising

## 2021-12-15 NOTE — REVIEW OF SYSTEMS
[Joint Pain] : joint pain [Joint Stiffness] : joint stiffness [Easy Bruising] : a tendency for easy bruising [Negative] : Allergic/Immunologic [Fever] : no fever [Chills] : no chills [Night Sweats] : no night sweats [Fatigue] : no fatigue [Recent Change In Weight] : ~T no recent weight change [Chest Pain] : no chest pain [Shortness Of Breath] : no shortness of breath [Abdominal Pain] : no abdominal pain [Easy Bleeding] : no tendency for easy bleeding [Swollen Glands] : no swollen glands [FreeTextEntry9] : Right hip  and thigh pain s/p femur fracture repair

## 2021-12-15 NOTE — END OF VISIT
[FreeTextEntry3] : All medical record entries made by the Scribe were at my, Dr. Jasmin West, direction and personally dictated by me on 12/15/2021. I have reviewed the chart and agree that the record accurately reflects my personal performance of the history, physical exam, assessment and plan. I have also personally directed, reviewed, and agreed with the chart.

## 2021-12-15 NOTE — ASSESSMENT
[FreeTextEntry1] : Patient is a 71 year old female with a history of hyponatremia, sigmoid colon resection for a malignant polyp in 2015, thoracic aortic aneurysm, clinical depression, and chronic thrombocytopenia for over 10 years, who presents for follow-up of anemia and thrombocytopenia. An evaluation was positive for IIb/IIIa platelet antibodies and weakly positive rheumatoid factor. Patient's chronic thrombocytopenia is likely on an autoimmune basis. A low grade myelodysplasia remains a possibility, as patient has not yet had a bone marrow biopsy/aspirate. Patient's anemia occurred recently after significant hematuria from effects and management of renal calculus. Hemoglobin has been improving since patient was discharged in July and is now within the normal range. Patient was also found to have a slightly elevated PTT, increased factor VIII activity, and normal activity of factors IX, XI, and XII. Will further evaluate with VWD studies, though less likely. Have ordered APTT, CBC, CMP, ferritin, iron panel, PTT/INR, reticulocyte count, VWF activity, VWF antigen, and VWF multimer. Patient was advised to call office for results and next appointment date.

## 2021-12-16 LAB
ALBUMIN SERPL ELPH-MCNC: 4.5 G/DL
ALP BLD-CCNC: 61 U/L
ALT SERPL-CCNC: 16 U/L
ANION GAP SERPL CALC-SCNC: 10 MMOL/L
AST SERPL-CCNC: 27 U/L
BASOPHILS # BLD AUTO: 0.09 K/UL
BASOPHILS # BLD AUTO: 0.09 K/UL
BASOPHILS NFR BLD AUTO: 2.6 %
BASOPHILS NFR BLD AUTO: 2.6 %
BILIRUB SERPL-MCNC: 0.2 MG/DL
BUN SERPL-MCNC: 34 MG/DL
CALCIUM SERPL-MCNC: 9.3 MG/DL
CHLORIDE SERPL-SCNC: 103 MMOL/L
CO2 SERPL-SCNC: 24 MMOL/L
CREAT SERPL-MCNC: 1.22 MG/DL
EOSINOPHIL # BLD AUTO: 0.03 K/UL
EOSINOPHIL # BLD AUTO: 0.03 K/UL
EOSINOPHIL NFR BLD AUTO: 0.9 %
EOSINOPHIL NFR BLD AUTO: 0.9 %
FERRITIN SERPL-MCNC: 87 NG/ML
GIANT PLATELETS BLD QL SMEAR: PRESENT
GLUCOSE SERPL-MCNC: 84 MG/DL
HCT VFR BLD CALC: 38.2 %
HGB BLD-MCNC: 11.6 G/DL
HYPOCHROMIA BLD QL SMEAR: SLIGHT
IRON SATN MFR SERPL: 18 %
IRON SERPL-MCNC: 58 UG/DL
LYMPHOCYTES # BLD AUTO: 0.74 K/UL
LYMPHOCYTES # BLD AUTO: 0.74 K/UL
LYMPHOCYTES NFR BLD AUTO: 20.2 %
LYMPHOCYTES NFR BLD AUTO: 20.2 %
MAN DIFF?: NORMAL
MCHC RBC-ENTMCNC: 28.8 PG
MCHC RBC-ENTMCNC: 30.4 GM/DL
MCV RBC AUTO: 94.8 FL
MONOCYTES # BLD AUTO: 0.51 K/UL
MONOCYTES # BLD AUTO: 0.51 K/UL
MONOCYTES NFR BLD AUTO: 14 %
MONOCYTES NFR BLD AUTO: 14 %
MSMEAR: NORMAL
MYELOCYTES NFR BLD: 2.6 %
NEUTROPHILS # BLD AUTO: 2.17 K/UL
NEUTROPHILS # BLD AUTO: 2.17 K/UL
NEUTROPHILS NFR BLD AUTO: 57.9 %
NEUTROPHILS NFR BLD AUTO: 57.9 %
NEUTS BAND NFR BLD MANUAL: 1.8 %
OVALOCYTES BLD QL SMEAR: SLIGHT
PLAT MORPH BLD: ABNORMAL
PLATELET # BLD AUTO: 79 K/UL
POIKILOCYTOSIS BLD QL SMEAR: SLIGHT
POLYCHROMASIA BLD QL SMEAR: SLIGHT
POTASSIUM SERPL-SCNC: 4.6 MMOL/L
PROT SERPL-MCNC: 7.1 G/DL
RBC # BLD: 4.03 M/UL
RBC # FLD: 15.4 %
RBC BLD AUTO: ABNORMAL
SODIUM SERPL-SCNC: 136 MMOL/L
SPHEROCYTES BLD QL SMEAR: SLIGHT
TIBC SERPL-MCNC: 330 UG/DL
UIBC SERPL-MCNC: 272 UG/DL
VWF AG PPP IA-ACNC: 152 %
VWF:RCO ACT/NOR PPP PL AGG: 170 %
WBC # FLD AUTO: 3.64 K/UL

## 2021-12-22 ENCOUNTER — NON-APPOINTMENT (OUTPATIENT)
Age: 71
End: 2021-12-22

## 2021-12-23 ENCOUNTER — NON-APPOINTMENT (OUTPATIENT)
Age: 71
End: 2021-12-23

## 2021-12-27 ENCOUNTER — NON-APPOINTMENT (OUTPATIENT)
Age: 71
End: 2021-12-27

## 2022-01-27 LAB — VWF MULTIMERS PPP IA-ACNC: NORMAL

## 2022-02-22 ENCOUNTER — EMERGENCY (EMERGENCY)
Facility: HOSPITAL | Age: 72
LOS: 1 days | Discharge: HOME | End: 2022-02-22
Attending: EMERGENCY MEDICINE | Admitting: EMERGENCY MEDICINE
Payer: MEDICARE

## 2022-02-22 VITALS
DIASTOLIC BLOOD PRESSURE: 72 MMHG | SYSTOLIC BLOOD PRESSURE: 125 MMHG | HEIGHT: 64 IN | OXYGEN SATURATION: 97 % | RESPIRATION RATE: 18 BRPM | WEIGHT: 125 LBS | HEART RATE: 88 BPM | TEMPERATURE: 98 F

## 2022-02-22 VITALS
DIASTOLIC BLOOD PRESSURE: 68 MMHG | HEART RATE: 77 BPM | TEMPERATURE: 98 F | OXYGEN SATURATION: 96 % | RESPIRATION RATE: 18 BRPM | SYSTOLIC BLOOD PRESSURE: 128 MMHG

## 2022-02-22 DIAGNOSIS — M25.561 PAIN IN RIGHT KNEE: ICD-10-CM

## 2022-02-22 DIAGNOSIS — L03.115 CELLULITIS OF RIGHT LOWER LIMB: ICD-10-CM

## 2022-02-22 DIAGNOSIS — S80.02XA CONTUSION OF LEFT KNEE, INITIAL ENCOUNTER: ICD-10-CM
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
## 2022-02-22 DIAGNOSIS — Z91.018 ALLERGY TO OTHER FOODS: ICD-10-CM

## 2022-02-22 DIAGNOSIS — S80.01XA CONTUSION OF RIGHT KNEE, INITIAL ENCOUNTER: ICD-10-CM
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
## 2022-02-22 DIAGNOSIS — I10 ESSENTIAL (PRIMARY) HYPERTENSION: ICD-10-CM

## 2022-02-22 DIAGNOSIS — Z98.890 OTHER SPECIFIED POSTPROCEDURAL STATES: Chronic | ICD-10-CM

## 2022-02-22 DIAGNOSIS — Z20.822 CONTACT WITH AND (SUSPECTED) EXPOSURE TO COVID-19: ICD-10-CM

## 2022-02-22 LAB
ALBUMIN SERPL ELPH-MCNC: 5 G/DL — SIGNIFICANT CHANGE UP (ref 3.3–5)
ALP SERPL-CCNC: 67 U/L — SIGNIFICANT CHANGE UP (ref 40–120)
ALT FLD-CCNC: 15 U/L — SIGNIFICANT CHANGE UP (ref 10–45)
ANION GAP SERPL CALC-SCNC: 12 MMOL/L — SIGNIFICANT CHANGE UP (ref 5–17)
AST SERPL-CCNC: 31 U/L — SIGNIFICANT CHANGE UP (ref 10–40)
BASOPHILS # BLD AUTO: 0.03 K/UL — SIGNIFICANT CHANGE UP (ref 0–0.2)
BASOPHILS NFR BLD AUTO: 0.6 % — SIGNIFICANT CHANGE UP (ref 0–2)
BILIRUB SERPL-MCNC: 0.5 MG/DL — SIGNIFICANT CHANGE UP (ref 0.2–1.2)
BUN SERPL-MCNC: 36 MG/DL — HIGH (ref 7–23)
CALCIUM SERPL-MCNC: 10 MG/DL — SIGNIFICANT CHANGE UP (ref 8.4–10.5)
CHLORIDE SERPL-SCNC: 99 MMOL/L — SIGNIFICANT CHANGE UP (ref 96–108)
CO2 SERPL-SCNC: 24 MMOL/L — SIGNIFICANT CHANGE UP (ref 22–31)
CREAT SERPL-MCNC: 1.31 MG/DL — HIGH (ref 0.5–1.3)
CRP SERPL-MCNC: <3 MG/L — SIGNIFICANT CHANGE UP (ref 0–4)
EOSINOPHIL # BLD AUTO: 0.22 K/UL — SIGNIFICANT CHANGE UP (ref 0–0.5)
EOSINOPHIL NFR BLD AUTO: 4.1 % — SIGNIFICANT CHANGE UP (ref 0–6)
ERYTHROCYTE [SEDIMENTATION RATE] IN BLOOD: 37 MM/HR — HIGH
GLUCOSE SERPL-MCNC: 104 MG/DL — HIGH (ref 70–99)
HCT VFR BLD CALC: 36.2 % — SIGNIFICANT CHANGE UP (ref 34.5–45)
HGB BLD-MCNC: 11.5 G/DL — SIGNIFICANT CHANGE UP (ref 11.5–15.5)
IMM GRANULOCYTES NFR BLD AUTO: 1.7 % — HIGH (ref 0–1.5)
LYMPHOCYTES # BLD AUTO: 0.81 K/UL — LOW (ref 1–3.3)
LYMPHOCYTES # BLD AUTO: 14.9 % — SIGNIFICANT CHANGE UP (ref 13–44)
MCHC RBC-ENTMCNC: 30 PG — SIGNIFICANT CHANGE UP (ref 27–34)
MCHC RBC-ENTMCNC: 31.8 GM/DL — LOW (ref 32–36)
MCV RBC AUTO: 94.5 FL — SIGNIFICANT CHANGE UP (ref 80–100)
MONOCYTES # BLD AUTO: 0.64 K/UL — SIGNIFICANT CHANGE UP (ref 0–0.9)
MONOCYTES NFR BLD AUTO: 11.8 % — SIGNIFICANT CHANGE UP (ref 2–14)
NEUTROPHILS # BLD AUTO: 3.63 K/UL — SIGNIFICANT CHANGE UP (ref 1.8–7.4)
NEUTROPHILS NFR BLD AUTO: 66.9 % — SIGNIFICANT CHANGE UP (ref 43–77)
NRBC # BLD: 0 /100 WBCS — SIGNIFICANT CHANGE UP (ref 0–0)
PLATELET # BLD AUTO: 115 K/UL — LOW (ref 150–400)
POTASSIUM SERPL-MCNC: 4.3 MMOL/L — SIGNIFICANT CHANGE UP (ref 3.5–5.3)
POTASSIUM SERPL-SCNC: 4.3 MMOL/L — SIGNIFICANT CHANGE UP (ref 3.5–5.3)
PROT SERPL-MCNC: 8.2 G/DL — SIGNIFICANT CHANGE UP (ref 6–8.3)
RBC # BLD: 3.83 M/UL — SIGNIFICANT CHANGE UP (ref 3.8–5.2)
RBC # FLD: 14.9 % — HIGH (ref 10.3–14.5)
SARS-COV-2 RNA SPEC QL NAA+PROBE: SIGNIFICANT CHANGE UP
SODIUM SERPL-SCNC: 135 MMOL/L — SIGNIFICANT CHANGE UP (ref 135–145)
WBC # BLD: 5.42 K/UL — SIGNIFICANT CHANGE UP (ref 3.8–10.5)
WBC # FLD AUTO: 5.42 K/UL — SIGNIFICANT CHANGE UP (ref 3.8–10.5)

## 2022-02-22 PROCEDURE — 85025 COMPLETE CBC W/AUTO DIFF WBC: CPT

## 2022-02-22 PROCEDURE — 87040 BLOOD CULTURE FOR BACTERIA: CPT

## 2022-02-22 PROCEDURE — U0003: CPT

## 2022-02-22 PROCEDURE — U0005: CPT

## 2022-02-22 PROCEDURE — 73564 X-RAY EXAM KNEE 4 OR MORE: CPT | Mod: 26,RT

## 2022-02-22 PROCEDURE — 36415 COLL VENOUS BLD VENIPUNCTURE: CPT

## 2022-02-22 PROCEDURE — 86140 C-REACTIVE PROTEIN: CPT

## 2022-02-22 PROCEDURE — 99283 EMERGENCY DEPT VISIT LOW MDM: CPT | Mod: 25

## 2022-02-22 PROCEDURE — 80053 COMPREHEN METABOLIC PANEL: CPT

## 2022-02-22 PROCEDURE — 73564 X-RAY EXAM KNEE 4 OR MORE: CPT

## 2022-02-22 PROCEDURE — 99284 EMERGENCY DEPT VISIT MOD MDM: CPT | Mod: 25

## 2022-02-22 PROCEDURE — 85652 RBC SED RATE AUTOMATED: CPT

## 2022-02-22 RX ORDER — CEPHALEXIN 500 MG
500 CAPSULE ORAL ONCE
Refills: 0 | Status: COMPLETED | OUTPATIENT
Start: 2022-02-22 | End: 2022-02-22

## 2022-02-22 RX ORDER — ACETAMINOPHEN 500 MG
650 TABLET ORAL ONCE
Refills: 0 | Status: COMPLETED | OUTPATIENT
Start: 2022-02-22 | End: 2022-02-22

## 2022-02-22 RX ORDER — CEPHALEXIN 500 MG
1 CAPSULE ORAL
Qty: 40 | Refills: 0
Start: 2022-02-22 | End: 2022-03-03

## 2022-02-22 RX ADMIN — Medication 500 MILLIGRAM(S): at 18:50

## 2022-02-22 NOTE — ED PROVIDER NOTE - OBJECTIVE STATEMENT
70 yo F with pmh of HTN and depression c/o R knee pain and swelling x 2 weeks. 2 weeks ago pt had a trip and fall and landed on her knees. A blister developed shortly after. Pt sterilized a needle and popped the blister which then began to scab. Pt states over the past week the knee became swollen, red and painful. Denies fever, chills. Pt went to urgent care and was referred to the ED.

## 2022-02-22 NOTE — ED PROVIDER NOTE - NSFOLLOWUPINSTRUCTIONS_ED_ALL_ED_FT
Cellulitis    Cellulitis is a skin infection caused by bacteria. This condition occurs most often in the arms and lower legs but can occur anywhere over the body. Symptoms include redness, swelling, warm skin, tenderness, and chills/fever. If you were prescribed an antibiotic medicine, take it as told by your health care provider. Do not stop taking the antibiotic even if you start to feel better.    SEEK IMMEDIATE MEDICAL CARE IF YOU HAVE ANY OF THE FOLLOWING SYMPTOMS: worsening fever, red streaks coming from affected area, vomiting or diarrhea, or dizziness/lightheadedness.      R.I.C.E. Treatment    WHAT YOU NEED TO KNOW:    R.I.C.E. treatment is a 4-step process used to decrease swelling and pain caused by an injury. R.I.C.E. stands for rest, ice, compression, and elevation. R.I.C.E. should be done within 24 to 48 hours after an injury.    Ice and Elevation       Ice and Elevation         DISCHARGE INSTRUCTIONS:    Return to the emergency department if:   •Your pain is severe.      •You have severe swelling or deformity.      •You have numbness in the injured area.      Contact your healthcare provider if:   •Your pain and swelling does not go away after a few days.      •You have questions or concerns about your condition or care.      How to use R.I.C.E. treatment:   •Rest your injured area as directed. You may need to stop using, or keep weight off, the injury for 48 hours or longer. Your healthcare provider may recommend crutches or another device. Return to your usual activities as directed.       •Apply ice on your injured area for 15 to 20 minutes every 4 hours or as directed. Use an ice pack, or put crushed ice in a plastic bag. Cover it with a towel. Ice helps prevent tissue damage and decreases swelling and pain.      •Compress, or keep pressure on, the injured area. Compression will help decrease swelling and support the injured area. Use an elastic bandage, air stirrup, splint, or sling as directed. If you use an elastic bandage to wrap your injured area, make sure the bandage is not too tight.       •Elevate the injured area above the level of your heart as often as you can. This will help decrease swelling and pain. Prop the injured area on pillows or blankets to keep it elevated comfortably.       Follow up with your healthcare provider as directed: Write down your questions so you remember to ask them during your visits.    Contusion    A contusion is a deep bruise. Contusions are the result of a blunt injury to tissues and muscle fibers under the skin. The skin overlying the contusion may turn blue, purple, or yellow. Symptoms also include pain and swelling in the injured area.    SEEK IMMEDIATE MEDICAL CARE IF YOU HAVE ANY OF THE FOLLOWING SYMPTOMS: severe pain, numbness, tingling, pain, weakness, or skin color/temperature change in any part of your body distal to the injury.

## 2022-02-22 NOTE — ED ADULT NURSE NOTE - OBJECTIVE STATEMENT
71 a&ox4 walked in from triage c.o knee pain . R knee, red swollen to the touch. sent in by urgent care 71 a&ox4 walked in from triage c.o knee pain . x 2 weeks of R knee, red swollen to the touch. sent in by urgent care to rule out septic knee. x 2 weeks ago pt had a trip and fall, fell onto her knees. reports a blister developed. R  knee painful to the touch. Denies fever, chills. Pt went to urgent care and was referred to the ED.

## 2022-02-22 NOTE — ED PROVIDER NOTE - CARE PROVIDER_API CALL
Bong Freedman)  Orthopedics  [address] [facility], [place] [address]  Phone: [phone]  Fax: [phone]  Follow Up Time:

## 2022-02-22 NOTE — ED PROVIDER NOTE - ATTENDING CONTRIBUTION TO CARE
one week ago fell on bilateral knees,   rt knee w abrasion, mild erythema / swelling, healed scab, FROM , DOUBT septic knee, walking without difficulty , will treat for cellulitis / d/c w strict emergent return instructions.

## 2022-02-22 NOTE — ED PROVIDER NOTE - CLINICAL SUMMARY MEDICAL DECISION MAKING FREE TEXT BOX
70 yo F with pmh of HTN and depression c/o R knee pain and swelling x 2 weeks. 2 weeks ago pt had a trip and fall and landed on her knees. A blister developed shortly after. Pt sterilized a needle and popped the blister which then began to scab. Pt states over the past week the knee became swollen, red and painful. Denies fever, chills 72 yo F with pmh of HTN and depression c/o R knee pain and swelling x 2 weeks. 2 weeks ago pt had a trip and fall and landed on her knees. A blister developed shortly after. Pt sterilized a needle and popped the blister which then began to scab. Pt states over the past week the knee became swollen, red and painful. Denies fever, chills. Afebrile. R knee- +scab with swelling to inferior aspect of knee with erythema and mild warm, FROM. +ecchymosis to anterior shins b/l. Labs, XR. Low suspicion for septic joint given good ROM. More likely superficial cellulitis with bursitis. 72 yo F with pmh of HTN and depression c/o R knee pain and swelling x 2 weeks. 2 weeks ago pt had a trip and fall and landed on her knees. A blister developed shortly after. Pt sterilized a needle and popped the blister which then began to scab. Pt states over the past week the knee became swollen, red and painful. Denies fever, chills. Afebrile. R knee- +scab with swelling to inferior aspect of knee with erythema and mild warm, FROM. +ecchymosis to anterior shins b/l. Labs, XR. Low suspicion for septic joint given good ROM. More likely superficial cellulitis with bursitis.   Labs wnl. XR neg. Will treat with keflex. Knee wrapped with ACE bandage. Pt had appt with Dr. Freedman

## 2022-02-22 NOTE — ED ADULT TRIAGE NOTE - CHIEF COMPLAINT QUOTE
Pt complains of or right knee, pain, redness and swelling becoming worst after falling the last 2 weeks. Pt denies any fever.

## 2022-02-22 NOTE — ED PROVIDER NOTE - PHYSICAL EXAMINATION
CONSTITUTIONAL: Well-appearing;  in no apparent distress.   HEAD: Normocephalic; atraumatic.   EYES: PERRL; EOM intact; conjunctiva and sclera clear  ENT: normal nose; no rhinorrhea; normal pharynx with no erythema or lesions.   NECK: Supple; non-tender; no LAD  CARDIOVASCULAR: Normal S1, S2; No audible murmurs. Regular rate and rhythm.   RESPIRATORY: Breathing easily; breath sounds clear and equal bilaterally; no wheezes, rhonchi, or rales.  GI: Soft; non-distended; non-tender; no palpable organomegaly.   MSK: R knee-   EXT: No cyanosis or edema; N/V intact  SKIN: Normal for age and race; warm; dry; good turgor; no apparent lesions or rash.   NEURO: A & O x 3; face symmetric; grossly unremarkable.   PSYCHOLOGICAL: The patient’s mood and manner are appropriate. CONSTITUTIONAL: Well-appearing;  in no apparent distress.   HEAD: Normocephalic; atraumatic.   EYES: PERRL; EOM intact; conjunctiva and sclera clear  ENT: normal nose; no rhinorrhea; normal pharynx with no erythema or lesions.   NECK: Supple; non-tender; no LAD  CARDIOVASCULAR: Normal S1, S2; No audible murmurs. Regular rate and rhythm.   RESPIRATORY: Breathing easily; breath sounds clear and equal bilaterally; no wheezes, rhonchi, or rales.  GI: Soft; non-distended; non-tender; no palpable organomegaly.   MSK: R knee- +scab with swelling to inferior aspect of knee with erythema and mild warm, FROM. +ecchymosis to anterior shins b/l   EXT: No cyanosis or edema; N/V intact  SKIN: Normal for age and race; warm; dry; good turgor; no apparent lesions or rash.   NEURO: A & O x 3; face symmetric; grossly unremarkable.   PSYCHOLOGICAL: The patient’s mood and manner are appropriate. CONSTITUTIONAL: Well-appearing;  in no apparent distress.   HEAD: Normocephalic; atraumatic.   EYES: PERRL; EOM intact; conjunctiva and sclera clear  ENT: normal nose; no rhinorrhea; normal pharynx with no erythema or lesions.   NECK: Supple; non-tender; no LAD  CARDIOVASCULAR: Normal S1, S2; No audible murmurs. Regular rate and rhythm.   RESPIRATORY: Breathing easily; breath sounds clear and equal bilaterally; no wheezes, rhonchi, or rales.  GI: Soft; non-distended; non-tender; no palpable organomegaly.   MSK: R knee- +scab with swelling to inferior aspect of knee with small area of erythema and mild warm, FROM. +ecchymosis to anterior shins b/l   EXT: No cyanosis or edema; N/V intact  SKIN: Normal for age and race; warm; dry; good turgor; no apparent lesions or rash.   NEURO: A & O x 3; face symmetric; grossly unremarkable.   PSYCHOLOGICAL: The patient’s mood and manner are appropriate.

## 2022-02-22 NOTE — ED PROVIDER NOTE - PATIENT PORTAL LINK FT
You can access the FollowMyHealth Patient Portal offered by [facility] by registering at the following website: [email or web address]. By joining [facility] FollowMyHealth portal, you will also be able to view your health information using other applications (apps) compatible with our system.

## 2022-02-25 ENCOUNTER — TRANSCRIPTION ENCOUNTER (OUTPATIENT)
Age: 72
End: 2022-02-25

## 2022-02-27 LAB
CULTURE RESULTS: SIGNIFICANT CHANGE UP
CULTURE RESULTS: SIGNIFICANT CHANGE UP
SPECIMEN SOURCE: SIGNIFICANT CHANGE UP
SPECIMEN SOURCE: SIGNIFICANT CHANGE UP

## 2022-03-04 ENCOUNTER — NON-APPOINTMENT (OUTPATIENT)
Age: 72
End: 2022-03-04

## 2022-03-07 ENCOUNTER — APPOINTMENT (OUTPATIENT)
Dept: HEMATOLOGY ONCOLOGY | Facility: CLINIC | Age: 72
End: 2022-03-07
Payer: MEDICARE

## 2022-03-07 VITALS — SYSTOLIC BLOOD PRESSURE: 120 MMHG | DIASTOLIC BLOOD PRESSURE: 80 MMHG

## 2022-03-07 VITALS — OXYGEN SATURATION: 97 % | TEMPERATURE: 96.9 F | HEART RATE: 83 BPM | BODY MASS INDEX: 26.33 KG/M2 | WEIGHT: 126 LBS

## 2022-03-07 PROCEDURE — 99214 OFFICE O/P EST MOD 30 MIN: CPT

## 2022-03-07 RX ORDER — CEPHALEXIN 500 MG/1
500 CAPSULE ORAL
Qty: 40 | Refills: 0 | Status: DISCONTINUED | COMMUNITY
Start: 2022-02-22

## 2022-03-07 NOTE — ASSESSMENT
[FreeTextEntry1] : Patient is a 71 year old female with a history of hyponatremia, sigmoid colon resection for a malignant polyp in 2015, thoracic aortic aneurysm, clinical depression, renal calculi and chronic thrombocytopenia for over 10 years, who presents for follow-up of anemia, thrombocytopenia and coagulopathy.  An evaluation was positive for IIb/IIIa platelet antibodies and weakly positive rheumatoid factor in the past.. Patient's chronic thrombocytopenia is likely on an autoimmune basis. A low grade myelodysplasia remains a possibility. Patient was also found to have a slightly elevated PTT in the past, with a nondiagnostic evaluation including VWD studies.Have ordered APTT, CBC, CMP, ferritin, iron panel, PTT/INR, reticulocyte count, B12 and folate to be drawn at the [facility] lab. She will follow up with an orthopedist regarding the trauma to her knee. Patient was advised to call office for results and next appointment date. \par \par  \par

## 2022-03-07 NOTE — REVIEW OF SYSTEMS
[Joint Pain] : joint pain [Easy Bruising] : a tendency for easy bruising [Negative] : Allergic/Immunologic [Fever] : no fever [Night Sweats] : no night sweats [Chills] : no chills [Recent Change In Weight] : ~T no recent weight change [Nosebleeds] : no nosebleeds [Chest Pain] : no chest pain [Shortness Of Breath] : no shortness of breath [Skin Rash] : no skin rash [Abdominal Pain] : no abdominal pain [Easy Bleeding] : no tendency for easy bleeding [FreeTextEntry9] : right knee pain improving s/p fall

## 2022-03-07 NOTE — REVIEW OF SYSTEMS
[Joint Pain] : joint pain [Easy Bruising] : a tendency for easy bruising [Negative] : Allergic/Immunologic [Fever] : no fever [Chills] : no chills [Night Sweats] : no night sweats [Recent Change In Weight] : ~T no recent weight change [Nosebleeds] : no nosebleeds [Chest Pain] : no chest pain [Shortness Of Breath] : no shortness of breath [Skin Rash] : no skin rash [Abdominal Pain] : no abdominal pain [Easy Bleeding] : no tendency for easy bleeding [FreeTextEntry9] : right knee pain improving s/p fall

## 2022-03-07 NOTE — HISTORY OF PRESENT ILLNESS
[de-identified] : Patient is a 71 year old female with a history of hyponatremia, sigmoid colon resection for a malignant polyp in 2015, thoracic aortic aneurysm, clinical depression, renal calculi and chronic thrombocytopenia for over 10 years, who presents for follow-up of anemia, thrombocytopenia and coagulopathy. Patient has had thrombocytopenia for over 10 years. Her platelet count ranges from 80,000 to 105,000. Patient also has had an intermittent slight elevation of the PTT. Evaluation , including VWD disease studies, were nondiagnostic and the most recent PTT was normal. Since last visit, the patient fell, injuring her right leg and knee which subsequently became infected (2/22/22). She has just completed a 2 week course of Cephalexin. On 2/22/22, the hemoglobin was 11.5, hematocrit 36.2 and platelet count was 115,000. Patient states her right leg was extremely bruised and swollen but has been improving. She has always had easy bruisability but denies bleeding from nose or mouth or in urine or stool. Denies fever, chills, sweats or other complaints.

## 2022-03-07 NOTE — PHYSICAL EXAM
[Normal] : affect appropriate [de-identified] : RRR, S1, S2, murmur, without ectopy [de-identified] : swelling of the right knee, lower extremity, no calf tenderness [de-identified] : Several different sized ecchymoses on right upper  and lower extremities

## 2022-03-07 NOTE — REASON FOR VISIT
[Follow-Up Visit] : a follow-up visit for [FreeTextEntry2] : Anemia, thrombocytopenia, coagulopathy,easy bruising

## 2022-03-07 NOTE — HISTORY OF PRESENT ILLNESS
[de-identified] : Patient is a 71 year old female with a history of hyponatremia, sigmoid colon resection for a malignant polyp in 2015, thoracic aortic aneurysm, clinical depression, renal calculi and chronic thrombocytopenia for over 10 years, who presents for follow-up of anemia, thrombocytopenia and coagulopathy. Patient has had thrombocytopenia for over 10 years. Her platelet count ranges from 80,000 to 105,000. Patient also has had an intermittent slight elevation of the PTT. Evaluation , including VWD disease studies, were nondiagnostic and the most recent PTT was normal. Since last visit, the patient fell, injuring her right leg and knee which subsequently became infected (2/22/22). She has just completed a 2 week course of Cephalexin. On 2/22/22, the hemoglobin was 11.5, hematocrit 36.2 and platelet count was 115,000. Patient states her right leg was extremely bruised and swollen but has been improving. She has always had easy bruisability but denies bleeding from nose or mouth or in urine or stool. Denies fever, chills, sweats or other complaints.

## 2022-03-07 NOTE — PHYSICAL EXAM
[Normal] : affect appropriate [de-identified] : RRR, S1, S2, murmur, without ectopy [de-identified] : swelling of the right knee, lower extremity, no calf tenderness [de-identified] : Several different sized ecchymoses on right upper  and lower extremities

## 2022-03-07 NOTE — ASSESSMENT
[FreeTextEntry1] : Patient is a 71 year old female with a history of hyponatremia, sigmoid colon resection for a malignant polyp in 2015, thoracic aortic aneurysm, clinical depression, renal calculi and chronic thrombocytopenia for over 10 years, who presents for follow-up of anemia, thrombocytopenia and coagulopathy.  An evaluation was positive for IIb/IIIa platelet antibodies and weakly positive rheumatoid factor in the past.. Patient's chronic thrombocytopenia is likely on an autoimmune basis. A low grade myelodysplasia remains a possibility. Patient was also found to have a slightly elevated PTT in the past, with a nondiagnostic evaluation including VWD studies.Have ordered APTT, CBC, CMP, ferritin, iron panel, PTT/INR, reticulocyte count, B12 and folate to be drawn at the [facility] lab. She will follow up with an orthopedist regarding the trauma to her knee. Patient was advised to call office for results and next appointment date. \par \par  \par

## 2022-03-08 LAB
ALBUMIN SERPL ELPH-MCNC: 4.4 G/DL
ALP BLD-CCNC: 66 U/L
ALT SERPL-CCNC: 19 U/L
ANION GAP SERPL CALC-SCNC: 11 MMOL/L
ANISOCYTOSIS BLD QL: SLIGHT
APTT BLD: 33.9 SEC
AST SERPL-CCNC: 29 U/L
BASOPHILS # BLD AUTO: 0.12 K/UL
BASOPHILS # BLD AUTO: 0.12 K/UL
BASOPHILS NFR BLD AUTO: 3 %
BASOPHILS NFR BLD AUTO: 3 %
BILIRUB SERPL-MCNC: 0.3 MG/DL
BUN SERPL-MCNC: 32 MG/DL
CALCIUM SERPL-MCNC: 9.5 MG/DL
CHLORIDE SERPL-SCNC: 102 MMOL/L
CO2 SERPL-SCNC: 25 MMOL/L
CREAT SERPL-MCNC: 1.26 MG/DL
EGFR: 46 ML/MIN/1.73M2
EOSINOPHIL # BLD AUTO: 0.2 K/UL
EOSINOPHIL # BLD AUTO: 0.2 K/UL
EOSINOPHIL NFR BLD AUTO: 5 %
EOSINOPHIL NFR BLD AUTO: 5 %
FERRITIN SERPL-MCNC: 94 NG/ML
FOLATE SERPL-MCNC: >20 NG/ML
GLUCOSE SERPL-MCNC: 83 MG/DL
HCT VFR BLD CALC: 37.4 %
HGB BLD-MCNC: 11.5 G/DL
INR PPP: 0.95 RATIO
IRON SATN MFR SERPL: 18 %
IRON SERPL-MCNC: 60 UG/DL
LYMPHOCYTES # BLD AUTO: 0.96 K/UL
LYMPHOCYTES # BLD AUTO: 0.96 K/UL
LYMPHOCYTES NFR BLD AUTO: 24 %
LYMPHOCYTES NFR BLD AUTO: 24 %
MAN DIFF?: NORMAL
MCHC RBC-ENTMCNC: 29.7 PG
MCHC RBC-ENTMCNC: 30.7 GM/DL
MCV RBC AUTO: 96.6 FL
MONOCYTES # BLD AUTO: 0.36 K/UL
MONOCYTES # BLD AUTO: 0.36 K/UL
MONOCYTES NFR BLD AUTO: 9 %
MONOCYTES NFR BLD AUTO: 9 %
MSMEAR: NORMAL
NEUTROPHILS # BLD AUTO: 2.35 K/UL
NEUTROPHILS # BLD AUTO: 2.35 K/UL
NEUTROPHILS NFR BLD AUTO: 59 %
NEUTROPHILS NFR BLD AUTO: 59 %
PLAT MORPH BLD: NORMAL
PLATELET # BLD AUTO: 106 K/UL
POLYCHROMASIA BLD QL SMEAR: SLIGHT
POTASSIUM SERPL-SCNC: 5.3 MMOL/L
PROT SERPL-MCNC: 7.3 G/DL
PT BLD: 11.1 SEC
RBC # BLD: 3.87 M/UL
RBC # BLD: 3.87 M/UL
RBC # FLD: 15.2 %
RBC BLD AUTO: ABNORMAL
RETICS # AUTO: 2.9 %
RETICS AGGREG/RBC NFR: 110.3 K/UL
SODIUM SERPL-SCNC: 138 MMOL/L
TIBC SERPL-MCNC: 335 UG/DL
UIBC SERPL-MCNC: 276 UG/DL
VIT B12 SERPL-MCNC: 1643 PG/ML
WBC # FLD AUTO: 3.98 K/UL

## 2022-03-09 ENCOUNTER — APPOINTMENT (OUTPATIENT)
Dept: NEPHROLOGY | Facility: CLINIC | Age: 72
End: 2022-03-09
Payer: MEDICARE

## 2022-03-09 DIAGNOSIS — N20.0 CALCULUS OF KIDNEY: ICD-10-CM

## 2022-03-09 DIAGNOSIS — Z87.448 PERSONAL HISTORY OF OTHER DISEASES OF URINARY SYSTEM: ICD-10-CM

## 2022-03-09 DIAGNOSIS — E22.2 SYNDROME OF INAPPROPRIATE SECRETION OF ANTIDIURETIC HORMONE: ICD-10-CM

## 2022-03-09 DIAGNOSIS — I10 ESSENTIAL (PRIMARY) HYPERTENSION: ICD-10-CM

## 2022-03-09 DIAGNOSIS — N18.30 CHRONIC KIDNEY DISEASE, STAGE 3 UNSPECIFIED: ICD-10-CM

## 2022-03-09 PROCEDURE — 99214 OFFICE O/P EST MOD 30 MIN: CPT

## 2022-03-09 NOTE — HISTORY OF PRESENT ILLNESS
[FreeTextEntry1] : 72yo with obstructive nephrolithiasis, chronic R hydronephrosis, chronic hyponatremia due to SIADH (in the setting of MAOi use) and at times superimposed polydipsia, hospitalized 7/2021 for ATN and acute hyponatremia s/p left ureteroscopy with stent placement for obstructing 7mm stone, with CKD III and congenital R ureteral atresia with hydronephrosis here for f/u:\par \par PCP: Dr. Foster Gray\par Uro: Dr. Eisenberg\par \par She's been feeling well. Following fluid restriction. No frequency/dysuria or passing of kidney stones lately, no flank pain or hematuria. \par BP controlled on nifedipine \par Drinks 2 small cups of coffee daily, still eats a lot of almonds. \par \par

## 2022-03-09 NOTE — ASSESSMENT
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 No indication for surgery per  as this has been stable. \par -  discussed lifestyle/diet changes to reduce frequency of stones other than drinking fluids since she's on a restriction however still drinking several cups of coffee and a lot of almonds\par \par SIADH - controlled with fluid restriction 1L - 1.5L daily\par \par HTN- well controlled\par \par Plan\par - 24hr urine litholink for stone prevention\par - on next labs will check cystatin C based eGFR for better understanding of what her underlying renal fx is\par - f/u in 2 months

## 2022-03-10 ENCOUNTER — NON-APPOINTMENT (OUTPATIENT)
Age: 72
End: 2022-03-10

## 2022-03-14 ENCOUNTER — NON-APPOINTMENT (OUTPATIENT)
Age: 72
End: 2022-03-14

## 2022-03-16 ENCOUNTER — APPOINTMENT (OUTPATIENT)
Dept: ORTHOPEDIC SURGERY | Facility: CLINIC | Age: 72
End: 2022-03-16
Payer: MEDICARE

## 2022-03-16 DIAGNOSIS — Z60.2 PROBLEMS RELATED TO LIVING ALONE: ICD-10-CM

## 2022-03-16 DIAGNOSIS — Z82.61 FAMILY HISTORY OF ARTHRITIS: ICD-10-CM

## 2022-03-16 DIAGNOSIS — S80.01XA CONTUSION OF RIGHT KNEE, INITIAL ENCOUNTER: ICD-10-CM

## 2022-03-16 DIAGNOSIS — Z82.62 FAMILY HISTORY OF OSTEOPOROSIS: ICD-10-CM

## 2022-03-16 DIAGNOSIS — Z85.038 PERSONAL HISTORY OF OTHER MALIGNANT NEOPLASM OF LARGE INTESTINE: ICD-10-CM

## 2022-03-16 DIAGNOSIS — Z72.3 LACK OF PHYSICAL EXERCISE: ICD-10-CM

## 2022-03-16 PROCEDURE — 99214 OFFICE O/P EST MOD 30 MIN: CPT

## 2022-03-16 SDOH — SOCIAL STABILITY - SOCIAL INSECURITY: PROBLEMS RELATED TO LIVING ALONE: Z60.2

## 2022-03-16 NOTE — REVIEW OF SYSTEMS
[Negative] : Heme/Lymph [Joint Pain] : joint pain [Joint Stiffness] : joint stiffness [Joint Swelling] : joint swelling [Depression] : depression [Sleep Disturbances] : ~T sleep disturbances

## 2022-03-17 ENCOUNTER — TRANSCRIPTION ENCOUNTER (OUTPATIENT)
Age: 72
End: 2022-03-17

## 2022-03-19 PROBLEM — Z85.038 HISTORY OF MALIGNANT NEOPLASM OF COLON: Status: RESOLVED | Noted: 2022-03-16 | Resolved: 2022-03-19

## 2022-03-19 PROBLEM — Z60.2 LIVES ALONE: Status: ACTIVE | Noted: 2022-03-16

## 2022-03-19 PROBLEM — Z82.62 FAMILY HISTORY OF OSTEOPOROSIS: Status: ACTIVE | Noted: 2022-03-16

## 2022-03-19 PROBLEM — Z82.61 FAMILY HISTORY OF ARTHRITIS: Status: ACTIVE | Noted: 2022-03-16

## 2022-03-19 PROBLEM — Z72.3 DOES NOT EXERCISE: Status: ACTIVE | Noted: 2022-03-16

## 2022-03-19 NOTE — DISCUSSION/SUMMARY
[de-identified] : 72y/o female 5wk s/p R knee contusion, with resolved prepatellar hematoma/cellulitis; with h/o delayed union R ITFx\par - HPT thru [facility]\par - HEP\par - Follow up as needed for persistent/recurrent right hip or knee pain. Right hip and femur XRs prior to next visit

## 2022-03-19 NOTE — PHYSICAL EXAM
[de-identified] : General appearance: well nourished and hydrated, pleasant, alert and oriented x 3, cooperative.  \par HEENT: normocephalic, EOM intact, wearing mask, external auditory canal clear.  \par Cardiovascular: no lower leg edema, no varicosities, dorsalis pedis pulses palpable and symmetric.  \par Lymphatics: no palpable lymphadenopathy, no lymphedema.  \par Neurologic: sensation is normal, no muscle weakness in upper or lower extremities, patella tendon reflexes present and symmetric.  \par Dermatologic: skin moist, warm, no rash.  \par Spine: cervical spine with normal lordosis and painless range of motion, thoracic spine with normal kyphosis and painless range of motion, lumbosacral spine with normal lordosis and painless range of motion.\par Gait: normal.  \par \par Right knee: all fields negative/normal/unremarkable unless otherwise noted --\par - Focal soft tissue swelling: mild prepatellar\par - Ecchymosis: faded darkened ecchymosis over tibial tubercle and diffusely over anterior knee\par - Erythema: \par - Effusion: trace\par - Wounds: \par - Alignment: normal\par - Tenderness: \par - ROM: 0-135\par - Collateral laxity: \par - Cruciate laxity: \par - Popliteal angle (degrees): 30\par - Quad strength: \par \par Right hip: all fields negative/normal/unremarkable unless otherwise noted --\par - Focal soft tissue swelling: \par - Ecchymosis: \par - Erythema: \par - Wounds: healed lateral incisions\par - Tenderness: minimal lateral over the lag screw incision\par - ROM: \par   - Flexion: 125\par   - Extension: 0\par   - Adduction: 15\par   - Abduction: 40\par   - Internal rotation in 90 degrees of hip flexion: 15\par   - External rotation in 90 degrees of hip flexion: 45\par - MEGHANN: mildly uncomfortable\par - FADIR: uncomfortable\par - Kentrell: \par - Stinchfield: uncomfortable\par - Flexor power: 4+/5\par - Abductor power: 4+/5  [de-identified] : Right knee XRs were interpreted by me and reviewed with the patient.\par \par Location of imaging: [facility]\par Date of exam: 2/22/22\par \par Right knee -- no fracture noted\par Alignment: normal\par Arthritis: none\par Patellar height: normal

## 2022-03-19 NOTE — HISTORY OF PRESENT ILLNESS
[___ mths] : [unfilled] month(s) ago [0] : a current pain level of 0/10 [Intermit.] : ~He/She~ states the symptoms seem to be intermittent [Bending] : worsened by bending [Direct Pressure] : worsened by direct pressure [de-identified] : 3/16/22: 72y/o female presenting for evaluation of right knee pain and swelling following a fall on 2/7/22. Reports that she tripped on some carpeting at home and fell hard onto the flexed right knee. Had a large prepatellar hematoma that subsequently began to drain and then developed an infection. She went to an ED on 2/22/22 and was given a 10 day course of Keflex, which she completed. She reports that the right knee pain, swelling, and clinical evidence of infection have largely resolved. She is ambulating without assistive device and does not have any pain today. \par \par She notes that she suffered a right intertrochanteric femur fracture in 2020 that was treated with intramedullary nailing. She was previously followed for this by Dr. Merida who noted concern for delayed union. She denies hip/thigh pain now. [None] : No relieving factors are noted [de-identified] : describes occasional achy pain.

## 2022-05-23 ENCOUNTER — NON-APPOINTMENT (OUTPATIENT)
Age: 72
End: 2022-05-23

## 2022-08-08 ENCOUNTER — LABORATORY RESULT (OUTPATIENT)
Age: 72
End: 2022-08-08

## 2022-08-08 ENCOUNTER — APPOINTMENT (OUTPATIENT)
Dept: HEMATOLOGY ONCOLOGY | Facility: CLINIC | Age: 72
End: 2022-08-08

## 2022-08-08 VITALS
DIASTOLIC BLOOD PRESSURE: 76 MMHG | HEIGHT: 58 IN | BODY MASS INDEX: 26.24 KG/M2 | HEART RATE: 84 BPM | SYSTOLIC BLOOD PRESSURE: 122 MMHG | WEIGHT: 125 LBS | TEMPERATURE: 97.1 F | OXYGEN SATURATION: 97 %

## 2022-08-08 LAB
APTT BLD: 32.3 SEC
INR PPP: 0.89
PT BLD: 10.6 SEC
RBC # BLD: 4.01 M/UL
RETICS # AUTO: 2 %
RETICS AGGREG/RBC NFR: 81.4 K/UL

## 2022-08-08 PROCEDURE — 36415 COLL VENOUS BLD VENIPUNCTURE: CPT

## 2022-08-08 PROCEDURE — 99214 OFFICE O/P EST MOD 30 MIN: CPT | Mod: 25

## 2022-08-08 RX ORDER — MULTIVIT,IRON,MINERALS/LUTEIN
TABLET ORAL
Refills: 0 | Status: ACTIVE | COMMUNITY

## 2022-08-08 RX ORDER — ERGOCALCIFEROL 1.25 MG/1
CAPSULE ORAL
Refills: 0 | Status: ACTIVE | COMMUNITY

## 2022-08-08 NOTE — ASSESSMENT
[FreeTextEntry1] : \par Patient is a 72 year old female with a history of hyponatremia, sigmoid colon resection for a malignant polyp in 2015, thoracic aortic aneurysm, clinical depression, renal calculi and chronic thrombocytopenia for over 10 years, who presents for follow-up of anemia, thrombocytopenia and coagulopathy. An evaluation was positive for IIb/IIIa platelet antibodies and weakly positive rheumatoid factor in the past.. Patient's chronic thrombocytopenia is likely on an autoimmune basis.  It is also possible that patient has a qualitative platelet disorder.  A low grade myelodysplasia remains a possibility. Patient was also found to have a slightly elevated PTT in the past, with a nondiagnostic evaluation including VWD studies and a normal PTT at the last visit.Have ordered APTT, CBC, CMP, ferritin, iron panel, PTT/INR, reticulocyte count, TSH, B12 and folate.  Venipuncture was performed in the office today.   Patient was advised to call office for results, further recommendations and next appointment date. \par \par  \par  \par

## 2022-08-08 NOTE — REVIEW OF SYSTEMS
[Joint Pain] : joint pain [Muscle Pain] : muscle pain [Easy Bleeding] : a tendency for easy bleeding [Negative] : Allergic/Immunologic [Fever] : no fever [Chills] : no chills [Night Sweats] : no night sweats [Fatigue] : no fatigue [Recent Change In Weight] : ~T no recent weight change [Chest Pain] : no chest pain [Shortness Of Breath] : no shortness of breath [Abdominal Pain] : no abdominal pain [Easy Bruising] : no tendency for easy bruising [FreeTextEntry9] : Shoulder muscle pain, had PT; Pain base of left thumb [de-identified] : Multiple briuses

## 2022-08-08 NOTE — HISTORY OF PRESENT ILLNESS
[de-identified] :  Patient is a 72 year old female with a history of hyponatremia, sigmoid colon resection for a malignant polyp in 2015, thoracic aortic aneurysm, clinical depression, renal calculi and chronic thrombocytopenia for over 10 years, who presents for follow-up of anemia, thrombocytopenia and coagulopathy. Patient has had thrombocytopenia for over 10 years. Her platelet count ranges from 80,000 to 105,000. Patient also has had an intermittent slight elevation of the PTT. Evaluation, including VWD disease studies, were nondiagnostic and the most recent INR/PTT was normal. At last visit in March of 2022, the hemoglobin was 11.5, hematocrit was 37.4 and platelet count was 106,000. Since last visit, patient complains of multiple musculoskeletal problems for which she has started physical therapy in June. In July during a physical therapy session, she injured her left thumb and saw her orthopedist Dr. Brad Fair who administrated a cortisone injection. Patient had a colonoscopy in April which was negative for polyps. She has always had easy bruisability, sometimes traumatic but also spontaneous.  But patient denies bleeding from nose or mouth or in urine or stool. Denies fever, chills, sweats, chest pain, palpitation, recent infections or other complaints.  No Repair - Repaired With Adjacent Surgical Defect Text (Leave Blank If You Do Not Want): After obtaining clear surgical margins the defect was repaired concurrently with another surgical defect which was in close approximation.

## 2022-08-08 NOTE — PHYSICAL EXAM
[Normal] : affect appropriate [de-identified] : RRR, S1, S2, murmur, without ectopy [de-identified] : Several different sized ecchymoses on left /right upper  and lower extremities

## 2022-08-08 NOTE — REASON FOR VISIT
[Follow-Up Visit] : a follow-up visit for [FreeTextEntry2] : Anemia, thrombocytopenia, excessive bruising

## 2022-08-09 LAB
ALBUMIN SERPL ELPH-MCNC: 4.5 G/DL
ALP BLD-CCNC: 76 U/L
ALT SERPL-CCNC: 17 U/L
ANION GAP SERPL CALC-SCNC: 12 MMOL/L
AST SERPL-CCNC: 32 U/L
BASOPHILS # BLD AUTO: 0 K/UL
BASOPHILS NFR BLD AUTO: 0 %
BILIRUB SERPL-MCNC: 0.4 MG/DL
BUN SERPL-MCNC: 26 MG/DL
CALCIUM SERPL-MCNC: 9.3 MG/DL
CHLORIDE SERPL-SCNC: 97 MMOL/L
CO2 SERPL-SCNC: 27 MMOL/L
CREAT SERPL-MCNC: 1.35 MG/DL
EGFR: 42 ML/MIN/1.73M2
EOSINOPHIL # BLD AUTO: 0.43 K/UL
EOSINOPHIL NFR BLD AUTO: 8.8 %
FERRITIN SERPL-MCNC: 52 NG/ML
FOLATE SERPL-MCNC: >20 NG/ML
GLUCOSE SERPL-MCNC: 87 MG/DL
HCT VFR BLD CALC: 37.7 %
HGB BLD-MCNC: 12.2 G/DL
IRON SATN MFR SERPL: 19 %
IRON SERPL-MCNC: 71 UG/DL
LYMPHOCYTES # BLD AUTO: 0.89 K/UL
LYMPHOCYTES NFR BLD AUTO: 18.4 %
MAN DIFF?: NORMAL
MCHC RBC-ENTMCNC: 30.4 PG
MCHC RBC-ENTMCNC: 32.4 GM/DL
MCV RBC AUTO: 94 FL
MONOCYTES # BLD AUTO: 0.34 K/UL
MONOCYTES NFR BLD AUTO: 7 %
NEUTROPHILS # BLD AUTO: 3.1 K/UL
NEUTROPHILS NFR BLD AUTO: 64 %
PLATELET # BLD AUTO: 75 K/UL
POTASSIUM SERPL-SCNC: 4.2 MMOL/L
PROT SERPL-MCNC: 7.4 G/DL
RBC # BLD: 4.01 M/UL
RBC # FLD: 14.6 %
SODIUM SERPL-SCNC: 137 MMOL/L
TIBC SERPL-MCNC: 365 UG/DL
TSH SERPL-ACNC: 6.81 UIU/ML
UIBC SERPL-MCNC: 294 UG/DL
VIT B12 SERPL-MCNC: 1562 PG/ML
WBC # FLD AUTO: 4.85 K/UL

## 2022-08-10 ENCOUNTER — NON-APPOINTMENT (OUTPATIENT)
Age: 72
End: 2022-08-10

## 2022-08-11 ENCOUNTER — NON-APPOINTMENT (OUTPATIENT)
Age: 72
End: 2022-08-11

## 2022-08-24 ENCOUNTER — NON-APPOINTMENT (OUTPATIENT)
Age: 72
End: 2022-08-24

## 2022-08-24 LAB
BASOPHILS # BLD AUTO: 0.17 K/UL
BASOPHILS # BLD AUTO: 0.17 K/UL
BASOPHILS NFR BLD AUTO: 4.4 %
BASOPHILS NFR BLD AUTO: 4.4 %
EOSINOPHIL # BLD AUTO: 0.14 K/UL
EOSINOPHIL # BLD AUTO: 0.14 K/UL
EOSINOPHIL NFR BLD AUTO: 3.5 %
EOSINOPHIL NFR BLD AUTO: 3.5 %
HCT VFR BLD CALC: 38 %
HGB BLD-MCNC: 12.3 G/DL
LYMPHOCYTES # BLD AUTO: 0.67 K/UL
LYMPHOCYTES # BLD AUTO: 0.67 K/UL
LYMPHOCYTES NFR BLD AUTO: 17.4 %
LYMPHOCYTES NFR BLD AUTO: 17.4 %
MAN DIFF?: NORMAL
MCHC RBC-ENTMCNC: 30.3 PG
MCHC RBC-ENTMCNC: 32.4 GM/DL
MCV RBC AUTO: 93.6 FL
MONOCYTES # BLD AUTO: 0.5 K/UL
MONOCYTES # BLD AUTO: 0.5 K/UL
MONOCYTES NFR BLD AUTO: 13 %
MONOCYTES NFR BLD AUTO: 13 %
MSMEAR: NORMAL
NEUTROPHILS # BLD AUTO: 2.39 K/UL
NEUTROPHILS # BLD AUTO: 2.39 K/UL
NEUTROPHILS NFR BLD AUTO: 61.7 %
NEUTROPHILS NFR BLD AUTO: 61.7 %
PLAT MORPH BLD: NORMAL
PLATELET # BLD AUTO: 81 K/UL
RBC # BLD: 4.06 M/UL
RBC # FLD: 14.4 %
RBC BLD AUTO: NORMAL
WBC # FLD AUTO: 3.87 K/UL

## 2022-08-26 ENCOUNTER — NON-APPOINTMENT (OUTPATIENT)
Age: 72
End: 2022-08-26

## 2022-12-14 ENCOUNTER — NON-APPOINTMENT (OUTPATIENT)
Age: 72
End: 2022-12-14

## 2023-01-04 ENCOUNTER — APPOINTMENT (OUTPATIENT)
Dept: HEMATOLOGY ONCOLOGY | Facility: CLINIC | Age: 73
End: 2023-01-04
Payer: MEDICARE

## 2023-01-04 VITALS
WEIGHT: 122 LBS | DIASTOLIC BLOOD PRESSURE: 74 MMHG | TEMPERATURE: 96.9 F | HEIGHT: 58 IN | BODY MASS INDEX: 25.61 KG/M2 | SYSTOLIC BLOOD PRESSURE: 132 MMHG | HEART RATE: 87 BPM | OXYGEN SATURATION: 97 %

## 2023-01-04 LAB
APTT BLD: 34.1 SEC
INR PPP: 0.93
PT BLD: 11.1 SEC
RBC # BLD: 3.96 M/UL
RETICS # AUTO: 2.2 %
RETICS AGGREG/RBC NFR: 87.5 K/UL

## 2023-01-04 PROCEDURE — 99214 OFFICE O/P EST MOD 30 MIN: CPT | Mod: 25

## 2023-01-04 PROCEDURE — 36415 COLL VENOUS BLD VENIPUNCTURE: CPT

## 2023-01-05 LAB
ALBUMIN SERPL ELPH-MCNC: 4.7 G/DL
ALP BLD-CCNC: 82 U/L
ALT SERPL-CCNC: 19 U/L
ANION GAP SERPL CALC-SCNC: 14 MMOL/L
AST SERPL-CCNC: 33 U/L
BASOPHILS # BLD AUTO: 0 K/UL
BASOPHILS # BLD AUTO: 0 K/UL
BASOPHILS NFR BLD AUTO: 0 %
BASOPHILS NFR BLD AUTO: 0 %
BILIRUB SERPL-MCNC: 0.3 MG/DL
BUN SERPL-MCNC: 31 MG/DL
CALCIUM SERPL-MCNC: 9.3 MG/DL
CHLORIDE SERPL-SCNC: 96 MMOL/L
CO2 SERPL-SCNC: 24 MMOL/L
CREAT SERPL-MCNC: 1.3 MG/DL
EGFR: 44 ML/MIN/1.73M2
EOSINOPHIL # BLD AUTO: 0.36 K/UL
EOSINOPHIL # BLD AUTO: 0.36 K/UL
EOSINOPHIL NFR BLD AUTO: 7.9 %
EOSINOPHIL NFR BLD AUTO: 7.9 %
ERYTHROCYTE [SEDIMENTATION RATE] IN BLOOD BY WESTERGREN METHOD: 20 MM/HR
FERRITIN SERPL-MCNC: 42 NG/ML
GIANT PLATELETS BLD QL SMEAR: PRESENT
GLUCOSE SERPL-MCNC: 94 MG/DL
HCT VFR BLD CALC: 37.4 %
HGB BLD-MCNC: 12 G/DL
IRON SATN MFR SERPL: 17 %
IRON SERPL-MCNC: 70 UG/DL
LYMPHOCYTES # BLD AUTO: 0.68 K/UL
LYMPHOCYTES # BLD AUTO: 0.68 K/UL
LYMPHOCYTES NFR BLD AUTO: 14.9 %
LYMPHOCYTES NFR BLD AUTO: 14.9 %
MAN DIFF?: NORMAL
MCHC RBC-ENTMCNC: 30.3 PG
MCHC RBC-ENTMCNC: 32.1 GM/DL
MCV RBC AUTO: 94.4 FL
METAMYELOCYTES # FLD: 0.9 %
MONOCYTES # BLD AUTO: 0.52 K/UL
MONOCYTES # BLD AUTO: 0.52 K/UL
MONOCYTES NFR BLD AUTO: 11.4 %
MONOCYTES NFR BLD AUTO: 11.4 %
MSMEAR: NORMAL
MYELOCYTES NFR BLD: 0.9 %
NEUTROPHILS # BLD AUTO: 2.9 K/UL
NEUTROPHILS # BLD AUTO: 2.9 K/UL
NEUTROPHILS NFR BLD AUTO: 62.3 %
NEUTROPHILS NFR BLD AUTO: 62.3 %
NEUTS BAND NFR BLD MANUAL: 0.8 %
PLAT MORPH BLD: ABNORMAL
PLATELET # BLD AUTO: 81 K/UL
POTASSIUM SERPL-SCNC: 4.5 MMOL/L
PROMYELOCYTES # FLD: 0.9 %
PROT SERPL-MCNC: 7.7 G/DL
RBC # BLD: 3.96 M/UL
RBC # FLD: 14.4 %
RBC BLD AUTO: NORMAL
RHEUMATOID FACT SER QL: 13 IU/ML
SODIUM SERPL-SCNC: 134 MMOL/L
TIBC SERPL-MCNC: 400 UG/DL
UIBC SERPL-MCNC: 330 UG/DL
WBC # FLD AUTO: 4.59 K/UL

## 2023-01-06 LAB — ANA SER IF-ACNC: NEGATIVE

## 2023-01-10 NOTE — REVIEW OF SYSTEMS
[Easy Bruising] : a tendency for easy bruising [Negative] : Allergic/Immunologic [Joint Pain] : joint pain [Joint Stiffness] : joint stiffness [Fever] : no fever [Chills] : no chills [Night Sweats] : no night sweats [Fatigue] : no fatigue [Nosebleeds] : no nosebleeds [Chest Pain] : no chest pain [Palpitations] : no palpitations [Shortness Of Breath] : no shortness of breath [Abdominal Pain] : no abdominal pain [Easy Bleeding] : no tendency for easy bleeding [FreeTextEntry8] : No dysuria [FreeTextEntry9] : Right hip discomfort ( has hip replacement)

## 2023-01-10 NOTE — ASSESSMENT
[FreeTextEntry1] : Patient is a 72 year old female with a history of hyponatremia, sigmoid colon resection for a malignant polyp in 2015, thoracic aortic aneurysm, clinical depression, renal calculi, renal insufficiency and chronic thrombocytopenia for over 10 years, who presents for follow-up of anemia, thrombocytopenia and coagulopathy. An evaluation was positive for IIb/IIIa platelet antibodies and weakly positive rheumatoid factor in the past. Patient's chronic thrombocytopenia is likely on an autoimmune basis. It is also possible that patient has a qualitative platelet disorder. A low grade myelodysplasia remains a possibility. Patient was also found to have a slightly elevated PTT in the past, with a nondiagnostic evaluation including VWD studies and a normal PTT at last visit. Have ordered ROSALVA, APTT, CBC with differential, PT/INR, CMP, ferritin, iron panel, manual differential, reticulocyte count, rheumatoid factor quant and sed rate. Venipuncture was performed in the office today. Patient was advised to call office for results, further recommendations and next appointment date.

## 2023-01-10 NOTE — PHYSICAL EXAM
[Normal] : affect appropriate [de-identified] : RRR, S1, S2, murmur, without ectopy [de-identified] : Several different sized ecchymoses on left /right upper  and lower extremities

## 2023-01-10 NOTE — REASON FOR VISIT
[Follow-Up Visit] : a follow-up visit for [FreeTextEntry2] : Anemia, immune thrombocytopenia, easy bruising, PTT

## 2023-01-10 NOTE — HISTORY OF PRESENT ILLNESS
[de-identified] : Patient is a 72 year old female with a history of hyponatremia, sigmoid colon resection for a malignant polyp in 2015, thoracic aortic aneurysm, clinical depression, renal calculi, renal insufficiency and chronic thrombocytopenia for over 10 years, who presents for follow-up of anemia, thrombocytopenia and coagulopathy. Patient has had thrombocytopenia for over 10 years.  She has tested positive for platelet antibodies in the past.  Her platelet count ranges from 70,000 to 130,000. Patient also has had an intermittent slight elevation of the PTT. Evaluations, including VWD disease studies, were nondiagnostic and the most recent INR/PTT was normal. At last visit in Aug 2022, CBC was significant for a lower platelet count at 75,000, normal hemoglobin at 12.2 and a normal white count of 4.85. Comprehensive metabolic panel was significant for an elevated creatinine at 1.35. Iron panel and ferritin were normal. B12 was 1562 and folate was greater than 20. TSH was elevated to 6.81. INR and PTT were normal. The patient had a repeat CBC in late August and her white blood count was 3.87, hemoglobin 12.3, hematocrit 38.0 and the platelet count low but slightly improved at 81,000. \par Patient saw Dr. Gray (PCP) in early December at which time the CBC was significant for a hemoglobin of 11.7, and a lower platelet count of 58,000. Sed rate was 71. Creatinine at the time was 1.31. Ferritin was 45 and B12 was high at 1635. Today, the patient complains of pain in her right hip and easy bruisability. She also had some spontaneous bleeding from her lips and tongue, possibly attributed to blisters. She states her depression is managed well.  Denies fever, chills, sweats, fatigue, chest pain, palpitations, abdominal pain, hematuria, hematochezia, epistaxis, or recent infections.

## 2023-01-26 ENCOUNTER — NON-APPOINTMENT (OUTPATIENT)
Age: 73
End: 2023-01-26

## 2023-02-28 ENCOUNTER — APPOINTMENT (OUTPATIENT)
Dept: ORTHOPEDIC SURGERY | Facility: CLINIC | Age: 73
End: 2023-02-28
Payer: COMMERCIAL

## 2023-02-28 VITALS — BODY MASS INDEX: 25.61 KG/M2 | HEIGHT: 58 IN | WEIGHT: 122 LBS

## 2023-02-28 DIAGNOSIS — T84.84XA PAIN DUE TO INTERNAL ORTHOPEDIC PROSTHETIC DEVICES, IMPLANTS AND GRAFTS, INITIAL ENCOUNTER: ICD-10-CM

## 2023-02-28 PROCEDURE — 99214 OFFICE O/P EST MOD 30 MIN: CPT

## 2023-02-28 PROCEDURE — 73521 X-RAY EXAM HIPS BI 2 VIEWS: CPT

## 2023-02-28 PROCEDURE — 72170 X-RAY EXAM OF PELVIS: CPT | Mod: RT

## 2023-03-14 PROBLEM — T84.84XA PAINFUL ORTHOPAEDIC HARDWARE: Status: ACTIVE | Noted: 2023-03-14

## 2023-03-16 NOTE — PROGRESS NOTE BEHAVIORAL HEALTH - NS ED BHA MED ROS GASTROINTESTINAL
No complaints
Mirvaso Pregnancy And Lactation Text: This medication has not been assigned a Pregnancy Risk Category. It is unknown if the medication is excreted in breast milk.

## 2023-03-21 ENCOUNTER — TRANSCRIPTION ENCOUNTER (OUTPATIENT)
Age: 73
End: 2023-03-21

## 2023-03-28 ENCOUNTER — APPOINTMENT (OUTPATIENT)
Dept: HEMATOLOGY ONCOLOGY | Facility: CLINIC | Age: 73
End: 2023-03-28
Payer: MEDICARE

## 2023-03-28 VITALS
BODY MASS INDEX: 27.5 KG/M2 | HEART RATE: 92 BPM | TEMPERATURE: 96.9 F | SYSTOLIC BLOOD PRESSURE: 140 MMHG | DIASTOLIC BLOOD PRESSURE: 82 MMHG | WEIGHT: 131 LBS | HEIGHT: 58 IN | OXYGEN SATURATION: 96 %

## 2023-03-28 DIAGNOSIS — U07.1 COVID-19: ICD-10-CM

## 2023-03-28 LAB
RBC # BLD: 4.04 M/UL
RETICS # AUTO: 2.3 %
RETICS AGGREG/RBC NFR: 90.9 K/UL

## 2023-03-28 PROCEDURE — 99213 OFFICE O/P EST LOW 20 MIN: CPT | Mod: 25

## 2023-03-28 PROCEDURE — 36415 COLL VENOUS BLD VENIPUNCTURE: CPT

## 2023-03-28 NOTE — ASSESSMENT
[FreeTextEntry1] : Patient is a 72 year old female with a history of hyponatremia, sigmoid colon resection for a malignant polyp in 2015, thoracic aortic aneurysm, clinical depression, renal calculi, renal insufficiency and chronic thrombocytopenia for over 10 years, who presents for follow-up of anemia, thrombocytopenia and coagulopathy. An evaluation was positive for IIb/IIIa platelet antibodies and weakly positive rheumatoid factor in the past. Patient's chronic thrombocytopenia is likely on an autoimmune basis.  Her medication, Parnate, has also been listed as a cause of thrombocytopenia.  It is also possible that patient has a qualitative platelet disorder as well. A low grade myelodysplasia remains a possibility. Patient was also found to have a slightly elevated PTT in the past, with a nondiagnostic evaluation including VWD studies and a normal PTT at last visit.  Hemoglobin and hematocrit have been in the normal range for the past few visits.  Have ordered ROSALVA, CBC with differential, CMP, ferritin, iron panel, manual differential, reticulocyte count, rheumatoid factor quant and sed rate. Venipuncture was performed in the office today. Patient was advised to call office for results, further recommendations and next appointment date.

## 2023-03-28 NOTE — HISTORY OF PRESENT ILLNESS
[de-identified] : Patient is a 72 year old female with a history of hyponatremia, sigmoid colon resection for a malignant polyp in 2015, thoracic aortic aneurysm, clinical depression, COVID infection in January 2023, renal calculi, renal insufficiency and chronic thrombocytopenia for over 10 years, who presents for follow-up of anemia, thrombocytopenia and coagulopathy. Patient has had thrombocytopenia for over 10 years. She has tested positive for platelet antibodies in the past. Her platelet count ranges from 70,000 to 130,000. Patient also has had an intermittent slight elevation of the PTT. Evaluations, including VWD disease studies, were nondiagnostic and the most recent INR/PTT was normal. At the last visit, the CBC was significant for a platelet count of 81,000, normal hemoglobin at 12.0, and a normal white count of 4.59. Comprehensive metabolic panel revealed BUN of 31, sodium of 134 and creatinine 1.30. Sed rate was normal at 20. Iron panel and ferritin were normal. ROSALVA was negative. INR and PTT were normal. Patient saw her PCP in February and was found to have a platelet count of 65,000, shortly after having had COVID-19 infection, with the remainder of the results essentially normal. TSH was slightly elevated at 4.7. B12 and folate were slightly elevated. Today, the patient complains of muscle tightness in the hip and easy bruisability. She states that she gained 9 pounds unintentionally since the last visit because she has been less active. Patient is planning for a CT scan next week to manage her thoracic aortic aneurysm. Denies fever, chills, sweats, fatigue, chest pain, palpitations, abdominal pain, hematuria, hematochezia, epistaxis, or recent infections. Of note, patient is planning for a colonoscopy in late April. \par

## 2023-03-28 NOTE — REVIEW OF SYSTEMS
[Muscle Weakness] : muscle weakness [Easy Bruising] : a tendency for easy bruising [Negative] : Allergic/Immunologic [Fever] : no fever [Chills] : no chills [Night Sweats] : no night sweats [Fatigue] : no fatigue [Recent Change In Weight] : ~T recent weight change [Vision Problems] : no vision problems [Nosebleeds] : no nosebleeds [Chest Pain] : no chest pain [Palpitations] : no palpitations [Shortness Of Breath] : no shortness of breath [Abdominal Pain] : no abdominal pain [Skin Rash] : no skin rash [Easy Bleeding] : no tendency for easy bleeding [FreeTextEntry2] : Gained 9 pounds since last visit [FreeTextEntry7] : No blood in stool [FreeTextEntry8] : No hematuria [de-identified] : Muscle discomfort, cramping

## 2023-03-28 NOTE — PHYSICAL EXAM
[Normal] : affect appropriate [de-identified] : RRR, S1, S2, murmur, without ectopy [de-identified] : Several different sized ecchymoses on left /right upper  and lower extremities, not on torso

## 2023-03-29 LAB
ALBUMIN SERPL ELPH-MCNC: 4.5 G/DL
ALP BLD-CCNC: 74 U/L
ALT SERPL-CCNC: 16 U/L
ANION GAP SERPL CALC-SCNC: 15 MMOL/L
AST SERPL-CCNC: 27 U/L
BASOPHILS # BLD AUTO: 0.2 K/UL
BASOPHILS # BLD AUTO: 0.2 K/UL
BASOPHILS NFR BLD AUTO: 4.3 %
BASOPHILS NFR BLD AUTO: 4.3 %
BILIRUB SERPL-MCNC: 0.2 MG/DL
BUN SERPL-MCNC: 32 MG/DL
CALCIUM SERPL-MCNC: 9.6 MG/DL
CHLORIDE SERPL-SCNC: 99 MMOL/L
CO2 SERPL-SCNC: 23 MMOL/L
CREAT SERPL-MCNC: 1.24 MG/DL
EGFR: 47 ML/MIN/1.73M2
EOSINOPHIL # BLD AUTO: 0.2 K/UL
EOSINOPHIL # BLD AUTO: 0.2 K/UL
EOSINOPHIL NFR BLD AUTO: 4.3 %
EOSINOPHIL NFR BLD AUTO: 4.3 %
ERYTHROCYTE [SEDIMENTATION RATE] IN BLOOD BY WESTERGREN METHOD: 33 MM/HR
FERRITIN SERPL-MCNC: 32 NG/ML
GIANT PLATELETS BLD QL SMEAR: PRESENT
GLUCOSE SERPL-MCNC: 76 MG/DL
HCT VFR BLD CALC: 38.5 %
HGB BLD-MCNC: 11.9 G/DL
IRON SATN MFR SERPL: 14 %
IRON SERPL-MCNC: 60 UG/DL
LYMPHOCYTES # BLD AUTO: 1.25 K/UL
LYMPHOCYTES # BLD AUTO: 1.25 K/UL
LYMPHOCYTES NFR BLD AUTO: 27 %
LYMPHOCYTES NFR BLD AUTO: 27 %
MAN DIFF?: NORMAL
MCHC RBC-ENTMCNC: 29.5 PG
MCHC RBC-ENTMCNC: 30.9 GM/DL
MCV RBC AUTO: 95.3 FL
METAMYELOCYTES # FLD: 0.9 %
MONOCYTES # BLD AUTO: 0.32 K/UL
MONOCYTES # BLD AUTO: 0.32 K/UL
MONOCYTES NFR BLD AUTO: 6.9 %
MONOCYTES NFR BLD AUTO: 6.9 %
MSMEAR: NORMAL
MYELOCYTES NFR BLD: 0.9 %
NEUTROPHILS # BLD AUTO: 2.54 K/UL
NEUTROPHILS # BLD AUTO: 2.54 K/UL
NEUTROPHILS NFR BLD AUTO: 53.9 %
NEUTROPHILS NFR BLD AUTO: 53.9 %
NEUTS BAND NFR BLD MANUAL: 0.9 %
OVALOCYTES BLD QL SMEAR: SLIGHT
PLAT MORPH BLD: ABNORMAL
PLATELET # BLD AUTO: 108 K/UL
POIKILOCYTOSIS BLD QL SMEAR: SLIGHT
POTASSIUM SERPL-SCNC: 4.8 MMOL/L
PROMYELOCYTES # FLD: 0.9 %
PROT SERPL-MCNC: 7.6 G/DL
RBC # BLD: 4.04 M/UL
RBC # FLD: 14.6 %
RBC BLD AUTO: ABNORMAL
RHEUMATOID FACT SER QL: 15 IU/ML
SCHISTOCYTES BLD QL SMEAR: SLIGHT
SODIUM SERPL-SCNC: 138 MMOL/L
SPHEROCYTES BLD QL SMEAR: SLIGHT
TIBC SERPL-MCNC: 413 UG/DL
UIBC SERPL-MCNC: 354 UG/DL
WBC # FLD AUTO: 4.63 K/UL

## 2023-03-30 ENCOUNTER — NON-APPOINTMENT (OUTPATIENT)
Age: 73
End: 2023-03-30

## 2023-03-30 LAB — ANA SER IF-ACNC: NEGATIVE

## 2023-04-13 NOTE — PROGRESS NOTE BEHAVIORAL HEALTH - NSBHFUPINTERVALHXFT_PSY_A_CORE
methylphenidate ER (CONCERTA) 36 MG Oral Tab GEE  - Giacomo Macdonald Patient on 1:1 and continues to be provocative and making suicidal statements if not administered more Parnate. She denies plan at this time. Patient A&Ox3. Patient previously on higher MAOI dosing per outpatient psychiatrist with Na+ 138 lst visit and Na+ WNL this AM per chart review. Currently on dose 60mg qd w Na now at 137 and medical team continuing fluid restriction for hyponatremia. Discussed case with primary team to continue 1:1 as patient more provocative and making suicidal statements, more affectively dysregulated. Patient on 1:1 and continues to be provocative and making suicidal statements if not administered more Parnate. She denies plan at this time. Patient A&Ox3. Patient previously on higher MAOI dosing per outpatient psychiatrist with Na+ 138 lst visit and Na+ WNL this AM per chart review. Currently on dose 60mg qd w Na now at 137 and medical team continuing fluid restriction for hyponatremia. Patient on 1:1 and demanding parnate. Patient has improved affect this morning and states that she is not suicidal but does need more that 60mg of parnate daily. 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 Pt further states that upon leaving the hospital she will resume her home dose and is confused why we cannot give her more.  She denies SI or a plan at this time. Patient A&Ox3. Patient previously on higher MAOI dosing per outpatient psychiatrist with Na+ 138 lst visit. Currently on dose 60mg qd w Na at 137 7/6 and medical team continuing fluid restriction for hyponatremia.

## 2023-04-18 NOTE — PATIENT PROFILE ADULT - NSPRESCRALCAMT_GEN_A_NUR
Addended by: LITTLE STEWART on: 4/18/2023 09:05 AM     Modules accepted: Orders    
pt A&Ox2 very confused

## 2023-04-26 NOTE — PROGRESS NOTE BEHAVIORAL HEALTH - NSBHFUPINTERVALHXFT_PSY_A_CORE
Admission Reconciliation is Completed  Discharge Reconciliation is Not Complete Primary team reached out for reassessment of patient and suicidality. Patient on 1:1 and continues to be provocative and making suicidal statements if not administered more Parnate. She denies plan at this time. Patient A&Ox3. Patient previously on higher MAOI dosing per outpatient psychiatrist with nml Na levels per chart review. Could be contributory and currently on dose 60mg qd w Na now at 134 and medical team continuing fluid restriction for hyponatremia. Discussed case with primary team to continue 1:1 as patient more provocative and making suicidal statements, more affectively dysregulated. Admission Reconciliation is Completed  Discharge Reconciliation is Completed

## 2023-05-22 ENCOUNTER — APPOINTMENT (OUTPATIENT)
Dept: UROLOGY | Facility: CLINIC | Age: 73
End: 2023-05-22
Payer: MEDICARE

## 2023-05-22 VITALS
SYSTOLIC BLOOD PRESSURE: 155 MMHG | HEART RATE: 70 BPM | OXYGEN SATURATION: 96 % | DIASTOLIC BLOOD PRESSURE: 87 MMHG | TEMPERATURE: 98 F

## 2023-05-22 PROCEDURE — 99213 OFFICE O/P EST LOW 20 MIN: CPT

## 2023-05-22 NOTE — HISTORY OF PRESENT ILLNESS
[FreeTextEntry1] : 10/8/21:\par 72 yo female referred by Dr. Ramirez for hx of right hydronephrosis.  The patient was first seen by Dr. Ramirez on June 2nd, 2021 for multiple episodes of gross hematuria in May 2021.  CTU workup revealed a moderate-severe right hydronephrosis as well as mild left hydronephrosis secondary to a 7 mm left proximal ureteral stone.  She eventually underwent left ureteroscopy with stent placement.    She was admitted to the hospital several days after her URS with gross hematuria and clot retention as well as renal failure.  She recovered from this and eventually underwent stent removal.  Her Cr is now 1.29 (eGFR = 42) as of 9/22/21.  She denies any recent episodes of hematuria or dysuria. She has no voiding complaints at this time. \par \par She had a renal scan performed on 9/8/21 which shows a right UPJ obstruction.  The split function is 53% left and 47% right.  After further inquiring about her right hydronephrosis, the patient states that she has known about this for many years.  She has been getting her pelvic US and mammography performed by Dr. Srivastava for many years, and the right sided hydronephrosis has been stable and present since at least 1999 (see uploaded report).  She has never had flank pain or recurrent pyelonephritis.  \par \par Of note, the patient has been seeing Dr. West for w/u of easy bruising with a long standing hx of thrombocytopenia and newly diagnosed IIb/IIa platelet antibodies.  She may be scheduled for bone marrow biopsy for further evaluation.  \par \par *************\par 10/20/21: [Result communication]\par Repeat NM scan from hospital shows a partial right UPJ obstruction. 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 Her hydronephrosis is very long standing, and is not symptomatic. Will hold off on any intervention at this time. She will come in for annual evaluation. Patient agrees with plan. \par \par *************\par 5/22/23:\par Patient returns for follow up.  She has felt well and denies any flank pain.  NM renal scan from 2021 did no show any evidence of high grade obstruction ion the right kidney.

## 2023-05-22 NOTE — PHYSICAL EXAM
[General Appearance - Well Developed] : well developed [Normal Appearance] : normal appearance [Abdomen Soft] : soft [Skin Color & Pigmentation] : normal skin color and pigmentation [Heart Rate And Rhythm] : Heart rate and rhythm were normal [] : no respiratory distress [Abdomen Tenderness] : non-tender [Costovertebral Angle Tenderness] : no ~M costovertebral angle tenderness [Oriented To Time, Place, And Person] : oriented to person, place, and time [Normal Station and Gait] : the gait and station were normal for the patient's age [No Focal Deficits] : no focal deficits

## 2023-05-22 NOTE — ASSESSMENT
[FreeTextEntry1] : 73 yo female with hx of chronic (> 20 years) right hydronephrosis with no evidence of high grade obstruction on NM renal scan.  He last scan was in October 2021.  We will get a new renal scan to ensure stability of her renal function. \par \par Plan:\par 1. NM renal scan\par 2. F/u after # 1

## 2023-06-03 NOTE — BEHAVIORAL HEALTH ASSESSMENT NOTE - NS ED BHA DEMOGRAPHICS MEDICAL RECORD REVIEWED CONSENT OBTAINED YN
Plan: Advised patient to wash hands with Cerave Hydrating Facial Cleanser and apply Cerave Moisturizing Cream after Detail Level: Simple Yes

## 2023-06-14 ENCOUNTER — OUTPATIENT (OUTPATIENT)
Dept: OUTPATIENT SERVICES | Facility: HOSPITAL | Age: 73
LOS: 1 days | End: 2023-06-14
Payer: MEDICARE

## 2023-06-14 ENCOUNTER — APPOINTMENT (OUTPATIENT)
Dept: NUCLEAR MEDICINE | Facility: HOSPITAL | Age: 73
End: 2023-06-14

## 2023-06-14 DIAGNOSIS — Z98.890 OTHER SPECIFIED POSTPROCEDURAL STATES: Chronic | ICD-10-CM

## 2023-06-14 PROCEDURE — 78708 K FLOW/FUNCT IMAGE W/DRUG: CPT | Mod: 26,MH

## 2023-06-14 PROCEDURE — 78708 K FLOW/FUNCT IMAGE W/DRUG: CPT

## 2023-06-14 PROCEDURE — A9562: CPT

## 2023-07-06 ENCOUNTER — APPOINTMENT (OUTPATIENT)
Dept: UROLOGY | Facility: CLINIC | Age: 73
End: 2023-07-06
Payer: MEDICARE

## 2023-07-06 DIAGNOSIS — N13.30 UNSPECIFIED HYDRONEPHROSIS: ICD-10-CM

## 2023-07-06 DIAGNOSIS — Q62.39 OTHER OBSTRUCTIVE DEFECTS OF RENAL PELVIS AND URETER: ICD-10-CM

## 2023-07-06 PROCEDURE — 99442: CPT | Mod: 95

## 2023-07-07 PROBLEM — Q62.39 UPJ OBSTRUCTION, CONGENITAL: Status: ACTIVE | Noted: 2023-07-07

## 2023-07-07 PROBLEM — N13.30 HYDRONEPHROSIS, RIGHT: Status: ACTIVE | Noted: 2021-10-08

## 2023-07-07 NOTE — HISTORY OF PRESENT ILLNESS
[FreeTextEntry1] : 10/8/21:\par 72 yo female referred by Dr. Ramirez for hx of right hydronephrosis.  The patient was first seen by Dr. Ramirez on June 2nd, 2021 for multiple episodes of gross hematuria in May 2021.  CTU workup revealed a moderate-severe right hydronephrosis as well as mild left hydronephrosis secondary to a 7 mm left proximal ureteral stone.  She eventually underwent left ureteroscopy with stent placement.    She was admitted to the hospital several days after her URS with gross hematuria and clot retention as well as renal failure.  She recovered from this and eventually underwent stent removal.  Her Cr is now 1.29 (eGFR = 42) as of 9/22/21.  She denies any recent episodes of hematuria or dysuria. She has no voiding complaints at this time. \par \par She had a renal scan performed on 9/8/21 which shows a right UPJ obstruction.  The split function is 53% left and 47% right.  After further inquiring about her right hydronephrosis, the patient states that she has known about this for many years.  She has been getting her pelvic US and mammography performed by Dr. Srivastava for many years, and the right sided hydronephrosis has been stable and present since at least 1999 (see uploaded report).  She has never had flank pain or recurrent pyelonephritis.  \par \par Of note, the patient has been seeing Dr. West for w/u of easy bruising with a long standing hx of thrombocytopenia and newly diagnosed IIb/IIa platelet antibodies.  She may be scheduled for bone marrow biopsy for further evaluation.  \par \par *************\par 10/20/21: [Result communication]\par Repeat NM scan from hospital shows a partial right UPJ obstruction. 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 Her hydronephrosis is very long standing, and is not symptomatic. Will hold off on any intervention at this time. She will come in for annual evaluation. Patient agrees with plan. \par \par *************\par 5/22/23:\par Patient returns for follow up.  She has felt well and denies any flank pain.  NM renal scan from 2021 did no show any evidence of high grade obstruction ion the right kidney.  \par \par ************\par 7/6/23:\par [Telephonic visit]\par Patient returns to discuss results of her recent NM renal scan.  Scan shows stable renal function L = 48.2% and R = 51.8%.  There continues to be stable obstruction of the right UPJ.  This has been present and stable since the late 1990s.  She continues to be completely asymptomatic.

## 2023-07-07 NOTE — ASSESSMENT
[FreeTextEntry1] : 72 yo female with stable right UPJ obstruction with stable and normal renal function.  We will continue with periodic surveillance.  She will follow up in 18-24 months.

## 2023-07-24 ENCOUNTER — LABORATORY RESULT (OUTPATIENT)
Age: 73
End: 2023-07-24

## 2023-07-24 ENCOUNTER — APPOINTMENT (OUTPATIENT)
Dept: HEMATOLOGY ONCOLOGY | Facility: CLINIC | Age: 73
End: 2023-07-24
Payer: MEDICARE

## 2023-07-24 VITALS
HEIGHT: 58 IN | DIASTOLIC BLOOD PRESSURE: 72 MMHG | OXYGEN SATURATION: 96 % | HEART RATE: 76 BPM | WEIGHT: 127 LBS | SYSTOLIC BLOOD PRESSURE: 124 MMHG | TEMPERATURE: 97.8 F | BODY MASS INDEX: 26.66 KG/M2

## 2023-07-24 LAB
APTT BLD: 35.8 SEC
ERYTHROCYTE [SEDIMENTATION RATE] IN BLOOD BY WESTERGREN METHOD: 24 MM/HR
INR PPP: 0.97
PT BLD: 11.5 SEC
RBC # BLD: 4.17 M/UL
RETICS # AUTO: 2 %
RETICS AGGREG/RBC NFR: 83 K/UL

## 2023-07-24 PROCEDURE — 36415 COLL VENOUS BLD VENIPUNCTURE: CPT

## 2023-07-24 PROCEDURE — 99214 OFFICE O/P EST MOD 30 MIN: CPT | Mod: 25

## 2023-07-24 NOTE — REVIEW OF SYSTEMS
[Recent Change In Weight] : ~T recent weight change [SOB on Exertion] : shortness of breath during exertion [Easy Bruising] : a tendency for easy bruising [Negative] : Allergic/Immunologic [Chills] : no chills [Vision Problems] : no vision problems [Nosebleeds] : no nosebleeds [Chest Pain] : no chest pain [Abdominal Pain] : no abdominal pain [Joint Pain] : no joint pain [Joint Stiffness] : no joint stiffness [Skin Rash] : no skin rash [FreeTextEntry2] : Intentional weight loss of 4 pounds [de-identified] : Bruising

## 2023-07-24 NOTE — PHYSICAL EXAM
[Normal] : affect appropriate [de-identified] : RRR, S1, S2, murmur, occasional ectopy [de-identified] : Several different sized ecchymoses on left /right upper  and lower extremities, not on torso

## 2023-07-24 NOTE — REASON FOR VISIT
[Follow-Up Visit] : a follow-up visit for [FreeTextEntry2] : Immune thrombocytopenia,Anemia, easy bruising, elevated PTT

## 2023-07-24 NOTE — HISTORY OF PRESENT ILLNESS
[de-identified] : Patient is a 73 year old female with a history of hyponatremia, sigmoid colon resection for a malignant polyp in 2015, thoracic aortic aneurysm, clinical depression, COVID infection in January 2023, renal calculi, renal insufficiency and chronic thrombocytopenia for over 10 years, who presents for follow-up of anemia, thrombocytopenia and coagulopathy. Patient has had thrombocytopenia for over 10 years. She has tested positive for platelet antibodies in the past. Her platelet count ranges from 70,000 to 130,000. Patient also has had an intermittent slight elevation of the PTT. Evaluations, including VWD disease studies, were nondiagnostic and the most recent INR/PTT was normal. At the last visit in March, the CBC revealed an improved platelet count at 108,000, white count of 4.63 and a hemoglobin of 11.9. Iron panel revealed a low normal iron saturation and ferritin and patient was advised to take gentle iron. Rheumatoid factor was 15 and ROSALVA was negative. Sed rate was again mildly elevated at 33. Comprehensive metabolic panel was unremarkable. Patient reports that her thoracic aortic aneurysm is stable. Patient has intentionally lost 4 pounds since the last visit. Today, the patient complains of lack of sleep and easy bruisability.  Patient states she has shortness of breath on exertion. She is active by walking. Patient is supplementing iron. Denies fever, chills, sweats, fatigue, chest pain, palpitations, abdominal pain, hematuria, hematochezia, epistaxis, gingival bleeding or recent infections.

## 2023-07-24 NOTE — ASSESSMENT
[FreeTextEntry1] : Patient is a 73 year old female with a history of hyponatremia, sigmoid colon resection for a malignant polyp in 2015, thoracic aortic aneurysm, clinical depression, renal calculi, renal insufficiency and chronic thrombocytopenia for over 10 years, who presents for follow-up of anemia, thrombocytopenia and coagulopathy. An evaluation was positive for IIb/IIIa platelet antibodies and weakly positive rheumatoid factor in the past. Patient's chronic thrombocytopenia is likely on an autoimmune basis. Her medication, Parnate, has also been listed as a cause of thrombocytopenia. It is also possible that patient has a qualitative platelet disorder (possibly drug-induced) as well but testing will not be accurate in the presence of thrombocytopenia. A low grade myelodysplasia remains a possibility. Patient was also found to have a slightly elevated PTT in the past which then normalized but will reevaluate. Hemoglobin and hematocrit have been in the normal range for the past few visits. \par Have ordered Activated Partial Thromboplastin Time, ROSALVA, B12 AND folate, CBC with differential, CMP, Ferritin, iron panel, manual differential, Prothrombin Time and INR Plasma, reticulocyte count, Rheumatoid Factor Quant serum or plasma sed rate and Thyroid Stimulating Hormone, Serum w/ FT4 Reflex. Venipuncture was performed in the office today. Patient was advised to call office for results, further recommendations and next appointment date.

## 2023-07-25 LAB
BASOPHILS NFR BLD AUTO: 5 %
EOSINOPHIL NFR BLD AUTO: 3 %
FERRITIN SERPL-MCNC: 54 NG/ML
FOLATE SERPL-MCNC: >20 NG/ML
GIANT PLATELETS BLD QL SMEAR: PRESENT
HYPOCHROMIA BLD QL SMEAR: SLIGHT
IRON SATN MFR SERPL: 17 %
IRON SERPL-MCNC: 66 UG/DL
LYMPHOCYTES # BLD AUTO: 1.13 K/UL
LYMPHOCYTES NFR BLD AUTO: 25 %
MONOCYTES NFR BLD AUTO: 8 %
MSMEAR: NORMAL
MYELOCYTES NFR BLD: 2 %
NEUTROPHILS # BLD AUTO: 2.57 K/UL
NEUTROPHILS NFR BLD AUTO: 57 %
PLAT MORPH BLD: ABNORMAL
POLYCHROMASIA BLD QL SMEAR: SLIGHT
RBC BLD AUTO: NORMAL
RHEUMATOID FACT SER QL: 13 IU/ML
TIBC SERPL-MCNC: 384 UG/DL
TSH SERPL-ACNC: 4.74 UIU/ML
UIBC SERPL-MCNC: 318 UG/DL
VIT B12 SERPL-MCNC: 1916 PG/ML

## 2023-07-26 LAB
ALBUMIN SERPL ELPH-MCNC: 4.7 G/DL
ALP BLD-CCNC: 78 U/L
ALT SERPL-CCNC: 20 U/L
ANA SER IF-ACNC: NEGATIVE
ANION GAP SERPL CALC-SCNC: 24 MMOL/L
AST SERPL-CCNC: 38 U/L
BILIRUB SERPL-MCNC: 0.2 MG/DL
BUN SERPL-MCNC: 25 MG/DL
CALCIUM SERPL-MCNC: 9.4 MG/DL
CHLORIDE SERPL-SCNC: 99 MMOL/L
CO2 SERPL-SCNC: 19 MMOL/L
CREAT SERPL-MCNC: 1.35 MG/DL
EGFR: 42 ML/MIN/1.73M2
GLUCOSE SERPL-MCNC: 84 MG/DL
POTASSIUM SERPL-SCNC: 4.3 MMOL/L
PROT SERPL-MCNC: 7.9 G/DL
SODIUM SERPL-SCNC: 142 MMOL/L

## 2023-08-02 ENCOUNTER — NON-APPOINTMENT (OUTPATIENT)
Age: 73
End: 2023-08-02

## 2023-08-02 LAB
BASOPHILS # BLD AUTO: 0.13 K/UL
BASOPHILS NFR BLD AUTO: 2.5 %
EOSINOPHIL # BLD AUTO: 0.22 K/UL
EOSINOPHIL NFR BLD AUTO: 4.2 %
LYMPHOCYTES # BLD AUTO: 0.84 K/UL
LYMPHOCYTES NFR BLD AUTO: 15.8 %
METAMYELOCYTES # FLD: 0.8 %
MONOCYTES # BLD AUTO: 0.4 K/UL
MONOCYTES NFR BLD AUTO: 7.5 %
MSMEAR: NORMAL
NEUTROPHILS # BLD AUTO: 3.67 K/UL
NEUTROPHILS NFR BLD AUTO: 69.2 %
PLAT MORPH BLD: NORMAL
RBC BLD AUTO: NORMAL

## 2023-08-08 ENCOUNTER — NON-APPOINTMENT (OUTPATIENT)
Age: 73
End: 2023-08-08

## 2023-08-11 ENCOUNTER — NON-APPOINTMENT (OUTPATIENT)
Age: 73
End: 2023-08-11

## 2023-08-15 NOTE — PROGRESS NOTE BEHAVIORAL HEALTH - NSBHFUPINTERVALCCFT_PSY_A_CORE
"I need more parnate" Muscle Hinge Flap Text: The defect edges were debeveled with a #15 scalpel blade.  Given the size, depth and location of the defect and the proximity to free margins a muscle hinge flap was deemed most appropriate.  Using a sterile surgical marker, an appropriate hinge flap was drawn incorporating the defect. The area thus outlined was incised with a #15 scalpel blade.  The skin margins were undermined to an appropriate distance in all directions utilizing iris scissors.

## 2023-08-29 ENCOUNTER — APPOINTMENT (OUTPATIENT)
Dept: HEMATOLOGY ONCOLOGY | Facility: CLINIC | Age: 73
End: 2023-08-29
Payer: MEDICARE

## 2023-08-29 VITALS
BODY MASS INDEX: 27.08 KG/M2 | HEIGHT: 58 IN | HEART RATE: 81 BPM | DIASTOLIC BLOOD PRESSURE: 74 MMHG | SYSTOLIC BLOOD PRESSURE: 122 MMHG | TEMPERATURE: 97.1 F | OXYGEN SATURATION: 97 % | WEIGHT: 129 LBS

## 2023-08-29 DIAGNOSIS — R79.1 ABNORMAL COAGULATION PROFILE: ICD-10-CM

## 2023-08-29 PROCEDURE — 36415 COLL VENOUS BLD VENIPUNCTURE: CPT

## 2023-08-29 PROCEDURE — 99214 OFFICE O/P EST MOD 30 MIN: CPT | Mod: 25

## 2023-08-29 NOTE — REASON FOR VISIT
[Follow-Up Visit] : a follow-up visit for [FreeTextEntry2] : Coagulopathy, elevated PTT, easy bruising, spontaneous ecchymosis, immune thrombocytopenia

## 2023-08-29 NOTE — PHYSICAL EXAM
[Normal] : affect appropriate [de-identified] : aphthous ulcer left inner cheek [de-identified] : RRR, S1, S2, murmur [de-identified] : Several different sized ecchymoses on left /right upper  and lower extremities, not on torso

## 2023-08-29 NOTE — HISTORY OF PRESENT ILLNESS
[de-identified] : Patient is a 73 year old female with a history of hyponatremia, sigmoid colon resection for a malignant polyp in 2015, thoracic aortic aneurysm, clinical depression, COVID infection in January 2023, renal calculi, renal insufficiency and chronic thrombocytopenia for over 10 years, who presents for follow-up of anemia, thrombocytopenia and coagulopathy. Patient has had thrombocytopenia for over 10 years. She has tested positive for platelet antibodies in the past. Her platelet count ranges from 70,000 to 130,000. Patient also has had an intermittent slight elevation of the PTT. Evaluations, including VWD disease studies, were nondiagnostic and the most recent PTT was slightly elevated. At the last visit in July, the CBC was significant for a much lower platelet count at 66,000, with a normal hemoglobin at 12.6 and a white count of 4.51. PTT was slightly elevated at 35.8 with a normal INR. This corrected to 31.6 with normal plasma. Factors VIII, IX, XI, and XII were within normal limits. Comprehensive metabolic panel was significant for an elevated creatinine to 1.35 and a BUN of 25.  Patient had a repeat CBC which was significant for a platelet count that was still low at 69,000. The white blood count was 5.31 and the hemoglobin was 13.0. Since the last visit, patient received a Prolia injection. Patient continues to complain of spontaneous bruising especially on extremities. She makes a point to state that she was a drinker in the past but has not had any alcohol since 2020. She reports blood blisters in her mouth which have since resolved. Patient will have a Dexa scan in the near future. Denies fever, chills, sweats, fatigue, chest pain, palpitations, shortness of breath, abdominal pain, joint pain, hematuria, hematochezia, epistaxis, gingival bleeding or recent infections.

## 2023-08-29 NOTE — ASSESSMENT
[FreeTextEntry1] : Patient is a 73 year old female with a history of hyponatremia, sigmoid colon resection for a malignant polyp in 2015, thoracic aortic aneurysm, clinical depression, renal calculi, renal insufficiency and chronic thrombocytopenia for over 10 years, who presents for follow-up of anemia, thrombocytopenia and coagulopathy. An evaluation was positive for IIb/IIIa platelet antibodies and weakly positive rheumatoid factor in the past. Patient's chronic thrombocytopenia is likely on an autoimmune basis but there is a suspicion of a qualitative platelet disorder as well.. Her medication, Parnate, has also been listed as a cause of thrombocytopenia. It is also possible that patient has a qualitative platelet disorder (possibly drug-induced) but testing will not be accurate in the presence of thrombocytopenia. A low grade myelodysplasia remains a possibility. Patient was also found to have a slightly elevated PTT in the past which then normalized and was present again at the last visit, therefore, will reevaluate. Hemoglobin and hematocrit have been in the normal range for the past few visits. Have ordered Activated Partial Thromboplastin Time, CBC, CMP, manual differential, miscellaneous test name HMW kinninogen (Evans Factor), prekallekrein (De Jesus Factor),  Prothrombin Time and INR Plasma, reticulocyte count and sed rate, Von Willebrand (VWF) Activity, von Willebrand Factor Antigen and von Willebrand Factor Multimer Assay. Venipuncture was performed in the office today. Patient was advised to call office for results and further recommendations.

## 2023-08-29 NOTE — REVIEW OF SYSTEMS
[Depression] : depression [Easy Bruising] : a tendency for easy bruising [Negative] : Allergic/Immunologic [Fever] : no fever [Night Sweats] : no night sweats [Fatigue] : no fatigue [Recent Change In Weight] : ~T no recent weight change [Vision Problems] : no vision problems [Nosebleeds] : no nosebleeds [Chest Pain] : no chest pain [Shortness Of Breath] : no shortness of breath [Abdominal Pain] : no abdominal pain [Joint Pain] : no joint pain [Joint Stiffness] : no joint stiffness [Dizziness] : no dizziness [FreeTextEntry7] : no blood in stool [FreeTextEntry8] : no blood in urine [de-identified] : ecchymoses

## 2023-08-30 LAB
ALBUMIN SERPL ELPH-MCNC: 4.6 G/DL
ALP BLD-CCNC: 80 U/L
ALT SERPL-CCNC: 16 U/L
ANION GAP SERPL CALC-SCNC: 14 MMOL/L
APTT BLD: 34.6 SEC
AST SERPL-CCNC: 29 U/L
BASOPHILS # BLD AUTO: 0.09 K/UL
BASOPHILS NFR BLD AUTO: 1.7 %
BILIRUB SERPL-MCNC: 0.3 MG/DL
BUN SERPL-MCNC: 34 MG/DL
CALCIUM SERPL-MCNC: 9.3 MG/DL
CHLORIDE SERPL-SCNC: 101 MMOL/L
CO2 SERPL-SCNC: 25 MMOL/L
CREAT SERPL-MCNC: 1.21 MG/DL
EGFR: 48 ML/MIN/1.73M2
EOSINOPHIL # BLD AUTO: 0.19 K/UL
EOSINOPHIL NFR BLD AUTO: 3.5 %
ERYTHROCYTE [SEDIMENTATION RATE] IN BLOOD BY WESTERGREN METHOD: 31 MM/HR
GIANT PLATELETS BLD QL SMEAR: PRESENT
GLUCOSE SERPL-MCNC: 88 MG/DL
HCT VFR BLD CALC: 39.2 %
HGB BLD-MCNC: 12.5 G/DL
INR PPP: 0.89
LYMPHOCYTES # BLD AUTO: 1.2 K/UL
LYMPHOCYTES NFR BLD AUTO: 21.7 %
MCHC RBC-ENTMCNC: 30.7 PG
MCHC RBC-ENTMCNC: 31.9 GM/DL
MCV RBC AUTO: 96.3 FL
METAMYELOCYTES # FLD: 0.9 %
MONOCYTES # BLD AUTO: 0.39 K/UL
MONOCYTES NFR BLD AUTO: 7 %
MSMEAR: NORMAL
NEUTROPHILS # BLD AUTO: 3.61 K/UL
NEUTROPHILS NFR BLD AUTO: 65.2 %
PLAT MORPH BLD: ABNORMAL
PLATELET # BLD AUTO: 94 K/UL
POTASSIUM SERPL-SCNC: 4.6 MMOL/L
PROT SERPL-MCNC: 7.5 G/DL
PT BLD: 10.2 SEC
RBC # BLD: 4.07 M/UL
RBC # BLD: 4.07 M/UL
RBC # FLD: 14.5 %
RBC BLD AUTO: NORMAL
RETICS # AUTO: 2.6 %
RETICS AGGREG/RBC NFR: 106.2 K/UL
SODIUM SERPL-SCNC: 140 MMOL/L
VWF AG PPP IA-ACNC: 145 %
VWF:RCO ACT/NOR PPP PL AGG: 142 %
WBC # FLD AUTO: 5.53 K/UL

## 2023-09-01 LAB
HMWK ACTIVITY ACT/NOR PPP: 32.9 SEC
HMWK PPP-ACNC: 35.8 SEC
HMWK PPP-ACNC: >200
PK PPP-MCNC: 33.4 SEC
PK PPP-MCNC: 34.9 SEC
PK PPP-MCNC: 59.3 SEC

## 2023-09-05 ENCOUNTER — NON-APPOINTMENT (OUTPATIENT)
Age: 73
End: 2023-09-05

## 2023-09-14 ENCOUNTER — OUTPATIENT (OUTPATIENT)
Dept: OUTPATIENT SERVICES | Facility: HOSPITAL | Age: 73
LOS: 1 days | End: 2023-09-14
Payer: MEDICARE

## 2023-09-14 ENCOUNTER — APPOINTMENT (OUTPATIENT)
Dept: RADIOLOGY | Facility: HOSPITAL | Age: 73
End: 2023-09-14
Payer: MEDICARE

## 2023-09-14 DIAGNOSIS — Z98.890 OTHER SPECIFIED POSTPROCEDURAL STATES: Chronic | ICD-10-CM

## 2023-09-14 LAB — VWF MULTIMERS PPP IA-ACNC: NORMAL

## 2023-09-14 PROCEDURE — 77080 DXA BONE DENSITY AXIAL: CPT | Mod: 26

## 2023-09-14 PROCEDURE — 77080 DXA BONE DENSITY AXIAL: CPT

## 2023-10-25 NOTE — BEHAVIORAL HEALTH ASSESSMENT NOTE - NSBHHPIREASONCL_PSY_A_CORE
Flexible bronchoscopy  Right uniportal VATS  Right upper lobectomy  Mediastinal lymph node dissection with nodes sampled from stations 3, R4, 7, 8, 9, 10, 11, 12 Flexible bronchoscopy  Right uniportal VATS, pneumonolysis  Right upper lobectomy  Mediastinal lymph node dissection with nodes sampled from stations 3, R4, 7, 8, 9, 10, 11, 12 med management

## 2023-10-26 ENCOUNTER — APPOINTMENT (OUTPATIENT)
Dept: HEMATOLOGY ONCOLOGY | Facility: CLINIC | Age: 73
End: 2023-10-26
Payer: MEDICARE

## 2023-10-26 ENCOUNTER — LABORATORY RESULT (OUTPATIENT)
Age: 73
End: 2023-10-26

## 2023-10-26 VITALS
TEMPERATURE: 97.6 F | SYSTOLIC BLOOD PRESSURE: 140 MMHG | BODY MASS INDEX: 26.87 KG/M2 | OXYGEN SATURATION: 97 % | HEART RATE: 91 BPM | HEIGHT: 58 IN | DIASTOLIC BLOOD PRESSURE: 82 MMHG | WEIGHT: 128 LBS

## 2023-10-26 LAB
RBC # BLD: 4.16 M/UL
RETICS # AUTO: 2.1 %
RETICS AGGREG/RBC NFR: 85.7 K/UL

## 2023-10-26 PROCEDURE — 36415 COLL VENOUS BLD VENIPUNCTURE: CPT

## 2023-10-26 PROCEDURE — 99214 OFFICE O/P EST MOD 30 MIN: CPT | Mod: 25

## 2023-10-27 LAB
ALBUMIN SERPL ELPH-MCNC: 4.5 G/DL
ALP BLD-CCNC: 68 U/L
ALT SERPL-CCNC: 15 U/L
ANION GAP SERPL CALC-SCNC: 12 MMOL/L
AST SERPL-CCNC: 29 U/L
BASOPHILS # BLD AUTO: 0.07 K/UL
BASOPHILS # BLD AUTO: 0.07 K/UL
BASOPHILS NFR BLD AUTO: 1.7 %
BASOPHILS NFR BLD AUTO: 1.7 %
BILIRUB SERPL-MCNC: 0.3 MG/DL
BUN SERPL-MCNC: 29 MG/DL
CALCIUM SERPL-MCNC: 9.9 MG/DL
CHLORIDE SERPL-SCNC: 98 MMOL/L
CO2 SERPL-SCNC: 26 MMOL/L
CREAT SERPL-MCNC: 1.22 MG/DL
EGFR: 47 ML/MIN/1.73M2
EOSINOPHIL # BLD AUTO: 0.15 K/UL
EOSINOPHIL # BLD AUTO: 0.15 K/UL
EOSINOPHIL NFR BLD AUTO: 3.5 %
EOSINOPHIL NFR BLD AUTO: 3.5 %
ERYTHROCYTE [SEDIMENTATION RATE] IN BLOOD BY WESTERGREN METHOD: 23 MM/HR
FOLATE SERPL-MCNC: >20 NG/ML
GIANT PLATELETS BLD QL SMEAR: PRESENT
GLUCOSE SERPL-MCNC: 78 MG/DL
HCT VFR BLD CALC: 39.5 %
HGB BLD-MCNC: 12.6 G/DL
HYPOCHROMIA BLD QL SMEAR: SLIGHT
IRON SATN MFR SERPL: 23 %
IRON SERPL-MCNC: 84 UG/DL
LYMPHOCYTES # BLD AUTO: 0.77 K/UL
LYMPHOCYTES # BLD AUTO: 0.77 K/UL
LYMPHOCYTES NFR BLD AUTO: 17.4 %
LYMPHOCYTES NFR BLD AUTO: 17.4 %
MAN DIFF?: NORMAL
MCHC RBC-ENTMCNC: 30.3 PG
MCHC RBC-ENTMCNC: 31.9 GM/DL
MCV RBC AUTO: 95 FL
MONOCYTES # BLD AUTO: 0.27 K/UL
MONOCYTES # BLD AUTO: 0.27 K/UL
MONOCYTES NFR BLD AUTO: 6.1 %
MONOCYTES NFR BLD AUTO: 6.1 %
MSMEAR: NORMAL
MYELOCYTES NFR BLD: 0.9 %
NEUTROPHILS # BLD AUTO: 3.1 K/UL
NEUTROPHILS # BLD AUTO: 3.1 K/UL
NEUTROPHILS NFR BLD AUTO: 70.4 %
NEUTROPHILS NFR BLD AUTO: 70.4 %
PLAT MORPH BLD: ABNORMAL
PLATELET # BLD AUTO: 67 K/UL
POLYCHROMASIA BLD QL SMEAR: SLIGHT
POTASSIUM SERPL-SCNC: 4.3 MMOL/L
PROT SERPL-MCNC: 7.3 G/DL
RBC # BLD: 4.16 M/UL
RBC # FLD: 14.2 %
RBC BLD AUTO: ABNORMAL
RHEUMATOID FACT SER QL: 13 IU/ML
SODIUM SERPL-SCNC: 136 MMOL/L
TIBC SERPL-MCNC: 357 UG/DL
UIBC SERPL-MCNC: 274 UG/DL
VIT B12 SERPL-MCNC: 1905 PG/ML
WBC # FLD AUTO: 4.41 K/UL

## 2023-10-30 LAB
ANA SER IF-ACNC: NEGATIVE
FERRITIN SERPL-MCNC: 53 NG/ML
GLYCOPROTEIN IV ANTIBODY: NEGATIVE
HLA CLASS 1 AB: NEGATIVE
IA/IIA AB: NEGATIVE
IB/IX AB: NEGATIVE
IIB/IIIA AB: NEGATIVE
TSH SERPL-ACNC: 5.06 UIU/ML

## 2023-11-06 ENCOUNTER — NON-APPOINTMENT (OUTPATIENT)
Age: 73
End: 2023-11-06

## 2023-12-12 ENCOUNTER — APPOINTMENT (OUTPATIENT)
Dept: HEMATOLOGY ONCOLOGY | Facility: CLINIC | Age: 73
End: 2023-12-12
Payer: MEDICARE

## 2023-12-12 VITALS
DIASTOLIC BLOOD PRESSURE: 68 MMHG | WEIGHT: 129 LBS | HEIGHT: 58 IN | OXYGEN SATURATION: 96 % | TEMPERATURE: 96.4 F | SYSTOLIC BLOOD PRESSURE: 130 MMHG | HEART RATE: 88 BPM | BODY MASS INDEX: 27.08 KG/M2

## 2023-12-12 DIAGNOSIS — D68.9 COAGULATION DEFECT, UNSPECIFIED: ICD-10-CM

## 2023-12-12 LAB
APTT BLD: 34.4 SEC
INR PPP: 0.9
PT BLD: 10.3 SEC
RBC # BLD: 4.34 M/UL
RETICS # AUTO: 2.2 %
RETICS AGGREG/RBC NFR: 95.5 K/UL

## 2023-12-12 PROCEDURE — 99214 OFFICE O/P EST MOD 30 MIN: CPT | Mod: 25

## 2023-12-12 PROCEDURE — 36415 COLL VENOUS BLD VENIPUNCTURE: CPT

## 2023-12-13 LAB
ALBUMIN SERPL ELPH-MCNC: 4.6 G/DL
ALP BLD-CCNC: 69 U/L
ALT SERPL-CCNC: 17 U/L
ANION GAP SERPL CALC-SCNC: 13 MMOL/L
AST SERPL-CCNC: 30 U/L
BASOPHILS # BLD AUTO: 0.13 K/UL
BASOPHILS # BLD AUTO: 0.13 K/UL
BASOPHILS NFR BLD AUTO: 2.6 %
BASOPHILS NFR BLD AUTO: 2.6 %
BILIRUB SERPL-MCNC: 0.4 MG/DL
BUN SERPL-MCNC: 30 MG/DL
CALCIUM SERPL-MCNC: 9.4 MG/DL
CHLORIDE SERPL-SCNC: 97 MMOL/L
CO2 SERPL-SCNC: 28 MMOL/L
CREAT SERPL-MCNC: 1.3 MG/DL
EGFR: 44 ML/MIN/1.73M2
EOSINOPHIL # BLD AUTO: 0.21 K/UL
EOSINOPHIL # BLD AUTO: 0.21 K/UL
EOSINOPHIL NFR BLD AUTO: 4.4 %
EOSINOPHIL NFR BLD AUTO: 4.4 %
FERRITIN SERPL-MCNC: 48 NG/ML
GIANT PLATELETS BLD QL SMEAR: PRESENT
GLUCOSE SERPL-MCNC: 90 MG/DL
HCT VFR BLD CALC: 41 %
HGB BLD-MCNC: 12.9 G/DL
HYPOCHROMIA BLD QL SMEAR: SLIGHT
IRON SATN MFR SERPL: 18 %
IRON SERPL-MCNC: 67 UG/DL
LYMPHOCYTES # BLD AUTO: 0.76 K/UL
LYMPHOCYTES # BLD AUTO: 0.76 K/UL
LYMPHOCYTES NFR BLD AUTO: 15.7 %
LYMPHOCYTES NFR BLD AUTO: 15.7 %
MAN DIFF?: NORMAL
MCHC RBC-ENTMCNC: 29.7 PG
MCHC RBC-ENTMCNC: 31.5 GM/DL
MCV RBC AUTO: 94.5 FL
MONOCYTES # BLD AUTO: 0.21 K/UL
MONOCYTES # BLD AUTO: 0.21 K/UL
MONOCYTES NFR BLD AUTO: 4.3 %
MONOCYTES NFR BLD AUTO: 4.3 %
MSMEAR: NORMAL
NEUTROPHILS # BLD AUTO: 3.53 K/UL
NEUTROPHILS # BLD AUTO: 3.53 K/UL
NEUTROPHILS NFR BLD AUTO: 71.3 %
NEUTROPHILS NFR BLD AUTO: 71.3 %
NEUTS BAND NFR BLD MANUAL: 1.7 %
PLAT MORPH BLD: ABNORMAL
PLATELET # BLD AUTO: 88 K/UL
POIKILOCYTOSIS BLD QL SMEAR: SLIGHT
POLYCHROMASIA BLD QL SMEAR: SLIGHT
POTASSIUM SERPL-SCNC: 3.7 MMOL/L
PROT SERPL-MCNC: 7.8 G/DL
RBC # BLD: 4.34 M/UL
RBC # FLD: 14.1 %
RBC BLD AUTO: ABNORMAL
SCHISTOCYTES BLD QL SMEAR: SLIGHT
SODIUM SERPL-SCNC: 138 MMOL/L
SPHEROCYTES BLD QL SMEAR: SLIGHT
TIBC SERPL-MCNC: 377 UG/DL
UIBC SERPL-MCNC: 310 UG/DL
WBC # FLD AUTO: 4.84 K/UL

## 2023-12-27 NOTE — BEHAVIORAL HEALTH ASSESSMENT NOTE - DIFFERENTIAL
January 2, 2024     Kaye Jarrett  [address]  [place] [address]      Dear Ms. Jarrett:    Below are the results from your recent visit:    Resulted Orders   XR chest 2 views    Narrative    Interpreted By:  Geri Evans,   STUDY:  Chest, 2 views.      INDICATION:  Signs/Symptoms:cough x4 weeks,.      COMPARISON:  None.      ACCESSION NUMBER(S):  [id number]      ORDERING CLINICIAN:  WARREN NOLEN      FINDINGS:  The cardiomediastinal silhouette size is within normal limits.      There is no focal consolidation, edema or pneumothorax.  No sizeable pleural effusion.        Impression    1. No acute cardiopulmonary process.      MACRO:  None.      Signed by: Geri Evans 12/28/2023 10:12 PM  Dictation workstation:   [id number]     The test results show your chest xray came back clear. Please continue current treatment.          Sincerely,    RADHA Kennedy   Recurrent Major Depression in Remission

## 2024-02-15 NOTE — ED ADULT NURSE NOTE - NSFALLRSKHRMRISKTYPE_ED_ALL_ED
"Christ Arambula (Shalania) was seen and treated in our emergency department on 2/15/2024.  She may return to work on 02/16/2024.       If you have any questions or concerns, please don't hesitate to call.       LPN    " coagulation(Bleeding disorder R/T clinical cond/anti-coags)

## 2024-03-07 ENCOUNTER — OUTPATIENT (OUTPATIENT)
Dept: OUTPATIENT SERVICES | Facility: HOSPITAL | Age: 74
LOS: 1 days | End: 2024-03-07
Payer: MEDICARE

## 2024-03-07 DIAGNOSIS — Z98.890 OTHER SPECIFIED POSTPROCEDURAL STATES: Chronic | ICD-10-CM

## 2024-03-07 PROCEDURE — 73130 X-RAY EXAM OF HAND: CPT

## 2024-03-07 PROCEDURE — 73130 X-RAY EXAM OF HAND: CPT | Mod: 26,RT

## 2024-03-20 ENCOUNTER — NON-APPOINTMENT (OUTPATIENT)
Age: 74
End: 2024-03-20

## 2024-03-26 ENCOUNTER — APPOINTMENT (OUTPATIENT)
Dept: HEMATOLOGY ONCOLOGY | Facility: CLINIC | Age: 74
End: 2024-03-26
Payer: MEDICARE

## 2024-03-26 VITALS
OXYGEN SATURATION: 95 % | BODY MASS INDEX: 26.24 KG/M2 | HEART RATE: 84 BPM | TEMPERATURE: 94.6 F | HEIGHT: 58 IN | WEIGHT: 125 LBS | SYSTOLIC BLOOD PRESSURE: 140 MMHG | DIASTOLIC BLOOD PRESSURE: 82 MMHG

## 2024-03-26 LAB
RBC # BLD: 4.15 M/UL
RETICS # AUTO: 2 %
RETICS AGGREG/RBC NFR: 83.8 K/UL

## 2024-03-26 PROCEDURE — G2211 COMPLEX E/M VISIT ADD ON: CPT

## 2024-03-26 PROCEDURE — 36415 COLL VENOUS BLD VENIPUNCTURE: CPT

## 2024-03-26 PROCEDURE — 99214 OFFICE O/P EST MOD 30 MIN: CPT

## 2024-03-26 RX ORDER — TRANYLCYPROMINE SULFATE 10 MG/1
10 TABLET, FILM COATED ORAL
Refills: 0 | Status: ACTIVE | COMMUNITY

## 2024-03-26 RX ORDER — IRON BIS-GLYCINAT/VIT C/FA/B12 28-60-0.4
CAPSULE ORAL
Refills: 0 | Status: ACTIVE | COMMUNITY

## 2024-03-26 NOTE — ASSESSMENT
[FreeTextEntry1] : Patient is a 73 year old female with a history of hyponatremia, sigmoid colon resection for a malignant polyp in 2015, thoracic aortic aneurysm, clinical depression, renal calculi, renal insufficiency, anemia and chronic thrombocytopenia for over 10 years, who presents for follow-up of  thrombocytopenia and coagulopathy. A past evaluation was positive for IIb/IIIa platelet antibodies and weakly positive rheumatoid factor in the past. Patient's chronic thrombocytopenia is likely on an autoimmune basis but there is a suspicion of a qualitative platelet disorder as well. Her medication, Parnate, has also been listed as a cause of thrombocytopenia. It is also possible that patient has a qualitative platelet disorder (possibly drug-induced) but testing will not be accurate in the presence of thrombocytopenia. Bone marrow aspirate and biopsy being considered to rule out other causes i.e. myelodysplasia. Have ordered B12 and folate, CBC with auto differential, CMP, ferritin, iron panel, manual differential, reticulocyte count and sed rate. Venipuncture was performed in the office today. Patient was advised to call office for results and further recommendations.

## 2024-03-26 NOTE — REVIEW OF SYSTEMS
[Recent Change In Weight] : ~T recent weight change [Easy Bruising] : a tendency for easy bruising [Negative] : Allergic/Immunologic [Fever] : no fever [Chills] : no chills [Night Sweats] : no night sweats [Fatigue] : no fatigue [Vision Problems] : no vision problems [Nosebleeds] : no nosebleeds [Chest Pain] : no chest pain [Abdominal Pain] : no abdominal pain [Shortness Of Breath] : no shortness of breath [Joint Pain] : no joint pain [Joint Stiffness] : no joint stiffness [Easy Bleeding] : no tendency for easy bleeding [FreeTextEntry2] : Lost 4 pounds since last visit [FreeTextEntry9] : Swollen right index finger [de-identified] : bruises  very easily, especially on the upper extremities

## 2024-03-26 NOTE — HISTORY OF PRESENT ILLNESS
[de-identified] : Patient is a 73 year old female with a history of hyponatremia, sigmoid colon resection for a malignant polyp in 2015, thoracic aortic aneurysm, clinical depression, COVID infection in January 2023, renal calculi, renal insufficiency, anemia and chronic thrombocytopenia for over 10 years, who presents for follow-up of thrombocytopenia, ecchymoses and coagulopathy. Patient has had thrombocytopenia for over 10 years. She has tested positive for platelet antibodies in the past. Her platelet count ranges from 70,000 to 130,000. Patient also has had an intermittent slight elevation of the PTT. At the last visit in December 2023, the CBC was significant for a normal hemoglobin at 12.9 and a platelet count at 88. INR and PTT were normal. Patient continues to bruise very easily especially on the upper extremities. She also complains of a swollen right index finger. Patient has lost 4 pounds since the last visit. She remains on Parnate for her depression. She continues to supplement magnesium, melatonin and gentle iron. Patient is considering having hardware removed from the top of the femur which causes discomfort as she thinks her bones are stronger now while being on the Prolia. Denies fever, chills, sweats, fatigue, chest pain, palpitations, shortness of breath, abdominal pain, hematuria, hematochezia, epistaxis or recent infections.

## 2024-03-26 NOTE — REASON FOR VISIT
[Follow-Up Visit] : a follow-up visit for [FreeTextEntry2] : Thrombocytopenia, immune thrombocytopenia, ecchymoses

## 2024-03-26 NOTE — PHYSICAL EXAM
[Thin] : thin [Normal] : grossly intact [de-identified] : RRR, S1, S2, murmur [de-identified] : Several different sized ecchymoses on left /right upper extremities,  not on torso or back or face

## 2024-03-27 LAB
ALBUMIN SERPL ELPH-MCNC: 4.5 G/DL
ALP BLD-CCNC: 63 U/L
ALT SERPL-CCNC: 20 U/L
ANION GAP SERPL CALC-SCNC: 17 MMOL/L
ANISOCYTOSIS BLD QL: SLIGHT
AST SERPL-CCNC: 38 U/L
BASOPHILS NFR BLD AUTO: 3 %
BASOPHILS NFR BLD AUTO: 3 %
BILIRUB SERPL-MCNC: 0.4 MG/DL
BUN SERPL-MCNC: 32 MG/DL
CALCIUM SERPL-MCNC: 9.6 MG/DL
CHLORIDE SERPL-SCNC: 101 MMOL/L
CO2 SERPL-SCNC: 23 MMOL/L
CREAT SERPL-MCNC: 1.17 MG/DL
EGFR: 50 ML/MIN/1.73M2
EOSINOPHIL NFR BLD AUTO: 6 %
EOSINOPHIL NFR BLD AUTO: 6 %
ERYTHROCYTE [SEDIMENTATION RATE] IN BLOOD BY WESTERGREN METHOD: 22 MM/HR
FERRITIN SERPL-MCNC: 61 NG/ML
FOLATE SERPL-MCNC: >20 NG/ML
GLUCOSE SERPL-MCNC: 77 MG/DL
HCT VFR BLD CALC: 40.2 %
HGB BLD-MCNC: 12.9 G/DL
HYPOCHROMIA BLD QL SMEAR: SLIGHT
IRON SATN MFR SERPL: 19 %
IRON SERPL-MCNC: 75 UG/DL
LYMPHOCYTES NFR BLD AUTO: 22 %
LYMPHOCYTES NFR BLD AUTO: 22 %
MAN DIFF?: NORMAL
MCHC RBC-ENTMCNC: 31.1 PG
MCHC RBC-ENTMCNC: 32.1 GM/DL
MCV RBC AUTO: 96.9 FL
METAMYELOCYTES # FLD: 1
MONOCYTES NFR BLD AUTO: 10 %
MONOCYTES NFR BLD AUTO: 10 %
MYELOCYTES NFR BLD: 1 %
NEUTROPHILS NFR BLD AUTO: 58 %
NEUTROPHILS NFR BLD AUTO: 58 %
NEUTS BAND NFR BLD MANUAL: 1 %
OVALOCYTES BLD QL SMEAR: SLIGHT
PLAT MORPH BLD: ABNORMAL
PLATELET # BLD AUTO: 69 K/UL
POIKILOCYTOSIS BLD QL SMEAR: SLIGHT
POLYCHROMASIA BLD QL SMEAR: SLIGHT
POTASSIUM SERPL-SCNC: 5.1 MMOL/L
PROT SERPL-MCNC: 7.4 G/DL
RBC # BLD: 4.15 M/UL
RBC # FLD: 14.6 %
RBC BLD AUTO: ABNORMAL
RHEUMATOID FACT SER QL: 14 IU/ML
SCHISTOCYTES BLD QL SMEAR: SLIGHT
SMUDGE CELLS # BLD: PRESENT
SODIUM SERPL-SCNC: 141 MMOL/L
SPHEROCYTES BLD QL SMEAR: SLIGHT
TIBC SERPL-MCNC: 386 UG/DL
UIBC SERPL-MCNC: 311 UG/DL
VIT B12 SERPL-MCNC: 1899 PG/ML
WBC # FLD AUTO: 4.56 K/UL

## 2024-03-28 LAB — ANA SER IF-ACNC: NEGATIVE

## 2024-04-02 ENCOUNTER — NON-APPOINTMENT (OUTPATIENT)
Age: 74
End: 2024-04-02

## 2024-04-24 ENCOUNTER — APPOINTMENT (OUTPATIENT)
Dept: HEMATOLOGY ONCOLOGY | Facility: CLINIC | Age: 74
End: 2024-04-24

## 2024-05-21 DIAGNOSIS — E87.1 HYPO-OSMOLALITY AND HYPONATREMIA: ICD-10-CM

## 2024-05-21 DIAGNOSIS — Z86.2 PERSONAL HISTORY OF DISEASES OF THE BLOOD AND BLOOD-FORMING ORGANS AND CERTAIN DISORDERS INVOLVING THE IMMUNE MECHANISM: ICD-10-CM

## 2024-05-29 LAB
ALBUMIN SERPL ELPH-MCNC: 4.7 G/DL
ALP BLD-CCNC: 60 U/L
ALT SERPL-CCNC: 16 U/L
ANION GAP SERPL CALC-SCNC: 13 MMOL/L
AST SERPL-CCNC: 31 U/L
BILIRUB SERPL-MCNC: 0.4 MG/DL
BUN SERPL-MCNC: 26 MG/DL
CALCIUM SERPL-MCNC: 9.5 MG/DL
CHLORIDE SERPL-SCNC: 97 MMOL/L
CO2 SERPL-SCNC: 27 MMOL/L
CREAT SERPL-MCNC: 1.39 MG/DL
EGFR: 40 ML/MIN/1.73M2
ERYTHROCYTE [SEDIMENTATION RATE] IN BLOOD BY WESTERGREN METHOD: 17 MM/HR
FERRITIN SERPL-MCNC: 59 NG/ML
FOLATE SERPL-MCNC: >20 NG/ML
GLUCOSE SERPL-MCNC: 83 MG/DL
IRON SATN MFR SERPL: 30 %
IRON SERPL-MCNC: 111 UG/DL
POTASSIUM SERPL-SCNC: 4.9 MMOL/L
PROT SERPL-MCNC: 7.6 G/DL
RBC # BLD: 3.54 M/UL
RETICS # AUTO: 2.4 %
RETICS AGGREG/RBC NFR: 86 K/UL
RHEUMATOID FACT SER QL: 12 IU/ML
SODIUM SERPL-SCNC: 137 MMOL/L
TIBC SERPL-MCNC: 372 UG/DL
UIBC SERPL-MCNC: 262 UG/DL
VIT B12 SERPL-MCNC: 1831 PG/ML

## 2024-05-30 ENCOUNTER — APPOINTMENT (OUTPATIENT)
Dept: HEMATOLOGY ONCOLOGY | Facility: CLINIC | Age: 74
End: 2024-05-30
Payer: MEDICARE

## 2024-05-30 VITALS
BODY MASS INDEX: 26.03 KG/M2 | SYSTOLIC BLOOD PRESSURE: 122 MMHG | WEIGHT: 124 LBS | TEMPERATURE: 97.1 F | DIASTOLIC BLOOD PRESSURE: 64 MMHG | OXYGEN SATURATION: 98 % | HEART RATE: 85 BPM | HEIGHT: 58 IN

## 2024-05-30 DIAGNOSIS — D69.3 IMMUNE THROMBOCYTOPENIC PURPURA: ICD-10-CM

## 2024-05-30 DIAGNOSIS — D64.9 ANEMIA, UNSPECIFIED: ICD-10-CM

## 2024-05-30 DIAGNOSIS — R23.3 SPONTANEOUS ECCHYMOSES: ICD-10-CM

## 2024-05-30 DIAGNOSIS — T14.8XXA OTHER INJURY OF UNSPECIFIED BODY REGION, INITIAL ENCOUNTER: ICD-10-CM

## 2024-05-30 DIAGNOSIS — D69.6 THROMBOCYTOPENIA, UNSPECIFIED: ICD-10-CM

## 2024-05-30 PROCEDURE — 99214 OFFICE O/P EST MOD 30 MIN: CPT

## 2024-05-30 PROCEDURE — G2211 COMPLEX E/M VISIT ADD ON: CPT

## 2024-05-30 NOTE — ASSESSMENT
[FreeTextEntry1] : Patient is a 73 year old female with a history of hyponatremia, sigmoid colon resection for a malignant polyp in 2015, thoracic aortic aneurysm, clinical depression, COVID infection in January 2023, renal calculi, renal insufficiency, anemia and chronic thrombocytopenia for over 10 years. A past evaluation was positive for IIb/IIIa platelet antibodies and weakly positive rheumatoid factor in the past. Patient's chronic thrombocytopenia is likely on an autoimmune basis but there is a suspicion of a qualitative platelet disorder as well. Her medication, Parnate, has also been listed as a cause of thrombocytopenia. It is also possible that patient has a qualitative platelet disorder (possibly drug-induced) but testing will not be accurate in the presence of thrombocytopenia. Patient has extensive hematomas/ ecchymosis from recent fall with extensive trauma to the right hip, right leg, ankle, left knee and left arm. Present anemia may be secondary to hematomas. Anemia and worsening thrombocytopenia also likely due to extensive hematomas. Due to this extensive trauma will  defer Bone marrow biopsy and aspirate at the present time.Have reviewed the recent blood work and will defer any additional blood testing presently. Have arranged for patient to see her orthopedic surgeon for evaluation of the traumatic areas within the next few days. Bone marrow biopsy and aspirate will be rescheduled at some future date.

## 2024-05-30 NOTE — PHYSICAL EXAM
[Thin] : thin [Normal] : affect appropriate [de-identified] : no bleeding from nose or mouth [de-identified] : RRR, S1, S2, murmur, occasional ectopy [de-identified] : large extensive hematoma right hip and thigh, swelling of RLE with marked right ankle swelling [de-identified] : Extensive ecchymoses/hematomas over right hip and thigh, right arm, right knee, LLE from the knee down with marked right ankle swelling

## 2024-05-30 NOTE — REASON FOR VISIT
[Follow-Up Visit] : a follow-up visit for [FreeTextEntry2] : Anemia, thrombocytopenia, easy bruising, ecchymoses, traumatic hematoma

## 2024-05-30 NOTE — HISTORY OF PRESENT ILLNESS
[de-identified] : Patient is a 73 year old female with a history of hyponatremia, sigmoid colon resection for a malignant polyp in 2015, thoracic aortic aneurysm, clinical depression, COVID infection in January 2023, renal calculi, renal insufficiency, anemia and chronic thrombocytopenia for over 10 years. Patient has had thrombocytopenia for over 10 years. She has tested positive for platelet antibodies in the past. Her platelet count ranges from 70,000 to 130,000. Patient also has had an intermittent slight elevation of the PTT. Today, patient comes for follow up and for scheduled bone marrow biopsy and aspirate. However on 5/24 patient's foot became caught on a shopping cart and she fell on her back sustaining multiple injuries to her right hip, right lower extremity, left knee, left arm and right ankle. Patient was able to get up ,assumed that nothing was fractured and did not seek medical attention. She was able to walk and when home applied ice to every area that was injured. She sustained multiple ecchymosis/hematomas which persist on the left arm, right hip, right lower extremity right ankle and left knee. Patient had been sent for blood work prior to the visit and on 5/28, the CBC was significant for a hemoglobin now of 10.7, hematocrit 34.1, platelet count of 68,000 and white blood count of 4.95. Chemistries were significant for a BUN of 26 and creatinine of 1.39. Iron panel was normal with a saturation of 30% and ferritin of 59. B12 was 1831 and folate was >20.0. Sed rate and rheumatoid factor were normal. Patient saw her PCP on 5/28, who recommended topical antibiotic ointment to the denuded skin of the left lower extremity but did not order any X-rays. She denies blood from nose or mouth. Her weight is stable. Today, the patient complains of lower extremity edema, joint pain in left arm, left knee, right hip, right lower extremity and ankle. She is concerned regarding stability of her left hip replacement. She remains on Parnate for her depression. She continues to supplement magnesium, melatonin and gentle iron. Patient will have colonoscopy in the near future. Denies fever, chills, sweats, fatigue, chest pain, palpitations, shortness of breath, abdominal pain, hematuria, hematochezia, epistaxis or recent infections.

## 2024-05-30 NOTE — REVIEW OF SYSTEMS
[Lower Ext Edema] : lower extremity edema [Joint Pain] : joint pain [Joint Stiffness] : joint stiffness [Muscle Pain] : muscle pain [Easy Bruising] : a tendency for easy bruising [Negative] : Allergic/Immunologic [Fever] : no fever [Chills] : no chills [Night Sweats] : no night sweats [Recent Change In Weight] : ~T no recent weight change [Vision Problems] : no vision problems [Nosebleeds] : no nosebleeds [Chest Pain] : no chest pain [Shortness Of Breath] : no shortness of breath [Cough] : no cough [Abdominal Pain] : no abdominal pain [Swollen Glands] : no swollen glands [FreeTextEntry9] : Left arm, left knee, right hip, right LE and ankle [de-identified] : Traumatic bruises

## 2024-06-02 LAB
BASOPHILS # BLD AUTO: 0.1 K/UL
BASOPHILS # BLD AUTO: 0.1 K/UL
BASOPHILS NFR BLD AUTO: 2 %
BASOPHILS NFR BLD AUTO: 2 %
EOSINOPHIL # BLD AUTO: 0.2 K/UL
EOSINOPHIL # BLD AUTO: 0.2 K/UL
EOSINOPHIL NFR BLD AUTO: 4 %
EOSINOPHIL NFR BLD AUTO: 4 %
HCT VFR BLD CALC: 34.1 %
HGB BLD-MCNC: 10.7 G/DL
LYMPHOCYTES # BLD AUTO: 0.89 K/UL
LYMPHOCYTES # BLD AUTO: 0.89 K/UL
LYMPHOCYTES NFR BLD AUTO: 18 %
LYMPHOCYTES NFR BLD AUTO: 18 %
MAN DIFF?: NORMAL
MCHC RBC-ENTMCNC: 30.2 PG
MCHC RBC-ENTMCNC: 31.4 GM/DL
MCV RBC AUTO: 96.3 FL
MONOCYTES # BLD AUTO: 0.54 K/UL
MONOCYTES # BLD AUTO: 0.54 K/UL
MONOCYTES NFR BLD AUTO: 11 %
MONOCYTES NFR BLD AUTO: 11 %
MSMEAR: NORMAL
NEUTROPHILS # BLD AUTO: 3.22 K/UL
NEUTROPHILS # BLD AUTO: 3.22 K/UL
NEUTROPHILS NFR BLD AUTO: 65 %
NEUTROPHILS NFR BLD AUTO: 65 %
PLAT MORPH BLD: NORMAL
PLATELET # BLD AUTO: 68 K/UL
RBC # BLD: 3.54 M/UL
RBC # FLD: 14.9 %
RBC BLD AUTO: NORMAL
WBC # FLD AUTO: 4.95 K/UL

## 2024-06-03 ENCOUNTER — APPOINTMENT (OUTPATIENT)
Dept: ORTHOPEDIC SURGERY | Facility: CLINIC | Age: 74
End: 2024-06-03
Payer: MEDICARE

## 2024-06-03 DIAGNOSIS — R23.3 SPONTANEOUS ECCHYMOSES: ICD-10-CM

## 2024-06-03 PROCEDURE — 99213 OFFICE O/P EST LOW 20 MIN: CPT

## 2024-06-03 PROCEDURE — 73610 X-RAY EXAM OF ANKLE: CPT | Mod: RT

## 2024-06-03 PROCEDURE — 72170 X-RAY EXAM OF PELVIS: CPT

## 2024-07-23 ENCOUNTER — APPOINTMENT (OUTPATIENT)
Dept: HEMATOLOGY ONCOLOGY | Facility: CLINIC | Age: 74
End: 2024-07-23
Payer: MEDICARE

## 2024-07-23 VITALS
DIASTOLIC BLOOD PRESSURE: 84 MMHG | WEIGHT: 123 LBS | TEMPERATURE: 97.1 F | HEART RATE: 78 BPM | SYSTOLIC BLOOD PRESSURE: 132 MMHG | OXYGEN SATURATION: 96 % | BODY MASS INDEX: 25.82 KG/M2 | HEIGHT: 58 IN

## 2024-07-23 DIAGNOSIS — E87.1 HYPO-OSMOLALITY AND HYPONATREMIA: ICD-10-CM

## 2024-07-23 DIAGNOSIS — R23.3 SPONTANEOUS ECCHYMOSES: ICD-10-CM

## 2024-07-23 DIAGNOSIS — D69.6 THROMBOCYTOPENIA, UNSPECIFIED: ICD-10-CM

## 2024-07-23 DIAGNOSIS — Z86.2 PERSONAL HISTORY OF DISEASES OF THE BLOOD AND BLOOD-FORMING ORGANS AND CERTAIN DISORDERS INVOLVING THE IMMUNE MECHANISM: ICD-10-CM

## 2024-07-23 DIAGNOSIS — D69.3 IMMUNE THROMBOCYTOPENIC PURPURA: ICD-10-CM

## 2024-07-23 LAB
RBC # BLD: 4.33 M/UL
RETICS # AUTO: 2 %
RETICS AGGREG/RBC NFR: 86.2 K/UL

## 2024-07-23 PROCEDURE — 99214 OFFICE O/P EST MOD 30 MIN: CPT | Mod: 25

## 2024-07-23 PROCEDURE — 36415 COLL VENOUS BLD VENIPUNCTURE: CPT

## 2024-07-23 PROCEDURE — 38222 DX BONE MARROW BX & ASPIR: CPT | Mod: RT

## 2024-07-23 NOTE — PHYSICAL EXAM
[Thin] : thin [Normal] : affect appropriate [de-identified] : no bleeding from nose or mouth [de-identified] : RRR, S1, S2, murmur, rare ectopy [de-identified] : Fading ecchymoses on both upper extremities, RLE. New half dollar sized ecchymosis LLE

## 2024-07-23 NOTE — REVIEW OF SYSTEMS
[Fatigue] : fatigue [Easy Bruising] : a tendency for easy bruising [Negative] : Allergic/Immunologic [Fever] : no fever [Chills] : no chills [Night Sweats] : no night sweats [Vision Problems] : no vision problems [Nosebleeds] : no nosebleeds [Chest Pain] : no chest pain [Palpitations] : no palpitations [Shortness Of Breath] : no shortness of breath [Cough] : no cough [Abdominal Pain] : no abdominal pain [Joint Pain] : no joint pain [Joint Stiffness] : no joint stiffness [Skin Rash] : no skin rash [Dizziness] : no dizziness [Easy Bleeding] : no tendency for easy bleeding [Swollen Glands] : no swollen glands

## 2024-07-23 NOTE — PHYSICAL EXAM
[Thin] : thin [Normal] : affect appropriate [de-identified] : no bleeding from nose or mouth [de-identified] : RRR, S1, S2, murmur, rare ectopy [de-identified] : Fading ecchymoses on both upper extremities, RLE. New half dollar sized ecchymosis LLE

## 2024-07-23 NOTE — HISTORY OF PRESENT ILLNESS
[de-identified] : Patient is a 74 year old female with a history of hyponatremia, sigmoid colon resection for a malignant polyp in 2015, thoracic aortic aneurysm, clinical depression, COVID infection in January 2023, renal calculi, renal insufficiency, anemia and chronic thrombocytopenia for over 10 years who presents for follow-up of thrombocytopenia, ecchymoses, coagulopathy and for  bone marrow biopsy and aspirate. Patient has had thrombocytopenia for over 10 years. She has tested positive for platelet antibodies in the past. Her platelet count usually  ranges from 70,000 to 130,000 but are presently 49,000 from a recent CBC. Patient also has had an intermittent slight elevation of the PTT. After the last visit, the patient saw her orthopedic surgeon Dr. Merida who assessed her injuries from the fall she had just prior to the visit and X-rays performed were negative for fracture or evidence of hardware complications.  Patient is planning to have a colonoscopy on August 5th and had blood work drawn by her gastroenterologist on July 17th, which revealed a white blood count of 5.0, hemoglobin of 12.4, hematocrit of 37.9 and the platelet count of 49,000. Her weight is stable. Patient still notes increased bruising but denies blood in urine, stool or from nose or mouth. Today, the patient feels fatigued but is otherwise feeling generally well.  She remains on Parnate for her depression. She continues to supplement magnesium, melatonin and gentle iron. Denies fever, chills, drenching night sweats, chest pain, palpitations, shortness of breath, abdominal pain or recent infections.

## 2024-07-23 NOTE — HISTORY OF PRESENT ILLNESS
[de-identified] : Patient is a 74 year old female with a history of hyponatremia, sigmoid colon resection for a malignant polyp in 2015, thoracic aortic aneurysm, clinical depression, COVID infection in January 2023, renal calculi, renal insufficiency, anemia and chronic thrombocytopenia for over 10 years who presents for follow-up of thrombocytopenia, ecchymoses, coagulopathy and for  bone marrow biopsy and aspirate. Patient has had thrombocytopenia for over 10 years. She has tested positive for platelet antibodies in the past. Her platelet count usually  ranges from 70,000 to 130,000 but are presently 49,000 from a recent CBC. Patient also has had an intermittent slight elevation of the PTT. After the last visit, the patient saw her orthopedic surgeon Dr. Merida who assessed her injuries from the fall she had just prior to the visit and X-rays performed were negative for fracture or evidence of hardware complications.  Patient is planning to have a colonoscopy on August 5th and had blood work drawn by her gastroenterologist on July 17th, which revealed a white blood count of 5.0, hemoglobin of 12.4, hematocrit of 37.9 and the platelet count of 49,000. Her weight is stable. Patient still notes increased bruising but denies blood in urine, stool or from nose or mouth. Today, the patient feels fatigued but is otherwise feeling generally well.  She remains on Parnate for her depression. She continues to supplement magnesium, melatonin and gentle iron. Denies fever, chills, drenching night sweats, chest pain, palpitations, shortness of breath, abdominal pain or recent infections.

## 2024-07-23 NOTE — REASON FOR VISIT
[Follow-Up Visit] : a follow-up visit for [FreeTextEntry2] : Thrombocytopenia, spontaneous bruising, easy bruisability

## 2024-07-23 NOTE — PROCEDURE
[Bone Marrow Biopsy] : bone marrow biopsy [Bone Marrow Aspiration] : bone marrow aspiration  [Patient] : the patient [Verbal Consent Obtained] : verbal consent was obtained prior to the procedure [Patient identification verified] : patient identification verified [Procedure verified and consent obtained] : procedure verified and consent obtained [Laterality verified and correct site marked] : laterality verified and correct site marked [Right] : site: right [Correct positioning] : correct positioning [Left lateral decibitus position] : left lateral decibitus position [Superior iliac spine was identified] : the superior iliac spine was identified. [The right posterior iliac crest was prepped with betadine and draped, using sterile technique.] : The right posterior iliac crest was prepped with betadine and draped, using sterile technique. [Aspirate] : aspirate [Cytogenetics] : cytogenetics [FISH] : FISH [Other ___] : [unfilled] [Biopsy] : biopsy [Flow Cytometry] : flow cytometry [] : The patient was instructed to remove the bandage the following AM. The patient may bathe. Acetaminophen may be taken for discomfort, as per package directions.If there are any other problems, the patient was instructed to call the office. The patient verbalized understanding, and is aware of the office contact numbers. [FreeTextEntry1] : Worsening thrombocytopenia [FreeTextEntry2] :  Post procedure blood pressure 136/74. Procedure well tolerated.

## 2024-07-23 NOTE — ASSESSMENT
[FreeTextEntry1] : Patient is a 74 year old female with a history of hyponatremia, sigmoid colon resection for a malignant polyp in 2015, thoracic aortic aneurysm, clinical depression, COVID infection in January 2023, renal calculi, renal insufficiency, anemia and chronic thrombocytopenia for over 10 years who presents for follow-up of thrombocytopenia, ecchymoses, coagulopathy and for  bone marrow biopsy and aspirate. A past evaluation was positive for IIb/IIIa platelet antibodies and weakly positive rheumatoid factor in the past. Patient's chronic thrombocytopenia is likely on an autoimmune basis but there is a suspicion of a qualitative platelet disorder as well. Her medication, Parnate, has also been listed as a cause of thrombocytopenia. Bone marrow aspirate and biopsy performed for myelodysplasia as a possible cause of worsening thrombocytopenia. Have ordered B12 and folate, CBC with auto differential, ferritin, iron panel, manual differential, reticulocyte count and sed rate. Venipuncture was performed in the office today. A bone marrow aspirate and biopsy were performed today following the discussion of the procedure, possible adverse effects and signing of informed consent. Full instructions given for wound care. Patient was advised to call the office to discuss results and further management.

## 2024-07-24 LAB
BASOPHILS NFR BLD AUTO: 4 %
BASOPHILS NFR BLD AUTO: 4 %
EOSINOPHIL NFR BLD AUTO: 2 %
EOSINOPHIL NFR BLD AUTO: 2 %
ERYTHROCYTE [SEDIMENTATION RATE] IN BLOOD BY WESTERGREN METHOD: 14 MM/HR
FERRITIN SERPL-MCNC: 59 NG/ML
FOLATE SERPL-MCNC: >20 NG/ML
GIANT PLATELETS BLD QL SMEAR: PRESENT
HCT VFR BLD CALC: 41.9 %
HGB BLD-MCNC: 13.2 G/DL
IRON SATN MFR SERPL: 29 %
IRON SERPL-MCNC: 113 UG/DL
LYMPHOCYTES # BLD AUTO: 0.74 K/UL
LYMPHOCYTES # BLD AUTO: 0.74 K/UL
LYMPHOCYTES NFR BLD AUTO: 15 %
LYMPHOCYTES NFR BLD AUTO: 15 %
MAN DIFF?: NORMAL
MCHC RBC-ENTMCNC: 30.5 PG
MCHC RBC-ENTMCNC: 31.5 GM/DL
MCV RBC AUTO: 96.8 FL
MONOCYTES NFR BLD AUTO: 7 %
MONOCYTES NFR BLD AUTO: 7 %
MSMEAR: NORMAL
NEUTROPHILS # BLD AUTO: 3.46 K/UL
NEUTROPHILS # BLD AUTO: 3.46 K/UL
NEUTROPHILS NFR BLD AUTO: 70 %
NEUTROPHILS NFR BLD AUTO: 70 %
PLAT MORPH BLD: ABNORMAL
PLATELET # BLD AUTO: 70 K/UL
RBC # BLD: 4.33 M/UL
RBC # FLD: 13.9 %
RBC BLD AUTO: NORMAL
TIBC SERPL-MCNC: 387 UG/DL
UIBC SERPL-MCNC: 274 UG/DL
VIT B12 SERPL-MCNC: 1837 PG/ML
WBC # FLD AUTO: 4.95 K/UL

## 2024-07-25 ENCOUNTER — NON-APPOINTMENT (OUTPATIENT)
Age: 74
End: 2024-07-25

## 2024-08-15 ENCOUNTER — APPOINTMENT (OUTPATIENT)
Dept: HEMATOLOGY ONCOLOGY | Facility: CLINIC | Age: 74
End: 2024-08-15
Payer: MEDICARE

## 2024-08-15 VITALS
HEIGHT: 58 IN | HEART RATE: 78 BPM | WEIGHT: 124 LBS | DIASTOLIC BLOOD PRESSURE: 82 MMHG | SYSTOLIC BLOOD PRESSURE: 130 MMHG | OXYGEN SATURATION: 97 % | TEMPERATURE: 97.1 F | BODY MASS INDEX: 26.03 KG/M2

## 2024-08-15 DIAGNOSIS — D69.6 THROMBOCYTOPENIA, UNSPECIFIED: ICD-10-CM

## 2024-08-15 DIAGNOSIS — D69.3 IMMUNE THROMBOCYTOPENIC PURPURA: ICD-10-CM

## 2024-08-15 DIAGNOSIS — R23.3 SPONTANEOUS ECCHYMOSES: ICD-10-CM

## 2024-08-15 PROCEDURE — G2211 COMPLEX E/M VISIT ADD ON: CPT

## 2024-08-15 PROCEDURE — 36415 COLL VENOUS BLD VENIPUNCTURE: CPT

## 2024-08-15 PROCEDURE — 99214 OFFICE O/P EST MOD 30 MIN: CPT

## 2024-08-15 NOTE — ASSESSMENT
[FreeTextEntry1] : Patient is a 74 year old female with a history of hyponatremia, sigmoid colon resection for a malignant polyp in 2015, thoracic aortic aneurysm, clinical depression, COVID infection in January 2023, renal calculi, renal insufficiency, anemia and chronic thrombocytopenia for over 10 years who presents for follow-up of thrombocytopenia, ecchymoses, coagulopathy and discussion of bone marrow biopsy results. A past evaluation was positive for IIb/IIIa platelet antibodies and weakly positive rheumatoid factor, suggesting that patient's chronic thrombocytopenia may be on an autoimmune basis. 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 There is also a suspicion of a qualitative platelet disorder as well. Her medication, Parnate, has also been listed as a cause of thrombocytopenia.  Have ordered B2MG, Cardiolipin Antibodies, IgG, IgM, and IgA, CBC with auto differential, CMP, ferritin, iron panel, Platelet Antibodies Test, reticulocyte count and sed rate. Venipuncture was performed in the office today. Patient was advised to call the office to discuss results and further management.

## 2024-08-15 NOTE — HISTORY OF PRESENT ILLNESS
[de-identified] : Patient is a 74 year old female with a history of hyponatremia, sigmoid colon resection for a malignant polyp in 2015, thoracic aortic aneurysm, clinical depression, COVID infection in January 2023, renal calculi, renal insufficiency, anemia and chronic thrombocytopenia  who presents for follow-up of thrombocytopenia, ecchymoses, coagulopathy and discussion of results. Patient has had thrombocytopenia for over 10 years. She has tested positive for platelet antibodies in the past. Her platelet count usually ranges from 70,000 to 130,000 but had fallen into the 50,000 range. Patient also has had an intermittent slight elevation of the PTT. At the last visit in July, the CBC was significant for a better platelet count at 70,000, closer to her usual baseline, hemoglobin 13.2, hematocrit 41.9 and a white count of 4.95. Sed rate was 14, iron panel was normal and ferritin was 59. B12 was 1837 and folate greater than 20. Bone marrow studies were significant for cellularity ranging from normal cellular to hypercellular with erythroid hyperplasia and full maturation in both myeloid and erythroid lines. Megakaryocytes are increased in number with many small forms. No overt dyspoiesis was reported. Karyotype was normal. 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 Patient successfully underwent colonoscopy without bleeding, results were negative for any recurrent polyps. She also had dental work without excessive bleeding. She still bruises with and without trauma but denies blood in the urine, stool or from nose or mouth. Her weight is stable. Today, the patient complains of difficulty sleeping and bruising easily but is otherwise feeling generally well. She remains on Parnate for her depression. She continues to supplement magnesium, melatonin and gentle iron. Patient will schedule a mammogram in the near future. Denies fever, chills, drenching night sweats, chest pain, palpitations, shortness of breath, abdominal pain or recent infections.

## 2024-08-15 NOTE — PHYSICAL EXAM
[Thin] : thin [Normal] : affect appropriate [de-identified] : no bleeding from nose or mouth [de-identified] : Fading ecchymoses on both upper extremities, RLE. New half dollar sized ecchymosis LLE/knee [de-identified] : RRR, S1, S2, murmur

## 2024-08-15 NOTE — REVIEW OF SYSTEMS
[Easy Bruising] : a tendency for easy bruising [Negative] : Allergic/Immunologic [Fever] : no fever [Chills] : no chills [Night Sweats] : no night sweats [Fatigue] : no fatigue [Recent Change In Weight] : ~T no recent weight change [Vision Problems] : no vision problems [Nosebleeds] : no nosebleeds [Chest Pain] : no chest pain [Palpitations] : no palpitations [Shortness Of Breath] : no shortness of breath [Abdominal Pain] : no abdominal pain [Joint Pain] : no joint pain [Joint Stiffness] : no joint stiffness [Skin Rash] : no skin rash [Dizziness] : no dizziness [Easy Bleeding] : no tendency for easy bleeding

## 2024-08-15 NOTE — REASON FOR VISIT
[Follow-Up Visit] : a follow-up visit for [FreeTextEntry2] : Thrombocytopenia, easy bruisability, spontaneous ecchymoses

## 2024-08-19 LAB
ALBUMIN SERPL ELPH-MCNC: 4.4 G/DL
ALP BLD-CCNC: 65 U/L
ALT SERPL-CCNC: 14 U/L
ANION GAP SERPL CALC-SCNC: 15 MMOL/L
ANISOCYTOSIS BLD QL: SLIGHT
AST SERPL-CCNC: 25 U/L
B2 GLYCOPROT1 IGA SERPL IA-ACNC: <2 U/ML
B2 GLYCOPROT1 IGG SER-ACNC: <1.4 U/ML
B2 GLYCOPROT1 IGM SER-ACNC: <1.5 U/ML
BASOPHILS # BLD AUTO: 0.46 K/UL
BASOPHILS # BLD AUTO: 0.46 K/UL
BASOPHILS NFR BLD AUTO: 8.8 %
BASOPHILS NFR BLD AUTO: 8.8 %
BILIRUB SERPL-MCNC: 0.3 MG/DL
BUN SERPL-MCNC: 31 MG/DL
BURR CELLS BLD QL SMEAR: PRESENT
CALCIUM SERPL-MCNC: 8.9 MG/DL
CARDIOLIPIN IGM SER-MCNC: <1.5 MPL U/ML
CARDIOLIPIN IGM SER-MCNC: <1.6 GPL U/ML
CHLORIDE SERPL-SCNC: 97 MMOL/L
CO2 SERPL-SCNC: 25 MMOL/L
CREAT SERPL-MCNC: 1.11 MG/DL
DEPRECATED CARDIOLIPIN IGA SER: <2 APL U/ML
EGFR: 52 ML/MIN/1.73M2
EOSINOPHIL # BLD AUTO: 0.42 K/UL
EOSINOPHIL # BLD AUTO: 0.42 K/UL
EOSINOPHIL NFR BLD AUTO: 8 %
EOSINOPHIL NFR BLD AUTO: 8 %
ERYTHROCYTE [SEDIMENTATION RATE] IN BLOOD BY WESTERGREN METHOD: 22 MM/HR
FERRITIN SERPL-MCNC: 85 NG/ML
GIANT PLATELETS BLD QL SMEAR: PRESENT
GLUCOSE SERPL-MCNC: 77 MG/DL
GLYCOPROTEIN IV ANTIBODY: NEGATIVE
HCT VFR BLD CALC: 38.1 %
HGB BLD-MCNC: 11.9 G/DL
HLA CLASS 1 AB: NEGATIVE
IA/IIA AB: NEGATIVE
IB/IX AB: NEGATIVE
IIB/IIIA AB: NEGATIVE
IRON SATN MFR SERPL: 18 %
IRON SERPL-MCNC: 65 UG/DL
LYMPHOCYTES # BLD AUTO: 1.03 K/UL
LYMPHOCYTES # BLD AUTO: 1.03 K/UL
LYMPHOCYTES NFR BLD AUTO: 19.5 %
LYMPHOCYTES NFR BLD AUTO: 19.5 %
MACROCYTES BLD QL SMEAR: SLIGHT
MAN DIFF?: NORMAL
MCHC RBC-ENTMCNC: 29.8 PG
MCHC RBC-ENTMCNC: 31.2 GM/DL
MCV RBC AUTO: 95.5 FL
MONOCYTES # BLD AUTO: 0.28 K/UL
MONOCYTES # BLD AUTO: 0.28 K/UL
MONOCYTES NFR BLD AUTO: 5.3 %
MONOCYTES NFR BLD AUTO: 5.3 %
MSMEAR: NORMAL
NEUTROPHILS # BLD AUTO: 3.08 K/UL
NEUTROPHILS # BLD AUTO: 3.08 K/UL
NEUTROPHILS NFR BLD AUTO: 58.4 %
NEUTROPHILS NFR BLD AUTO: 58.4 %
OVALOCYTES BLD QL SMEAR: SLIGHT
PLAT MORPH BLD: ABNORMAL
PLATELET # BLD AUTO: 54 K/UL
POIKILOCYTOSIS BLD QL SMEAR: SLIGHT
POLYCHROMASIA BLD QL SMEAR: SLIGHT
POTASSIUM SERPL-SCNC: 4.2 MMOL/L
PROT SERPL-MCNC: 7.3 G/DL
RBC # BLD: 3.99 M/UL
RBC # BLD: 3.99 M/UL
RBC # FLD: 13.8 %
RBC BLD AUTO: ABNORMAL
RETICS # AUTO: 1.9 %
RETICS AGGREG/RBC NFR: 76.6 K/UL
RHEUMATOID FACT SER QL: 13 IU/ML
SCHISTOCYTES BLD QL SMEAR: SLIGHT
SODIUM SERPL-SCNC: 137 MMOL/L
TIBC SERPL-MCNC: 368 UG/DL
UIBC SERPL-MCNC: 303 UG/DL
WBC # FLD AUTO: 5.27 K/UL

## 2024-08-21 LAB — ANA SER IF-ACNC: NEGATIVE

## 2024-09-30 NOTE — STROKE CODE NOTE - IV ALTEPLASE EXCL ABS HIDDEN
Lana was contacted by a population [facility] pharmacy technician. This was per a referral from Theo Lawrence MD.  Unable to reach patient after two attempts.  Referral will be completed after patient call back.      Lauren Coronado CPhT  [facility] Pharmacy Technician Specialist  [phone]       show

## 2025-02-06 ENCOUNTER — APPOINTMENT (OUTPATIENT)
Dept: ORTHOPEDIC SURGERY | Age: 75
End: 2025-02-06
Payer: MEDICARE

## 2025-02-06 DIAGNOSIS — M25.522 PAIN IN LEFT ELBOW: ICD-10-CM

## 2025-02-06 DIAGNOSIS — M79.641 PAIN IN RIGHT HAND: ICD-10-CM

## 2025-02-06 PROCEDURE — 99214 OFFICE O/P EST MOD 30 MIN: CPT

## 2025-02-06 PROCEDURE — 73140 X-RAY EXAM OF FINGER(S): CPT | Mod: RT

## 2025-02-06 PROCEDURE — 73080 X-RAY EXAM OF ELBOW: CPT | Mod: LT

## 2025-04-21 ENCOUNTER — NON-APPOINTMENT (OUTPATIENT)
Age: 75
End: 2025-04-21

## 2025-04-22 ENCOUNTER — NON-APPOINTMENT (OUTPATIENT)
Age: 75
End: 2025-04-22

## 2025-04-29 ENCOUNTER — APPOINTMENT (OUTPATIENT)
Dept: HEMATOLOGY ONCOLOGY | Facility: CLINIC | Age: 75
End: 2025-04-29

## 2025-04-29 VITALS
BODY MASS INDEX: 26.24 KG/M2 | HEART RATE: 88 BPM | SYSTOLIC BLOOD PRESSURE: 142 MMHG | WEIGHT: 125 LBS | TEMPERATURE: 97.8 F | DIASTOLIC BLOOD PRESSURE: 46 MMHG | OXYGEN SATURATION: 96 % | HEIGHT: 58 IN

## 2025-04-29 DIAGNOSIS — D68.9 COAGULATION DEFECT, UNSPECIFIED: ICD-10-CM

## 2025-04-29 DIAGNOSIS — R23.3 SPONTANEOUS ECCHYMOSES: ICD-10-CM

## 2025-04-29 DIAGNOSIS — Z86.2 PERSONAL HISTORY OF DISEASES OF THE BLOOD AND BLOOD-FORMING ORGANS AND CERTAIN DISORDERS INVOLVING THE IMMUNE MECHANISM: ICD-10-CM

## 2025-04-29 DIAGNOSIS — D69.3 IMMUNE THROMBOCYTOPENIC PURPURA: ICD-10-CM

## 2025-04-29 DIAGNOSIS — D46.9 MYELODYSPLASTIC SYNDROME, UNSPECIFIED: ICD-10-CM

## 2025-04-29 DIAGNOSIS — D69.6 THROMBOCYTOPENIA, UNSPECIFIED: ICD-10-CM

## 2025-04-29 PROCEDURE — 36415 COLL VENOUS BLD VENIPUNCTURE: CPT

## 2025-04-29 PROCEDURE — 99215 OFFICE O/P EST HI 40 MIN: CPT

## 2025-04-29 PROCEDURE — G2211 COMPLEX E/M VISIT ADD ON: CPT

## 2025-04-29 RX ORDER — LIOTHYRONINE SODIUM 5 UG/1
5 TABLET ORAL
Refills: 0 | Status: ACTIVE | COMMUNITY

## 2025-04-29 RX ORDER — MV-MN/OM3/DHA/EPA/FISH/LUT/ZEA 250-5-1 MG
CAPSULE ORAL
Refills: 0 | Status: ACTIVE | COMMUNITY

## 2025-04-30 LAB
ALBUMIN SERPL ELPH-MCNC: 4.4 G/DL
ALP BLD-CCNC: 55 U/L
ALT SERPL-CCNC: 14 U/L
ANION GAP SERPL CALC-SCNC: 15 MMOL/L
ANISOCYTOSIS BLD QL: SLIGHT
APTT BLD: 33.2 SEC
AST SERPL-CCNC: 27 U/L
B2 GLYCOPROT1 IGA SERPL IA-ACNC: <2 U/ML
B2 GLYCOPROT1 IGG SER-ACNC: <1.4 U/ML
B2 GLYCOPROT1 IGM SER-ACNC: <1.5 U/ML
B2 MICROGLOB SERPL-MCNC: 3.4 MG/L
BILIRUB SERPL-MCNC: 0.4 MG/DL
BUN SERPL-MCNC: 28 MG/DL
BURR CELLS BLD QL SMEAR: PRESENT
CALCIUM SERPL-MCNC: 9.4 MG/DL
CARDIOLIPIN IGM SER-MCNC: <1.5 MPL U/ML
CARDIOLIPIN IGM SER-MCNC: <1.6 GPL U/ML
CHLORIDE SERPL-SCNC: 101 MMOL/L
CO2 SERPL-SCNC: 24 MMOL/L
CREAT SERPL-MCNC: 1.14 MG/DL
CRP SERPL-MCNC: <3 MG/L
DEPRECATED CARDIOLIPIN IGA SER: <2 APL U/ML
EGFRCR SERPLBLD CKD-EPI 2021: 51 ML/MIN/1.73M2
ERYTHROCYTE [SEDIMENTATION RATE] IN BLOOD BY WESTERGREN METHOD: 8 MM/HR
FERRITIN SERPL-MCNC: 71 NG/ML
FOLATE SERPL-MCNC: >20 NG/ML
GLUCOSE SERPL-MCNC: 84 MG/DL
HAPTOGLOB SERPL-MCNC: 21 MG/DL
HCT VFR BLD CALC: 40.3 %
HGB BLD-MCNC: 12.8 G/DL
INR PPP: 0.91
IRON SATN MFR SERPL: 28 %
IRON SERPL-MCNC: 103 UG/DL
LDH SERPL-CCNC: 305 U/L
LYMPHOCYTES # BLD AUTO: 0.82 K/UL
LYMPHOCYTES # BLD AUTO: 0.82 K/UL
MACROCYTES BLD QL SMEAR: SLIGHT
MAN DIFF?: NORMAL
MANUAL SMEAR: NORMAL
MCHC RBC-ENTMCNC: 30.5 PG
MCHC RBC-ENTMCNC: 31.8 G/DL
MCV RBC AUTO: 96 FL
MICROCYTES BLD QL SMEAR: SLIGHT
NEUTROPHILS # BLD AUTO: 2.72 K/UL
NEUTROPHILS # BLD AUTO: 2.72 K/UL
OVALOCYTES BLD QL SMEAR: SLIGHT
PLAT MORPH BLD: NORMAL
PLATELET # BLD AUTO: 69 K/UL
POIKILOCYTOSIS BLD QL SMEAR: SLIGHT
POLYCHROMASIA BLD QL SMEAR: SLIGHT
POTASSIUM SERPL-SCNC: 4.2 MMOL/L
PROT SERPL-MCNC: 7.1 G/DL
PT BLD: 10.5 SEC
RBC # BLD: 4.2 M/UL
RBC # BLD: 4.2 M/UL
RBC # FLD: 14.1 %
RBC BLD AUTO: ABNORMAL
RETICS # AUTO: 2.3 %
RETICS AGGREG/RBC NFR: 95.3 K/UL
SCHISTOCYTES BLD QL SMEAR: SLIGHT
SODIUM SERPL-SCNC: 139 MMOL/L
TIBC SERPL-MCNC: 374 UG/DL
UIBC SERPL-MCNC: 271 UG/DL
URATE SERPL-MCNC: 5.7 MG/DL
VIT B12 SERPL-MCNC: 1813 PG/ML
WBC # FLD AUTO: 4.12 K/UL

## 2025-05-02 LAB
APTT HEX PL PPP: NEGATIVE
CONFIRM: 29.2 SEC
DRVVT IMM 1:2 NP PPP: NORMAL
DRVVT SCREEN TO CONFIRM RATIO: 0.87 RATIO
SCREEN DRVVT: 25.4 SEC

## 2025-05-06 ENCOUNTER — NON-APPOINTMENT (OUTPATIENT)
Age: 75
End: 2025-05-06

## 2025-07-24 NOTE — PROGRESS NOTE BEHAVIORAL HEALTH - ESTIMATED INTELLIGENCE
[facility]: Cancer Care                                                                                      RN Cancer Care Coordinator responded to pt MyC message with information about IV iron orders just placed today.    Sending requests to scheduling desk.   She will need to call for scheduling at [facility] infusion.    Signature:  Blanquita Dang RN  
Above average

## 2025-08-11 ENCOUNTER — APPOINTMENT (OUTPATIENT)
Dept: HEMATOLOGY ONCOLOGY | Facility: CLINIC | Age: 75
End: 2025-08-11

## 2025-08-11 VITALS
SYSTOLIC BLOOD PRESSURE: 154 MMHG | TEMPERATURE: 96.7 F | OXYGEN SATURATION: 97 % | DIASTOLIC BLOOD PRESSURE: 80 MMHG | HEIGHT: 58 IN | HEART RATE: 86 BPM | WEIGHT: 126 LBS | BODY MASS INDEX: 26.45 KG/M2

## 2025-08-11 DIAGNOSIS — D69.3 IMMUNE THROMBOCYTOPENIC PURPURA: ICD-10-CM

## 2025-08-11 DIAGNOSIS — D46.9 MYELODYSPLASTIC SYNDROME, UNSPECIFIED: ICD-10-CM

## 2025-08-11 DIAGNOSIS — R23.3 SPONTANEOUS ECCHYMOSES: ICD-10-CM

## 2025-08-11 PROCEDURE — 99214 OFFICE O/P EST MOD 30 MIN: CPT

## 2025-08-11 PROCEDURE — 36415 COLL VENOUS BLD VENIPUNCTURE: CPT

## 2025-08-11 PROCEDURE — G2211 COMPLEX E/M VISIT ADD ON: CPT

## 2025-08-12 LAB
ALBUMIN SERPL ELPH-MCNC: 4.4 G/DL
ALP BLD-CCNC: 59 U/L
ALT SERPL-CCNC: 12 U/L
ANION GAP SERPL CALC-SCNC: 14 MMOL/L
AST SERPL-CCNC: 28 U/L
AUTO BASOPHILS #: 0.03 K/UL
AUTO BASOPHILS %: 0.6 %
AUTO EOSINOPHILS #: 0.24 K/UL
AUTO EOSINOPHILS %: 5 %
AUTO IMMATURE GRANULOCYTES #: 0.11 K/UL
AUTO LYMPHOCYTES #: 0.84 K/UL
AUTO LYMPHOCYTES %: 17.4 %
AUTO MONOCYTES #: 0.52 K/UL
AUTO MONOCYTES %: 10.7 %
AUTO NEUTROPHILS #: 3.1 K/UL
AUTO NEUTROPHILS %: 64 %
AUTO NRBC #: 0 K/UL
B2 MICROGLOB SERPL-MCNC: 3.3 MG/L
BILIRUB SERPL-MCNC: 0.4 MG/DL
BUN SERPL-MCNC: 27 MG/DL
CALCIUM SERPL-MCNC: 9.3 MG/DL
CHLORIDE SERPL-SCNC: 101 MMOL/L
CO2 SERPL-SCNC: 25 MMOL/L
CREAT SERPL-MCNC: 1.11 MG/DL
CRP SERPL-MCNC: <3 MG/L
EGFRCR SERPLBLD CKD-EPI 2021: 52 ML/MIN/1.73M2
ERYTHROCYTE [SEDIMENTATION RATE] IN BLOOD BY WESTERGREN METHOD: 13 MM/HR
FERRITIN SERPL-MCNC: 94 NG/ML
FOLATE SERPL-MCNC: 12.1 NG/ML
GIANT PLATELETS BLD QL SMEAR: PRESENT
GLUCOSE SERPL-MCNC: 91 MG/DL
HAPTOGLOB SERPL-MCNC: 31 MG/DL
HCT VFR BLD CALC: 41.5 %
HGB BLD-MCNC: 12.8 G/DL
IMM GRANULOCYTES NFR BLD AUTO: 2.3 %
IMMATURE PLATELET FRACTION #: 5.6 K/UL
IMMATURE PLATELET FRACTION %: 17.6 %
IMMATURE RETICULOCYTE FRACTION %: 6.9 %
IRON SATN MFR SERPL: 22 %
IRON SERPL-MCNC: 80 UG/DL
LDH SERPL-CCNC: 389 U/L
MAN DIFF?: NORMAL
MANUAL BASOPHILS #: 0.21 K/UL
MANUAL BASOPHILS %: 4.3 %
MANUAL EOSINOPHILS #: 0.21 K/UL
MANUAL EOSINOPHILS %: 4.3 %
MANUAL LYMPHOCYTES #: 0.8 K/UL
MANUAL LYMPHOCYTES %: 16.5 %
MANUAL MONOCYTES #: 0.25 K/UL
MANUAL MONOCYTES %: 5.2 %
MANUAL MYELOCYTE #: 0.08 K/UL
MANUAL NEUTROPHILS #: 3.29 K/UL
MANUAL NEUTROPHILS %: 68 %
MCHC RBC-ENTMCNC: 30.3 PG
MCHC RBC-ENTMCNC: 30.8 G/DL
MCV RBC AUTO: 98.1 FL
MYELOCYTES NFR BLD: 1.7 %
PLAT MORPH BLD: ABNORMAL
PLATELET # BLD AUTO: 32 K/UL
PLATELET ESTIMATE: ABNORMAL
PMV BLD: NORMAL FL
POTASSIUM SERPL-SCNC: 5.4 MMOL/L
PROT SERPL-MCNC: 7.2 G/DL
RBC # BLD: 4.23 M/UL
RBC # BLD: 4.23 M/UL
RBC # FLD: 14.1 %
RBC BLD AUTO: NORMAL
RETICS # AUTO: 2.1 %
RETICS AGGREG/RBC NFR: 87.6 K/UL
RETICULOCYTE HEMOGLOBIN EQUIVALENT: 33.8 PG
SODIUM SERPL-SCNC: 140 MMOL/L
TIBC SERPL-MCNC: 358 UG/DL
UIBC SERPL-MCNC: 278 UG/DL
URATE SERPL-MCNC: 5.9 MG/DL
VIT B12 SERPL-MCNC: 1706 PG/ML
WBC # FLD AUTO: 4.84 K/UL

## 2025-08-14 ENCOUNTER — NON-APPOINTMENT (OUTPATIENT)
Age: 75
End: 2025-08-14

## 2025-08-19 ENCOUNTER — NON-APPOINTMENT (OUTPATIENT)
Age: 75
End: 2025-08-19

## 2025-09-08 ENCOUNTER — APPOINTMENT (OUTPATIENT)
Dept: HEMATOLOGY ONCOLOGY | Facility: CLINIC | Age: 75
End: 2025-09-08

## 2025-09-08 DIAGNOSIS — E87.1 HYPO-OSMOLALITY AND HYPONATREMIA: ICD-10-CM

## 2025-09-08 DIAGNOSIS — Z86.2 PERSONAL HISTORY OF DISEASES OF THE BLOOD AND BLOOD-FORMING ORGANS AND CERTAIN DISORDERS INVOLVING THE IMMUNE MECHANISM: ICD-10-CM

## 2025-09-08 DIAGNOSIS — D46.9 MYELODYSPLASTIC SYNDROME, UNSPECIFIED: ICD-10-CM

## 2025-09-08 DIAGNOSIS — D69.6 THROMBOCYTOPENIA, UNSPECIFIED: ICD-10-CM

## 2025-09-08 DIAGNOSIS — R23.3 SPONTANEOUS ECCHYMOSES: ICD-10-CM

## 2025-09-08 PROCEDURE — 36415 COLL VENOUS BLD VENIPUNCTURE: CPT

## 2025-09-09 LAB
AUTO BASOPHILS #: 0.04 K/UL
AUTO BASOPHILS %: 0.9 %
AUTO EOSINOPHILS #: 0.2 K/UL
AUTO EOSINOPHILS %: 4.6 %
AUTO IMMATURE GRANULOCYTES #: 0.21 K/UL
AUTO LYMPHOCYTES #: 0.67 K/UL
AUTO LYMPHOCYTES %: 15.4 %
AUTO MONOCYTES #: 0.46 K/UL
AUTO MONOCYTES %: 10.6 %
AUTO NEUTROPHILS #: 2.78 K/UL
AUTO NEUTROPHILS %: 63.7 %
AUTO NRBC #: 0 K/UL
ERYTHROCYTE [SEDIMENTATION RATE] IN BLOOD BY WESTERGREN METHOD: 9 MM/HR
GIANT PLATELETS BLD QL SMEAR: PRESENT
HAPTOGLOB SERPL-MCNC: 21 MG/DL
HCT VFR BLD CALC: 39.4 %
HGB BLD-MCNC: 12.6 G/DL
IMM GRANULOCYTES NFR BLD AUTO: 4.8 %
IMMATURE PLATELET FRACTION #: 12.6 K/UL
IMMATURE PLATELET FRACTION %: 17.2 %
IMMATURE RETICULOCYTE FRACTION %: 7 %
LDH SERPL-CCNC: 282 U/L
MAN DIFF?: NORMAL
MANUAL BASOPHILS #: 0.22 K/UL
MANUAL BASOPHILS %: 5.1 %
MANUAL EOSINOPHILS #: 0.11 K/UL
MANUAL EOSINOPHILS %: 2.6 %
MANUAL LYMPHOCYTES #: 0.56 K/UL
MANUAL LYMPHOCYTES %: 12.8 %
MANUAL MONOCYTES #: 0.45 K/UL
MANUAL MONOCYTES %: 10.3 %
MANUAL NEUTROPHILS #: 3.02 K/UL
MANUAL NEUTROPHILS %: 69.2 %
MCHC RBC-ENTMCNC: 30.5 PG
MCHC RBC-ENTMCNC: 32 G/DL
MCV RBC AUTO: 95.4 FL
PLAT MORPH BLD: ABNORMAL
PLATELET # BLD AUTO: 73 K/UL
PLATELET ESTIMATE: ABNORMAL
PMV BLD AUTO: 0 /100 WBCS
PMV BLD: 13.2 FL
RBC # BLD: 4.13 M/UL
RBC # BLD: 4.13 M/UL
RBC # FLD: 14.1 %
RBC BLD AUTO: NORMAL
RETICS # AUTO: 2.4 %
RETICS AGGREG/RBC NFR: 97.5 K/UL
RETICULOCYTE HEMOGLOBIN EQUIVALENT: 34.1 PG
WBC # FLD AUTO: 4.36 K/UL

## 2025-09-10 ENCOUNTER — NON-APPOINTMENT (OUTPATIENT)
Age: 75
End: 2025-09-10